# Patient Record
Sex: MALE | Race: WHITE | Employment: OTHER | URBAN - METROPOLITAN AREA
[De-identification: names, ages, dates, MRNs, and addresses within clinical notes are randomized per-mention and may not be internally consistent; named-entity substitution may affect disease eponyms.]

---

## 2017-03-03 ENCOUNTER — OFFICE VISIT (OUTPATIENT)
Dept: INTERNAL MEDICINE CLINIC | Age: 47
End: 2017-03-03

## 2017-03-03 VITALS
BODY MASS INDEX: 24.99 KG/M2 | HEIGHT: 65 IN | HEART RATE: 56 BPM | RESPIRATION RATE: 18 BRPM | WEIGHT: 150 LBS | OXYGEN SATURATION: 98 % | DIASTOLIC BLOOD PRESSURE: 70 MMHG | SYSTOLIC BLOOD PRESSURE: 120 MMHG

## 2017-03-03 DIAGNOSIS — E55.9 VITAMIN D DEFICIENCY: ICD-10-CM

## 2017-03-03 DIAGNOSIS — K21.9 GASTROESOPHAGEAL REFLUX DISEASE WITHOUT ESOPHAGITIS: ICD-10-CM

## 2017-03-03 DIAGNOSIS — J44.9 CHRONIC OBSTRUCTIVE PULMONARY DISEASE, UNSPECIFIED COPD TYPE (HCC): ICD-10-CM

## 2017-03-03 DIAGNOSIS — I10 ESSENTIAL HYPERTENSION: ICD-10-CM

## 2017-03-03 DIAGNOSIS — R73.03 PREDIABETES: ICD-10-CM

## 2017-03-03 DIAGNOSIS — E53.8 FOLATE DEFICIENCY: ICD-10-CM

## 2017-03-03 DIAGNOSIS — E78.5 HYPERLIPIDEMIA LDL GOAL <70: Primary | ICD-10-CM

## 2017-03-03 DIAGNOSIS — M62.838 MUSCLE SPASM: ICD-10-CM

## 2017-03-03 LAB
A/G RATIO: 1.5 (ref 1.1–2.2)
ALBUMIN SERPL-MCNC: 4.3 G/DL (ref 3.4–5)
ALP BLD-CCNC: 92 U/L (ref 40–129)
ALT SERPL-CCNC: 13 U/L (ref 10–40)
ANION GAP SERPL CALCULATED.3IONS-SCNC: 17 MMOL/L (ref 3–16)
AST SERPL-CCNC: 19 U/L (ref 15–37)
BILIRUB SERPL-MCNC: 0.3 MG/DL (ref 0–1)
BUN BLDV-MCNC: 17 MG/DL (ref 7–20)
CALCIUM SERPL-MCNC: 9.3 MG/DL (ref 8.3–10.6)
CHLORIDE BLD-SCNC: 99 MMOL/L (ref 99–110)
CHOLESTEROL, TOTAL: 123 MG/DL (ref 0–199)
CO2: 29 MMOL/L (ref 21–32)
CREAT SERPL-MCNC: 0.9 MG/DL (ref 0.9–1.3)
GFR AFRICAN AMERICAN: >60
GFR NON-AFRICAN AMERICAN: >60
GLOBULIN: 2.8 G/DL
GLUCOSE BLD-MCNC: 88 MG/DL (ref 70–99)
HDLC SERPL-MCNC: 56 MG/DL (ref 40–60)
LDL CHOLESTEROL CALCULATED: 43 MG/DL
POTASSIUM SERPL-SCNC: 4.3 MMOL/L (ref 3.5–5.1)
SODIUM BLD-SCNC: 145 MMOL/L (ref 136–145)
TOTAL PROTEIN: 7.1 G/DL (ref 6.4–8.2)
TRIGL SERPL-MCNC: 120 MG/DL (ref 0–150)
TSH SERPL DL<=0.05 MIU/L-ACNC: 0.95 UIU/ML (ref 0.27–4.2)
VLDLC SERPL CALC-MCNC: 24 MG/DL

## 2017-03-03 PROCEDURE — 36415 COLL VENOUS BLD VENIPUNCTURE: CPT | Performed by: FAMILY MEDICINE

## 2017-03-03 PROCEDURE — 99214 OFFICE O/P EST MOD 30 MIN: CPT | Performed by: FAMILY MEDICINE

## 2017-03-03 RX ORDER — HYDROCHLOROTHIAZIDE 25 MG/1
TABLET ORAL
Qty: 30 TABLET | Refills: 11 | Status: SHIPPED | OUTPATIENT
Start: 2017-03-03 | End: 2018-03-15 | Stop reason: SDUPTHER

## 2017-03-03 RX ORDER — ATORVASTATIN CALCIUM 20 MG/1
20 TABLET, FILM COATED ORAL DAILY
Qty: 30 TABLET | Refills: 11 | Status: SHIPPED | OUTPATIENT
Start: 2017-03-03 | End: 2018-03-15 | Stop reason: SDUPTHER

## 2017-03-03 RX ORDER — MULTIVIT-MIN/IRON/FOLIC ACID/K 18-600-40
1 CAPSULE ORAL DAILY
Qty: 30 CAPSULE | Refills: 11 | Status: SHIPPED | OUTPATIENT
Start: 2017-03-03 | End: 2018-03-15 | Stop reason: SDUPTHER

## 2017-03-03 RX ORDER — FOLIC ACID 1 MG/1
1 TABLET ORAL DAILY
Qty: 30 TABLET | Refills: 11 | Status: SHIPPED | OUTPATIENT
Start: 2017-03-03 | End: 2018-04-03 | Stop reason: ALTCHOICE

## 2017-03-03 RX ORDER — ALBUTEROL SULFATE 90 UG/1
2 AEROSOL, METERED RESPIRATORY (INHALATION) EVERY 4 HOURS PRN
Qty: 1 INHALER | Refills: 11 | Status: SHIPPED | OUTPATIENT
Start: 2017-03-03 | End: 2018-03-15 | Stop reason: SDUPTHER

## 2017-03-03 RX ORDER — PANTOPRAZOLE SODIUM 40 MG/1
TABLET, DELAYED RELEASE ORAL
Qty: 30 TABLET | Refills: 11 | Status: SHIPPED | OUTPATIENT
Start: 2017-03-03 | End: 2018-03-15 | Stop reason: SDUPTHER

## 2017-03-04 ENCOUNTER — TELEPHONE (OUTPATIENT)
Dept: INTERNAL MEDICINE CLINIC | Age: 47
End: 2017-03-04

## 2017-03-04 LAB
ESTIMATED AVERAGE GLUCOSE: 125.5 MG/DL
HBA1C MFR BLD: 6 %

## 2017-03-04 ASSESSMENT — ENCOUNTER SYMPTOMS
WHEEZING: 0
SHORTNESS OF BREATH: 0
DIARRHEA: 0
ABDOMINAL PAIN: 0
VOMITING: 0
COUGH: 0

## 2018-03-15 ENCOUNTER — OFFICE VISIT (OUTPATIENT)
Dept: FAMILY MEDICINE CLINIC | Age: 48
End: 2018-03-15

## 2018-03-15 VITALS
SYSTOLIC BLOOD PRESSURE: 128 MMHG | WEIGHT: 145.4 LBS | HEART RATE: 78 BPM | HEIGHT: 64 IN | DIASTOLIC BLOOD PRESSURE: 76 MMHG | OXYGEN SATURATION: 94 % | BODY MASS INDEX: 24.82 KG/M2

## 2018-03-15 DIAGNOSIS — J44.9 CHRONIC OBSTRUCTIVE PULMONARY DISEASE, UNSPECIFIED COPD TYPE (HCC): ICD-10-CM

## 2018-03-15 DIAGNOSIS — Z12.11 SCREEN FOR COLON CANCER: ICD-10-CM

## 2018-03-15 DIAGNOSIS — L81.8 EXTENSIVE TATTOOS: ICD-10-CM

## 2018-03-15 DIAGNOSIS — R73.03 PREDIABETES: ICD-10-CM

## 2018-03-15 DIAGNOSIS — Z11.4 SCREENING FOR HIV WITHOUT PRESENCE OF RISK FACTORS: ICD-10-CM

## 2018-03-15 DIAGNOSIS — Z00.00 ANNUAL PHYSICAL EXAM: Primary | ICD-10-CM

## 2018-03-15 DIAGNOSIS — E78.5 HYPERLIPIDEMIA LDL GOAL <70: ICD-10-CM

## 2018-03-15 DIAGNOSIS — E55.9 VITAMIN D DEFICIENCY: ICD-10-CM

## 2018-03-15 DIAGNOSIS — Z12.5 SCREENING PSA (PROSTATE SPECIFIC ANTIGEN): ICD-10-CM

## 2018-03-15 DIAGNOSIS — I10 ESSENTIAL HYPERTENSION: ICD-10-CM

## 2018-03-15 DIAGNOSIS — E78.2 MIXED HYPERLIPIDEMIA: ICD-10-CM

## 2018-03-15 DIAGNOSIS — K21.9 GASTROESOPHAGEAL REFLUX DISEASE WITHOUT ESOPHAGITIS: ICD-10-CM

## 2018-03-15 LAB
A/G RATIO: 2.1 (ref 1.1–2.2)
ALBUMIN SERPL-MCNC: 4.5 G/DL (ref 3.4–5)
ALP BLD-CCNC: 93 U/L (ref 40–129)
ALT SERPL-CCNC: 12 U/L (ref 10–40)
ANION GAP SERPL CALCULATED.3IONS-SCNC: 14 MMOL/L (ref 3–16)
AST SERPL-CCNC: 16 U/L (ref 15–37)
BILIRUB SERPL-MCNC: <0.2 MG/DL (ref 0–1)
BUN BLDV-MCNC: 15 MG/DL (ref 7–20)
CALCIUM SERPL-MCNC: 9 MG/DL (ref 8.3–10.6)
CHLORIDE BLD-SCNC: 98 MMOL/L (ref 99–110)
CHOLESTEROL, TOTAL: 116 MG/DL (ref 0–199)
CO2: 28 MMOL/L (ref 21–32)
CREAT SERPL-MCNC: 0.9 MG/DL (ref 0.9–1.3)
GFR AFRICAN AMERICAN: >60
GFR NON-AFRICAN AMERICAN: >60
GLOBULIN: 2.1 G/DL
GLUCOSE BLD-MCNC: 100 MG/DL (ref 70–99)
HDLC SERPL-MCNC: 62 MG/DL (ref 40–60)
HEPATITIS C ANTIBODY INTERPRETATION: NORMAL
LDL CHOLESTEROL CALCULATED: 43 MG/DL
POTASSIUM SERPL-SCNC: 4 MMOL/L (ref 3.5–5.1)
PROSTATE SPECIFIC ANTIGEN: 0.24 NG/ML (ref 0–4)
SODIUM BLD-SCNC: 140 MMOL/L (ref 136–145)
TOTAL PROTEIN: 6.6 G/DL (ref 6.4–8.2)
TRIGL SERPL-MCNC: 53 MG/DL (ref 0–150)
VITAMIN D 25-HYDROXY: 34.8 NG/ML
VLDLC SERPL CALC-MCNC: 11 MG/DL

## 2018-03-15 PROCEDURE — 36415 COLL VENOUS BLD VENIPUNCTURE: CPT | Performed by: FAMILY MEDICINE

## 2018-03-15 PROCEDURE — 99396 PREV VISIT EST AGE 40-64: CPT | Performed by: FAMILY MEDICINE

## 2018-03-15 RX ORDER — ATORVASTATIN CALCIUM 20 MG/1
20 TABLET, FILM COATED ORAL DAILY
Qty: 90 TABLET | Refills: 0 | Status: SHIPPED | OUTPATIENT
Start: 2018-03-15 | End: 2018-06-10 | Stop reason: SDUPTHER

## 2018-03-15 RX ORDER — HYDROCHLOROTHIAZIDE 25 MG/1
TABLET ORAL
Qty: 90 TABLET | Refills: 11 | Status: SHIPPED | OUTPATIENT
Start: 2018-03-15 | End: 2019-04-10 | Stop reason: SDUPTHER

## 2018-03-15 RX ORDER — MULTIVIT-MIN/IRON/FOLIC ACID/K 18-600-40
1 CAPSULE ORAL DAILY
Qty: 90 CAPSULE | Refills: 0 | Status: SHIPPED | OUTPATIENT
Start: 2018-03-15 | End: 2018-06-11 | Stop reason: ALTCHOICE

## 2018-03-15 RX ORDER — ALBUTEROL SULFATE 90 UG/1
2 AEROSOL, METERED RESPIRATORY (INHALATION) EVERY 4 HOURS PRN
Qty: 1 INHALER | Refills: 2 | Status: SHIPPED | OUTPATIENT
Start: 2018-03-15 | End: 2020-03-03

## 2018-03-15 RX ORDER — PANTOPRAZOLE SODIUM 40 MG/1
TABLET, DELAYED RELEASE ORAL
Qty: 90 TABLET | Refills: 0 | Status: SHIPPED | OUTPATIENT
Start: 2018-03-15 | End: 2018-06-10 | Stop reason: SDUPTHER

## 2018-03-15 ASSESSMENT — ENCOUNTER SYMPTOMS
EYE DISCHARGE: 0
CONSTIPATION: 0
SINUS PRESSURE: 0
WHEEZING: 0
ABDOMINAL PAIN: 0
DIARRHEA: 0
EYE PAIN: 0
COUGH: 0
SHORTNESS OF BREATH: 1
RHINORRHEA: 0
COLOR CHANGE: 0
VOMITING: 0
BLOOD IN STOOL: 0
CHEST TIGHTNESS: 0

## 2018-03-15 NOTE — PROGRESS NOTES
capsule by mouth daily 30 capsule 11     No current facility-administered medications for this visit. Allergies: No known allergies  Past Medical History:   Diagnosis Date    Atherosclerosis 11/15: [place]    COPD (chronic obstructive pulmonary disease) ([address]     Folate deficiency 01/2013    GERD (gastroesophageal reflux disease)     Hyperlipidemia 2/14    Hypertriglyceridemia, Good HDL    Hypertension 12/12: age 37    Needle phobia     Prediabetes 11/2016    Vitamin D deficiency 1/13     Past Surgical History:   Procedure Laterality Date    ANKLE FRACTURE SURGERY Left 1998    BRONCHOSCOPY  12-   [address] [place] [address] DENTAL SURGERY  2/13     Family History   Problem Relation Age of Onset    COPD Mother 39    COPD Father 39     Social History   Substance Use Topics    Smoking status: Current Every Day Smoker     Packs/day: 1.00     Years: 30.00     Types: Cigarettes     Start date: 1984    Smokeless tobacco: Never Used    Alcohol use 0.0 oz/week      Comment: occasional     Vitals:    03/15/18 1110   BP: 128/76   Pulse: 78   SpO2: 94%   Weight: 145 lb 6.4 oz (66 kg)   Height: 5' 4\" (1.626 m)     Body mass index is 24.96 kg/m². Wt Readings from Last 3 Encounters:   03/15/18 145 lb 6.4 oz (66 kg)   01/18/18 145 lb (65.8 kg)   03/03/17 150 lb (68 kg)     BP Readings from Last 3 Encounters:   03/15/18 128/76   01/18/18 133/65   03/03/17 120/70        Review of Systems   Constitutional: Negative for fatigue, fever and unexpected weight change. HENT: Negative for congestion, ear discharge, ear pain, hearing loss, rhinorrhea and sinus pressure. Eyes: Negative for pain, discharge and visual disturbance. Respiratory: Positive for shortness of breath. Negative for cough, chest tightness and wheezing. Cardiovascular: Negative for chest pain, palpitations and leg swelling. Gastrointestinal: Negative for abdominal pain, blood in stool, constipation, diarrhea and vomiting.    Genitourinary: Negative for difficulty urinating, dysuria and hematuria. Musculoskeletal: Negative for arthralgias and gait problem. Skin: Negative for color change and rash. Neurological: Negative for dizziness, seizures, syncope and headaches. Hematological: Negative for adenopathy. Does not bruise/bleed easily. Psychiatric/Behavioral: Negative for dysphoric mood and sleep disturbance. The patient is not nervous/anxious. Objective:   Physical Exam   Constitutional: He is oriented to person, place, and time. He appears well-developed and well-nourished. No distress. HENT:   Head: Normocephalic. Right Ear: External ear normal.   Left Ear: External ear normal.   Nose: Nose normal.   Mouth/Throat: Oropharynx is clear and moist. No oropharyngeal exudate. Eyes: Conjunctivae and EOM are normal. Pupils are equal, round, and reactive to light. No scleral icterus. Neck: Neck supple. No thyromegaly present. Cardiovascular: Normal rate, regular rhythm, normal heart sounds and intact distal pulses. No murmur heard. Pulmonary/Chest: Effort normal and breath sounds normal. He has no wheezes. Abdominal: Soft. Bowel sounds are normal. He exhibits no distension. There is no tenderness. There is no rebound and no guarding. Musculoskeletal: Normal range of motion. He exhibits no edema, tenderness or deformity. Lymphadenopathy:     He has no cervical adenopathy. Neurological: He is alert and oriented to person, place, and time. No cranial nerve deficit. Coordination normal.   Skin: Skin is warm and dry. Multiple tattoos   Psychiatric: He has a normal mood and affect.  His behavior is normal. Judgment and thought content normal.       Assessment:      cpe  htn  hld  Prediabetes  COPD  GERD        Plan:      Orders Placed This Encounter   Procedures    LIPID PANEL     Order Specific Question:   Is Patient Fasting?/# of Hours     Answer:   12    COMPREHENSIVE METABOLIC PANEL    VITAMIN D 25 HYDROXY    HEMOGLOBIN A1C    PSA Screening    HIV-1 AND HIV-2 ANTIBODIES    HEPATITIS C ANTIBODY    Garlin Leventhal- Teri Belling, MD     Referral Priority:   Routine     Referral Type:   Consult for Advice and Opinion     Referral Reason:   Specialty Services Required     Referred to Provider:   Angel Real MD     Requested Specialty:   Pulmonology     Number of Visits Requested:   1   Diony Nguyen MD     Referral Priority:   Routine     Referral Type:   Consult for Advice and Opinion     Referral Reason:   Specialty Services Required     Referred to Provider:   Claudette Griffes, MD     Requested Specialty:   Gastroenterology     Number of Visits Requested:   1     Patient Counseling:  --Nutrition: Stressed importance of moderation in sodium/caffeine intake, saturated fat and cholesterol, caloric balance, sufficient intake of fresh fruits, vegetables, fiber, calcium, iron, and 1 mg of folate supplement per day (for females capable of pregnancy). --Exercise: Stressed the importance of regular exercise. --Dental health: Discussed importance of regular tooth brushing, flossing, and dental visits. --Immunizations reviewed. Daily sunscreen recommended. Yearly FSE discussed  --Discussed benefits of screening colonoscopy.

## 2018-03-16 ENCOUNTER — TELEPHONE (OUTPATIENT)
Dept: FAMILY MEDICINE CLINIC | Age: 48
End: 2018-03-16

## 2018-03-16 LAB
ESTIMATED AVERAGE GLUCOSE: 111.2 MG/DL
HBA1C MFR BLD: 5.5 %
HIV AG/AB: NORMAL
HIV ANTIGEN: NORMAL
HIV-1 ANTIBODY: NORMAL
HIV-2 AB: NORMAL

## 2018-03-16 NOTE — TELEPHONE ENCOUNTER
Patient's wife states that if Dr. Nydia Henao wants him to continue taking the magnesium and folic acid they would like a script sent to pharmacy, if she is ok with him stopping these just let them know. They were advised Dr. Nydia Henao is gone for the day and may not respond until Monday and they are ok with this.

## 2018-03-16 NOTE — TELEPHONE ENCOUNTER
The patient called about the prescriptions Dr. Johnny Gonzalez sent over to the pharmacy. He was able to get all of his scripts however he noticed that the magnesium and folic acid he takes were not sent in. He would like to know if Dr. Johnny Gonzalez would prescribe those or does she not want him taking those.

## 2018-03-19 NOTE — TELEPHONE ENCOUNTER
Patient's wife was advised he does not need to continue taking these supplements per Dr. Gerardo Rao so no scripts will be sent.

## 2018-03-28 ENCOUNTER — INITIAL CONSULT (OUTPATIENT)
Dept: GASTROENTEROLOGY | Age: 48
End: 2018-03-28

## 2018-03-28 ENCOUNTER — OFFICE VISIT (OUTPATIENT)
Dept: PULMONOLOGY | Age: 48
End: 2018-03-28

## 2018-03-28 VITALS
HEIGHT: 64 IN | DIASTOLIC BLOOD PRESSURE: 72 MMHG | BODY MASS INDEX: 24.92 KG/M2 | SYSTOLIC BLOOD PRESSURE: 109 MMHG | RESPIRATION RATE: 16 BRPM | OXYGEN SATURATION: 93 % | TEMPERATURE: 97.6 F | HEART RATE: 78 BPM | WEIGHT: 146 LBS

## 2018-03-28 VITALS
DIASTOLIC BLOOD PRESSURE: 68 MMHG | BODY MASS INDEX: 24.92 KG/M2 | WEIGHT: 146 LBS | SYSTOLIC BLOOD PRESSURE: 110 MMHG | HEIGHT: 64 IN

## 2018-03-28 DIAGNOSIS — R13.19 ESOPHAGEAL DYSPHAGIA: Primary | ICD-10-CM

## 2018-03-28 DIAGNOSIS — R12 HEARTBURN: ICD-10-CM

## 2018-03-28 DIAGNOSIS — J44.9 CHRONIC OBSTRUCTIVE PULMONARY DISEASE, UNSPECIFIED COPD TYPE (HCC): Primary | ICD-10-CM

## 2018-03-28 DIAGNOSIS — Z12.11 SCREENING FOR COLON CANCER: ICD-10-CM

## 2018-03-28 PROCEDURE — 3017F COLORECTAL CA SCREEN DOC REV: CPT | Performed by: INTERNAL MEDICINE

## 2018-03-28 PROCEDURE — G8926 SPIRO NO PERF OR DOC: HCPCS | Performed by: INTERNAL MEDICINE

## 2018-03-28 PROCEDURE — 3023F SPIROM DOC REV: CPT | Performed by: INTERNAL MEDICINE

## 2018-03-28 PROCEDURE — 99214 OFFICE O/P EST MOD 30 MIN: CPT | Performed by: INTERNAL MEDICINE

## 2018-03-28 PROCEDURE — G8419 CALC BMI OUT NRM PARAM NOF/U: HCPCS | Performed by: INTERNAL MEDICINE

## 2018-03-28 PROCEDURE — 4004F PT TOBACCO SCREEN RCVD TLK: CPT | Performed by: INTERNAL MEDICINE

## 2018-03-28 PROCEDURE — G8427 DOCREV CUR MEDS BY ELIG CLIN: HCPCS | Performed by: INTERNAL MEDICINE

## 2018-03-28 PROCEDURE — G8484 FLU IMMUNIZE NO ADMIN: HCPCS | Performed by: INTERNAL MEDICINE

## 2018-03-28 PROCEDURE — 99204 OFFICE O/P NEW MOD 45 MIN: CPT | Performed by: INTERNAL MEDICINE

## 2018-03-28 RX ORDER — VARENICLINE TARTRATE 25 MG
KIT ORAL
Qty: 1 EACH | Refills: 0 | Status: SHIPPED | OUTPATIENT
Start: 2018-03-28 | End: 2018-05-04

## 2018-03-28 RX ORDER — VARENICLINE TARTRATE 1 MG/1
1 TABLET, FILM COATED ORAL 2 TIMES DAILY
Qty: 60 TABLET | Refills: 3 | Status: SHIPPED | OUTPATIENT
Start: 2018-04-24 | End: 2018-05-04

## 2018-03-28 NOTE — LETTER
Dabigatran (Pradaxa), Apixaban (Eliquis), Edoxaban (Savaysa)you should have received instructions regarding if and when to discontinue the medication. If you have not, or do not clearly understand the instructions, please call the office for clarification ([phone]). 5. Drink plenty of fluid. 1 day prior to your procedure:  1. Do not eat any SOLID FOOD, beginning with breakfast drink clear liquids only, which includes: Coffee/tea (without milk or creamer) Gatorade/PowerAde (no red or purple), JeIl-O (no red or purple), All Soda (even dark cola), Sorbet/Popsicles (no red or purple) Water If you take Diabetic medications (insulin/oral medication)-reduce the amount by one half on the morning of prep. You may resume the meds once you begin eating again. You must drink 8oz of liquids every hour to avoid dehydration. 2. If you take Diabetic medications (insulin/oral medication) - reduce the amount by one half on morning of prep. You may resume the meds once you begin eating again. 3. Bowel Cleansing Prep  * 7:45 pm Take all four dulcolax tablets. * 8:00 pm (one day prior)-Fill cup with 5oz of cold water then add one packet of prep (stir it for 2-3 min.) & drink it. MAKE SURE THE SOLUTION IS DISSOLVED PRIOR TO DRINKING IT. You must drink 5 more 8oz glasses of clear liquids within the next 5 hrs. MORNING OF PROCEDURE  1. __8:00 AM___(5 hrs prior to the procedure) Mix second packet with 5oz of cold water and drink it. You must drink 3 more 8oz glasses of clear liquids within the next 2hrs. 2. Take your Blood pressure, Heart and Seizure medication the morning of the procedure with sips of water. 3. Bring inhalers with you. 4. Do not take your Diabetic medication the morning of procedure. 5. If you are not having an EGD, you may continue drinking liquids until 2hrs prior to procedure.

## 2018-03-28 NOTE — PROGRESS NOTES
Chief Complaint: shortness of breath     Referring Provider: [facility] History:   Severe dyspnea on exertion, walks less than 50 feet. Tries ventolin with small improvement. Presenting HPI: 51 yo WM with 60 pack year tobacco, now with 2 year moderate progressive dyspnea on exertion associated with productive cough; symptoms are not 100% predictable, some intermittent nature, but daily. Symptoms are improved with albuterol       Physical Exam:  Blood pressure 109/72, pulse 78, temperature 97.6 °F (36.4 °C), temperature source Oral, resp. rate 16, height 5' 4\" (1.626 m), weight 146 lb (66.2 kg), SpO2 93 %.'  Constitutional:  No acute distress. HENT:  Oropharynx is clear and moist. No thyromegaly. Eyes:  Conjunctivae are normal. Pupils equal, round, and reactive to light. No scleral icterus. Neck: . No tracheal deviation present. No obvious thyroid mass. Cardiovascular: Normal rate, regular rhythm, normal heart sounds. No right ventricular heave. No lower extremity edema. Pulmonary/Chest: Few wheezes. No rales. Chest wall is not dull to percussion. No accessory muscle usage or stridor. Abdominal: Soft. Bowel sounds present. No distension or hernia. No tenderness. Musculoskeletal: No cyanosis. No clubbing. No obvious joint deformity. Lymphadenopathy: No cervical or supraclavicular adenopathy. Skin: Skin is warm and dry. No rash or nodules on the exposed extremities. Psychiatric: Normal mood and affect. Behavior is normal.  No anxiety. Neurologic: Alert, awake and oriented. PERRL. Speech fluent    Data:   CT CHEST 11-18-15  There is some minimal increased opacity in the right mainstem bronchus and   bronchus intermedius likely retained secretions. The central airways are   otherwise patent. Subpleural opacity is noted posteriorly at the right lung base likely   representative of atelectasis. There is a calcified granuloma noted posteriorly in the left lower lobe. Lungs are otherwise grossly clear. Mediastinal structures demonstrate a normal appearance of the heart and great   vessels. No suspicious hilar or mediastinal adenopathy noted. The esophagus appears to taper normally to the GE junction. GE junction is unremarkable. Stomach and the small bowel are unremarkable in appearance. Mesenteric and   retroperitoneal structures including the abdominal aorta are grossly   unremarkable. Atherosclerotic disease is noted. Liver, gallbladder, spleen, kidneys, and pancreas are unremarkable in   appearance. No acute abnormality of the visualized osseous structures. Impression   IMPRESSION:   No acute abnormality of the chest or abdomen noted. PFT 1/12/16 FEV1 1/05 L 32%  ++ airtrapping  DLCO 14.73 50%    Bronchoscopy 2015 cytology no malignant cells. Assessment:  Severe COPD  Severe dyspnea on exertion likely secondary to above  Family h/o COPD  Ongoing tobacco abuse, 60 pack year tobacco history, now down to 1/2 ppd. Plan:   · Anoro today  · Albuterol PRN, consider nebulizer in future  · Alpha 1 antitrypsin level, COPD  · CBC diff, drug monitoring, COPD  · Handicap placard today, dx COPD  · Chantix today  · See me in about 2 months    I discussed with this patient today the risks and benefits of various tobacco cessation strategies, including, but not limited to \"cold turkey,\" nicotine replacement, Chantix and wellbutrin. We specifically discussed the risks of Chantix and Wellbutrin, with focus on side effects to include nausea, intense dreams, seizures and spent considerable time focusing on the warning regarding depression. The patient specifically denies suicidal ideation/homicidal ideation and was counseled to stop the product and immediately seek medical assistance for any worsening depression/mood disturbance, agitation, hostility or changes in behavior or thinking. Specific risk of completed suicide was discussed.   The patient is encouraged to read enclosed literature for any prescribed medications.

## 2018-04-03 ENCOUNTER — HOSPITAL ENCOUNTER (OUTPATIENT)
Dept: OTHER | Age: 48
Discharge: HOME | End: 2018-04-03
Attending: INTERNAL MEDICINE | Admitting: INTERNAL MEDICINE

## 2018-04-03 VITALS
WEIGHT: 146 LBS | HEART RATE: 65 BPM | HEIGHT: 64 IN | SYSTOLIC BLOOD PRESSURE: 118 MMHG | DIASTOLIC BLOOD PRESSURE: 52 MMHG | OXYGEN SATURATION: 94 % | BODY MASS INDEX: 24.92 KG/M2 | TEMPERATURE: 97.2 F | RESPIRATION RATE: 16 BRPM

## 2018-04-03 PROCEDURE — 43239 EGD BIOPSY SINGLE/MULTIPLE: CPT | Performed by: INTERNAL MEDICINE

## 2018-04-03 PROCEDURE — 43450 DILATE ESOPHAGUS 1/MULT PASS: CPT | Performed by: INTERNAL MEDICINE

## 2018-04-03 PROCEDURE — 45378 DIAGNOSTIC COLONOSCOPY: CPT | Performed by: INTERNAL MEDICINE

## 2018-04-03 PROCEDURE — 99152 MOD SED SAME PHYS/QHP 5/>YRS: CPT | Performed by: INTERNAL MEDICINE

## 2018-04-03 RX ORDER — SODIUM CHLORIDE, SODIUM LACTATE, POTASSIUM CHLORIDE, CALCIUM CHLORIDE 600; 310; 30; 20 MG/100ML; MG/100ML; MG/100ML; MG/100ML
INJECTION, SOLUTION INTRAVENOUS CONTINUOUS
Status: DISCONTINUED | OUTPATIENT
Start: 2018-04-03 | End: 2018-04-04 | Stop reason: HOSPADM

## 2018-04-03 RX ADMIN — SODIUM CHLORIDE, SODIUM LACTATE, POTASSIUM CHLORIDE, CALCIUM CHLORIDE: 600; 310; 30; 20 INJECTION, SOLUTION INTRAVENOUS at 11:55

## 2018-04-27 ENCOUNTER — OFFICE VISIT (OUTPATIENT)
Dept: ORTHOPEDIC SURGERY | Age: 48
End: 2018-04-27

## 2018-04-27 VITALS
SYSTOLIC BLOOD PRESSURE: 132 MMHG | WEIGHT: 145.94 LBS | BODY MASS INDEX: 24.92 KG/M2 | DIASTOLIC BLOOD PRESSURE: 74 MMHG | HEIGHT: 64 IN | HEART RATE: 68 BPM

## 2018-04-27 DIAGNOSIS — M54.12 CERVICAL RADICULOPATHY: Primary | ICD-10-CM

## 2018-04-27 PROCEDURE — 99203 OFFICE O/P NEW LOW 30 MIN: CPT | Performed by: ORTHOPAEDIC SURGERY

## 2018-04-27 PROCEDURE — G8419 CALC BMI OUT NRM PARAM NOF/U: HCPCS | Performed by: ORTHOPAEDIC SURGERY

## 2018-04-27 PROCEDURE — 3017F COLORECTAL CA SCREEN DOC REV: CPT | Performed by: ORTHOPAEDIC SURGERY

## 2018-04-27 PROCEDURE — 4004F PT TOBACCO SCREEN RCVD TLK: CPT | Performed by: ORTHOPAEDIC SURGERY

## 2018-04-27 PROCEDURE — G8427 DOCREV CUR MEDS BY ELIG CLIN: HCPCS | Performed by: ORTHOPAEDIC SURGERY

## 2018-04-27 RX ORDER — CYCLOBENZAPRINE HCL 10 MG
10 TABLET ORAL 3 TIMES DAILY PRN
Qty: 30 TABLET | Refills: 0 | Status: SHIPPED | OUTPATIENT
Start: 2018-04-27 | End: 2018-05-07

## 2018-05-04 ENCOUNTER — HOSPITAL ENCOUNTER (OUTPATIENT)
Dept: OTHER | Age: 48
Discharge: HOME | End: 2018-05-05
Attending: FAMILY MEDICINE | Admitting: FAMILY MEDICINE

## 2018-05-04 ENCOUNTER — HOSPITAL ENCOUNTER (OUTPATIENT)
Dept: GENERAL RADIOLOGY | Age: 48
Discharge: HOME | End: 2018-05-04

## 2018-05-04 ENCOUNTER — OFFICE VISIT (OUTPATIENT)
Dept: FAMILY MEDICINE CLINIC | Age: 48
End: 2018-05-04

## 2018-05-04 VITALS
SYSTOLIC BLOOD PRESSURE: 120 MMHG | WEIGHT: 149.4 LBS | HEART RATE: 82 BPM | DIASTOLIC BLOOD PRESSURE: 64 MMHG | HEIGHT: 64 IN | BODY MASS INDEX: 25.51 KG/M2 | OXYGEN SATURATION: 96 %

## 2018-05-04 DIAGNOSIS — M54.12 ACUTE CERVICAL RADICULOPATHY: Primary | ICD-10-CM

## 2018-05-04 DIAGNOSIS — M54.12 ACUTE CERVICAL RADICULOPATHY: ICD-10-CM

## 2018-05-04 PROCEDURE — 3017F COLORECTAL CA SCREEN DOC REV: CPT | Performed by: FAMILY MEDICINE

## 2018-05-04 PROCEDURE — 1036F TOBACCO NON-USER: CPT | Performed by: FAMILY MEDICINE

## 2018-05-04 PROCEDURE — G8419 CALC BMI OUT NRM PARAM NOF/U: HCPCS | Performed by: FAMILY MEDICINE

## 2018-05-04 PROCEDURE — 99213 OFFICE O/P EST LOW 20 MIN: CPT | Performed by: FAMILY MEDICINE

## 2018-05-04 PROCEDURE — G8427 DOCREV CUR MEDS BY ELIG CLIN: HCPCS | Performed by: FAMILY MEDICINE

## 2018-05-04 RX ORDER — UMECLIDINIUM BROMIDE AND VILANTEROL TRIFENATATE 62.5; 25 UG/1; UG/1
1 POWDER RESPIRATORY (INHALATION) EVERY MORNING
COMMUNITY
Start: 2018-04-23 | End: 2018-07-12

## 2018-05-04 ASSESSMENT — ENCOUNTER SYMPTOMS
CONSTIPATION: 0
WHEEZING: 0
DIARRHEA: 0
VOMITING: 0
EYE PAIN: 0
RHINORRHEA: 0
ABDOMINAL PAIN: 0
SINUS PRESSURE: 0
CHEST TIGHTNESS: 0
EYE DISCHARGE: 0
COUGH: 0
SHORTNESS OF BREATH: 0
BLOOD IN STOOL: 0

## 2018-05-07 ENCOUNTER — TELEPHONE (OUTPATIENT)
Dept: FAMILY MEDICINE CLINIC | Age: 48
End: 2018-05-07

## 2018-05-07 DIAGNOSIS — M54.12 CERVICAL RADICULOPATHY: Primary | ICD-10-CM

## 2018-05-07 RX ORDER — HYDROCODONE BITARTRATE AND ACETAMINOPHEN 5; 325 MG/1; MG/1
1 TABLET ORAL EVERY 6 HOURS PRN
Qty: 15 TABLET | Refills: 0 | Status: SHIPPED | OUTPATIENT
Start: 2018-05-07 | End: 2018-05-11

## 2018-05-07 NOTE — TELEPHONE ENCOUNTER
Patient says he is in a lot of pain and flexeril is not helping. When asked if he is doing the exercises which Nayeli recommended he stated no because he is in too much pain to even consider exercises. Advised we can not move the MRI up any sooner than this Wednesday and we do not right for pain medications but can see if there is anything else Dr. Jemima Mccrary can recommend.

## 2018-05-07 NOTE — TELEPHONE ENCOUNTER
Patient's wife called again. I advised nurse spoke to patient already, and a message was sent to Dr. Otilia Kellogg to see if something could be done in the interim. She understood.

## 2018-05-07 NOTE — TELEPHONE ENCOUNTER
Patient's wife called. Patient is waiting to have an MRI for the spine which is scheduled this Wednesday. Patient now has numbing, stiffness his in right arm along with severe pain. No sleep, cannot take the pain any longer. None of the pain medication is helping with the pain. Would like to know if you could expedite the MRI, help with the pain. Call patient directly [phone].

## 2018-05-07 NOTE — TELEPHONE ENCOUNTER
Patient was advised, he has not called the ortho yet, I did advise he needs to call and follow up with them to let them know there has been no improvement but pain has worsened. He was advised can not give any more than this and it is a one time script, reviewed precautions while driving due to drowsiness can not drive while taking. He voiced his understanding.

## 2018-05-07 NOTE — TELEPHONE ENCOUNTER
I have a script for hydrocodone -15 pills only. Can not prescribe more than that. Will need to  script  This can make drowsy no driving with it  Did he call the ortho doctor he saw originally?

## 2018-05-09 ENCOUNTER — HOSPITAL ENCOUNTER (OUTPATIENT)
Dept: MRI IMAGING | Age: 48
Discharge: HOME | End: 2018-05-09
Attending: FAMILY MEDICINE | Admitting: FAMILY MEDICINE

## 2018-05-09 DIAGNOSIS — M54.12 ACUTE CERVICAL RADICULOPATHY: ICD-10-CM

## 2018-05-14 ENCOUNTER — OFFICE VISIT (OUTPATIENT)
Dept: ORTHOPEDIC SURGERY | Age: 48
End: 2018-05-14

## 2018-05-14 VITALS
HEIGHT: 64 IN | DIASTOLIC BLOOD PRESSURE: 64 MMHG | BODY MASS INDEX: 25.52 KG/M2 | SYSTOLIC BLOOD PRESSURE: 109 MMHG | HEART RATE: 80 BPM | WEIGHT: 149.47 LBS

## 2018-05-14 DIAGNOSIS — M54.12 CERVICAL RADICULOPATHY: Primary | ICD-10-CM

## 2018-05-14 PROCEDURE — G8427 DOCREV CUR MEDS BY ELIG CLIN: HCPCS | Performed by: PHYSICAL MEDICINE & REHABILITATION

## 2018-05-14 PROCEDURE — 3017F COLORECTAL CA SCREEN DOC REV: CPT | Performed by: PHYSICAL MEDICINE & REHABILITATION

## 2018-05-14 PROCEDURE — 99244 OFF/OP CNSLTJ NEW/EST MOD 40: CPT | Performed by: PHYSICAL MEDICINE & REHABILITATION

## 2018-05-14 PROCEDURE — G8419 CALC BMI OUT NRM PARAM NOF/U: HCPCS | Performed by: PHYSICAL MEDICINE & REHABILITATION

## 2018-05-14 RX ORDER — GABAPENTIN 300 MG/1
CAPSULE ORAL
Qty: 90 CAPSULE | Refills: 1 | Status: SHIPPED | OUTPATIENT
Start: 2018-05-14 | End: 2018-06-12 | Stop reason: SDUPTHER

## 2018-05-18 ENCOUNTER — TELEPHONE (OUTPATIENT)
Dept: ORTHOPEDIC SURGERY | Age: 48
End: 2018-05-18

## 2018-05-23 ENCOUNTER — HOSPITAL ENCOUNTER (OUTPATIENT)
Dept: OTHER | Age: 48
Discharge: HOME | End: 2018-05-23
Attending: INTERNAL MEDICINE | Admitting: INTERNAL MEDICINE

## 2018-05-23 ENCOUNTER — OFFICE VISIT (OUTPATIENT)
Dept: PULMONOLOGY | Age: 48
End: 2018-05-23

## 2018-05-23 VITALS
OXYGEN SATURATION: 96 % | HEIGHT: 64 IN | HEART RATE: 71 BPM | RESPIRATION RATE: 18 BRPM | BODY MASS INDEX: 26.05 KG/M2 | SYSTOLIC BLOOD PRESSURE: 108 MMHG | DIASTOLIC BLOOD PRESSURE: 70 MMHG | WEIGHT: 152.6 LBS | TEMPERATURE: 97.6 F

## 2018-05-23 DIAGNOSIS — J44.9 COPD, SEVERE (HCC): Primary | ICD-10-CM

## 2018-05-23 DIAGNOSIS — Z72.0 TOBACCO ABUSE: ICD-10-CM

## 2018-05-23 DIAGNOSIS — R06.00 DYSPNEA, UNSPECIFIED TYPE: ICD-10-CM

## 2018-05-23 PROCEDURE — 4004F PT TOBACCO SCREEN RCVD TLK: CPT | Performed by: INTERNAL MEDICINE

## 2018-05-23 PROCEDURE — 3017F COLORECTAL CA SCREEN DOC REV: CPT | Performed by: INTERNAL MEDICINE

## 2018-05-23 PROCEDURE — G8926 SPIRO NO PERF OR DOC: HCPCS | Performed by: INTERNAL MEDICINE

## 2018-05-23 PROCEDURE — G8419 CALC BMI OUT NRM PARAM NOF/U: HCPCS | Performed by: INTERNAL MEDICINE

## 2018-05-23 PROCEDURE — 99214 OFFICE O/P EST MOD 30 MIN: CPT | Performed by: INTERNAL MEDICINE

## 2018-05-23 PROCEDURE — 3023F SPIROM DOC REV: CPT | Performed by: INTERNAL MEDICINE

## 2018-05-23 PROCEDURE — G8427 DOCREV CUR MEDS BY ELIG CLIN: HCPCS | Performed by: INTERNAL MEDICINE

## 2018-05-24 LAB
BASOPHILS ABSOLUTE: 0.1 K/UL (ref 0–0.2)
BASOPHILS RELATIVE PERCENT: 0.8 %
EOSINOPHILS ABSOLUTE: 0.3 K/UL (ref 0–0.6)
EOSINOPHILS RELATIVE PERCENT: 4.7 %
HCT VFR BLD CALC: 43.6 % (ref 40.5–52.5)
HEMOGLOBIN: 14.7 G/DL (ref 13.5–17.5)
LYMPHOCYTES ABSOLUTE: 1.7 K/UL (ref 1–5.1)
LYMPHOCYTES RELATIVE PERCENT: 23.6 %
MCH RBC QN AUTO: 32.3 PG (ref 26–34)
MCHC RBC AUTO-ENTMCNC: 33.7 G/DL (ref 31–36)
MCV RBC AUTO: 96.1 FL (ref 80–100)
MONOCYTES ABSOLUTE: 0.7 K/UL (ref 0–1.3)
MONOCYTES RELATIVE PERCENT: 9.7 %
NEUTROPHILS ABSOLUTE: 4.4 K/UL (ref 1.7–7.7)
NEUTROPHILS RELATIVE PERCENT: 61.2 %
PDW BLD-RTO: 13.6 % (ref 12.4–15.4)
PLATELET # BLD: 262 K/UL (ref 135–450)
PMV BLD AUTO: 9.1 FL (ref 5–10.5)
RBC # BLD: 4.54 M/UL (ref 4.2–5.9)
WBC # BLD: 7.1 K/UL (ref 4–11)

## 2018-06-07 ENCOUNTER — PAT TELEPHONE (OUTPATIENT)
Dept: PREADMISSION TESTING | Age: 48
End: 2018-06-07

## 2018-06-07 VITALS — BODY MASS INDEX: 25.95 KG/M2 | WEIGHT: 152 LBS | HEIGHT: 64 IN

## 2018-06-07 RX ORDER — SODIUM CHLORIDE, SODIUM LACTATE, POTASSIUM CHLORIDE, CALCIUM CHLORIDE 600; 310; 30; 20 MG/100ML; MG/100ML; MG/100ML; MG/100ML
INJECTION, SOLUTION INTRAVENOUS CONTINUOUS
Status: CANCELLED | OUTPATIENT
Start: 2018-06-11

## 2018-06-10 DIAGNOSIS — E78.5 HYPERLIPIDEMIA LDL GOAL <70: ICD-10-CM

## 2018-06-10 DIAGNOSIS — K21.9 GASTROESOPHAGEAL REFLUX DISEASE WITHOUT ESOPHAGITIS: ICD-10-CM

## 2018-06-11 ENCOUNTER — HOSPITAL ENCOUNTER (OUTPATIENT)
Dept: PAIN MANAGEMENT | Age: 48
Discharge: HOME | End: 2018-06-11
Attending: PHYSICAL MEDICINE & REHABILITATION | Admitting: PHYSICAL MEDICINE & REHABILITATION

## 2018-06-11 VITALS
OXYGEN SATURATION: 99 % | HEIGHT: 64 IN | TEMPERATURE: 97.6 F | BODY MASS INDEX: 25.95 KG/M2 | HEART RATE: 67 BPM | DIASTOLIC BLOOD PRESSURE: 79 MMHG | SYSTOLIC BLOOD PRESSURE: 132 MMHG | WEIGHT: 152 LBS | RESPIRATION RATE: 16 BRPM

## 2018-06-11 RX ORDER — PANTOPRAZOLE SODIUM 40 MG/1
TABLET, DELAYED RELEASE ORAL
Qty: 30 TABLET | Refills: 5 | Status: SHIPPED | OUTPATIENT
Start: 2018-06-11 | End: 2018-12-10 | Stop reason: SDUPTHER

## 2018-06-11 RX ORDER — SODIUM CHLORIDE, SODIUM LACTATE, POTASSIUM CHLORIDE, CALCIUM CHLORIDE 600; 310; 30; 20 MG/100ML; MG/100ML; MG/100ML; MG/100ML
INJECTION, SOLUTION INTRAVENOUS
Status: COMPLETED
Start: 2018-06-11 | End: 2018-06-11

## 2018-06-11 RX ORDER — LIDOCAINE HYDROCHLORIDE 10 MG/ML
INJECTION, SOLUTION EPIDURAL; INFILTRATION; INTRACAUDAL; PERINEURAL
Status: COMPLETED
Start: 2018-06-11 | End: 2018-06-11

## 2018-06-11 RX ORDER — SODIUM CHLORIDE, SODIUM LACTATE, POTASSIUM CHLORIDE, CALCIUM CHLORIDE 600; 310; 30; 20 MG/100ML; MG/100ML; MG/100ML; MG/100ML
INJECTION, SOLUTION INTRAVENOUS CONTINUOUS
Status: DISCONTINUED | OUTPATIENT
Start: 2018-06-11 | End: 2018-06-12 | Stop reason: HOSPADM

## 2018-06-11 RX ORDER — ATORVASTATIN CALCIUM 20 MG/1
TABLET, FILM COATED ORAL
Qty: 30 TABLET | Refills: 5 | Status: SHIPPED | OUTPATIENT
Start: 2018-06-11 | End: 2018-12-10 | Stop reason: SDUPTHER

## 2018-06-11 RX ADMIN — SODIUM CHLORIDE, SODIUM LACTATE, POTASSIUM CHLORIDE, CALCIUM CHLORIDE: 600; 310; 30; 20 INJECTION, SOLUTION INTRAVENOUS at 10:43

## 2018-06-11 RX ADMIN — LIDOCAINE HYDROCHLORIDE 0.1 ML: 10 INJECTION, SOLUTION EPIDURAL; INFILTRATION; INTRACAUDAL; PERINEURAL at 10:43

## 2018-06-12 DIAGNOSIS — M54.12 CERVICAL RADICULOPATHY: ICD-10-CM

## 2018-06-12 RX ORDER — GABAPENTIN 300 MG/1
CAPSULE ORAL
Qty: 90 CAPSULE | Refills: 0 | Status: SHIPPED | OUTPATIENT
Start: 2018-06-12 | End: 2019-01-03

## 2018-06-25 ENCOUNTER — OFFICE VISIT (OUTPATIENT)
Dept: ORTHOPEDIC SURGERY | Age: 48
End: 2018-06-25

## 2018-06-25 VITALS
DIASTOLIC BLOOD PRESSURE: 71 MMHG | SYSTOLIC BLOOD PRESSURE: 136 MMHG | BODY MASS INDEX: 24.99 KG/M2 | HEART RATE: 71 BPM | HEIGHT: 65 IN | WEIGHT: 150 LBS

## 2018-06-25 DIAGNOSIS — M50.30 DDD (DEGENERATIVE DISC DISEASE), CERVICAL: ICD-10-CM

## 2018-06-25 DIAGNOSIS — R20.0 NUMBNESS OF RIGHT HAND: Primary | ICD-10-CM

## 2018-06-25 DIAGNOSIS — M54.12 CERVICAL RADICULITIS: ICD-10-CM

## 2018-06-25 PROCEDURE — G8427 DOCREV CUR MEDS BY ELIG CLIN: HCPCS | Performed by: PHYSICIAN ASSISTANT

## 2018-06-25 PROCEDURE — 3017F COLORECTAL CA SCREEN DOC REV: CPT | Performed by: PHYSICIAN ASSISTANT

## 2018-06-25 PROCEDURE — G8419 CALC BMI OUT NRM PARAM NOF/U: HCPCS | Performed by: PHYSICIAN ASSISTANT

## 2018-06-25 PROCEDURE — 4004F PT TOBACCO SCREEN RCVD TLK: CPT | Performed by: PHYSICIAN ASSISTANT

## 2018-06-25 PROCEDURE — 99214 OFFICE O/P EST MOD 30 MIN: CPT | Performed by: PHYSICIAN ASSISTANT

## 2018-06-29 ENCOUNTER — TELEPHONE (OUTPATIENT)
Dept: ORTHOPEDIC SURGERY | Age: 48
End: 2018-06-29

## 2018-06-29 ENCOUNTER — OFFICE VISIT (OUTPATIENT)
Dept: ORTHOPEDIC SURGERY | Age: 48
End: 2018-06-29

## 2018-06-29 DIAGNOSIS — M54.12 CERVICAL RADICULOPATHY: Primary | ICD-10-CM

## 2018-06-29 DIAGNOSIS — M79.601 PAIN OF RIGHT UPPER EXTREMITY: ICD-10-CM

## 2018-06-29 PROCEDURE — 95908 NRV CNDJ TST 3-4 STUDIES: CPT | Performed by: PHYSICAL MEDICINE & REHABILITATION

## 2018-06-29 PROCEDURE — 95886 MUSC TEST DONE W/N TEST COMP: CPT | Performed by: PHYSICAL MEDICINE & REHABILITATION

## 2018-07-12 ENCOUNTER — TELEPHONE (OUTPATIENT)
Dept: PULMONOLOGY | Age: 48
End: 2018-07-12

## 2018-07-12 NOTE — TELEPHONE ENCOUNTER
Spoke to [facility] pharmacist states this is correct. Attempted to reach patient times two no answer. Order pending please review and sign.

## 2018-07-12 NOTE — TELEPHONE ENCOUNTER
Toby Hoyt, will you call pharmacy and double check on this. Rare to be okay for incruse and not anoro and pt has been on anoro to date. If correct, change to Stiolto 2 puffs daily. Offer to have him come in for teaching if pharmacist does not provide.

## 2018-07-19 NOTE — TELEPHONE ENCOUNTER
I have attempted without success to contact this patient by phone to will try again later unable to leave message.

## 2018-10-31 ENCOUNTER — OFFICE VISIT (OUTPATIENT)
Dept: ORTHOPEDIC SURGERY | Age: 48
End: 2018-10-31
Payer: MEDICAID

## 2018-10-31 VITALS
DIASTOLIC BLOOD PRESSURE: 94 MMHG | BODY MASS INDEX: 24.98 KG/M2 | HEIGHT: 65 IN | SYSTOLIC BLOOD PRESSURE: 133 MMHG | WEIGHT: 149.91 LBS | HEART RATE: 78 BPM

## 2018-10-31 DIAGNOSIS — M50.30 DDD (DEGENERATIVE DISC DISEASE), CERVICAL: ICD-10-CM

## 2018-10-31 DIAGNOSIS — M54.12 CERVICAL RADICULITIS: Primary | ICD-10-CM

## 2018-10-31 PROCEDURE — G8419 CALC BMI OUT NRM PARAM NOF/U: HCPCS | Performed by: PHYSICIAN ASSISTANT

## 2018-10-31 PROCEDURE — 3017F COLORECTAL CA SCREEN DOC REV: CPT | Performed by: PHYSICIAN ASSISTANT

## 2018-10-31 PROCEDURE — G8484 FLU IMMUNIZE NO ADMIN: HCPCS | Performed by: PHYSICIAN ASSISTANT

## 2018-10-31 PROCEDURE — G8427 DOCREV CUR MEDS BY ELIG CLIN: HCPCS | Performed by: PHYSICIAN ASSISTANT

## 2018-10-31 PROCEDURE — 4004F PT TOBACCO SCREEN RCVD TLK: CPT | Performed by: PHYSICIAN ASSISTANT

## 2018-10-31 PROCEDURE — 99214 OFFICE O/P EST MOD 30 MIN: CPT | Performed by: PHYSICIAN ASSISTANT

## 2018-10-31 RX ORDER — GABAPENTIN 300 MG/1
CAPSULE ORAL
Qty: 90 CAPSULE | Refills: 0 | Status: SHIPPED | OUTPATIENT
Start: 2018-10-31 | End: 2018-11-26 | Stop reason: SDUPTHER

## 2018-10-31 NOTE — PROGRESS NOTES
(gastroesophageal reflux disease)     Hyperlipidemia 2/14    Hypertriglyceridemia, Good HDL    Hypertension 12/12: age 37    Prediabetes 11/2016    Vitamin D deficiency 1/13      Past Surgical History:     Past Surgical History:   Procedure Laterality Date    ANKLE FRACTURE SURGERY Left 1998    BRONCHOSCOPY  12-   [employer] DENTAL SURGERY  2/13     Current Medications:     Current Outpatient Prescriptions:     gabapentin (NEURONTIN) 300 MG capsule, i po BID & qHS. , Disp: 90 capsule, Rfl: 0    Tiotropium Bromide-Olodaterol (STIOLTO RESPIMAT) 2.5-2.5 MCG/ACT AERS, Inhale 2 puffs into the lungs daily Provide testing, Disp: 1 Inhaler, Rfl: 5    pantoprazole (PROTONIX) 40 MG tablet, TAKE ONE TABLET BY MOUTH DAILY, Disp: 30 tablet, Rfl: 5    atorvastatin (LIPITOR) 20 MG tablet, TAKE ONE TABLET BY MOUTH DAILY, Disp: 30 tablet, Rfl: 5    hydrochlorothiazide (HYDRODIURIL) 25 MG tablet, TAKE ONE TABLET BY MOUTH EVERY DAY, Disp: 90 tablet, Rfl: 11    albuterol sulfate HFA (PROAIR HFA) 108 (90 Base) MCG/ACT inhaler, Inhale 2 puffs into the lungs every 4 hours as needed for Wheezing or Shortness of Breath (or coughs), Disp: 1 Inhaler, Rfl: 2    gabapentin (NEURONTIN) 300 MG capsule, TAKE ONE TO TWO CAPSULES BY MOUTH THREE TIMES A DAY AS NEEDED, Disp: 90 capsule, Rfl: 0  Allergies:  No known allergies  Social History:    reports that he has been smoking Cigarettes. He started smoking about 34 years ago. He has a 30.00 pack-year smoking history. He has never used smokeless tobacco. He reports that he drinks about 0.6 oz of alcohol per week . He reports that he uses drugs, including Marijuana.   Family History:   Family History   Problem Relation Age of Onset    COPD Mother 39    COPD Father 39       REVIEW OF SYSTEMS: Full ROS noted & scanned   CONSTITUTIONAL: Denies unexplained weight loss, fevers, chills or fatigue  NEUROLOGICAL: Denies unsteady gait or progressive weakness      PHYSICAL EXAM:    Vitals: Blood

## 2018-10-31 NOTE — LETTER
[address] [place] [address] [address] and Sports Medicine    Please Schedule the following with: Dr. Jerri Stewart    Date:  10/31/18     Patient: Tomasz Bower     YOB: 1970    Patient Home Phone: [phone] (home)    Diagnosis: Right C6 7 foraminal disc M50.20, mild stenosis centrally C5 6 C6 7 M48.02    []LT     [x]RT     []AMINTA     []Midline    Levels: C7-T1 #2    [x]Cervical SOHA 28104, 11705  []L-MBB 50835, 80595  []SI Joint 33458   []C-FACET 39640, 81691, 49616  []L-FACET 06589, 20424  []Interlaminar SOHA 44775     []HIP 56831    []C-MBB 58285, 62780, 53288  []Transforaminal SOHA 31991  []Neurotomy 17513, 92442, 83440    Attending Physician: Brandon Burger Schedule for: At: [facility]    First Insurance:  [facility]                                              Pre-cert #:  Second Insurance:                 Pre-cert #:    Comments: IV sedation    6/11/18 Right C7/T1 DAVIDA #1--w/good improvement     [] Blood Thinner:                 []Diabetic           []Antibiotic:               []Glaucoma:    [] Current Open Wounds, Lacerations or Sores     Allergies: Allergies   Allergen Reactions    No Known Allergies        Past Medical History:   Diagnosis Date    [facility] ([address]ronic obstructive pulmonary disease) [address] [address]     Folate deficiency 01/2013    GERD (gastroesophageal reflux disease)     Hyperlipidemia 2/14    Hypertriglyceridemia, Good HDL    Hypertension 12/12: age 37    Prediabetes 11/2016    Vitamin D deficiency 1/13          Current Outpatient Prescriptions:     gabapentin (NEURONTIN) 300 MG capsule, i po BID & qHS. , Disp: 90 capsule, Rfl: 0    Tiotropium Bromide-Olodaterol (STIOLTO RESPIMAT) 2.5-2.5 MCG/ACT AERS, Inhale 2 puffs into the lungs daily Provide testing, Disp: 1 Inhaler, Rfl: 5    pantoprazole (PROTONIX) 40 MG tablet, TAKE ONE TABLET BY MOUTH DAILY, Disp: 30 tablet, Rfl: 5    atorvastatin (LIPITOR) 20 MG tablet, TAKE ONE TABLET BY MOUTH DAILY,

## 2018-11-06 ENCOUNTER — TELEPHONE (OUTPATIENT)
Dept: ORTHOPEDIC SURGERY | Age: 48
End: 2018-11-06

## 2018-11-12 ENCOUNTER — TELEPHONE (OUTPATIENT)
Dept: ORTHOPEDIC SURGERY | Age: 48
End: 2018-11-12

## 2018-11-14 ENCOUNTER — OFFICE VISIT (OUTPATIENT)
Dept: ORTHOPEDIC SURGERY | Age: 48
End: 2018-11-14
Payer: MEDICAID

## 2018-11-14 VITALS
DIASTOLIC BLOOD PRESSURE: 77 MMHG | BODY MASS INDEX: 24.98 KG/M2 | WEIGHT: 149.91 LBS | HEIGHT: 65 IN | SYSTOLIC BLOOD PRESSURE: 120 MMHG | HEART RATE: 98 BPM

## 2018-11-14 DIAGNOSIS — M54.12 CERVICAL RADICULITIS: Primary | ICD-10-CM

## 2018-11-14 DIAGNOSIS — M54.12 CERVICAL RADICULOPATHY: ICD-10-CM

## 2018-11-14 DIAGNOSIS — M50.30 DDD (DEGENERATIVE DISC DISEASE), CERVICAL: ICD-10-CM

## 2018-11-14 PROCEDURE — G8427 DOCREV CUR MEDS BY ELIG CLIN: HCPCS | Performed by: PHYSICAL MEDICINE & REHABILITATION

## 2018-11-14 PROCEDURE — 99214 OFFICE O/P EST MOD 30 MIN: CPT | Performed by: PHYSICAL MEDICINE & REHABILITATION

## 2018-11-14 PROCEDURE — 4004F PT TOBACCO SCREEN RCVD TLK: CPT | Performed by: PHYSICAL MEDICINE & REHABILITATION

## 2018-11-14 PROCEDURE — 3017F COLORECTAL CA SCREEN DOC REV: CPT | Performed by: PHYSICAL MEDICINE & REHABILITATION

## 2018-11-14 PROCEDURE — G8419 CALC BMI OUT NRM PARAM NOF/U: HCPCS | Performed by: PHYSICAL MEDICINE & REHABILITATION

## 2018-11-14 PROCEDURE — G8484 FLU IMMUNIZE NO ADMIN: HCPCS | Performed by: PHYSICAL MEDICINE & REHABILITATION

## 2018-11-14 RX ORDER — HYDROCODONE BITARTRATE AND ACETAMINOPHEN 5; 325 MG/1; MG/1
1 TABLET ORAL 3 TIMES DAILY PRN
Qty: 21 TABLET | Refills: 0 | Status: SHIPPED | OUTPATIENT
Start: 2018-11-14 | End: 2018-11-21

## 2018-11-14 NOTE — PROGRESS NOTES
Good HDL    Hypertension 12/12: age 37    Prediabetes 11/2016    Vitamin D deficiency 1/13      Past Surgical History:     Past Surgical History:   Procedure Laterality Date    ANKLE FRACTURE SURGERY Left 1998    BRONCHOSCOPY  12-   [facility] DENTAL SURGERY  2/13     Current Medications:     Current Outpatient Prescriptions:     gabapentin (NEURONTIN) 300 MG capsule, i po BID & qHS. , Disp: 90 capsule, Rfl: 0    Tiotropium Bromide-Olodaterol (STIOLTO RESPIMAT) 2.5-2.5 MCG/ACT AERS, Inhale 2 puffs into the lungs daily Provide testing, Disp: 1 Inhaler, Rfl: 5    gabapentin (NEURONTIN) 300 MG capsule, TAKE ONE TO TWO CAPSULES BY MOUTH THREE TIMES A DAY AS NEEDED, Disp: 90 capsule, Rfl: 0    pantoprazole (PROTONIX) 40 MG tablet, TAKE ONE TABLET BY MOUTH DAILY, Disp: 30 tablet, Rfl: 5    atorvastatin (LIPITOR) 20 MG tablet, TAKE ONE TABLET BY MOUTH DAILY, Disp: 30 tablet, Rfl: 5    hydrochlorothiazide (HYDRODIURIL) 25 MG tablet, TAKE ONE TABLET BY MOUTH EVERY DAY, Disp: 90 tablet, Rfl: 11    albuterol sulfate HFA (PROAIR HFA) 108 (90 Base) MCG/ACT inhaler, Inhale 2 puffs into the lungs every 4 hours as needed for Wheezing or Shortness of Breath (or coughs), Disp: 1 Inhaler, Rfl: 2  Allergies:  No known allergies  Social History:    reports that he has been smoking Cigarettes. He started smoking about 34 years ago. He has a 30.00 pack-year smoking history. He has never used smokeless tobacco. He reports that he drinks about 0.6 oz of alcohol per week . He reports that he uses drugs, including Marijuana.   Family History:   Family History   Problem Relation Age of Onset    COPD Mother 39    COPD Father 39       REVIEW OF SYSTEMS: Full ROS noted & scanned   CONSTITUTIONAL: Denies unexplained weight loss, fevers, chills or fatigue  NEUROLOGICAL: Denies unsteady gait or progressive weakness      PHYSICAL EXAM:    Vitals: Blood pressure 120/77, pulse 98, height 5' 4.8\" (1.646 m), weight 149 lb 14.6 oz (68 kg).    GENERAL EXAM:  · General Apparence: Patient is adequately groomed with no evidence of malnutrition. · Orientation: The patient is oriented to time, place and person. · Mood & Affect:The patient's mood and affect are appropriate   · Lymphatic: The lymphatic examination bilaterally reveals all areas to be without enlargement or induration  · Sensation: Sensation is intact without deficit  · Coordination/Balance: Good coordination     CERVICAL EXAMINATION:  · Inspection: His head is listed to the contralateral left side    · Palpation: No evidence of tenderness at the midline, and trapezius. Paraspinal tenderness is present. There is no step-off or paraspinal spasm. · Range of Motion: Intact flexion mild to moderate loss extension  · Strength: 5/5 bilateral upper extremities except right triceps 5 minus/5  · Special Tests:    ·   Spurling's positive on the right, L'Hermitte's & Campos's negative bilaterally. ·   Carpal tunnel Tinel's negative    · Skin:There are no rashes, ulcerations or lesions in right & left upper extremities. · Reflexes: Bilaterally triceps trace left absent right, biceps and brachioradialis are trace  Clonus absent bilaterally at the feet. · Additional Examinations:       · RIGHT UPPER EXTREMITY:  Inspection/examination of the right upper extremity does not show any tenderness, deformity or injury. Range of motion is full. There is no gross instability. There are no rashes, ulcerations or lesions. Strength and tone are normal.  · LEFT UPPER EXTREMITY: Inspection/examination of the left upper extremity does not show any tenderness, deformity or injury. Range of motion is full. There is no gross instability. There are no rashes, ulcerations or lesions.  Strength and tone are normal.      Diagnostic Testing:    Cervical MRI May 2018 is reviewed showing C6 7 disc extrusion with severe right foraminal stenosis correlating with his clinical symptoms, mild central narrowing C5 6 C6

## 2018-11-19 ENCOUNTER — HOSPITAL ENCOUNTER (OUTPATIENT)
Age: 48
Setting detail: OUTPATIENT SURGERY
Discharge: HOME | End: 2018-11-19
Attending: PHYSICAL MEDICINE & REHABILITATION | Admitting: PHYSICAL MEDICINE & REHABILITATION
Payer: MEDICAID

## 2018-11-19 VITALS
HEIGHT: 64 IN | HEART RATE: 80 BPM | RESPIRATION RATE: 17 BRPM | TEMPERATURE: 98.6 F | BODY MASS INDEX: 24.75 KG/M2 | WEIGHT: 145 LBS | SYSTOLIC BLOOD PRESSURE: 127 MMHG | OXYGEN SATURATION: 96 % | DIASTOLIC BLOOD PRESSURE: 79 MMHG

## 2018-11-19 PROCEDURE — 6360000004 HC RX CONTRAST MEDICATION: Performed by: PHYSICAL MEDICINE & REHABILITATION

## 2018-11-19 PROCEDURE — 2500000003 HC RX 250 WO HCPCS: Performed by: PHYSICAL MEDICINE & REHABILITATION

## 2018-11-19 PROCEDURE — 7100000011 HC PHASE II RECOVERY - ADDTL 15 MIN: Performed by: PHYSICAL MEDICINE & REHABILITATION

## 2018-11-19 PROCEDURE — 99152 MOD SED SAME PHYS/QHP 5/>YRS: CPT | Performed by: PHYSICAL MEDICINE & REHABILITATION

## 2018-11-19 PROCEDURE — 3600000002 HC SURGERY LEVEL 2 BASE: Performed by: PHYSICAL MEDICINE & REHABILITATION

## 2018-11-19 PROCEDURE — 7100000010 HC PHASE II RECOVERY - FIRST 15 MIN: Performed by: PHYSICAL MEDICINE & REHABILITATION

## 2018-11-19 PROCEDURE — 6360000002 HC RX W HCPCS: Performed by: PHYSICAL MEDICINE & REHABILITATION

## 2018-11-19 PROCEDURE — 2580000003 HC RX 258: Performed by: PHYSICAL MEDICINE & REHABILITATION

## 2018-11-19 PROCEDURE — 3600000012 HC SURGERY LEVEL 2 ADDTL 15MIN: Performed by: PHYSICAL MEDICINE & REHABILITATION

## 2018-11-19 PROCEDURE — 2709999900 HC NON-CHARGEABLE SUPPLY: Performed by: PHYSICAL MEDICINE & REHABILITATION

## 2018-11-19 RX ORDER — SODIUM CHLORIDE, SODIUM LACTATE, POTASSIUM CHLORIDE, CALCIUM CHLORIDE 600; 310; 30; 20 MG/100ML; MG/100ML; MG/100ML; MG/100ML
INJECTION, SOLUTION INTRAVENOUS ONCE
Status: COMPLETED | OUTPATIENT
Start: 2018-11-19 | End: 2018-11-19

## 2018-11-19 RX ORDER — MIDAZOLAM HYDROCHLORIDE 5 MG/ML
INJECTION INTRAMUSCULAR; INTRAVENOUS PRN
Status: DISCONTINUED | OUTPATIENT
Start: 2018-11-19 | End: 2018-11-19 | Stop reason: HOSPADM

## 2018-11-19 RX ORDER — LIDOCAINE HYDROCHLORIDE 10 MG/ML
INJECTION, SOLUTION EPIDURAL; INFILTRATION; INTRACAUDAL; PERINEURAL PRN
Status: DISCONTINUED | OUTPATIENT
Start: 2018-11-19 | End: 2018-11-19 | Stop reason: HOSPADM

## 2018-11-19 RX ORDER — DEXAMETHASONE SODIUM PHOSPHATE 10 MG/ML
INJECTION, SOLUTION INTRAMUSCULAR; INTRAVENOUS PRN
Status: DISCONTINUED | OUTPATIENT
Start: 2018-11-19 | End: 2018-11-19 | Stop reason: HOSPADM

## 2018-11-19 RX ORDER — 0.9 % SODIUM CHLORIDE 0.9 %
VIAL (ML) INJECTION PRN
Status: DISCONTINUED | OUTPATIENT
Start: 2018-11-19 | End: 2018-11-19 | Stop reason: HOSPADM

## 2018-11-19 RX ORDER — IBUPROFEN 600 MG/1
600 TABLET ORAL EVERY 6 HOURS PRN
COMMUNITY
End: 2019-01-03

## 2018-11-19 RX ADMIN — LIDOCAINE HYDROCHLORIDE 0.1 ML: 10 INJECTION, SOLUTION EPIDURAL; INFILTRATION; INTRACAUDAL; PERINEURAL at 09:52

## 2018-11-19 RX ADMIN — SODIUM CHLORIDE, POTASSIUM CHLORIDE, SODIUM LACTATE AND CALCIUM CHLORIDE: 600; 310; 30; 20 INJECTION, SOLUTION INTRAVENOUS at 09:52

## 2018-11-19 ASSESSMENT — ACTIVITIES OF DAILY LIVING (ADL): EFFECT OF PAIN ON DAILY ACTIVITIES: STANDING AND MOVING

## 2018-11-19 ASSESSMENT — PAIN - FUNCTIONAL ASSESSMENT: PAIN_FUNCTIONAL_ASSESSMENT: 0-10

## 2018-11-19 ASSESSMENT — PAIN SCALES - GENERAL: PAINLEVEL_OUTOF10: 0

## 2018-11-19 ASSESSMENT — PAIN DESCRIPTION - DESCRIPTORS: DESCRIPTORS: NUMBNESS;ACHING

## 2018-11-19 NOTE — H&P
HISTORY AND PHYSICAL/PRE-SEDATION ASSESSMENT    Patient:  Glenna Soliman   :  1970  Medical Record No.:  [id number]   Date:  2018  Physician:  Nasima Tucker M.D. Facility: [facility]     Nursing History and Physical reviewed and agreed upon. Additional findings:    Allergies:  No known allergies    Home Medications:    Prior to Admission medications    Medication Sig Start Date End Date Taking? Authorizing Provider   HYDROcodone-acetaminophen (NORCO) 5-325 MG per tablet Take 1 tablet by mouth 3 times daily as needed for Pain for up to 7 days. Zach Greenwood Date: 18  Nasima Tucker MD   gabapentin (NEURONTIN) 300 MG capsule i po BID & qHS. 10/31/18 11/30/18  Azalea Perez PA-C   Tiotropium Bromide-Olodaterol (STIOLTO RESPIMAT) 2.5-2.5 MCG/ACT AERS Inhale 2 puffs into the lungs daily Provide testing 18   Kaylee Parr MD   gabapentin (NEURONTIN) 300 MG capsule TAKE ONE TO TWO CAPSULES BY MOUTH THREE TIMES A DAY AS NEEDED 18  Nasima Tucker MD   pantoprazole (PROTONIX) 40 MG tablet TAKE ONE TABLET BY MOUTH DAILY 18   Sarina Gomez DO   atorvastatin (LIPITOR) 20 MG tablet TAKE ONE TABLET BY MOUTH DAILY 18   Sarina Gomez DO   hydrochlorothiazide (HYDRODIURIL) 25 MG tablet TAKE ONE TABLET BY MOUTH EVERY DAY 3/15/18   Sarina Gomez DO   albuterol sulfate HFA (PROAIR HFA) 108 (90 Base) MCG/ACT inhaler Inhale 2 puffs into the lungs every 4 hours as needed for Wheezing or Shortness of Breath (or coughs) 3/15/18   Sarina Palomares DO       Vitals: Stable     PHYSICAL EXAM:  HENT: Airway patent and reviewed  Cardiovascular: Normal rate, regular rhythm, normal heart sounds. Pulmonary/Chest: No wheezes. No rhonchi. No rales. Abdominal: Soft. Bowel sounds are normal. No distension. MALLAMPATI:           []   I. Complete visualization of the soft palate           [x]   II.

## 2018-11-26 DIAGNOSIS — M54.12 CERVICAL RADICULITIS: ICD-10-CM

## 2018-11-26 DIAGNOSIS — M50.30 DDD (DEGENERATIVE DISC DISEASE), CERVICAL: ICD-10-CM

## 2018-11-26 RX ORDER — GABAPENTIN 300 MG/1
CAPSULE ORAL
Qty: 90 CAPSULE | Refills: 0 | Status: SHIPPED | OUTPATIENT
Start: 2018-11-26 | End: 2019-01-03

## 2018-12-06 ENCOUNTER — OFFICE VISIT (OUTPATIENT)
Dept: ORTHOPEDIC SURGERY | Age: 48
End: 2018-12-06
Payer: MEDICAID

## 2018-12-06 VITALS
SYSTOLIC BLOOD PRESSURE: 104 MMHG | HEIGHT: 64 IN | HEART RATE: 96 BPM | DIASTOLIC BLOOD PRESSURE: 73 MMHG | BODY MASS INDEX: 24.77 KG/M2 | WEIGHT: 145.06 LBS

## 2018-12-06 DIAGNOSIS — M50.10 HERNIATION OF CERVICAL INTERVERTEBRAL DISC WITH RADICULOPATHY: Primary | ICD-10-CM

## 2018-12-06 PROCEDURE — G8427 DOCREV CUR MEDS BY ELIG CLIN: HCPCS | Performed by: PHYSICIAN ASSISTANT

## 2018-12-06 PROCEDURE — G8484 FLU IMMUNIZE NO ADMIN: HCPCS | Performed by: PHYSICIAN ASSISTANT

## 2018-12-06 PROCEDURE — 99213 OFFICE O/P EST LOW 20 MIN: CPT | Performed by: PHYSICIAN ASSISTANT

## 2018-12-06 PROCEDURE — 3017F COLORECTAL CA SCREEN DOC REV: CPT | Performed by: PHYSICIAN ASSISTANT

## 2018-12-06 PROCEDURE — G8419 CALC BMI OUT NRM PARAM NOF/U: HCPCS | Performed by: PHYSICIAN ASSISTANT

## 2018-12-06 PROCEDURE — 4004F PT TOBACCO SCREEN RCVD TLK: CPT | Performed by: PHYSICIAN ASSISTANT

## 2018-12-07 ENCOUNTER — TELEPHONE (OUTPATIENT)
Dept: PULMONOLOGY | Age: 48
End: 2018-12-07

## 2018-12-10 DIAGNOSIS — K21.9 GASTROESOPHAGEAL REFLUX DISEASE WITHOUT ESOPHAGITIS: ICD-10-CM

## 2018-12-10 DIAGNOSIS — E78.5 HYPERLIPIDEMIA LDL GOAL <70: ICD-10-CM

## 2018-12-10 RX ORDER — PANTOPRAZOLE SODIUM 40 MG/1
TABLET, DELAYED RELEASE ORAL
Qty: 30 TABLET | Refills: 4 | Status: SHIPPED | OUTPATIENT
Start: 2018-12-10 | End: 2019-04-22 | Stop reason: SDUPTHER

## 2018-12-10 RX ORDER — ATORVASTATIN CALCIUM 20 MG/1
TABLET, FILM COATED ORAL
Qty: 30 TABLET | Refills: 4 | Status: SHIPPED | OUTPATIENT
Start: 2018-12-10 | End: 2019-04-22 | Stop reason: SDUPTHER

## 2018-12-11 ENCOUNTER — OFFICE VISIT (OUTPATIENT)
Dept: PULMONOLOGY | Age: 48
End: 2018-12-11
Payer: MEDICAID

## 2018-12-11 VITALS
WEIGHT: 155 LBS | OXYGEN SATURATION: 92 % | TEMPERATURE: 98 F | RESPIRATION RATE: 16 BRPM | HEART RATE: 89 BPM | BODY MASS INDEX: 27.46 KG/M2 | SYSTOLIC BLOOD PRESSURE: 130 MMHG | HEIGHT: 63 IN | DIASTOLIC BLOOD PRESSURE: 85 MMHG

## 2018-12-11 DIAGNOSIS — J43.9 PULMONARY EMPHYSEMA, UNSPECIFIED EMPHYSEMA TYPE (HCC): ICD-10-CM

## 2018-12-11 DIAGNOSIS — J44.9 COPD, SEVERE (HCC): ICD-10-CM

## 2018-12-11 DIAGNOSIS — Z01.818 PRE-OPERATIVE CLEARANCE: Primary | ICD-10-CM

## 2018-12-11 PROCEDURE — G8419 CALC BMI OUT NRM PARAM NOF/U: HCPCS | Performed by: INTERNAL MEDICINE

## 2018-12-11 PROCEDURE — G8484 FLU IMMUNIZE NO ADMIN: HCPCS | Performed by: INTERNAL MEDICINE

## 2018-12-11 PROCEDURE — 99213 OFFICE O/P EST LOW 20 MIN: CPT | Performed by: INTERNAL MEDICINE

## 2018-12-11 PROCEDURE — 3017F COLORECTAL CA SCREEN DOC REV: CPT | Performed by: INTERNAL MEDICINE

## 2018-12-11 PROCEDURE — G8926 SPIRO NO PERF OR DOC: HCPCS | Performed by: INTERNAL MEDICINE

## 2018-12-11 PROCEDURE — 1036F TOBACCO NON-USER: CPT | Performed by: INTERNAL MEDICINE

## 2018-12-11 PROCEDURE — 3023F SPIROM DOC REV: CPT | Performed by: INTERNAL MEDICINE

## 2018-12-11 PROCEDURE — G8427 DOCREV CUR MEDS BY ELIG CLIN: HCPCS | Performed by: INTERNAL MEDICINE

## 2018-12-11 RX ORDER — VARENICLINE TARTRATE 1 MG/1
1 TABLET, FILM COATED ORAL 2 TIMES DAILY
Qty: 60 TABLET | Refills: 2 | Status: SHIPPED | OUTPATIENT
Start: 2018-12-11 | End: 2018-12-18 | Stop reason: CLARIF

## 2018-12-11 NOTE — PROGRESS NOTES
unremarkable. Atherosclerotic disease is noted. Liver, gallbladder, spleen, kidneys, and pancreas are unremarkable in   appearance. No acute abnormality of the visualized osseous structures. Impression   IMPRESSION:   No acute abnormality of the chest or abdomen noted. PFT 1/12/16 FEV1 1/05 L 32%  ++ airtrapping  DLCO 14.73 50%    Bronchoscopy 2015 cytology no malignant cells. Assessment:  Severe COPD/Emphysema  Need for back surgery    Not addressed today  Family h/o COPD  60 pack year tobacco history, quit in September    Plan:   · Anoro - continue, using with benefit  · Albuterol PRN  · Alpha 1 antitrypsin level - give order to patient today, dx emphysema   · On Chantix through March  · Patient may proceed to the operating room from pulmonary perspective without further testing. Because of underlying disease, he is at increased risk of anthony-operative complications, including atelectasis, pneumonia and, when general anesthesia is required, failure to liberate from mechanical ventilation. However, this should not preclude him from having the recommended operation. It is my recommendation that his inhaled bronchodilators (Anoro scheduled) be continued in the perioperative period and that short acting bronchodilators be used on a prn basis as well. All of this was discussed with the patient today in the office.       Eugene Mcqueen with [facility]

## 2018-12-12 ENCOUNTER — TELEPHONE (OUTPATIENT)
Dept: PULMONOLOGY | Age: 48
End: 2018-12-12

## 2018-12-12 NOTE — TELEPHONE ENCOUNTER
PA required for Chantix. PA submitted through CoverMyMeds. Awaiting determination     LOV: 12/11/18      Assessment:  · Severe COPD/Emphysema  · Need for back surgery     Not addressed today  · Family h/o COPD  · 60 pack year tobacco history, quit in September     Plan:   · Anoro - continue, using with benefit  · Albuterol PRN  · Alpha 1 antitrypsin level - give order to patient today, dx emphysema   · On Chantix through March  · Patient may proceed to the operating room from pulmonary perspective without further testing. Because of underlying disease, he is at increased risk of anthony-operative complications, including atelectasis, pneumonia and, when general anesthesia is required, failure to liberate from mechanical ventilation. However, this should not preclude him from having the recommended operation. It is my recommendation that his inhaled bronchodilators (Anoro scheduled) be continued in the perioperative period and that short acting bronchodilators be used on a prn basis as well.   All of this was discussed with the patient today in the office.       Demond Mackenzie with [facility]

## 2019-01-02 ENCOUNTER — HOSPITAL ENCOUNTER (OUTPATIENT)
Age: 49
Discharge: HOME | End: 2019-01-02
Payer: MEDICAID

## 2019-01-02 ENCOUNTER — OFFICE VISIT (OUTPATIENT)
Dept: FAMILY MEDICINE CLINIC | Age: 49
End: 2019-01-02
Payer: MEDICAID

## 2019-01-02 VITALS
DIASTOLIC BLOOD PRESSURE: 80 MMHG | SYSTOLIC BLOOD PRESSURE: 124 MMHG | HEART RATE: 95 BPM | HEIGHT: 63 IN | BODY MASS INDEX: 27.5 KG/M2 | WEIGHT: 155.2 LBS | OXYGEN SATURATION: 95 % | RESPIRATION RATE: 18 BRPM

## 2019-01-02 DIAGNOSIS — I10 ESSENTIAL HYPERTENSION: ICD-10-CM

## 2019-01-02 DIAGNOSIS — J44.9 CHRONIC OBSTRUCTIVE PULMONARY DISEASE, UNSPECIFIED COPD TYPE (HCC): ICD-10-CM

## 2019-01-02 DIAGNOSIS — J43.9 PULMONARY EMPHYSEMA, UNSPECIFIED EMPHYSEMA TYPE (HCC): ICD-10-CM

## 2019-01-02 DIAGNOSIS — M50.30 DDD (DEGENERATIVE DISC DISEASE), CERVICAL: ICD-10-CM

## 2019-01-02 DIAGNOSIS — Z01.818 PREOP EXAMINATION: Primary | ICD-10-CM

## 2019-01-02 LAB
ANION GAP SERPL CALCULATED.3IONS-SCNC: 14 MMOL/L (ref 3–16)
BUN BLDV-MCNC: 16 MG/DL (ref 7–20)
CALCIUM SERPL-MCNC: 10.1 MG/DL (ref 8.3–10.6)
CHLORIDE BLD-SCNC: 98 MMOL/L (ref 99–110)
CO2: 30 MMOL/L (ref 21–32)
CREAT SERPL-MCNC: 1.1 MG/DL (ref 0.9–1.3)
GFR AFRICAN AMERICAN: >60
GFR NON-AFRICAN AMERICAN: >60
GLUCOSE BLD-MCNC: 92 MG/DL (ref 70–99)
POTASSIUM SERPL-SCNC: 4.4 MMOL/L (ref 3.5–5.1)
SODIUM BLD-SCNC: 142 MMOL/L (ref 136–145)

## 2019-01-02 PROCEDURE — 3023F SPIROM DOC REV: CPT | Performed by: FAMILY MEDICINE

## 2019-01-02 PROCEDURE — 36415 COLL VENOUS BLD VENIPUNCTURE: CPT

## 2019-01-02 PROCEDURE — 82103 ALPHA-1-ANTITRYPSIN TOTAL: CPT

## 2019-01-02 PROCEDURE — G8926 SPIRO NO PERF OR DOC: HCPCS | Performed by: FAMILY MEDICINE

## 2019-01-02 PROCEDURE — 3017F COLORECTAL CA SCREEN DOC REV: CPT | Performed by: FAMILY MEDICINE

## 2019-01-02 PROCEDURE — 80048 BASIC METABOLIC PNL TOTAL CA: CPT

## 2019-01-02 PROCEDURE — 93000 ELECTROCARDIOGRAM COMPLETE: CPT | Performed by: FAMILY MEDICINE

## 2019-01-02 PROCEDURE — G8484 FLU IMMUNIZE NO ADMIN: HCPCS | Performed by: FAMILY MEDICINE

## 2019-01-02 PROCEDURE — 99242 OFF/OP CONSLTJ NEW/EST SF 20: CPT | Performed by: FAMILY MEDICINE

## 2019-01-02 PROCEDURE — G8427 DOCREV CUR MEDS BY ELIG CLIN: HCPCS | Performed by: FAMILY MEDICINE

## 2019-01-02 PROCEDURE — G8419 CALC BMI OUT NRM PARAM NOF/U: HCPCS | Performed by: FAMILY MEDICINE

## 2019-01-02 ASSESSMENT — PATIENT HEALTH QUESTIONNAIRE - PHQ9
SUM OF ALL RESPONSES TO PHQ QUESTIONS 1-9: 0
SUM OF ALL RESPONSES TO PHQ9 QUESTIONS 1 & 2: 0
SUM OF ALL RESPONSES TO PHQ QUESTIONS 1-9: 0
1. LITTLE INTEREST OR PLEASURE IN DOING THINGS: 0
2. FEELING DOWN, DEPRESSED OR HOPELESS: 0

## 2019-01-02 ASSESSMENT — ENCOUNTER SYMPTOMS
SINUS PRESSURE: 0
BLOOD IN STOOL: 0
CHEST TIGHTNESS: 0
CONSTIPATION: 0
SHORTNESS OF BREATH: 0
ABDOMINAL PAIN: 0
EYE PAIN: 0
COUGH: 0
DIARRHEA: 0
COLOR CHANGE: 0
RHINORRHEA: 0
EYE DISCHARGE: 0
WHEEZING: 0
VOMITING: 0
BACK PAIN: 0

## 2019-01-04 ENCOUNTER — TELEPHONE (OUTPATIENT)
Dept: ORTHOPEDIC SURGERY | Age: 49
End: 2019-01-04

## 2019-01-04 LAB — ALPHA-1 ANTITRYPSIN: 161 MG/DL (ref 90–200)

## 2019-01-07 RX ORDER — FOLIC ACID 1 MG/1
1 TABLET ORAL DAILY
Qty: 30 TABLET | Refills: 11 | Status: SHIPPED | OUTPATIENT
Start: 2019-01-07 | End: 2020-01-22

## 2019-01-11 ENCOUNTER — TELEPHONE (OUTPATIENT)
Dept: ORTHOPEDIC SURGERY | Age: 49
End: 2019-01-11

## 2019-01-15 ENCOUNTER — ANESTHESIA EVENT (OUTPATIENT)
Dept: OPERATING ROOM | Age: 49
DRG: 321 | End: 2019-01-15
Payer: MEDICAID

## 2019-01-16 ENCOUNTER — HOSPITAL ENCOUNTER (OUTPATIENT)
Dept: GENERAL RADIOLOGY | Age: 49
Discharge: HOME | DRG: 321 | End: 2019-01-16
Attending: ORTHOPAEDIC SURGERY
Payer: MEDICAID

## 2019-01-16 ENCOUNTER — ANESTHESIA (OUTPATIENT)
Dept: OPERATING ROOM | Age: 49
DRG: 321 | End: 2019-01-16
Payer: MEDICAID

## 2019-01-16 ENCOUNTER — HOSPITAL ENCOUNTER (OUTPATIENT)
Age: 49
Setting detail: OUTPATIENT SURGERY
Discharge: HOME | DRG: 321 | End: 2019-01-16
Attending: ORTHOPAEDIC SURGERY | Admitting: ORTHOPAEDIC SURGERY
Payer: MEDICAID

## 2019-01-16 VITALS
RESPIRATION RATE: 16 BRPM | DIASTOLIC BLOOD PRESSURE: 81 MMHG | SYSTOLIC BLOOD PRESSURE: 136 MMHG | BODY MASS INDEX: 27.14 KG/M2 | HEIGHT: 64 IN | HEART RATE: 72 BPM | WEIGHT: 159 LBS | OXYGEN SATURATION: 96 % | TEMPERATURE: 97.3 F

## 2019-01-16 VITALS — SYSTOLIC BLOOD PRESSURE: 134 MMHG | TEMPERATURE: 96.8 F | OXYGEN SATURATION: 100 % | DIASTOLIC BLOOD PRESSURE: 85 MMHG

## 2019-01-16 DIAGNOSIS — M50.20 CERVICAL DISC HERNIATION: Primary | ICD-10-CM

## 2019-01-16 DIAGNOSIS — R52 PAIN: ICD-10-CM

## 2019-01-16 PROCEDURE — 7100000010 HC PHASE II RECOVERY - FIRST 15 MIN: Performed by: ORTHOPAEDIC SURGERY

## 2019-01-16 PROCEDURE — 2500000003 HC RX 250 WO HCPCS: Performed by: ANESTHESIOLOGY

## 2019-01-16 PROCEDURE — 2780000010 HC IMPLANT OTHER: Performed by: ORTHOPAEDIC SURGERY

## 2019-01-16 PROCEDURE — 6360000002 HC RX W HCPCS: Performed by: NURSE ANESTHETIST, CERTIFIED REGISTERED

## 2019-01-16 PROCEDURE — 6360000002 HC RX W HCPCS: Performed by: ORTHOPAEDIC SURGERY

## 2019-01-16 PROCEDURE — 2580000003 HC RX 258: Performed by: ANESTHESIOLOGY

## 2019-01-16 PROCEDURE — 7100000001 HC PACU RECOVERY - ADDTL 15 MIN: Performed by: ORTHOPAEDIC SURGERY

## 2019-01-16 PROCEDURE — 3700000000 HC ANESTHESIA ATTENDED CARE: Performed by: ORTHOPAEDIC SURGERY

## 2019-01-16 PROCEDURE — 3600000005 HC SURGERY LEVEL 5 BASE: Performed by: ORTHOPAEDIC SURGERY

## 2019-01-16 PROCEDURE — 7100000011 HC PHASE II RECOVERY - ADDTL 15 MIN: Performed by: ORTHOPAEDIC SURGERY

## 2019-01-16 PROCEDURE — 2500000003 HC RX 250 WO HCPCS: Performed by: NURSE ANESTHETIST, CERTIFIED REGISTERED

## 2019-01-16 PROCEDURE — 2580000003 HC RX 258: Performed by: ORTHOPAEDIC SURGERY

## 2019-01-16 PROCEDURE — 2720000010 HC SURG SUPPLY STERILE: Performed by: ORTHOPAEDIC SURGERY

## 2019-01-16 PROCEDURE — 72040 X-RAY EXAM NECK SPINE 2-3 VW: CPT

## 2019-01-16 PROCEDURE — 3600000015 HC SURGERY LEVEL 5 ADDTL 15MIN: Performed by: ORTHOPAEDIC SURGERY

## 2019-01-16 PROCEDURE — 3209999900 FLUORO FOR SURGICAL PROCEDURES

## 2019-01-16 PROCEDURE — 2709999900 HC NON-CHARGEABLE SUPPLY: Performed by: ORTHOPAEDIC SURGERY

## 2019-01-16 PROCEDURE — C1713 ANCHOR/SCREW BN/BN,TIS/BN: HCPCS | Performed by: ORTHOPAEDIC SURGERY

## 2019-01-16 PROCEDURE — 2500000003 HC RX 250 WO HCPCS: Performed by: ORTHOPAEDIC SURGERY

## 2019-01-16 PROCEDURE — 7100000000 HC PACU RECOVERY - FIRST 15 MIN: Performed by: ORTHOPAEDIC SURGERY

## 2019-01-16 PROCEDURE — 0RB30ZZ EXCISION OF CERVICAL VERTEBRAL DISC, OPEN APPROACH: ICD-10-PCS | Performed by: ORTHOPAEDIC SURGERY

## 2019-01-16 PROCEDURE — 0RG10A0 FUSION OF CERVICAL VERTEBRAL JOINT WITH INTERBODY FUSION DEVICE, ANTERIOR APPROACH, ANTERIOR COLUMN, OPEN APPROACH: ICD-10-PCS | Performed by: ORTHOPAEDIC SURGERY

## 2019-01-16 PROCEDURE — 3700000001 HC ADD 15 MINUTES (ANESTHESIA): Performed by: ORTHOPAEDIC SURGERY

## 2019-01-16 DEVICE — PLATE 3001021 ZEVO 21MM 1 LVL
Type: IMPLANTABLE DEVICE | Site: SPINE CERVICAL | Status: FUNCTIONAL
Brand: ZEVO™ ANTERIOR CERVICAL PLATE SYSTEM

## 2019-01-16 DEVICE — GRAFT HUM TISS W14XH7MM D11MM CANC CORT LORD SPNL SPCR BLK: Type: IMPLANTABLE DEVICE | Site: SPINE CERVICAL | Status: FUNCTIONAL

## 2019-01-16 RX ORDER — OXYCODONE HYDROCHLORIDE AND ACETAMINOPHEN 5; 325 MG/1; MG/1
2 TABLET ORAL PRN
Status: DISCONTINUED | OUTPATIENT
Start: 2019-01-16 | End: 2019-01-16 | Stop reason: HOSPADM

## 2019-01-16 RX ORDER — HYDRALAZINE HYDROCHLORIDE 20 MG/ML
5 INJECTION INTRAMUSCULAR; INTRAVENOUS
Status: DISCONTINUED | OUTPATIENT
Start: 2019-01-16 | End: 2019-01-16 | Stop reason: HOSPADM

## 2019-01-16 RX ORDER — SODIUM CHLORIDE 0.9 % (FLUSH) 0.9 %
10 SYRINGE (ML) INJECTION EVERY 12 HOURS SCHEDULED
Status: CANCELLED | OUTPATIENT
Start: 2019-01-16

## 2019-01-16 RX ORDER — SODIUM CHLORIDE 9 MG/ML
INJECTION, SOLUTION INTRAVENOUS CONTINUOUS
Status: CANCELLED | OUTPATIENT
Start: 2019-01-16

## 2019-01-16 RX ORDER — MORPHINE SULFATE 4 MG/ML
1 INJECTION, SOLUTION INTRAMUSCULAR; INTRAVENOUS EVERY 5 MIN PRN
Status: DISCONTINUED | OUTPATIENT
Start: 2019-01-16 | End: 2019-01-16 | Stop reason: HOSPADM

## 2019-01-16 RX ORDER — MORPHINE SULFATE 4 MG/ML
2 INJECTION, SOLUTION INTRAMUSCULAR; INTRAVENOUS EVERY 5 MIN PRN
Status: DISCONTINUED | OUTPATIENT
Start: 2019-01-16 | End: 2019-01-16 | Stop reason: HOSPADM

## 2019-01-16 RX ORDER — ONDANSETRON 2 MG/ML
4 INJECTION INTRAMUSCULAR; INTRAVENOUS
Status: DISCONTINUED | OUTPATIENT
Start: 2019-01-16 | End: 2019-01-16 | Stop reason: HOSPADM

## 2019-01-16 RX ORDER — ACETAMINOPHEN 10 MG/ML
1000 INJECTION, SOLUTION INTRAVENOUS
Status: COMPLETED | OUTPATIENT
Start: 2019-01-16 | End: 2019-01-16

## 2019-01-16 RX ORDER — OXYCODONE HYDROCHLORIDE AND ACETAMINOPHEN 5; 325 MG/1; MG/1
1 TABLET ORAL PRN
Status: DISCONTINUED | OUTPATIENT
Start: 2019-01-16 | End: 2019-01-16 | Stop reason: HOSPADM

## 2019-01-16 RX ORDER — OXYCODONE HYDROCHLORIDE AND ACETAMINOPHEN 5; 325 MG/1; MG/1
2 TABLET ORAL EVERY 4 HOURS PRN
Status: CANCELLED | OUTPATIENT
Start: 2019-01-16

## 2019-01-16 RX ORDER — DOCUSATE SODIUM 100 MG/1
100 CAPSULE, LIQUID FILLED ORAL 2 TIMES DAILY
Status: CANCELLED | OUTPATIENT
Start: 2019-01-16

## 2019-01-16 RX ORDER — BUPIVACAINE HYDROCHLORIDE AND EPINEPHRINE 2.5; 5 MG/ML; UG/ML
INJECTION, SOLUTION EPIDURAL; INFILTRATION; INTRACAUDAL; PERINEURAL PRN
Status: DISCONTINUED | OUTPATIENT
Start: 2019-01-16 | End: 2019-01-16 | Stop reason: HOSPADM

## 2019-01-16 RX ORDER — CYCLOBENZAPRINE HCL 10 MG
10 TABLET ORAL 3 TIMES DAILY PRN
Status: CANCELLED | OUTPATIENT
Start: 2019-01-16

## 2019-01-16 RX ORDER — OXYCODONE HYDROCHLORIDE AND ACETAMINOPHEN 5; 325 MG/1; MG/1
1 TABLET ORAL EVERY 4 HOURS PRN
Status: CANCELLED | OUTPATIENT
Start: 2019-01-16

## 2019-01-16 RX ORDER — CYCLOBENZAPRINE HCL 10 MG
10 TABLET ORAL 3 TIMES DAILY PRN
Qty: 40 TABLET | Refills: 1 | Status: SHIPPED | OUTPATIENT
Start: 2019-01-16 | End: 2019-01-26

## 2019-01-16 RX ORDER — OXYCODONE HYDROCHLORIDE AND ACETAMINOPHEN 5; 325 MG/1; MG/1
2 TABLET ORAL EVERY 4 HOURS PRN
Qty: 50 TABLET | Refills: 0 | Status: SHIPPED | OUTPATIENT
Start: 2019-01-16 | End: 2019-02-10

## 2019-01-16 RX ORDER — DEXAMETHASONE SODIUM PHOSPHATE 10 MG/ML
INJECTION INTRAMUSCULAR; INTRAVENOUS PRN
Status: DISCONTINUED | OUTPATIENT
Start: 2019-01-16 | End: 2019-01-16 | Stop reason: SDUPTHER

## 2019-01-16 RX ORDER — ROCURONIUM BROMIDE 10 MG/ML
INJECTION, SOLUTION INTRAVENOUS PRN
Status: DISCONTINUED | OUTPATIENT
Start: 2019-01-16 | End: 2019-01-16 | Stop reason: SDUPTHER

## 2019-01-16 RX ORDER — FOLIC ACID 1 MG/1
1 TABLET ORAL DAILY
Status: CANCELLED | OUTPATIENT
Start: 2019-01-16

## 2019-01-16 RX ORDER — PROPOFOL 10 MG/ML
INJECTION, EMULSION INTRAVENOUS CONTINUOUS PRN
Status: DISCONTINUED | OUTPATIENT
Start: 2019-01-16 | End: 2019-01-16 | Stop reason: SDUPTHER

## 2019-01-16 RX ORDER — ONDANSETRON 2 MG/ML
INJECTION INTRAMUSCULAR; INTRAVENOUS PRN
Status: DISCONTINUED | OUTPATIENT
Start: 2019-01-16 | End: 2019-01-16 | Stop reason: SDUPTHER

## 2019-01-16 RX ORDER — PANTOPRAZOLE SODIUM 40 MG/1
1 TABLET, DELAYED RELEASE ORAL DAILY
Status: CANCELLED | OUTPATIENT
Start: 2019-01-16

## 2019-01-16 RX ORDER — MIDAZOLAM HYDROCHLORIDE 1 MG/ML
INJECTION INTRAMUSCULAR; INTRAVENOUS PRN
Status: DISCONTINUED | OUTPATIENT
Start: 2019-01-16 | End: 2019-01-16 | Stop reason: SDUPTHER

## 2019-01-16 RX ORDER — PROPOFOL 10 MG/ML
INJECTION, EMULSION INTRAVENOUS PRN
Status: DISCONTINUED | OUTPATIENT
Start: 2019-01-16 | End: 2019-01-16 | Stop reason: SDUPTHER

## 2019-01-16 RX ORDER — DOCUSATE SODIUM 100 MG/1
100 CAPSULE, LIQUID FILLED ORAL 2 TIMES DAILY PRN
Qty: 30 CAPSULE | Refills: 1 | Status: SHIPPED | OUTPATIENT
Start: 2019-01-16 | End: 2019-04-16

## 2019-01-16 RX ORDER — FENTANYL CITRATE 50 UG/ML
INJECTION, SOLUTION INTRAMUSCULAR; INTRAVENOUS PRN
Status: DISCONTINUED | OUTPATIENT
Start: 2019-01-16 | End: 2019-01-16 | Stop reason: SDUPTHER

## 2019-01-16 RX ORDER — ATORVASTATIN CALCIUM 20 MG/1
1 TABLET, FILM COATED ORAL DAILY
Status: CANCELLED | OUTPATIENT
Start: 2019-01-16

## 2019-01-16 RX ORDER — HYDROCHLOROTHIAZIDE 25 MG/1
1 TABLET ORAL DAILY
Status: CANCELLED | OUTPATIENT
Start: 2019-01-16

## 2019-01-16 RX ORDER — ONDANSETRON 2 MG/ML
4 INJECTION INTRAMUSCULAR; INTRAVENOUS EVERY 6 HOURS PRN
Status: CANCELLED | OUTPATIENT
Start: 2019-01-16

## 2019-01-16 RX ORDER — ALBUTEROL SULFATE 90 UG/1
2 AEROSOL, METERED RESPIRATORY (INHALATION) EVERY 4 HOURS PRN
Status: CANCELLED | OUTPATIENT
Start: 2019-01-16

## 2019-01-16 RX ORDER — SODIUM CHLORIDE, SODIUM LACTATE, POTASSIUM CHLORIDE, CALCIUM CHLORIDE 600; 310; 30; 20 MG/100ML; MG/100ML; MG/100ML; MG/100ML
INJECTION, SOLUTION INTRAVENOUS CONTINUOUS
Status: DISCONTINUED | OUTPATIENT
Start: 2019-01-16 | End: 2019-01-16 | Stop reason: HOSPADM

## 2019-01-16 RX ORDER — MEPERIDINE HYDROCHLORIDE 50 MG/ML
12.5 INJECTION INTRAMUSCULAR; INTRAVENOUS; SUBCUTANEOUS EVERY 5 MIN PRN
Status: DISCONTINUED | OUTPATIENT
Start: 2019-01-16 | End: 2019-01-16 | Stop reason: HOSPADM

## 2019-01-16 RX ORDER — SODIUM CHLORIDE 0.9 % (FLUSH) 0.9 %
10 SYRINGE (ML) INJECTION PRN
Status: CANCELLED | OUTPATIENT
Start: 2019-01-16

## 2019-01-16 RX ORDER — LABETALOL HYDROCHLORIDE 5 MG/ML
5 INJECTION, SOLUTION INTRAVENOUS EVERY 10 MIN PRN
Status: DISCONTINUED | OUTPATIENT
Start: 2019-01-16 | End: 2019-01-16 | Stop reason: HOSPADM

## 2019-01-16 RX ORDER — MORPHINE SULFATE 4 MG/ML
2 INJECTION, SOLUTION INTRAMUSCULAR; INTRAVENOUS
Status: CANCELLED | OUTPATIENT
Start: 2019-01-16

## 2019-01-16 RX ORDER — CEPHALEXIN 500 MG/1
500 CAPSULE ORAL EVERY 6 HOURS
Qty: 2 CAPSULE | Refills: 0 | Status: SHIPPED | OUTPATIENT
Start: 2019-01-16 | End: 2019-04-16

## 2019-01-16 RX ADMIN — ACETAMINOPHEN 1000 MG: 10 INJECTION, SOLUTION INTRAVENOUS at 12:44

## 2019-01-16 RX ADMIN — SUGAMMADEX 200 MG: 100 INJECTION, SOLUTION INTRAVENOUS at 14:43

## 2019-01-16 RX ADMIN — FENTANYL CITRATE 100 MCG: 50 INJECTION INTRAMUSCULAR; INTRAVENOUS at 13:26

## 2019-01-16 RX ADMIN — PROPOFOL 200 MG: 10 INJECTION, EMULSION INTRAVENOUS at 13:26

## 2019-01-16 RX ADMIN — Medication 2 G: at 13:36

## 2019-01-16 RX ADMIN — FENTANYL CITRATE 50 MCG: 50 INJECTION INTRAMUSCULAR; INTRAVENOUS at 14:26

## 2019-01-16 RX ADMIN — PROPOFOL 70 MCG/KG/MIN: 10 INJECTION, EMULSION INTRAVENOUS at 13:47

## 2019-01-16 RX ADMIN — LIDOCAINE HYDROCHLORIDE 0.1 ML: 10 INJECTION, SOLUTION EPIDURAL; INFILTRATION; INTRACAUDAL; PERINEURAL at 10:35

## 2019-01-16 RX ADMIN — DEXAMETHASONE SODIUM PHOSPHATE 8 MG: 10 INJECTION INTRAMUSCULAR; INTRAVENOUS at 13:26

## 2019-01-16 RX ADMIN — ONDANSETRON 4 MG: 2 INJECTION INTRAMUSCULAR; INTRAVENOUS at 13:26

## 2019-01-16 RX ADMIN — ROCURONIUM BROMIDE 40 MG: 10 SOLUTION INTRAVENOUS at 13:26

## 2019-01-16 RX ADMIN — SODIUM CHLORIDE, POTASSIUM CHLORIDE, SODIUM LACTATE AND CALCIUM CHLORIDE: 600; 310; 30; 20 INJECTION, SOLUTION INTRAVENOUS at 10:35

## 2019-01-16 RX ADMIN — SODIUM CHLORIDE, POTASSIUM CHLORIDE, SODIUM LACTATE AND CALCIUM CHLORIDE: 600; 310; 30; 20 INJECTION, SOLUTION INTRAVENOUS at 14:48

## 2019-01-16 RX ADMIN — MIDAZOLAM HYDROCHLORIDE 2 MG: 2 INJECTION, SOLUTION INTRAMUSCULAR; INTRAVENOUS at 13:26

## 2019-01-16 ASSESSMENT — PULMONARY FUNCTION TESTS
PIF_VALUE: 2
PIF_VALUE: 21
PIF_VALUE: 24
PIF_VALUE: 19
PIF_VALUE: 24
PIF_VALUE: 24
PIF_VALUE: 25
PIF_VALUE: 25
PIF_VALUE: 23
PIF_VALUE: 25
PIF_VALUE: 24
PIF_VALUE: 25
PIF_VALUE: 19
PIF_VALUE: 19
PIF_VALUE: 24
PIF_VALUE: 25
PIF_VALUE: 20
PIF_VALUE: 23
PIF_VALUE: 6
PIF_VALUE: 23
PIF_VALUE: 24
PIF_VALUE: 25
PIF_VALUE: 24
PIF_VALUE: 23
PIF_VALUE: 9
PIF_VALUE: 24
PIF_VALUE: 20
PIF_VALUE: 23
PIF_VALUE: 24
PIF_VALUE: 20
PIF_VALUE: 24
PIF_VALUE: 19
PIF_VALUE: 21
PIF_VALUE: 24
PIF_VALUE: 21
PIF_VALUE: 25
PIF_VALUE: 21
PIF_VALUE: 21
PIF_VALUE: 12
PIF_VALUE: 1
PIF_VALUE: 24
PIF_VALUE: 19
PIF_VALUE: 24
PIF_VALUE: 31
PIF_VALUE: 22
PIF_VALUE: 21
PIF_VALUE: 21
PIF_VALUE: 23
PIF_VALUE: 23
PIF_VALUE: 24
PIF_VALUE: 25
PIF_VALUE: 17
PIF_VALUE: 19
PIF_VALUE: 24
PIF_VALUE: 24
PIF_VALUE: 23
PIF_VALUE: 20
PIF_VALUE: 21
PIF_VALUE: 25
PIF_VALUE: 24
PIF_VALUE: 20
PIF_VALUE: 22
PIF_VALUE: 23
PIF_VALUE: 20
PIF_VALUE: 24
PIF_VALUE: 1
PIF_VALUE: 24
PIF_VALUE: 21
PIF_VALUE: 24
PIF_VALUE: 24
PIF_VALUE: 19
PIF_VALUE: 20
PIF_VALUE: 23
PIF_VALUE: 25
PIF_VALUE: 24
PIF_VALUE: 19
PIF_VALUE: 24
PIF_VALUE: 0
PIF_VALUE: 19
PIF_VALUE: 0
PIF_VALUE: 19
PIF_VALUE: 21
PIF_VALUE: 2
PIF_VALUE: 23
PIF_VALUE: 24

## 2019-01-16 ASSESSMENT — PAIN SCALES - GENERAL
PAINLEVEL_OUTOF10: 0
PAINLEVEL_OUTOF10: 0

## 2019-01-16 ASSESSMENT — PAIN - FUNCTIONAL ASSESSMENT: PAIN_FUNCTIONAL_ASSESSMENT: 0-10

## 2019-01-16 ASSESSMENT — PAIN DESCRIPTION - DESCRIPTORS: DESCRIPTORS: ACHING;TINGLING

## 2019-01-18 ENCOUNTER — TELEPHONE (OUTPATIENT)
Dept: ORTHOPEDIC SURGERY | Age: 49
End: 2019-01-18

## 2019-01-29 ENCOUNTER — OFFICE VISIT (OUTPATIENT)
Dept: ORTHOPEDIC SURGERY | Age: 49
End: 2019-01-29

## 2019-01-29 VITALS
WEIGHT: 158.95 LBS | BODY MASS INDEX: 27.14 KG/M2 | HEART RATE: 76 BPM | HEIGHT: 64 IN | DIASTOLIC BLOOD PRESSURE: 80 MMHG | SYSTOLIC BLOOD PRESSURE: 120 MMHG

## 2019-01-29 DIAGNOSIS — Z98.1 S/P CERVICAL SPINAL FUSION: Primary | ICD-10-CM

## 2019-01-29 PROCEDURE — 99024 POSTOP FOLLOW-UP VISIT: CPT | Performed by: PHYSICIAN ASSISTANT

## 2019-02-01 PROBLEM — Z01.818 PREOP EXAMINATION: Status: RESOLVED | Noted: 2019-01-02 | Resolved: 2019-02-01

## 2019-02-15 ENCOUNTER — TELEPHONE (OUTPATIENT)
Dept: ORTHOPEDIC SURGERY | Age: 49
End: 2019-02-15

## 2019-04-10 DIAGNOSIS — I10 ESSENTIAL HYPERTENSION: ICD-10-CM

## 2019-04-10 RX ORDER — HYDROCHLOROTHIAZIDE 25 MG/1
TABLET ORAL
Qty: 30 TABLET | Refills: 10 | Status: SHIPPED | OUTPATIENT
Start: 2019-04-10 | End: 2019-07-11 | Stop reason: ALTCHOICE

## 2019-04-10 NOTE — TELEPHONE ENCOUNTER
Memory Shon is requesting refill(s) HCTZ  Last OV 1/2/19 (pertaining to medication)  LR 3/15/18 (per medication requested)  Next office visit scheduled or attempted No   If no, reason:  Pt is not scheduled

## 2019-04-16 ENCOUNTER — HOSPITAL ENCOUNTER (OUTPATIENT)
Dept: PULMONOLOGY | Age: 49
Discharge: HOME | End: 2019-04-16
Payer: MEDICAID

## 2019-04-16 ENCOUNTER — OFFICE VISIT (OUTPATIENT)
Dept: PULMONOLOGY | Age: 49
End: 2019-04-16
Payer: MEDICAID

## 2019-04-16 VITALS
SYSTOLIC BLOOD PRESSURE: 114 MMHG | HEART RATE: 77 BPM | OXYGEN SATURATION: 93 % | TEMPERATURE: 97.5 F | HEIGHT: 64 IN | DIASTOLIC BLOOD PRESSURE: 74 MMHG | BODY MASS INDEX: 27.35 KG/M2 | RESPIRATION RATE: 20 BRPM | WEIGHT: 160.2 LBS

## 2019-04-16 DIAGNOSIS — J44.9 CHRONIC OBSTRUCTIVE PULMONARY DISEASE, UNSPECIFIED COPD TYPE (HCC): ICD-10-CM

## 2019-04-16 DIAGNOSIS — J44.9 CHRONIC OBSTRUCTIVE PULMONARY DISEASE, UNSPECIFIED COPD TYPE (HCC): Primary | ICD-10-CM

## 2019-04-16 LAB
DLCO %PRED: 42 %
DLCO PRED: NORMAL ML/MIN/MMHG
DLCO/VA %PRED: NORMAL %
DLCO/VA PRED: NORMAL ML/MIN/MMHG
DLCO/VA: NORMAL ML/MIN/MMHG
DLCO: NORMAL ML/MIN/MMHG
EXPIRATORY TIME-POST: NORMAL SEC
EXPIRATORY TIME: NORMAL SEC
FEF 25-75% %CHNG: NORMAL
FEF 25-75% %PRED-POST: NORMAL %
FEF 25-75% %PRED-PRE: NORMAL L/SEC
FEF 25-75% PRED: NORMAL L/SEC
FEF 25-75%-POST: NORMAL L/SEC
FEF 25-75%-PRE: NORMAL L/SEC
FEV1 %PRED-POST: 36 %
FEV1 %PRED-PRE: 35 %
FEV1 PRED: NORMAL L
FEV1-POST: NORMAL L
FEV1-PRE: NORMAL L
FEV1/FVC %PRED-POST: NORMAL %
FEV1/FVC %PRED-PRE: NORMAL %
FEV1/FVC PRED: NORMAL %
FEV1/FVC-POST: 60 %
FEV1/FVC-PRE: 55 %
FVC %PRED-POST: NORMAL L
FVC %PRED-PRE: NORMAL %
FVC PRED: NORMAL L
FVC-POST: NORMAL L
FVC-PRE: NORMAL L
GAW %PRED: NORMAL %
GAW PRED: NORMAL L/S/CMH2O
GAW: NORMAL L/S/CMH2O
IC %PRED: NORMAL %
IC PRED: NORMAL L
IC: NORMAL L
MEP: NORMAL
MIP: NORMAL
MVV %PRED-PRE: NORMAL %
MVV PRED: NORMAL L/MIN
MVV-PRE: NORMAL L/MIN
PEF %PRED-POST: NORMAL %
PEF %PRED-PRE: NORMAL L/SEC
PEF PRED: NORMAL L/SEC
PEF%CHNG: NORMAL
PEF-POST: NORMAL L/SEC
PEF-PRE: NORMAL L/SEC
RAW %PRED: NORMAL %
RAW PRED: NORMAL CMH2O/L/S
RAW: NORMAL CMH2O/L/S
RV %PRED: NORMAL %
RV PRED: NORMAL L
RV: NORMAL L
SVC %PRED: NORMAL %
SVC PRED: NORMAL L
SVC: NORMAL L
TLC %PRED: 102 %
TLC PRED: NORMAL L
TLC: NORMAL L
VA %PRED: NORMAL %
VA PRED: NORMAL L
VA: NORMAL L
VTG %PRED: NORMAL %
VTG PRED: NORMAL L
VTG: NORMAL L

## 2019-04-16 PROCEDURE — 6360000002 HC RX W HCPCS: Performed by: INTERNAL MEDICINE

## 2019-04-16 PROCEDURE — 94729 DIFFUSING CAPACITY: CPT

## 2019-04-16 PROCEDURE — 1036F TOBACCO NON-USER: CPT | Performed by: INTERNAL MEDICINE

## 2019-04-16 PROCEDURE — 99213 OFFICE O/P EST LOW 20 MIN: CPT | Performed by: INTERNAL MEDICINE

## 2019-04-16 PROCEDURE — 94726 PLETHYSMOGRAPHY LUNG VOLUMES: CPT

## 2019-04-16 PROCEDURE — 94640 AIRWAY INHALATION TREATMENT: CPT

## 2019-04-16 PROCEDURE — 3023F SPIROM DOC REV: CPT | Performed by: INTERNAL MEDICINE

## 2019-04-16 PROCEDURE — G8419 CALC BMI OUT NRM PARAM NOF/U: HCPCS | Performed by: INTERNAL MEDICINE

## 2019-04-16 PROCEDURE — 94060 EVALUATION OF WHEEZING: CPT

## 2019-04-16 PROCEDURE — 3017F COLORECTAL CA SCREEN DOC REV: CPT | Performed by: INTERNAL MEDICINE

## 2019-04-16 PROCEDURE — G8427 DOCREV CUR MEDS BY ELIG CLIN: HCPCS | Performed by: INTERNAL MEDICINE

## 2019-04-16 PROCEDURE — 94618 PULMONARY STRESS TESTING: CPT

## 2019-04-16 PROCEDURE — G8926 SPIRO NO PERF OR DOC: HCPCS | Performed by: INTERNAL MEDICINE

## 2019-04-16 RX ORDER — GLUCOSAMINE HCL 500 MG
2000 TABLET ORAL DAILY
COMMUNITY
End: 2019-07-11

## 2019-04-16 RX ORDER — ALBUTEROL SULFATE 90 UG/1
2 AEROSOL, METERED RESPIRATORY (INHALATION) 4 TIMES DAILY PRN
Qty: 1 INHALER | Refills: 5 | Status: SHIPPED | OUTPATIENT
Start: 2019-04-16 | End: 2020-01-17

## 2019-04-16 RX ORDER — ALBUTEROL SULFATE 2.5 MG/3ML
2.5 SOLUTION RESPIRATORY (INHALATION) ONCE
Status: COMPLETED | OUTPATIENT
Start: 2019-04-16 | End: 2019-04-16

## 2019-04-16 RX ADMIN — ALBUTEROL SULFATE 2.5 MG: 2.5 SOLUTION RESPIRATORY (INHALATION) at 11:18

## 2019-04-16 ASSESSMENT — PULMONARY FUNCTION TESTS
FEV1/FVC_POST: 60
FEV1_PERCENT_PREDICTED_POST: 36
FEV1_PERCENT_PREDICTED_PRE: 35
FEV1/FVC_PRE: 55

## 2019-04-16 NOTE — PROGRESS NOTES
Chief Complaint: shortness of breath     Referring Provider: Andres Bryant History:   Quit tobacco in Jan 2019. Breathing worse with stiolto compared with anoro. Has gained 20 pounds    Works installing dion    Presenting HPI: 49 yo WM with 60 pack year tobacco, now with 2 year moderate progressive dyspnea on exertion associated with productive cough; symptoms are not 100% predictable, some intermittent nature, but daily. Symptoms are improved with albuterol       Physical Exam:  Blood pressure 114/74, pulse 77, temperature 97.5 °F (36.4 °C), temperature source Oral, resp. rate 20, height 5' 4\" (1.626 m), weight 160 lb 3.2 oz (72.7 kg), SpO2 93 %.'  Constitutional:  No acute distress. HENT:  Oropharynx is clear and moist.   Neck: No tracheal deviation present. Cardiovascular: Normal heart sounds. No lower extremity edema. Pulmonary/Chest: No wheezes. No rhonchi. No rales. + decreased breath sounds. No accessory muscle usage or stridor. Musculoskeletal: No cyanosis. No clubbing. Skin: Skin is warm and dry. Psychiatric: Normal mood and affect. Neurologic: speech fluent, alert and oriented, strength symmetric      Data:   CT CHEST 11-18-15  There is some minimal increased opacity in the right mainstem bronchus and   bronchus intermedius likely retained secretions. The central airways are   otherwise patent. Subpleural opacity is noted posteriorly at the right lung base likely   representative of atelectasis. There is a calcified granuloma noted posteriorly in the left lower lobe. Lungs are otherwise grossly clear. Mediastinal structures demonstrate a normal appearance of the heart and great   vessels. No suspicious hilar or mediastinal adenopathy noted. The esophagus appears to taper normally to the GE junction. GE junction is unremarkable. Stomach and the small bowel are unremarkable in appearance.  Mesenteric and   retroperitoneal structures including the abdominal aorta are grossly   unremarkable. Atherosclerotic disease is noted. Liver, gallbladder, spleen, kidneys, and pancreas are unremarkable in   appearance. No acute abnormality of the visualized osseous structures. Impression   IMPRESSION:   No acute abnormality of the chest or abdomen noted. PFT 1/12/16 FEV1 1/05 L 32%  ++ airtrapping  DLCO 14.73 50%  PFT 4/16/19 FEV1 1.1 L 35%    DLCO 12.12 42%    Bronchoscopy 2015 cytology no malignant cells.     ALPHA 1 161  Assessment:  Severe COPD/Emphysema  Need for back surgery    Not addressed today  Family h/o COPD  60 pack year tobacco history, quit 1/1/19    Plan:   · Failed Stiolto, more shortness of breath, change to Trelegy -- start prior authorization -- give co-pay card - if not allowed, will add inhaled corticosteroids to stiolto   · Repeat PFT with 6 MWT   · Weight loss  · Albuterol PRN      Mariely Lin with [facility]

## 2019-04-17 ENCOUNTER — TELEPHONE (OUTPATIENT)
Dept: PULMONOLOGY | Age: 49
End: 2019-04-17

## 2019-04-17 NOTE — TELEPHONE ENCOUNTER
Antolin Mejía MD  [place], [place]   Caller: Unspecified Sadia Avila,  4:51 PM)             Tell pt I did review his PFT. Broderick Tan is very similar to prior PFT from 3 years prior.  Oxygenation looks good.  Plan as discussed in office.

## 2019-04-18 ENCOUNTER — TELEPHONE (OUTPATIENT)
Dept: PULMONOLOGY | Age: 49
End: 2019-04-18

## 2019-04-18 NOTE — TELEPHONE ENCOUNTER
Message closed in error. Received fax from [facility] stating  Was denied. Formulary alternatives are Stiolto Respimat or Arnuity Ellipta. Please advise if any of these would be able to replace the Trelegy Ellipta.

## 2019-04-22 DIAGNOSIS — K21.9 GASTROESOPHAGEAL REFLUX DISEASE WITHOUT ESOPHAGITIS: ICD-10-CM

## 2019-04-22 DIAGNOSIS — E78.5 HYPERLIPIDEMIA LDL GOAL <70: ICD-10-CM

## 2019-04-22 RX ORDER — PANTOPRAZOLE SODIUM 40 MG/1
TABLET, DELAYED RELEASE ORAL
Qty: 90 TABLET | Refills: 1 | Status: SHIPPED | OUTPATIENT
Start: 2019-04-22 | End: 2019-11-18 | Stop reason: SDUPTHER

## 2019-04-22 RX ORDER — ATORVASTATIN CALCIUM 20 MG/1
TABLET, FILM COATED ORAL
Qty: 90 TABLET | Refills: 1 | Status: SHIPPED | OUTPATIENT
Start: 2019-04-22 | End: 2019-12-08 | Stop reason: SDUPTHER

## 2019-04-22 NOTE — TELEPHONE ENCOUNTER
Lucio Ding is requesting refill(s) lipitor  Last OV 1/2/19 (pertaining to medication)  LR 12/10/18 (per medication requested)  Next office visit scheduled or attempted No   If no, reason:  Pt is not scheduled

## 2019-05-02 ENCOUNTER — TELEPHONE (OUTPATIENT)
Dept: PULMONOLOGY | Age: 49
End: 2019-05-02

## 2019-06-05 DIAGNOSIS — E55.9 VITAMIN D DEFICIENCY: ICD-10-CM

## 2019-06-05 NOTE — TELEPHONE ENCOUNTER
LM for the pt to contact the office, pt is over due for a f/u.     Barbie Loveless is requesting refill(s) vitamin d  Last OV 1/2/19 (pertaining to medication)  LR ? (per medication requested)  Next office visit scheduled or attempted No   If no, reason:  LM for the pt to schedule

## 2019-06-10 RX ORDER — ACETAMINOPHEN 160 MG
TABLET,DISINTEGRATING ORAL
Qty: 30 CAPSULE | Refills: 0 | OUTPATIENT
Start: 2019-06-10

## 2019-07-11 ENCOUNTER — OFFICE VISIT (OUTPATIENT)
Dept: FAMILY MEDICINE CLINIC | Age: 49
End: 2019-07-11
Payer: MEDICAID

## 2019-07-11 VITALS
SYSTOLIC BLOOD PRESSURE: 136 MMHG | WEIGHT: 166.8 LBS | DIASTOLIC BLOOD PRESSURE: 86 MMHG | HEART RATE: 70 BPM | HEIGHT: 64 IN | BODY MASS INDEX: 28.48 KG/M2

## 2019-07-11 DIAGNOSIS — R73.03 PREDIABETES: ICD-10-CM

## 2019-07-11 DIAGNOSIS — K21.9 GASTROESOPHAGEAL REFLUX DISEASE WITHOUT ESOPHAGITIS: ICD-10-CM

## 2019-07-11 DIAGNOSIS — I10 ESSENTIAL HYPERTENSION: Primary | ICD-10-CM

## 2019-07-11 DIAGNOSIS — Z12.5 SCREENING PSA (PROSTATE SPECIFIC ANTIGEN): ICD-10-CM

## 2019-07-11 DIAGNOSIS — E78.2 MIXED HYPERLIPIDEMIA: ICD-10-CM

## 2019-07-11 PROCEDURE — 99214 OFFICE O/P EST MOD 30 MIN: CPT | Performed by: FAMILY MEDICINE

## 2019-07-11 PROCEDURE — 3017F COLORECTAL CA SCREEN DOC REV: CPT | Performed by: FAMILY MEDICINE

## 2019-07-11 PROCEDURE — G8419 CALC BMI OUT NRM PARAM NOF/U: HCPCS | Performed by: FAMILY MEDICINE

## 2019-07-11 PROCEDURE — G8427 DOCREV CUR MEDS BY ELIG CLIN: HCPCS | Performed by: FAMILY MEDICINE

## 2019-07-11 PROCEDURE — 1036F TOBACCO NON-USER: CPT | Performed by: FAMILY MEDICINE

## 2019-07-11 RX ORDER — AMLODIPINE BESYLATE 5 MG/1
5 TABLET ORAL DAILY
Qty: 30 TABLET | Refills: 3 | Status: SHIPPED | OUTPATIENT
Start: 2019-07-11 | End: 2019-11-07 | Stop reason: SDUPTHER

## 2019-07-11 ASSESSMENT — ENCOUNTER SYMPTOMS
CONSTIPATION: 0
CHEST TIGHTNESS: 0
BLOOD IN STOOL: 0
DIARRHEA: 0
EYE PAIN: 0
RHINORRHEA: 0
COUGH: 0
SINUS PRESSURE: 0
VOMITING: 0
WHEEZING: 0
SHORTNESS OF BREATH: 0
EYE DISCHARGE: 0
ABDOMINAL PAIN: 0

## 2019-07-16 ENCOUNTER — NURSE ONLY (OUTPATIENT)
Dept: FAMILY MEDICINE CLINIC | Age: 49
End: 2019-07-16
Payer: MEDICAID

## 2019-07-16 DIAGNOSIS — Z12.5 SCREENING PSA (PROSTATE SPECIFIC ANTIGEN): ICD-10-CM

## 2019-07-16 DIAGNOSIS — R73.03 PREDIABETES: ICD-10-CM

## 2019-07-16 DIAGNOSIS — E78.2 MIXED HYPERLIPIDEMIA: ICD-10-CM

## 2019-07-16 LAB
A/G RATIO: 2 (ref 1.1–2.2)
ALBUMIN SERPL-MCNC: 4.8 G/DL (ref 3.4–5)
ALP BLD-CCNC: 90 U/L (ref 40–129)
ALT SERPL-CCNC: 14 U/L (ref 10–40)
ANION GAP SERPL CALCULATED.3IONS-SCNC: 14 MMOL/L (ref 3–16)
AST SERPL-CCNC: 18 U/L (ref 15–37)
BILIRUB SERPL-MCNC: <0.2 MG/DL (ref 0–1)
BUN BLDV-MCNC: 14 MG/DL (ref 7–20)
CALCIUM SERPL-MCNC: 10 MG/DL (ref 8.3–10.6)
CHLORIDE BLD-SCNC: 102 MMOL/L (ref 99–110)
CHOLESTEROL, TOTAL: 173 MG/DL (ref 0–199)
CO2: 27 MMOL/L (ref 21–32)
CREAT SERPL-MCNC: 1 MG/DL (ref 0.9–1.3)
GFR AFRICAN AMERICAN: >60
GFR NON-AFRICAN AMERICAN: >60
GLOBULIN: 2.4 G/DL
GLUCOSE BLD-MCNC: 107 MG/DL (ref 70–99)
HDLC SERPL-MCNC: 75 MG/DL (ref 40–60)
LDL CHOLESTEROL CALCULATED: 81 MG/DL
POTASSIUM SERPL-SCNC: 4.7 MMOL/L (ref 3.5–5.1)
PROSTATE SPECIFIC ANTIGEN: 0.39 NG/ML (ref 0–4)
SODIUM BLD-SCNC: 143 MMOL/L (ref 136–145)
TOTAL PROTEIN: 7.2 G/DL (ref 6.4–8.2)
TRIGL SERPL-MCNC: 87 MG/DL (ref 0–150)
VLDLC SERPL CALC-MCNC: 17 MG/DL

## 2019-07-16 PROCEDURE — 36415 COLL VENOUS BLD VENIPUNCTURE: CPT | Performed by: FAMILY MEDICINE

## 2019-07-17 ENCOUNTER — OFFICE VISIT (OUTPATIENT)
Dept: PULMONOLOGY | Age: 49
End: 2019-07-17
Payer: MEDICAID

## 2019-07-17 VITALS
BODY MASS INDEX: 26.98 KG/M2 | SYSTOLIC BLOOD PRESSURE: 132 MMHG | WEIGHT: 158 LBS | RESPIRATION RATE: 16 BRPM | HEIGHT: 64 IN | TEMPERATURE: 98.2 F | DIASTOLIC BLOOD PRESSURE: 72 MMHG | OXYGEN SATURATION: 95 % | HEART RATE: 84 BPM

## 2019-07-17 DIAGNOSIS — J44.9 COPD, VERY SEVERE (HCC): Primary | ICD-10-CM

## 2019-07-17 LAB
ESTIMATED AVERAGE GLUCOSE: 122.6 MG/DL
HBA1C MFR BLD: 5.9 %

## 2019-07-17 PROCEDURE — 99213 OFFICE O/P EST LOW 20 MIN: CPT | Performed by: INTERNAL MEDICINE

## 2019-07-17 PROCEDURE — 1036F TOBACCO NON-USER: CPT | Performed by: INTERNAL MEDICINE

## 2019-07-17 PROCEDURE — 3017F COLORECTAL CA SCREEN DOC REV: CPT | Performed by: INTERNAL MEDICINE

## 2019-07-17 PROCEDURE — 3023F SPIROM DOC REV: CPT | Performed by: INTERNAL MEDICINE

## 2019-07-17 PROCEDURE — G8926 SPIRO NO PERF OR DOC: HCPCS | Performed by: INTERNAL MEDICINE

## 2019-07-17 PROCEDURE — G8427 DOCREV CUR MEDS BY ELIG CLIN: HCPCS | Performed by: INTERNAL MEDICINE

## 2019-07-17 PROCEDURE — G8419 CALC BMI OUT NRM PARAM NOF/U: HCPCS | Performed by: INTERNAL MEDICINE

## 2019-07-17 NOTE — PROGRESS NOTES
Chief Complaint: shortness of breath     Referring Provider: Andres Bryant History:   Quit tobacco in Jan 2019. Patient experiences significant dyspnea on exertion. He has trouble with heat and humidity. He cannot be out in the heat. For some reason, his Stiolto was discontinued. He does not feel the Arnuity provides any benefit. He is using albuterol more than 4 times daily on average    Presenting HPI: 49 yo WM with 60 pack year tobacco, now with 2 year moderate progressive dyspnea on exertion associated with productive cough; symptoms are not 100% predictable, some intermittent nature, but daily. Symptoms are improved with albuterol       Physical Exam:  Blood pressure 132/72, pulse 84, temperature 98.2 °F (36.8 °C), temperature source Oral, resp. rate 16, height 5' 4\" (1.626 m), weight 158 lb (71.7 kg), SpO2 95 %.'  Constitutional:  No acute distress. HENT:  Oropharynx is clear and moist.   Neck: No tracheal deviation present. Cardiovascular: Normal heart sounds. No lower extremity edema. Pulmonary/Chest: No wheezes. No rhonchi. No rales. + decreased breath sounds. No accessory muscle usage or stridor. Musculoskeletal: No cyanosis. No clubbing. Skin: Skin is warm and dry. Psychiatric: Normal mood and affect. Neurologic: speech fluent, alert and oriented, strength symmetric      Data:   CT CHEST 11-18-15  There is some minimal increased opacity in the right mainstem bronchus and   bronchus intermedius likely retained secretions. The central airways are   otherwise patent. Subpleural opacity is noted posteriorly at the right lung base likely   representative of atelectasis. There is a calcified granuloma noted posteriorly in the left lower lobe. Lungs are otherwise grossly clear. Mediastinal structures demonstrate a normal appearance of the heart and great   vessels. No suspicious hilar or mediastinal adenopathy noted.       The esophagus appears to taper normally to

## 2019-11-08 RX ORDER — AMLODIPINE BESYLATE 5 MG/1
TABLET ORAL
Qty: 30 TABLET | Refills: 2 | Status: SHIPPED | OUTPATIENT
Start: 2019-11-08 | End: 2020-01-13 | Stop reason: SDUPTHER

## 2019-11-18 DIAGNOSIS — K21.9 GASTROESOPHAGEAL REFLUX DISEASE WITHOUT ESOPHAGITIS: ICD-10-CM

## 2019-11-18 RX ORDER — PANTOPRAZOLE SODIUM 40 MG/1
TABLET, DELAYED RELEASE ORAL
Qty: 90 TABLET | Refills: 0 | Status: SHIPPED | OUTPATIENT
Start: 2019-11-18 | End: 2020-01-13 | Stop reason: SDUPTHER

## 2019-12-08 DIAGNOSIS — E78.5 HYPERLIPIDEMIA LDL GOAL <70: ICD-10-CM

## 2019-12-09 RX ORDER — ATORVASTATIN CALCIUM 20 MG/1
TABLET, FILM COATED ORAL
Qty: 30 TABLET | Refills: 2 | Status: SHIPPED | OUTPATIENT
Start: 2019-12-09 | End: 2020-01-13 | Stop reason: SDUPTHER

## 2020-01-13 ENCOUNTER — OFFICE VISIT (OUTPATIENT)
Dept: FAMILY MEDICINE CLINIC | Age: 50
End: 2020-01-13
Payer: MEDICAID

## 2020-01-13 VITALS
HEIGHT: 64 IN | BODY MASS INDEX: 28.61 KG/M2 | WEIGHT: 167.6 LBS | HEART RATE: 80 BPM | DIASTOLIC BLOOD PRESSURE: 60 MMHG | OXYGEN SATURATION: 96 % | SYSTOLIC BLOOD PRESSURE: 116 MMHG

## 2020-01-13 LAB
A/G RATIO: 1.6 (ref 1.1–2.2)
ALBUMIN SERPL-MCNC: 4.5 G/DL (ref 3.4–5)
ALP BLD-CCNC: 97 U/L (ref 40–129)
ALT SERPL-CCNC: 21 U/L (ref 10–40)
ANION GAP SERPL CALCULATED.3IONS-SCNC: 15 MMOL/L (ref 3–16)
AST SERPL-CCNC: 19 U/L (ref 15–37)
BILIRUB SERPL-MCNC: 0.4 MG/DL (ref 0–1)
BUN BLDV-MCNC: 20 MG/DL (ref 7–20)
CALCIUM SERPL-MCNC: 10.2 MG/DL (ref 8.3–10.6)
CHLORIDE BLD-SCNC: 96 MMOL/L (ref 99–110)
CHOLESTEROL, TOTAL: 146 MG/DL (ref 0–199)
CO2: 29 MMOL/L (ref 21–32)
CREAT SERPL-MCNC: 1.1 MG/DL (ref 0.9–1.3)
GFR AFRICAN AMERICAN: >60
GFR NON-AFRICAN AMERICAN: >60
GLOBULIN: 2.9 G/DL
GLUCOSE BLD-MCNC: 102 MG/DL (ref 70–99)
HDLC SERPL-MCNC: 53 MG/DL (ref 40–60)
LDL CHOLESTEROL CALCULATED: 63 MG/DL
POTASSIUM SERPL-SCNC: 4.3 MMOL/L (ref 3.5–5.1)
SODIUM BLD-SCNC: 140 MMOL/L (ref 136–145)
TOTAL PROTEIN: 7.4 G/DL (ref 6.4–8.2)
TRIGL SERPL-MCNC: 150 MG/DL (ref 0–150)
VLDLC SERPL CALC-MCNC: 30 MG/DL

## 2020-01-13 PROCEDURE — G8427 DOCREV CUR MEDS BY ELIG CLIN: HCPCS | Performed by: FAMILY MEDICINE

## 2020-01-13 PROCEDURE — G8419 CALC BMI OUT NRM PARAM NOF/U: HCPCS | Performed by: FAMILY MEDICINE

## 2020-01-13 PROCEDURE — 36415 COLL VENOUS BLD VENIPUNCTURE: CPT | Performed by: FAMILY MEDICINE

## 2020-01-13 PROCEDURE — 3017F COLORECTAL CA SCREEN DOC REV: CPT | Performed by: FAMILY MEDICINE

## 2020-01-13 PROCEDURE — G8484 FLU IMMUNIZE NO ADMIN: HCPCS | Performed by: FAMILY MEDICINE

## 2020-01-13 PROCEDURE — 99213 OFFICE O/P EST LOW 20 MIN: CPT | Performed by: FAMILY MEDICINE

## 2020-01-13 PROCEDURE — 1036F TOBACCO NON-USER: CPT | Performed by: FAMILY MEDICINE

## 2020-01-13 RX ORDER — PANTOPRAZOLE SODIUM 40 MG/1
TABLET, DELAYED RELEASE ORAL
Qty: 90 TABLET | Refills: 1 | Status: SHIPPED | OUTPATIENT
Start: 2020-01-13 | End: 2020-08-17

## 2020-01-13 RX ORDER — AMLODIPINE BESYLATE 5 MG/1
TABLET ORAL
Qty: 90 TABLET | Refills: 1 | Status: SHIPPED | OUTPATIENT
Start: 2020-01-13 | End: 2020-08-17

## 2020-01-13 RX ORDER — ATORVASTATIN CALCIUM 20 MG/1
TABLET, FILM COATED ORAL
Qty: 90 TABLET | Refills: 1 | Status: SHIPPED | OUTPATIENT
Start: 2020-01-13 | End: 2020-09-14

## 2020-01-13 RX ORDER — HYDROCHLOROTHIAZIDE 25 MG/1
1 TABLET ORAL DAILY
COMMUNITY
Start: 2019-11-18 | End: 2020-03-22 | Stop reason: SDUPTHER

## 2020-01-13 ASSESSMENT — PATIENT HEALTH QUESTIONNAIRE - PHQ9
1. LITTLE INTEREST OR PLEASURE IN DOING THINGS: 0
SUM OF ALL RESPONSES TO PHQ9 QUESTIONS 1 & 2: 0
SUM OF ALL RESPONSES TO PHQ QUESTIONS 1-9: 0
2. FEELING DOWN, DEPRESSED OR HOPELESS: 0
SUM OF ALL RESPONSES TO PHQ QUESTIONS 1-9: 0

## 2020-01-13 ASSESSMENT — ENCOUNTER SYMPTOMS
SHORTNESS OF BREATH: 0
CONSTIPATION: 0
VOMITING: 0
RHINORRHEA: 0
DIARRHEA: 0
EYE PAIN: 0
EYE DISCHARGE: 0
WHEEZING: 0
COUGH: 0
SINUS PRESSURE: 0
ABDOMINAL PAIN: 0
BLOOD IN STOOL: 0
CHEST TIGHTNESS: 0

## 2020-01-13 NOTE — PATIENT INSTRUCTIONS

## 2020-01-13 NOTE — PROGRESS NOTES
Subjective:      Patient ID: Arina Falcon is a 44 y.o. male. HPI  Chief Complaint   Patient presents with    Hypertension     Patient is here for a 6 month follow up, he is fasting for blood work       Here for checkup on medication. Has history of hypertension, hyperlipidemia and prediabetes. Has been smoking January for. Is compliant with pulmonology. States no with his blood pressure medicine. Taking PPI. No worsening reflux  Trying to walk regularly.  Breathing stable  Arina Falcon is a 44 y.o. male with the following history as recorded in EpicCare:  Patient Active Problem List    Diagnosis Date Noted    Gastroesophageal reflux disease without esophagitis      Priority: Low    Essential hypertension      Priority: Low    Cervical disc herniation 01/16/2019    DDD (degenerative disc disease), cervical 01/02/2019    Heartburn     Esophageal dysphagia     Mixed hyperlipidemia 03/15/2018    Prediabetes 11/01/2016    COPD [facility]chronic obstructive pulmonary disease) [facility])      Current Outpatient Medications   Medication Sig Dispense Refill    atorvastatin (LIPITOR) 20 MG tablet TAKE ONE TABLET BY MOUTH DAILY 30 tablet 2    pantoprazole (PROTONIX) 40 MG tablet TAKE ONE TABLET BY MOUTH DAILY 90 tablet 0    amLODIPine (NORVASC) 5 MG tablet TAKE ONE TABLET BY MOUTH DAILY 30 tablet 2    Tiotropium Bromide-Olodaterol (STIOLTO RESPIMAT) 2.5-2.5 MCG/ACT AERS Inhale 2 puffs into the lungs daily Provide testing 11 Inhaler 5    fluticasone (ARNUITY ELLIPTA) 100 MCG/ACT AEPB One inhalation daily 30 each 5    albuterol sulfate  (90 Base) MCG/ACT inhaler Inhale 2 puffs into the lungs 4 times daily as needed for Wheezing 1 Inhaler 5    folic acid (FOLVITE) 1 MG tablet Take 1 tablet by mouth daily 30 tablet 11    albuterol sulfate HFA (PROAIR HFA) 108 (90 Base) MCG/ACT inhaler Inhale 2 puffs into the lungs every 4 hours as needed for Wheezing or Shortness of Breath (or coughs) (Patient not

## 2020-01-13 NOTE — PROGRESS NOTES
Sodium (mmol/L)   Date Value   07/16/2019 143    BUN (mg/dL)   Date Value   07/16/2019 14    Glucose (mg/dL)   Date Value   07/16/2019 107 (H)      Potassium (mmol/L)   Date Value   07/16/2019 4.7    CREATININE (mg/dL)   Date Value   07/16/2019 1.0           BP Readings from Last 2 Encounters:   07/17/19 132/72   07/11/19 136/86       Is patient currently taking any antihypertensive medications? Yes   If yes, see med list as above    Is the patient reporting any side effects of antihypertensive medications? No    Is the patient taking any over the counter medications? No   If yes, see med list as above    Is the patient taking a daily aspirin? No    No components found for: CHLPL  Lab Results   Component Value Date    TRIG 87 07/16/2019     Lab Results   Component Value Date    HDL 75 (H) 07/16/2019     Lab Results   Component Value Date    LDLCALC 81 07/16/2019     Lab Results   Component Value Date    LABVLDL 17 07/16/2019       Lab Results   Component Value Date    ALT 14 07/16/2019    AST 18 07/16/2019    ALKPHOS 90 07/16/2019    BILITOT <0.2 07/16/2019           Is patient currently taking any cholesterol medications? Yes   If yes, see med list as above    Is the patient reporting any side effects of cholesterol medications? No    Is the patient taking any over the counter medications? No   If yes, see med list as above    Is the patient taking a daily aspirin?     No

## 2020-01-14 LAB
ESTIMATED AVERAGE GLUCOSE: 114 MG/DL
HBA1C MFR BLD: 5.6 %
VARICELLA-ZOSTER VIRUS AB, IGG: NORMAL

## 2020-01-17 ENCOUNTER — OFFICE VISIT (OUTPATIENT)
Dept: PULMONOLOGY | Age: 50
End: 2020-01-17
Payer: MEDICAID

## 2020-01-17 VITALS
SYSTOLIC BLOOD PRESSURE: 100 MMHG | WEIGHT: 168 LBS | HEART RATE: 80 BPM | RESPIRATION RATE: 18 BRPM | BODY MASS INDEX: 28.68 KG/M2 | HEIGHT: 64 IN | DIASTOLIC BLOOD PRESSURE: 64 MMHG | OXYGEN SATURATION: 95 %

## 2020-01-17 PROCEDURE — G8419 CALC BMI OUT NRM PARAM NOF/U: HCPCS | Performed by: INTERNAL MEDICINE

## 2020-01-17 PROCEDURE — 3017F COLORECTAL CA SCREEN DOC REV: CPT | Performed by: INTERNAL MEDICINE

## 2020-01-17 PROCEDURE — 1036F TOBACCO NON-USER: CPT | Performed by: INTERNAL MEDICINE

## 2020-01-17 PROCEDURE — 99213 OFFICE O/P EST LOW 20 MIN: CPT | Performed by: INTERNAL MEDICINE

## 2020-01-17 PROCEDURE — G8926 SPIRO NO PERF OR DOC: HCPCS | Performed by: INTERNAL MEDICINE

## 2020-01-17 PROCEDURE — G8427 DOCREV CUR MEDS BY ELIG CLIN: HCPCS | Performed by: INTERNAL MEDICINE

## 2020-01-17 PROCEDURE — 3023F SPIROM DOC REV: CPT | Performed by: INTERNAL MEDICINE

## 2020-01-17 PROCEDURE — G8484 FLU IMMUNIZE NO ADMIN: HCPCS | Performed by: INTERNAL MEDICINE

## 2020-01-17 NOTE — PROGRESS NOTES
Chief Complaint: shortness of breath     Referring Provider: Andres Bryant History:   Quit tobacco in Jan 2019. Is using Stiolto q am, albuterol when needed. Dyspnea on exertion is stable, he is active. No hospitalizations or ED visits. Presenting HPI: 49 yo WM with 60 pack year tobacco, now with 2 year moderate progressive dyspnea on exertion associated with productive cough; symptoms are not 100% predictable, some intermittent nature, but daily. Symptoms are improved with albuterol      reports that he quit smoking about 12 months ago. His smoking use included cigarettes. He started smoking about 36 years ago. He has a 70.00 pack-year smoking history. He has never used smokeless tobacco. He reports current alcohol use of about 1.0 standard drinks of alcohol per week. He reports current drug use. Drug: Marijuana. Physical Exam:  Blood pressure 100/64, pulse 80, resp. rate 18, height 5' 4\" (1.626 m), weight 168 lb (76.2 kg), SpO2 95 %.'  Constitutional:  No acute distress. HENT:  Oropharynx is clear and moist. Poor dentition  Neck: No tracheal deviation present. Cardiovascular: Normal heart sounds. No lower extremity edema. Pulmonary/Chest: No wheezes. No rhonchi. No rales. + decreased breath sounds. No accessory muscle usage or stridor. Musculoskeletal: No cyanosis. No clubbing. Skin: Skin is warm and dry. Psychiatric: Normal mood and affect. Neurologic: speech fluent, alert and oriented, strength symmetric      Data:   CT CHEST 11-18-15  There is some minimal increased opacity in the right mainstem bronchus and   bronchus intermedius likely retained secretions. The central airways are   otherwise patent. Subpleural opacity is noted posteriorly at the right lung base likely   representative of atelectasis. There is a calcified granuloma noted posteriorly in the left lower lobe. Lungs are otherwise grossly clear.       Mediastinal structures demonstrate a normal

## 2020-01-22 RX ORDER — FOLIC ACID 1 MG/1
TABLET ORAL
Qty: 60 TABLET | Refills: 10 | Status: SHIPPED | OUTPATIENT
Start: 2020-01-22 | End: 2021-02-16

## 2020-02-17 ENCOUNTER — OFFICE VISIT (OUTPATIENT)
Dept: ORTHOPEDIC SURGERY | Age: 50
End: 2020-02-17
Payer: MEDICAID

## 2020-02-17 VITALS — HEIGHT: 64 IN | BODY MASS INDEX: 28.68 KG/M2 | WEIGHT: 167.99 LBS

## 2020-02-17 PROCEDURE — G8427 DOCREV CUR MEDS BY ELIG CLIN: HCPCS | Performed by: ORTHOPAEDIC SURGERY

## 2020-02-17 PROCEDURE — 99213 OFFICE O/P EST LOW 20 MIN: CPT | Performed by: ORTHOPAEDIC SURGERY

## 2020-02-17 PROCEDURE — G8419 CALC BMI OUT NRM PARAM NOF/U: HCPCS | Performed by: ORTHOPAEDIC SURGERY

## 2020-02-17 PROCEDURE — G8484 FLU IMMUNIZE NO ADMIN: HCPCS | Performed by: ORTHOPAEDIC SURGERY

## 2020-02-17 PROCEDURE — 3017F COLORECTAL CA SCREEN DOC REV: CPT | Performed by: ORTHOPAEDIC SURGERY

## 2020-02-17 PROCEDURE — 1036F TOBACCO NON-USER: CPT | Performed by: ORTHOPAEDIC SURGERY

## 2020-02-18 ENCOUNTER — TELEPHONE (OUTPATIENT)
Dept: ORTHOPEDIC SURGERY | Age: 50
End: 2020-02-18

## 2020-02-19 ENCOUNTER — OFFICE VISIT (OUTPATIENT)
Dept: ORTHOPEDIC SURGERY | Age: 50
End: 2020-02-19
Payer: MEDICAID

## 2020-02-19 PROCEDURE — 95886 MUSC TEST DONE W/N TEST COMP: CPT | Performed by: PHYSICAL MEDICINE & REHABILITATION

## 2020-02-19 PROCEDURE — 95910 NRV CNDJ TEST 7-8 STUDIES: CPT | Performed by: PHYSICAL MEDICINE & REHABILITATION

## 2020-02-28 ENCOUNTER — HOSPITAL ENCOUNTER (OUTPATIENT)
Dept: PHYSICAL THERAPY | Age: 50
Setting detail: THERAPIES SERIES
Discharge: HOME | End: 2020-02-28
Payer: MEDICAID

## 2020-02-28 PROCEDURE — 97110 THERAPEUTIC EXERCISES: CPT

## 2020-02-28 PROCEDURE — 97161 PT EVAL LOW COMPLEX 20 MIN: CPT

## 2020-02-28 PROCEDURE — 97140 MANUAL THERAPY 1/> REGIONS: CPT

## 2020-02-28 NOTE — PROGRESS NOTES
The following patient has been evaluated for physical therapy services. In order for therapy to continue, Medicare requires physician review of the treatment plan. Please review the attached evaluation and/or summary of the patient's plan of care, and verify that you agree by signing below and sending it back to our office. Thank you for this referral.    Physician signature_______________________ Date________________    Fax to:   [facility] [phone]    CERVICAL AND THORACIC SPINE PHYSICAL THERAPY EVALUATION    Evaluation Date:  2/28/2020         Patient Name:  Janet Bennett       YOB: 1970       Medical Diagnosis:  Cervical spondylosis       ICD 10:      Treatment Diagnosis: same. Neck [pain, bilat shoulder pain       Onset Date: Dec 2019     Referral Date:  2/17/20     Referring Physician: Dr. 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 Girlfriend. 1 acre of land. He does the outside work. Health History reviewed with patient:  Yes. COPD, pre diabetic,  HTN, GERD    SUBJECTIVE FINDINGS        History of Present Illness:  2/28/20 states his neck did well after surgery and just started to bother him a few months ago  He has had an x-ray  Then referred to therapy before he can have another MRI. .       surgery 1/16/19: C6-7 fusion. PREOPERATIVE DIAGNOSIS: Herniated cervical disc C6-7 on the right             POSTOPERATIVE DIAGNOSIS:       same     PROCEDURES PERFORMED:  1. Anterior discectomy with excision of HNP C6-7, anterior foraminotomy C6-7  2. Anterior interbody fusion C6-7 with allograft  3. Application of plate and screws L3-5  3.   Microdissection using the operating room microscope.       Pain    Patient describes pain to be intermit cervical pain- lower neck. Aching in his shoulders and upper arms, aching in the R axilla, lateral thoracic area. Has intermit N&T in both hands, the L is the worst.  Increases with sleeping on his R side. States he has a HA most days- it comes on during the day. Patient reports  0/10 pain at rest,   7-8/10 pain with movement. Worsened by - ADLs. Tinkering around in the garage. Has pressure in his neck if he tries to lift things  Improved by - avoidance  Pain increases by coughing, sneezing, and laughing:   NO    Current Functional Limitations: none- has  Been more limited due to his COPD  PLOF:  indep  Pt. Sleep affected? :   Yes. Toss and turn more often. Awake several hours each night. Patient goal for therapy:   Pain relief      OBJECTIVE FINDINGS    Posture  :  Stands stands with upper cerv ext, mild scoliosis with the L shoulder high. Mod FHP      Palpation/Observation  :        Range of Motion (Cervical or Thoracic Spine)     Range Tested AROM PROM MMT/Resisted Tests   Flexion WFL, pulls and pain. neg   Extension WFL  neg   Sidebending Left decr 75%*  neg   Sidebending Right WFL  neg   Rotation Left Decr60%*  Irritating, no pain   Rotation Right decr 30%*  neg     Comments: rot L 4/5  Ext 4/5  Rot R 5/5    Strength        Shoulder  Left Right Scapula Left Right   Flexion /5 /5 Upper Trapezius /5 /5   Abduction  /5 /5 Middle Trapezius /5 /5   Adduction /5 /5 Lower Trapezius /5 /5   Extension /5 /5 Rhomboid /5 /5   Internal Rotation /5 /5 Serratus Anterior   /5 /5   External Rotation /5 /5 Latissimus Dorsi /5 /5       Shoulder AROM   M,ild painful arc at 135-150 elevation bilat. Shoulder  Left Right   Flexion 160 160   Abduction  160 160   Extension 40 40   Internal Rotation decr wfl   External Rotation T2 T2   Shoulders:   + impingement bilat shoulders. Improves a great deal with ER and active elevation.      Dermatomes/Myotomes     [x]   All dermatomes WNL except as marked below  [x]   All mobility      Poor posture            Decreased functional status    Increased pain           Decreased flexibility    Poor alignment    Rehabilitation Potential:  Good for goals listed below. Strengths for achieving goals include: motivated  Limitations for achieving goals include: none expected    Prognosis: [x]    Good []    Fair  []    Poor    GOALS   GCode:    Short Term Goals ( 2   weeks) Long Term Goals ( 6  weeks)   1). Establish HEP 1). (I) HEP   2). decr pain 30% 2). painsclae 0-2/10. No HA   3). improve shoulder AROM 3). cervical AROM: to [facility] with rot left   4). improve sleep posture 4). cervical strength 4+ to 5/5   5). improved awareness of posture 5). shoulder AROM to WNL   6). 6).disability to 20% or less       PLAN OF CARE     To see patient 2  x/week for  6 weeks for the following treatment interventions:     Therapeutic Exercise   Progressive Resistive Exercise   Modalities of Choice (Heat/Cold/US/EStim/Ionto)   Home Exercise Program   Manual Techniques/Mobilization   Postural Reeducation    Thank you for the referral of this patient.       Timed Code Treatment Minutes:  45  minutes     Total Treatment Time: 75   minutes    Beer San PT  MOMT [id number]

## 2020-02-28 NOTE — FLOWSHEET NOTE
[address] [place] [address] [facility], [id number]   Phone [phone]                                                     [x] Daily Treatment Note   [] Progress Note   [] Discharge Note      Date:  2020    Patient Name:  Ady Laurent        :  1970    Restrictions/Precautions:      Treatment Diagnosis: same. Neck [pain, bilat shoulder pain                                      Onset Date:    Dec 2019              Referral Date:  20               Referring Physician: Dr. 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 Girlfriend. 1 acre of land. He does the outside work.     Health History reviewed with patient:  Yes. COPD, pre diabetic,  HTN, GERD        Progress report will be due (10 Rx or 30 days whichever is less):  3/28/20       Visit# / total visits:   1    Pain level: /10     Subjective:  20 states his neck did well after surgery and just started to bother him a few months ago  He has had an x-ray  Then referred to therapy before he can have another MRI. .        surgery 19: C6-7 fusion. PREOPERATIVE DIAGNOSIS: Herniated cervical disc C6-7 on the right             POSTOPERATIVE DIAGNOSIS:       same     PROCEDURES PERFORMED:  1.  Anterior discectomy with excision of HNP C6-7, anterior foraminotomy C6-7  2.  Anterior interbody fusion C6-7 with allograft  3.  Application of plate and screws F3-9  3.  Microdissection using the operating room microscope.        Pain    Patient describes pain to be intermit cervical pain- lower neck. Aching in his shoulders and upper arms, aching in the R axilla, lateral thoracic area.  Has intermit N&T in both hands, the L is the worst.  Increases comments)   [x] Plan of care initiated [] Hold pending MD visit [] Discharge    Plan for Next Session:  above    See Progress Note: [] Yes  [x] No       Next due:         Electronically signed by:  SREEDHAR Handy 23  Note: If patient does not return for scheduled/ recommended follow up visits, this note will serve as a discharge from care along with most recent update on progress.            Outpatient Physical Therapy  Progress Note      Progress Note covers period from:  2/28/20  To       Subjective:           Objective:   Observation:   Test measurements:       Functional Outcome Measure:            Assessment:   Summary:    Patient's response to treatment:      Goals:  · Progress toward previous goals:      Plan:  ·     Electronically Signed by:

## 2020-03-02 ENCOUNTER — HOSPITAL ENCOUNTER (OUTPATIENT)
Dept: PHYSICAL THERAPY | Age: 50
Setting detail: THERAPIES SERIES
Discharge: HOME | End: 2020-03-02
Payer: MEDICAID

## 2020-03-02 PROCEDURE — 97140 MANUAL THERAPY 1/> REGIONS: CPT

## 2020-03-02 NOTE — FLOWSHEET NOTE
[address] [place] [address] and [facility], [address]   Phone [phone]                                                     [x] Daily Treatment Note   [] Progress Note   [] Discharge Note      Date:  3/2/2020    Patient Name:  Alexis Wilson        :  1970    Restrictions/Precautions:      Treatment Diagnosis: same. Neck [pain, bilat shoulder pain                                      Onset Date:    Dec 2019              Referral Date:  20               Referring Physician: Dr. Sherry Soulier                                                    Visits Allowed/Insurance/Certification Information:      Restrictions/Precautions:      Pt Occupation/Job Duties:  Disability. (  Used to install different types of floors)     Social support/Environment:   [address]. Girlfriend. 1 acre of land. He does the outside work.     Health History reviewed with patient:  Yes. COPD, pre diabetic,  HTN, GERD        Progress report will be due (10 Rx or 30 days whichever is less):  3/28/20       Visit# / total visits:   2    Pain level: 0-4/10     Subjective:  3/2/20 reports he has not had a HA since his 1st visit. Pain ranges 0-4/10    Much improved. 20 states his neck did well after surgery and just started to bother him a few months ago  He has had an x-ray  Then referred to therapy before he can have another MRI. .        surgery 19: C6-7 fusion. PREOPERATIVE DIAGNOSIS: Herniated cervical disc C6-7 on the right             POSTOPERATIVE DIAGNOSIS:       same     PROCEDURES PERFORMED:  1.  Anterior discectomy with excision of HNP C6-7, anterior foraminotomy C6-7  2.  Anterior interbody fusion C6-7 with allograft  3.  Application of plate and screws G5-9  3.  Microdissection using the operating room microscope.        Pain    Patient describes pain to be intermit cervical pain- lower neck.   Aching in his shoulders and upper arms, aching in the R axilla, lateral thoracic area. Has intermit N&T in both hands, the L is the worst.  Increases with sleeping on his R side. States he has a HA most days- it comes on during the day. Patient reports  0/10 pain at rest,   7-8/10 pain with movement. Worsened by - ADLs. Tinkering around in the garage. Has pressure in his neck if he tries to lift things  Improved by - avoidance  Pain increases by coughing, sneezing, and laughing:   NO    Current Functional Limitations: none- has  Been more limited due to his COPD  PLOF:  indep  Pt. Sleep affected? :   Yes. Toss and turn more often. Awake several hours each night.       Patient goal for therapy:   Pain relief        Exercises:   Exercise/Equipment Resistance/Repetitions Other comments   20 explained course and role of PT     Education-Discussed shoulder mechanics and nature of impingement. Shoulder presses Hold 5 sec  3x 5    Elbow presses \"                \"    Wall slides for shoulder ROM x5 each  Hold 10-15 sec at least 1x. Positionin head pillows, towel roll if needed 2-3 pillows for top arm. 3/2/20 ER onside w/ Towel roll 3#  2x 10 bilat. Will use red band on his vacation to [place]                                          NV- check ER strength of shoulders, cont manual tech. Therapeutic Exercise:   5 min  Given HO  Group Therapy:      Home Exercise Program:  Given HO    Therapeutic Activity:      Neuromuscular Re-education:      Gait:      Manual Therapy:  3 25 min. Cervical distraction, OA, AA distraction. Wiggle of atlas, AA rot left. 1s tribs bilat. Will add 2nd rib.     Canalith Repositioning Procedure:       Modalities:      Timed Code Treatment Minutes:  27                  2 man       Total Treatment Minutes:   35    Medicare Cap Total YTD:      Treatment/Activity Tolerance:    [x] Patient tolerated treatment well [] Patient limited by fatigue   [] Patient limited by pain [] Patient limited by other

## 2020-03-03 RX ORDER — ALBUTEROL SULFATE 90 UG/1
AEROSOL, METERED RESPIRATORY (INHALATION)
Qty: 1 INHALER | Refills: 5 | Status: SHIPPED | OUTPATIENT
Start: 2020-03-03 | End: 2020-10-21

## 2020-03-04 ENCOUNTER — HOSPITAL ENCOUNTER (OUTPATIENT)
Dept: PHYSICAL THERAPY | Age: 50
Setting detail: THERAPIES SERIES
Discharge: HOME | End: 2020-03-04
Payer: MEDICAID

## 2020-03-04 PROCEDURE — 97140 MANUAL THERAPY 1/> REGIONS: CPT

## 2020-03-04 NOTE — FLOWSHEET NOTE
Increases with sleeping on his R side. States he has a HA most days- it comes on during the day. Patient reports  0/10 pain at rest,   7-8/10 pain with movement. Worsened by - ADLs. Tinkering around in the garage. Has pressure in his neck if he tries to lift things  Improved by - avoidance  Pain increases by coughing, sneezing, and laughing:   NO    Current Functional Limitations: none- has  Been more limited due to his COPD  PLOF:  indep  Pt. Sleep affected? :   Yes. Toss and turn more often. Awake several hours each night.       Patient goal for therapy:   Pain relief     Exercises:   Exercise/Equipment Resistance/Repetitions Other comments   20 explained course and role of PT     Education-Discussed shoulder mechanics and nature of impingement. Shoulder presses Hold 5 sec  3x 5    Elbow presses \"                \"    Wall slides for shoulder ROM x5 each  Hold 10-15 sec at least 1x. Positionin head pillows, towel roll if needed 2-3 pillows for top arm. 3/2/20 ER onside w/ Towel roll 3#  2x 10 bilat. Will use red band on his vacation to [place]                                                                     Therapeutic Exercise:   4 min  Given   ER strength 4-/5 B    Group Therapy:      Home Exercise Program:  Given     Therapeutic Activity:      Neuromuscular Re-education:     Gait:      Manual Therapy:   25 min. Cervical distraction, OA, AA distraction. Wiggle of atlas, AA rot left. 1s tribs bilat. Will add 2nd rib.     Modalities:      Timed Code Treatment Minutes:  34                  2 man       Total Treatment Minutes:   29    Medicare Cap Total YTD:      Treatment/Activity Tolerance:    [x] Patient tolerated treatment well [] Patient limited by fatigue   [] Patient limited by pain [] Patient limited by other medical complications   [] Other:     Prognosis: [x] Good [] Fair  [] Poor    Patient Requires Follow-up:  [x] Yes  [] No    Plan: [x] Continue per plan of care [] Alter current plan (see comments)   [] Plan of care initiated [] Hold pending MD visit [] Discharge    Plan for Next Session:  above    See Progress Note: [] Yes  [x] No       Next due:         Electronically signed by:  Anthony Lake [id number]  Note: If patient does not return for scheduled/ recommended follow up visits, this note will serve as a discharge from care along with most recent update on progress.            Outpatient Physical Therapy  Progress Note      Progress Note covers period from:  2/28/20  To       Subjective:           Objective:   Observation:   Test measurements:       Functional Outcome Measure:            Assessment:   Summary:    Patient's response to treatment:      Goals:  · Progress toward previous goals:      Plan:  ·     Electronically Signed by:

## 2020-03-19 ENCOUNTER — APPOINTMENT (OUTPATIENT)
Dept: PHYSICAL THERAPY | Age: 50
End: 2020-03-19
Payer: MEDICAID

## 2020-03-22 RX ORDER — HYDROCHLOROTHIAZIDE 25 MG/1
TABLET ORAL
Qty: 90 TABLET | Refills: 1 | Status: SHIPPED | OUTPATIENT
Start: 2020-03-22 | End: 2021-06-24 | Stop reason: ALTCHOICE

## 2020-03-24 ENCOUNTER — APPOINTMENT (OUTPATIENT)
Dept: PHYSICAL THERAPY | Age: 50
End: 2020-03-24
Payer: MEDICAID

## 2020-03-27 ENCOUNTER — APPOINTMENT (OUTPATIENT)
Dept: PHYSICAL THERAPY | Age: 50
End: 2020-03-27
Payer: MEDICAID

## 2020-03-31 ENCOUNTER — APPOINTMENT (OUTPATIENT)
Dept: PHYSICAL THERAPY | Age: 50
End: 2020-03-31
Payer: MEDICAID

## 2020-07-21 RX ORDER — TIOTROPIUM BROMIDE AND OLODATEROL 3.124; 2.736 UG/1; UG/1
SPRAY, METERED RESPIRATORY (INHALATION)
Qty: 1 INHALER | Refills: 5 | Status: SHIPPED | OUTPATIENT
Start: 2020-07-21 | End: 2020-10-13

## 2020-08-17 RX ORDER — PANTOPRAZOLE SODIUM 40 MG/1
TABLET, DELAYED RELEASE ORAL
Qty: 90 TABLET | Refills: 1 | Status: SHIPPED | OUTPATIENT
Start: 2020-08-17 | End: 2020-12-17 | Stop reason: SDUPTHER

## 2020-08-17 RX ORDER — AMLODIPINE BESYLATE 5 MG/1
TABLET ORAL
Qty: 90 TABLET | Refills: 1 | Status: SHIPPED | OUTPATIENT
Start: 2020-08-17 | End: 2020-12-17 | Stop reason: SDUPTHER

## 2020-09-14 RX ORDER — ATORVASTATIN CALCIUM 20 MG/1
TABLET, FILM COATED ORAL
Qty: 90 TABLET | Refills: 0 | Status: SHIPPED | OUTPATIENT
Start: 2020-09-14 | End: 2020-12-17 | Stop reason: SDUPTHER

## 2020-09-14 NOTE — TELEPHONE ENCOUNTER
Refill Request ATORVASTATIN 20 MG TABLET     Last Seen: 1/13/2020    Last Written: 1/13/20 90 tablets with 1 refill    Next Appointment:   Future Appointments   Date Time Provider Simone Marcelo   12/23/2020  8:00 AM Vee Tao MD [facility]         Requested Prescriptions     Pending Prescriptions Disp Refills    atorvastatin (LIPITOR) 20 MG tablet [Pharmacy Med Name: ATORVASTATIN 20 MG TABLET] 30 tablet 1     Sig: TAKE ONE TABLET BY MOUTH DAILY

## 2020-09-14 NOTE — TELEPHONE ENCOUNTER
Have pt make follow up appointment with Dr Miguel Lamar. Last see in January and cancelled appointment in June.

## 2020-10-07 ENCOUNTER — OFFICE VISIT (OUTPATIENT)
Dept: FAMILY MEDICINE CLINIC | Age: 50
End: 2020-10-07
Payer: MEDICAID

## 2020-10-07 VITALS
HEART RATE: 77 BPM | OXYGEN SATURATION: 98 % | WEIGHT: 165.6 LBS | HEIGHT: 64 IN | DIASTOLIC BLOOD PRESSURE: 70 MMHG | SYSTOLIC BLOOD PRESSURE: 116 MMHG | TEMPERATURE: 98.3 F | BODY MASS INDEX: 28.27 KG/M2

## 2020-10-07 PROBLEM — M50.20 CERVICAL DISC HERNIATION: Status: RESOLVED | Noted: 2019-01-16 | Resolved: 2020-10-07

## 2020-10-07 PROBLEM — Z00.00 ANNUAL PHYSICAL EXAM: Status: ACTIVE | Noted: 2020-10-07

## 2020-10-07 LAB
A/G RATIO: 1.8 (ref 1.1–2.2)
ALBUMIN SERPL-MCNC: 4.6 G/DL (ref 3.4–5)
ALP BLD-CCNC: 111 U/L (ref 40–129)
ALT SERPL-CCNC: 23 U/L (ref 10–40)
ANION GAP SERPL CALCULATED.3IONS-SCNC: 14 MMOL/L (ref 3–16)
AST SERPL-CCNC: 23 U/L (ref 15–37)
BILIRUB SERPL-MCNC: 0.3 MG/DL (ref 0–1)
BUN BLDV-MCNC: 17 MG/DL (ref 7–20)
CALCIUM SERPL-MCNC: 9.5 MG/DL (ref 8.3–10.6)
CHLORIDE BLD-SCNC: 103 MMOL/L (ref 99–110)
CO2: 24 MMOL/L (ref 21–32)
CREAT SERPL-MCNC: 1 MG/DL (ref 0.9–1.3)
GFR AFRICAN AMERICAN: >60
GFR NON-AFRICAN AMERICAN: >60
GLOBULIN: 2.5 G/DL
GLUCOSE BLD-MCNC: 109 MG/DL (ref 70–99)
POTASSIUM SERPL-SCNC: 4.6 MMOL/L (ref 3.5–5.1)
PROSTATE SPECIFIC ANTIGEN: 0.26 NG/ML (ref 0–4)
SODIUM BLD-SCNC: 141 MMOL/L (ref 136–145)
TOTAL PROTEIN: 7.1 G/DL (ref 6.4–8.2)

## 2020-10-07 PROCEDURE — 99396 PREV VISIT EST AGE 40-64: CPT | Performed by: FAMILY MEDICINE

## 2020-10-07 PROCEDURE — 36415 COLL VENOUS BLD VENIPUNCTURE: CPT | Performed by: FAMILY MEDICINE

## 2020-10-07 PROCEDURE — G8484 FLU IMMUNIZE NO ADMIN: HCPCS | Performed by: FAMILY MEDICINE

## 2020-10-07 ASSESSMENT — ENCOUNTER SYMPTOMS
SHORTNESS OF BREATH: 0
EYE PAIN: 0
VOMITING: 0
EYE REDNESS: 0
SORE THROAT: 0
ABDOMINAL PAIN: 0
DIARRHEA: 0
WHEEZING: 0
COLOR CHANGE: 0
SINUS PRESSURE: 0
COUGH: 0
APNEA: 0
SINUS PAIN: 0
BLOOD IN STOOL: 0

## 2020-10-07 ASSESSMENT — PATIENT HEALTH QUESTIONNAIRE - PHQ9
SUM OF ALL RESPONSES TO PHQ9 QUESTIONS 1 & 2: 0
1. LITTLE INTEREST OR PLEASURE IN DOING THINGS: 0
SUM OF ALL RESPONSES TO PHQ QUESTIONS 1-9: 0
SUM OF ALL RESPONSES TO PHQ QUESTIONS 1-9: 0
2. FEELING DOWN, DEPRESSED OR HOPELESS: 0

## 2020-10-07 NOTE — PROGRESS NOTES
Subjective:      Patient ID: Claudette Sifuentes is a 44 y.o. male. HPI   Chief Complaint   Patient presents with    Annual Exam     Patient is here for a 6 month follow up, he is not fasting for blood work and has declined a flu shot. Shekhar Sapp is a 44year old male presenting to clinic for CPE . FORMER SMOKER   He has no acute complaints today. He has a history of hypertension which is being managed well with lifestyle modifications and medication. He quit smoking over a year ago, plays ball with his kids, eats less, and takes amlodipine. No side effects noted with the amlodipine. He checks his blood pressure once a week and it's usually within a normal range. He also has a history of hypercholesterolemia which is being well controlled with Lipitor. No myalgias or other side effects noted. He sees a pulmonologist once a year to manage his COPD. He states that he hasn't had increased shortness of breath and it's being managed well. Today he refused both the flu vaccine and shingles vaccine.   Claudette Sifuentes is a 44 y.o. male with the following history as recorded in [facility]:  Patient Active Problem List    Diagnosis Date Noted    Gastroesophageal reflux disease without esophagitis      Priority: Low    Essential hypertension      Priority: Low    Cervical disc herniation 01/16/2019    DDD (degenerative disc disease), cervical 01/02/2019    Heartburn     Esophageal dysphagia     Mixed hyperlipidemia 03/15/2018    Prediabetes 11/01/2016    COPD (chronic obstructive pulmonary disease) [facility])      Current Outpatient Medications   Medication Sig Dispense Refill    atorvastatin (LIPITOR) 20 MG tablet TAKE ONE TABLET BY MOUTH DAILY 90 tablet 0    pantoprazole (PROTONIX) 40 MG tablet TAKE ONE TABLET BY MOUTH DAILY 90 tablet 1    amLODIPine (NORVASC) 5 MG tablet TAKE ONE TABLET BY MOUTH DAILY 90 tablet 1    STIOLTO RESPIMAT 2.5-2.5 MCG/ACT AERS INHALE TWO INHALATION(S) BY MOUTH DAILY 1 Inhaler SpO2: 98%   Weight: 165 lb 9.6 oz (75.1 kg)   Height: 5' 4.02\" (1.626 m)     Body mass index is 28.41 kg/m². Wt Readings from Last 3 Encounters:   10/07/20 165 lb 9.6 oz (75.1 kg)   02/17/20 167 lb 15.9 oz (76.2 kg)   01/17/20 168 lb (76.2 kg)     BP Readings from Last 3 Encounters:   10/07/20 116/70   01/17/20 100/64   01/13/20 116/60          Review of Systems   Constitutional: Negative for activity change, chills, fatigue and fever. HENT: Negative for ear pain, hearing loss, sinus pressure, sinus pain, sore throat and tinnitus. Eyes: Negative for pain and redness. Respiratory: Negative for apnea, cough, shortness of breath and wheezing. Cardiovascular: Negative for chest pain and palpitations. Gastrointestinal: Negative for abdominal pain, blood in stool, diarrhea and vomiting. Endocrine: Negative for polydipsia and polyuria. Genitourinary: Negative for difficulty urinating. Musculoskeletal: Negative for arthralgias and joint swelling. Skin: Negative for color change and pallor. Neurological: Negative for dizziness, syncope, weakness and numbness. Hematological: Negative for adenopathy. Does not bruise/bleed easily. Psychiatric/Behavioral: Negative for confusion and hallucinations. The patient is not nervous/anxious. Objective:   Physical Exam  Constitutional:       General: He is not in acute distress. Appearance: Normal appearance. He is normal weight. He is not ill-appearing. HENT:      Head: Normocephalic and atraumatic. Right Ear: Tympanic membrane, ear canal and external ear normal. There is no impacted cerumen. Left Ear: Tympanic membrane, ear canal and external ear normal. There is no impacted cerumen. Nose: Nose normal.      Mouth/Throat:      Mouth: Mucous membranes are moist.      Pharynx: Oropharynx is clear. Eyes:      General: No scleral icterus. Right eye: No discharge. Left eye: No discharge.       Extraocular Movements: Extraocular movements intact. Conjunctiva/sclera: Conjunctivae normal.      Pupils: Pupils are equal, round, and reactive to light. Neck:      Musculoskeletal: Normal range of motion and neck supple. No neck rigidity or muscular tenderness. Cardiovascular:      Rate and Rhythm: Normal rate and regular rhythm. Pulses: Normal pulses. Heart sounds: No murmur. No gallop. Pulmonary:      Effort: Pulmonary effort is normal.      Breath sounds: No stridor. No wheezing, rhonchi or rales. Abdominal:      General: Abdomen is flat. Bowel sounds are normal. There is no distension. Palpations: Abdomen is soft. There is no mass. Tenderness: There is no abdominal tenderness. There is no guarding or rebound. Hernia: No hernia is present. Musculoskeletal: Normal range of motion. General: No swelling, tenderness, deformity or signs of injury. Right lower leg: No edema. Left lower leg: No edema. Lymphadenopathy:      Cervical: No cervical adenopathy. Skin:     General: Skin is warm. Coloration: Skin is not jaundiced or pale. Findings: No bruising or erythema. Neurological:      General: No focal deficit present. Mental Status: He is alert and oriented to person, place, and time. Cranial Nerves: No cranial nerve deficit. Sensory: No sensory deficit. Motor: No weakness. Coordination: Coordination normal.      Gait: Gait normal.   Psychiatric:         Mood and Affect: Mood normal.         Behavior: Behavior normal.         Thought Content:  Thought content normal.         Judgment: Judgment normal.         Assessment:       cpe  Hypertension-Well controlled with lifestyle modification and amlodipine  Hypercholesterolemia-Well controlled  COPD-Stable, well controlled  GERD-Well controlled      Plan:       Orders Placed This Encounter   Procedures    Psa screening    COMPREHENSIVE METABOLIC PANEL    HEMOGLOBIN A1C     PSA  Continue

## 2020-10-07 NOTE — PATIENT INSTRUCTIONS

## 2020-10-08 LAB
ESTIMATED AVERAGE GLUCOSE: 119.8 MG/DL
HBA1C MFR BLD: 5.8 %

## 2020-10-13 ENCOUNTER — TELEPHONE (OUTPATIENT)
Dept: PULMONOLOGY | Age: 50
End: 2020-10-13

## 2020-10-13 NOTE — TELEPHONE ENCOUNTER
PA for [employer]. Insurance prefers Justine Chopra. Please advise. [employer] [place].       Assessment: 1/17/20  · Very Severe COPD/pulmonary emphysema     Not addressed today  · Family h/o COPD  · 60 pack year tobacco history, quit 1/1/19     Plan:   · Stiolto daily  · Albuterol PRN  · 12 month f/u

## 2020-10-21 RX ORDER — ALBUTEROL SULFATE 90 UG/1
AEROSOL, METERED RESPIRATORY (INHALATION)
Qty: 18 G | Refills: 4 | Status: SHIPPED | OUTPATIENT
Start: 2020-10-21 | End: 2021-08-25

## 2020-11-06 PROBLEM — Z00.00 ANNUAL PHYSICAL EXAM: Status: RESOLVED | Noted: 2020-10-07 | Resolved: 2020-11-06

## 2020-12-04 ENCOUNTER — TELEPHONE (OUTPATIENT)
Dept: PULMONOLOGY | Age: 50
End: 2020-12-04

## 2020-12-04 NOTE — TELEPHONE ENCOUNTER
Within this Telehealth Consent, the terms you and yours refer to the person using the Telehealth Service (Service), or in the case of a use of the Service by or on behalf of a minor, you and yours refer to and include (i) the parent or legal guardian who provides consent to the use of the Service by such minor or uses the Service on behalf of such minor, and (ii) the minor for whom consent is being provided or on whose behalf the Service is being utilized. When using Service, you will be consulting with your health care providers via the use of Telehealth.   Telehealth involves the delivery of healthcare services using electronic communications, information technology or other means between a healthcare provider and a patient who are not in the same physical location. Telehealth may be used for diagnosis, treatment, follow-up and/or patient education, and may include, but is not limited to, one or more of the following:    Electronic transmission of medical records, photo images, personal health information or other data between a patient and a healthcare provider    Interactions between a patient and healthcare provider via audio, video and/or data communications    Use of output data from medical devices, sound and video files    Anticipated Benefits   The use of Telehealth by your Provider(s) through the Service may have the following possible benefits:    Making it easier and more efficient for you to access medical care and treatment for the conditions treated by such Provider(s) utilizing the Service    Allowing you to obtain medical care and treatment by Provider(s) at times that are convenient for you    Enabling you to interact with Provider(s) without the necessity of an in-office appointment     Possible Risks   While the use of Telehealth can provide potential benefits for you, there are also potential risks associated with the use of Telehealth.  These risks include, but may not be limited to the following:    Your Provider(s) may not able to provide medical treatment for your particular condition and you may be required to seek alternative healthcare or emergency care services.  The electronic systems or other security protocols or safeguards used in the Service could fail, causing a breach of privacy of your medical or other information.  Given regulatory requirements in certain jurisdictions, your Provider(s) diagnosis and/or treatment options, especially pertaining to certain prescriptions, may be limited. Acceptance   1. You understand that Services will be provided via Telehealth. This process involves the use of HIPAA compliant and secure, real-time audio-visual interfacing with a qualified and appropriately trained provider located at [facility]. 2. You understand that, under no circumstances, will this session be recorded. 3. You understand that the Provider(s) at [facility] and other clinical participants will be party to the information obtained during the Telehealth session in accordance with best medical practices. 4. You understand that the information obtained during the Telehealth session will be used to help determine the most appropriate treatment options. 5. You understand that You have the right to revoke this consent at any point in time. 6. You understand that Telehealth is voluntary, and that continued treatment is not dependent upon consent. 7. You understand that, in the event of non-consent to Telehealth services and/or technical difficulties, you will obtain services as typically provided in the absence of Telehealth technology. 8. You understand that this consent will be kept in Your medical record. 9. No potential benefits from the use of Telehealth or specific results can be guaranteed. Your condition may not be cured or improved and, in some cases, may get worse.    10. There are limitations in the provision of medical care and treatment via Telehealth and the Service and you may not be able to receive diagnosis and/or treatment through the Service for every condition for which you seek diagnosis and/or treatment. 11. There are potential risks to the use of Telehealth, including but not limited to the risks described in this Telehealth Consent. 12. Your Provider(s) have discussed the use of Telehealth and the Service with you, including the benefits and risks of such and you have provided oral consent to your Provider(s) for the use of Telehealth and the Service. 15. You understand that it is your duty to provide your Provider(s) truthful, accurate and complete information, including all relevant information regarding care that you may have received or may be receiving from other healthcare providers outside of the Service. 14. You understand that each of your Provider(s) may determine in his or sole discretion that your condition is not suitable for diagnosis and/or treatment using the Service, and that you may need to seek medical care and treatment a specialist or other healthcare provider, outside of the Service. 15. You understand that you are fully responsible for payment for all services provided by Provider(s) or through use of the Service and that you may not be able to use third-party insurance. 16. You represent that (a) you have read this Telehealth Consent carefully, (b) you understand the risks and benefits of the Service and the use of Telehealth in the medical care and treatment provided to you by Provider(s) using the Service, and (c) you have the legal capacity and authority to provide this consent for yourself and/or the minor for which you are consenting under applicable federal and state laws, including laws relating to the age of [de-identified] and/or parental/guardian consent.    17. You give your informed consent to the use of Telehealth by Provider(s) using the Service under the terms described in the Terms of Service and this Telehealth Consent. The patient was read the following statement and has consented to the visit as of 12/4/20. The patient has been scheduled for their first telehealth visit on 12/23/20 with Dr. Curt Strong.

## 2020-12-17 RX ORDER — AMLODIPINE BESYLATE 5 MG/1
TABLET ORAL
Qty: 90 TABLET | Refills: 0 | Status: SHIPPED | OUTPATIENT
Start: 2020-12-17 | End: 2021-05-13

## 2020-12-17 RX ORDER — PANTOPRAZOLE SODIUM 40 MG/1
TABLET, DELAYED RELEASE ORAL
Qty: 90 TABLET | Refills: 1 | Status: SHIPPED | OUTPATIENT
Start: 2020-12-17 | End: 2021-06-24 | Stop reason: SDUPTHER

## 2020-12-17 RX ORDER — ATORVASTATIN CALCIUM 20 MG/1
TABLET, FILM COATED ORAL
Qty: 90 TABLET | Refills: 1 | Status: SHIPPED | OUTPATIENT
Start: 2020-12-17 | End: 2021-06-17

## 2020-12-17 NOTE — TELEPHONE ENCOUNTER
Francois Bob is requesting refill(s) lipitor, amlodipine  Last OV 10/7/20 (pertaining to medication)  LR 9/14/20 (per medication requested)  Next office visit scheduled or attempted Yes   If no, reason:  4/8/21

## 2020-12-23 ENCOUNTER — VIRTUAL VISIT (OUTPATIENT)
Dept: PULMONOLOGY | Age: 50
End: 2020-12-23
Payer: MEDICAID

## 2020-12-23 PROCEDURE — 99441 PR PHYS/QHP TELEPHONE EVALUATION 5-10 MIN: CPT | Performed by: INTERNAL MEDICINE

## 2020-12-23 RX ORDER — FLUTICASONE FUROATE, UMECLIDINIUM BROMIDE AND VILANTEROL TRIFENATATE 100; 62.5; 25 UG/1; UG/1; UG/1
1 POWDER RESPIRATORY (INHALATION) DAILY
Qty: 1 EACH | Refills: 5 | Status: SHIPPED | OUTPATIENT
Start: 2020-12-23 | End: 2021-08-26

## 2020-12-23 NOTE — PROGRESS NOTES
Chief Complaint: shortness of breath     Referring Provider: Andres Bryant History:   Quit tobacco in Jan 2019. bevespi was substituted for stiolto and is now have to use more albuterol. Dyspnea on exertion is otherwise stable, he is active. No hospitalizations or ED visits. Presenting HPI: 49 yo WM with 60 pack year tobacco, now with 2 year moderate progressive dyspnea on exertion associated with productive cough; symptoms are not 100% predictable, some intermittent nature, but daily. Symptoms are improved with albuterol      reports that he quit smoking about 1 years ago. His smoking use included cigarettes. He started smoking about 37 years ago. He has a 70.00 pack-year smoking history. He has never used smokeless tobacco. He reports current alcohol use of about 1.0 standard drinks of alcohol per week. He reports current drug use. Drug: Marijuana. Physical Exam:  There were no vitals taken for this visit.'  Phone     Data:   CT CHEST 11-18-15  There is some minimal increased opacity in the right mainstem bronchus and   bronchus intermedius likely retained secretions. The central airways are   otherwise patent. Subpleural opacity is noted posteriorly at the right lung base likely   representative of atelectasis. There is a calcified granuloma noted posteriorly in the left lower lobe. Lungs are otherwise grossly clear. Mediastinal structures demonstrate a normal appearance of the heart and great   vessels. No suspicious hilar or mediastinal adenopathy noted. The esophagus appears to taper normally to the GE junction. GE junction is unremarkable. Stomach and the small bowel are unremarkable in appearance. Mesenteric and   retroperitoneal structures including the abdominal aorta are grossly   unremarkable. Atherosclerotic disease is noted. Liver, gallbladder, spleen, kidneys, and pancreas are unremarkable in   appearance.       No acute abnormality of the visualized osseous structures. Impression   IMPRESSION:   No acute abnormality of the chest or abdomen noted. PFT 1/12/16 FEV1 1/05 L 32%  ++ airtrapping  DLCO 14.73 50%  PFT 4/16/19 FEV1 1.1 L 35%    DLCO 12.12 42%    Bronchoscopy 2015 cytology no malignant cells.     ALPHA 1 161    Assessment:  Very Severe COPD/pulmonary emphysema    Not addressed today  Family h/o COPD  60 pack year tobacco history, quit 1/1/19    Plan:   · Trelegy to replace bevespi   · Albuterol PRN  · 12 month f/u

## 2021-02-16 DIAGNOSIS — E53.8 FOLATE DEFICIENCY: ICD-10-CM

## 2021-02-16 DIAGNOSIS — M62.838 MUSCLE SPASM: ICD-10-CM

## 2021-02-16 RX ORDER — FOLIC ACID 1 MG/1
TABLET ORAL
Qty: 60 TABLET | Refills: 9 | Status: SHIPPED | OUTPATIENT
Start: 2021-02-16

## 2021-02-16 NOTE — TELEPHONE ENCOUNTER
Refill Request FOLIC ACID 1 MG TABLET    Last Seen: 10/7/2020    Last Written: 1/22/20 60 tablets with 10 refills    Next Appointment:   Future Appointments   Date Time Provider Simone Marcelo   4/8/2021  8:40 AM DO Radha Flores   12/28/2021  1:00 PM Heena Cordero MD [facility]         Requested Prescriptions     Pending Prescriptions Disp Refills    folic acid (FOLVITE) 1 MG tablet [Pharmacy Med Name: FOLIC ACID 1 MG TABLET] 60 tablet 9     Sig: TAKE ONE TABLET BY MOUTH DAILY

## 2021-05-13 RX ORDER — AMLODIPINE BESYLATE 5 MG/1
TABLET ORAL
Qty: 90 TABLET | Refills: 1 | Status: SHIPPED | OUTPATIENT
Start: 2021-05-13 | End: 2021-12-02 | Stop reason: SDUPTHER

## 2021-05-13 NOTE — TELEPHONE ENCOUNTER
Bella Zapien is requesting refill(s) amlodipine  Last OV 10/7/20 (pertaining to medication)  LR 12/17/20 (per medication requested)  Next office visit scheduled or attempted No   If no, reason:

## 2021-06-17 RX ORDER — ATORVASTATIN CALCIUM 20 MG/1
TABLET, FILM COATED ORAL
Qty: 90 TABLET | Refills: 0 | Status: SHIPPED | OUTPATIENT
Start: 2021-06-17 | End: 2021-09-24

## 2021-06-17 NOTE — TELEPHONE ENCOUNTER
Attempted to contact the pt but was unable to LM due to VM not being set up. Pt is over due for a 6 onth HTN, HLD f/u.     Renetta Rico is requesting refill(s) lipitor  Last OV 10/7/20 (pertaining to medication)  LR 12/17/20 (per medication requested)  Next office visit scheduled or attempted No   If no, reason:

## 2021-06-24 ENCOUNTER — OFFICE VISIT (OUTPATIENT)
Dept: FAMILY MEDICINE CLINIC | Age: 51
End: 2021-06-24
Payer: MEDICARE

## 2021-06-24 VITALS
OXYGEN SATURATION: 98 % | SYSTOLIC BLOOD PRESSURE: 144 MMHG | HEART RATE: 65 BPM | DIASTOLIC BLOOD PRESSURE: 62 MMHG | HEIGHT: 64 IN | WEIGHT: 161.4 LBS | BODY MASS INDEX: 27.55 KG/M2

## 2021-06-24 DIAGNOSIS — K21.9 GASTROESOPHAGEAL REFLUX DISEASE WITHOUT ESOPHAGITIS: ICD-10-CM

## 2021-06-24 DIAGNOSIS — E78.2 MIXED HYPERLIPIDEMIA: ICD-10-CM

## 2021-06-24 DIAGNOSIS — I10 ESSENTIAL HYPERTENSION: Primary | ICD-10-CM

## 2021-06-24 DIAGNOSIS — J44.9 CHRONIC OBSTRUCTIVE PULMONARY DISEASE, UNSPECIFIED COPD TYPE (HCC): ICD-10-CM

## 2021-06-24 DIAGNOSIS — R73.03 PREDIABETES: ICD-10-CM

## 2021-06-24 LAB
A/G RATIO: 1.6 (ref 1.1–2.2)
ALBUMIN SERPL-MCNC: 4.2 G/DL (ref 3.4–5)
ALP BLD-CCNC: 80 U/L (ref 40–129)
ALT SERPL-CCNC: 14 U/L (ref 10–40)
ANION GAP SERPL CALCULATED.3IONS-SCNC: 11 MMOL/L (ref 3–16)
AST SERPL-CCNC: 26 U/L (ref 15–37)
BILIRUB SERPL-MCNC: 0.4 MG/DL (ref 0–1)
BUN BLDV-MCNC: 13 MG/DL (ref 7–20)
CALCIUM SERPL-MCNC: 9.5 MG/DL (ref 8.3–10.6)
CHLORIDE BLD-SCNC: 102 MMOL/L (ref 99–110)
CHOLESTEROL, TOTAL: 137 MG/DL (ref 0–199)
CO2: 27 MMOL/L (ref 21–32)
CREAT SERPL-MCNC: 1.2 MG/DL (ref 0.9–1.3)
GFR AFRICAN AMERICAN: >60
GFR NON-AFRICAN AMERICAN: >60
GLOBULIN: 2.7 G/DL
GLUCOSE BLD-MCNC: 94 MG/DL (ref 70–99)
HDLC SERPL-MCNC: 67 MG/DL (ref 40–60)
LDL CHOLESTEROL CALCULATED: 54 MG/DL
POTASSIUM SERPL-SCNC: 4 MMOL/L (ref 3.5–5.1)
SODIUM BLD-SCNC: 140 MMOL/L (ref 136–145)
TOTAL PROTEIN: 6.9 G/DL (ref 6.4–8.2)
TRIGL SERPL-MCNC: 82 MG/DL (ref 0–150)
VLDLC SERPL CALC-MCNC: 16 MG/DL

## 2021-06-24 PROCEDURE — 36415 COLL VENOUS BLD VENIPUNCTURE: CPT | Performed by: FAMILY MEDICINE

## 2021-06-24 PROCEDURE — 99214 OFFICE O/P EST MOD 30 MIN: CPT | Performed by: FAMILY MEDICINE

## 2021-06-24 RX ORDER — PANTOPRAZOLE SODIUM 40 MG/1
TABLET, DELAYED RELEASE ORAL
Qty: 90 TABLET | Refills: 1 | Status: SHIPPED | OUTPATIENT
Start: 2021-06-24 | End: 2022-02-18

## 2021-06-24 SDOH — ECONOMIC STABILITY: FOOD INSECURITY: WITHIN THE PAST 12 MONTHS, YOU WORRIED THAT YOUR FOOD WOULD RUN OUT BEFORE YOU GOT MONEY TO BUY MORE.: NEVER TRUE

## 2021-06-24 SDOH — ECONOMIC STABILITY: FOOD INSECURITY: WITHIN THE PAST 12 MONTHS, THE FOOD YOU BOUGHT JUST DIDN'T LAST AND YOU DIDN'T HAVE MONEY TO GET MORE.: NEVER TRUE

## 2021-06-24 ASSESSMENT — ENCOUNTER SYMPTOMS
WHEEZING: 0
SHORTNESS OF BREATH: 0
BLOOD IN STOOL: 0
EYE DISCHARGE: 0
EYE PAIN: 0
CHEST TIGHTNESS: 0
DIARRHEA: 0
SINUS PRESSURE: 0
VOMITING: 0
CONSTIPATION: 0
RHINORRHEA: 0
ABDOMINAL PAIN: 0
COUGH: 0

## 2021-06-24 ASSESSMENT — SOCIAL DETERMINANTS OF HEALTH (SDOH): HOW HARD IS IT FOR YOU TO PAY FOR THE VERY BASICS LIKE FOOD, HOUSING, MEDICAL CARE, AND HEATING?: NOT HARD AT ALL

## 2021-06-24 ASSESSMENT — PATIENT HEALTH QUESTIONNAIRE - PHQ9
SUM OF ALL RESPONSES TO PHQ QUESTIONS 1-9: 0
SUM OF ALL RESPONSES TO PHQ QUESTIONS 1-9: 0
SUM OF ALL RESPONSES TO PHQ9 QUESTIONS 1 & 2: 0
SUM OF ALL RESPONSES TO PHQ QUESTIONS 1-9: 0
2. FEELING DOWN, DEPRESSED OR HOPELESS: 0
1. LITTLE INTEREST OR PLEASURE IN DOING THINGS: 0

## 2021-06-24 NOTE — PROGRESS NOTES
ILT=[id number]   Subjective:      Patient ID: Shavonne James is a 46 y.o. male. HPI  Chief Complaint   Patient presents with    Hypertension     Patient is here for a 6 month followup, he is fasting for blood work. Here for checkup on medication. Has history of hypertension, hyperlipidemia and prediabetes. Is compliant with pulmonology. COPD stable. Doing okay on his blood pressure medicine. Denies any cough or swelling or dizziness. Denies any muscle pain or rash with his cholesterol medicine. Has been gaining weight since he stopped smoking. Is not following any particular low-carb diet  Shavonne James is a 46 y.o. male with the following history as recorded in [facility]:  Patient Active Problem List    Diagnosis Date Noted    Gastroesophageal reflux disease without esophagitis     Essential hypertension     DDD (degenerative disc disease), cervical 01/02/2019    Esophageal dysphagia     Mixed hyperlipidemia 03/15/2018    Prediabetes 11/01/2016    COPD [facility]ch[facility] obstructive [facility] disease) ([facility]      Current Outpatient Medications   Medication Sig Dispense Refill    atorvastatin (LIPITOR) 20 MG tablet TAKE ONE TABLET BY MOUTH DAILY 90 tablet 0    amLODIPine (NORVASC) 5 MG tablet TAKE ONE TABLET BY MOUTH DAILY 90 tablet 1    folic acid (FOLVITE) 1 MG tablet TAKE ONE TABLET BY MOUTH DAILY 60 tablet 9    fluticasone-umeclidin-vilant (TRELEGY ELLIPTA) 100-62.5-25 MCG/INH AEPB Inhale 1 puff into the lungs daily 1 each 5    pantoprazole (PROTONIX) 40 MG tablet TAKE ONE TABLET BY MOUTH DAILY 90 tablet 1    albuterol sulfate  (90 Base) MCG/ACT inhaler INHALE TWO PUFFS BY MOUTH FOUR TIMES A DAY AS NEEDED FOR WHEEZING 18 g 4    hydroCHLOROthiazide (HYDRODIURIL) 25 MG tablet TAKE ONE TABLET BY MOUTH DAILY 90 tablet 1     No current facility-administered medications for this visit.      Allergies: No known allergies  Past Medical History:   Diagnosis Date    Cervical disc herniation     [facility]     Folate deficiency 2013    GERD (gastroesophageal reflux disease)     Hyperlipidemia     Hypertriglyceridemia, Good HDL    Hypertension : age 37    Prediabetes 2016    Vitamin D deficiency 2013    resolved     Past Surgical History:   Procedure Laterality Date    ANKLE FRACTURE SURGERY Left     BRONCHOSCOPY  2015    CERVICAL FUSION N/A 2019    ANTERIOR CERVICAL DISCECTOMY AND FUSION WITH ALLOGRAFT, MEDTRONICS AND EVOKES  CPT CODE - 77242 performed by Kamari Huertas [facility] at [facility]      AK NJX DX/THER SBST INTRLMNR CRV/THRC W/IMG GDN Right 2018    RIGHT CERVICAL SEVEN THORACIC EPIDURAL STEROID INJECTION SITE CONFIRMED BY FLUOROSCOPY performed by Franz Hashimoto, MD at [place] [address]     Family History   Problem Relation Age of Onset    COPD Mother 39    COPD Father 39     Social History     Tobacco Use    Smoking status: Former Smoker     Packs/day: 2.00     Years: 35.00     Pack years: 70.00     Types: Cigarettes     Start date:      Quit date: 2019     Years since quittin.4    Smokeless tobacco: Never Used   Substance Use Topics    Alcohol use: Yes     Alcohol/week: 1.0 standard drinks     Types: 1 Cans of beer per week     Comment: occasional         Review of Systems   Constitutional: Negative for fatigue, fever and unexpected weight change. HENT: Negative for congestion, ear discharge, ear pain, hearing loss, rhinorrhea and sinus pressure. Eyes: Negative for pain, discharge and visual disturbance. Respiratory: Negative for cough, chest tightness, shortness of breath and wheezing. Cardiovascular: Negative for chest pain, palpitations and leg swelling. Gastrointestinal: Negative for abdominal pain, blood in stool, constipation, diarrhea and vomiting. Genitourinary: Negative for difficulty urinating, dysuria and hematuria.    Neurological: Negative for dizziness, Melanie Credit LAURO,

## 2021-06-25 LAB
ESTIMATED AVERAGE GLUCOSE: 114 MG/DL
HBA1C MFR BLD: 5.6 %

## 2021-07-27 ENCOUNTER — HOSPITAL ENCOUNTER (INPATIENT)
Age: 51
LOS: 4 days | Discharge: HOME | DRG: 190 | End: 2021-07-31
Attending: EMERGENCY MEDICINE | Admitting: INTERNAL MEDICINE
Payer: MEDICARE

## 2021-07-27 ENCOUNTER — APPOINTMENT (OUTPATIENT)
Dept: GENERAL RADIOLOGY | Age: 51
DRG: 190 | End: 2021-07-27
Payer: MEDICARE

## 2021-07-27 DIAGNOSIS — J96.01 ACUTE RESPIRATORY FAILURE WITH HYPOXIA (HCC): Primary | ICD-10-CM

## 2021-07-27 DIAGNOSIS — J44.1 COPD EXACERBATION (HCC): ICD-10-CM

## 2021-07-27 LAB
A/G RATIO: 1.4 (ref 1.1–2.2)
ALBUMIN SERPL-MCNC: 4.6 G/DL (ref 3.4–5)
ALP BLD-CCNC: 104 U/L (ref 40–129)
ALT SERPL-CCNC: 21 U/L (ref 10–40)
ANION GAP SERPL CALCULATED.3IONS-SCNC: 13 MMOL/L (ref 3–16)
AST SERPL-CCNC: 29 U/L (ref 15–37)
BASE EXCESS VENOUS: -0.5 MMOL/L (ref -3–3)
BASOPHILS ABSOLUTE: 0.1 K/UL (ref 0–0.2)
BASOPHILS RELATIVE PERCENT: 0.9 %
BILIRUB SERPL-MCNC: 0.5 MG/DL (ref 0–1)
BUN BLDV-MCNC: 10 MG/DL (ref 7–20)
CALCIUM SERPL-MCNC: 9.1 MG/DL (ref 8.3–10.6)
CARBOXYHEMOGLOBIN: 1.5 % (ref 0–1.5)
CHLORIDE BLD-SCNC: 96 MMOL/L (ref 99–110)
CO2: 26 MMOL/L (ref 21–32)
CREAT SERPL-MCNC: 1 MG/DL (ref 0.9–1.3)
EKG ATRIAL RATE: 77 BPM
EKG DIAGNOSIS: NORMAL
EKG P AXIS: 79 DEGREES
EKG P-R INTERVAL: 182 MS
EKG Q-T INTERVAL: 366 MS
EKG QRS DURATION: 76 MS
EKG QTC CALCULATION (BAZETT): 414 MS
EKG R AXIS: 72 DEGREES
EKG T AXIS: 76 DEGREES
EKG VENTRICULAR RATE: 77 BPM
EOSINOPHILS ABSOLUTE: 0 K/UL (ref 0–0.6)
EOSINOPHILS RELATIVE PERCENT: 0.3 %
GFR AFRICAN AMERICAN: >60
GFR NON-AFRICAN AMERICAN: >60
GLOBULIN: 3.2 G/DL
GLUCOSE BLD-MCNC: 94 MG/DL (ref 70–99)
HCO3 VENOUS: 25.8 MMOL/L (ref 23–29)
HCT VFR BLD CALC: 48.7 % (ref 40.5–52.5)
HEMOGLOBIN: 16.1 G/DL (ref 13.5–17.5)
LYMPHOCYTES ABSOLUTE: 0.7 K/UL (ref 1–5.1)
LYMPHOCYTES RELATIVE PERCENT: 13.9 %
MAGNESIUM: 1.8 MG/DL (ref 1.8–2.4)
MCH RBC QN AUTO: 32.3 PG (ref 26–34)
MCHC RBC AUTO-ENTMCNC: 33.1 G/DL (ref 31–36)
MCV RBC AUTO: 97.5 FL (ref 80–100)
METHEMOGLOBIN VENOUS: 0.1 %
MONOCYTES ABSOLUTE: 0.6 K/UL (ref 0–1.3)
MONOCYTES RELATIVE PERCENT: 10.9 %
NEUTROPHILS ABSOLUTE: 4 K/UL (ref 1.7–7.7)
NEUTROPHILS RELATIVE PERCENT: 74 %
O2 CONTENT, VEN: 20 VOL %
O2 SAT, VEN: 89 %
O2 THERAPY: ABNORMAL
PCO2, VEN: 48.2 MMHG (ref 40–50)
PDW BLD-RTO: 13.9 % (ref 12.4–15.4)
PH VENOUS: 7.35 (ref 7.35–7.45)
PLATELET # BLD: 192 K/UL (ref 135–450)
PMV BLD AUTO: 7.8 FL (ref 5–10.5)
PO2, VEN: 58.3 MMHG (ref 25–40)
POTASSIUM REFLEX MAGNESIUM: 3.5 MMOL/L (ref 3.5–5.1)
RBC # BLD: 5 M/UL (ref 4.2–5.9)
SARS-COV-2, NAAT: NOT DETECTED
SODIUM BLD-SCNC: 135 MMOL/L (ref 136–145)
TCO2 CALC VENOUS: 27 MMOL/L
TOTAL PROTEIN: 7.8 G/DL (ref 6.4–8.2)
TROPONIN: <0.01 NG/ML
TROPONIN: <0.01 NG/ML
WBC # BLD: 5.4 K/UL (ref 4–11)

## 2021-07-27 PROCEDURE — 99284 EMERGENCY DEPT VISIT MOD MDM: CPT

## 2021-07-27 PROCEDURE — 99221 1ST HOSP IP/OBS SF/LOW 40: CPT | Performed by: NURSE PRACTITIONER

## 2021-07-27 PROCEDURE — 6370000000 HC RX 637 (ALT 250 FOR IP): Performed by: NURSE PRACTITIONER

## 2021-07-27 PROCEDURE — 85025 COMPLETE CBC W/AUTO DIFF WBC: CPT

## 2021-07-27 PROCEDURE — 80053 COMPREHEN METABOLIC PANEL: CPT

## 2021-07-27 PROCEDURE — 93010 ELECTROCARDIOGRAM REPORT: CPT | Performed by: INTERNAL MEDICINE

## 2021-07-27 PROCEDURE — 2580000003 HC RX 258: Performed by: NURSE PRACTITIONER

## 2021-07-27 PROCEDURE — 94761 N-INVAS EAR/PLS OXIMETRY MLT: CPT

## 2021-07-27 PROCEDURE — 6370000000 HC RX 637 (ALT 250 FOR IP): Performed by: INTERNAL MEDICINE

## 2021-07-27 PROCEDURE — 93005 ELECTROCARDIOGRAM TRACING: CPT | Performed by: EMERGENCY MEDICINE

## 2021-07-27 PROCEDURE — 36415 COLL VENOUS BLD VENIPUNCTURE: CPT

## 2021-07-27 PROCEDURE — 1200000000 HC SEMI PRIVATE

## 2021-07-27 PROCEDURE — 6360000002 HC RX W HCPCS: Performed by: NURSE PRACTITIONER

## 2021-07-27 PROCEDURE — 2500000003 HC RX 250 WO HCPCS: Performed by: EMERGENCY MEDICINE

## 2021-07-27 PROCEDURE — 2580000003 HC RX 258: Performed by: EMERGENCY MEDICINE

## 2021-07-27 PROCEDURE — 6360000002 HC RX W HCPCS: Performed by: EMERGENCY MEDICINE

## 2021-07-27 PROCEDURE — 87635 SARS-COV-2 COVID-19 AMP PRB: CPT

## 2021-07-27 PROCEDURE — 6370000000 HC RX 637 (ALT 250 FOR IP): Performed by: EMERGENCY MEDICINE

## 2021-07-27 PROCEDURE — 84484 ASSAY OF TROPONIN QUANT: CPT

## 2021-07-27 PROCEDURE — 83735 ASSAY OF MAGNESIUM: CPT

## 2021-07-27 PROCEDURE — 71045 X-RAY EXAM CHEST 1 VIEW: CPT

## 2021-07-27 PROCEDURE — 94640 AIRWAY INHALATION TREATMENT: CPT

## 2021-07-27 PROCEDURE — 82803 BLOOD GASES ANY COMBINATION: CPT

## 2021-07-27 PROCEDURE — 96374 THER/PROPH/DIAG INJ IV PUSH: CPT

## 2021-07-27 PROCEDURE — 2700000000 HC OXYGEN THERAPY PER DAY

## 2021-07-27 PROCEDURE — 6370000000 HC RX 637 (ALT 250 FOR IP)

## 2021-07-27 PROCEDURE — 99223 1ST HOSP IP/OBS HIGH 75: CPT | Performed by: INTERNAL MEDICINE

## 2021-07-27 RX ORDER — POLYETHYLENE GLYCOL 3350 17 G/17G
17 POWDER, FOR SOLUTION ORAL DAILY PRN
Status: DISCONTINUED | OUTPATIENT
Start: 2021-07-27 | End: 2021-07-31 | Stop reason: HOSPADM

## 2021-07-27 RX ORDER — SODIUM CHLORIDE 0.9 % (FLUSH) 0.9 %
5-40 SYRINGE (ML) INJECTION EVERY 12 HOURS SCHEDULED
Status: DISCONTINUED | OUTPATIENT
Start: 2021-07-27 | End: 2021-07-31 | Stop reason: HOSPADM

## 2021-07-27 RX ORDER — METHYLPREDNISOLONE SODIUM SUCCINATE 40 MG/ML
40 INJECTION, POWDER, LYOPHILIZED, FOR SOLUTION INTRAMUSCULAR; INTRAVENOUS EVERY 12 HOURS
Status: DISCONTINUED | OUTPATIENT
Start: 2021-07-27 | End: 2021-07-28

## 2021-07-27 RX ORDER — BUDESONIDE AND FORMOTEROL FUMARATE DIHYDRATE 160; 4.5 UG/1; UG/1
2 AEROSOL RESPIRATORY (INHALATION) 2 TIMES DAILY
Status: DISCONTINUED | OUTPATIENT
Start: 2021-07-27 | End: 2021-07-31 | Stop reason: HOSPADM

## 2021-07-27 RX ORDER — DOXYCYCLINE HYCLATE 100 MG
100 TABLET ORAL 2 TIMES DAILY
Status: DISCONTINUED | OUTPATIENT
Start: 2021-07-27 | End: 2021-07-31 | Stop reason: HOSPADM

## 2021-07-27 RX ORDER — FOLIC ACID 1 MG/1
1 TABLET ORAL DAILY
Status: DISCONTINUED | OUTPATIENT
Start: 2021-07-27 | End: 2021-07-31 | Stop reason: HOSPADM

## 2021-07-27 RX ORDER — SODIUM CHLORIDE 0.9 % (FLUSH) 0.9 %
5-40 SYRINGE (ML) INJECTION PRN
Status: DISCONTINUED | OUTPATIENT
Start: 2021-07-27 | End: 2021-07-31 | Stop reason: HOSPADM

## 2021-07-27 RX ORDER — ACETAMINOPHEN 325 MG/1
650 TABLET ORAL EVERY 6 HOURS PRN
Status: DISCONTINUED | OUTPATIENT
Start: 2021-07-27 | End: 2021-07-31 | Stop reason: HOSPADM

## 2021-07-27 RX ORDER — SODIUM CHLORIDE 9 MG/ML
25 INJECTION, SOLUTION INTRAVENOUS PRN
Status: DISCONTINUED | OUTPATIENT
Start: 2021-07-27 | End: 2021-07-31 | Stop reason: HOSPADM

## 2021-07-27 RX ORDER — ONDANSETRON 4 MG/1
4 TABLET, ORALLY DISINTEGRATING ORAL EVERY 8 HOURS PRN
Status: DISCONTINUED | OUTPATIENT
Start: 2021-07-27 | End: 2021-07-31 | Stop reason: HOSPADM

## 2021-07-27 RX ORDER — ONDANSETRON 2 MG/ML
4 INJECTION INTRAMUSCULAR; INTRAVENOUS EVERY 6 HOURS PRN
Status: DISCONTINUED | OUTPATIENT
Start: 2021-07-27 | End: 2021-07-31 | Stop reason: HOSPADM

## 2021-07-27 RX ORDER — IPRATROPIUM BROMIDE AND ALBUTEROL SULFATE 2.5; .5 MG/3ML; MG/3ML
1 SOLUTION RESPIRATORY (INHALATION) EVERY 4 HOURS
Status: DISCONTINUED | OUTPATIENT
Start: 2021-07-27 | End: 2021-07-28

## 2021-07-27 RX ORDER — ATORVASTATIN CALCIUM 10 MG/1
20 TABLET, FILM COATED ORAL DAILY
Status: DISCONTINUED | OUTPATIENT
Start: 2021-07-27 | End: 2021-07-31 | Stop reason: HOSPADM

## 2021-07-27 RX ORDER — IPRATROPIUM BROMIDE AND ALBUTEROL SULFATE 2.5; .5 MG/3ML; MG/3ML
1 SOLUTION RESPIRATORY (INHALATION) ONCE
Status: COMPLETED | OUTPATIENT
Start: 2021-07-27 | End: 2021-07-27

## 2021-07-27 RX ORDER — IPRATROPIUM BROMIDE AND ALBUTEROL SULFATE 2.5; .5 MG/3ML; MG/3ML
1 SOLUTION RESPIRATORY (INHALATION)
Status: DISCONTINUED | OUTPATIENT
Start: 2021-07-27 | End: 2021-07-27

## 2021-07-27 RX ORDER — METHYLPREDNISOLONE SODIUM SUCCINATE 125 MG/2ML
125 INJECTION, POWDER, LYOPHILIZED, FOR SOLUTION INTRAMUSCULAR; INTRAVENOUS DAILY
Status: DISCONTINUED | OUTPATIENT
Start: 2021-07-27 | End: 2021-07-27

## 2021-07-27 RX ORDER — PREDNISONE 20 MG/1
40 TABLET ORAL DAILY
Status: DISCONTINUED | OUTPATIENT
Start: 2021-07-30 | End: 2021-07-28

## 2021-07-27 RX ORDER — PANTOPRAZOLE SODIUM 40 MG/1
40 TABLET, DELAYED RELEASE ORAL DAILY
Status: DISCONTINUED | OUTPATIENT
Start: 2021-07-27 | End: 2021-07-31 | Stop reason: HOSPADM

## 2021-07-27 RX ORDER — IPRATROPIUM BROMIDE AND ALBUTEROL SULFATE 2.5; .5 MG/3ML; MG/3ML
SOLUTION RESPIRATORY (INHALATION)
Status: COMPLETED
Start: 2021-07-27 | End: 2021-07-27

## 2021-07-27 RX ORDER — AMLODIPINE BESYLATE 5 MG/1
5 TABLET ORAL DAILY
Status: DISCONTINUED | OUTPATIENT
Start: 2021-07-27 | End: 2021-07-31 | Stop reason: HOSPADM

## 2021-07-27 RX ORDER — ACETAMINOPHEN 650 MG/1
650 SUPPOSITORY RECTAL EVERY 6 HOURS PRN
Status: DISCONTINUED | OUTPATIENT
Start: 2021-07-27 | End: 2021-07-31 | Stop reason: HOSPADM

## 2021-07-27 RX ADMIN — IPRATROPIUM BROMIDE AND ALBUTEROL SULFATE 1 AMPULE: .5; 3 SOLUTION RESPIRATORY (INHALATION) at 13:11

## 2021-07-27 RX ADMIN — IPRATROPIUM BROMIDE AND ALBUTEROL SULFATE 1 AMPULE: .5; 3 SOLUTION RESPIRATORY (INHALATION) at 23:12

## 2021-07-27 RX ADMIN — Medication 10 ML: at 20:26

## 2021-07-27 RX ADMIN — METHYLPREDNISOLONE SODIUM SUCCINATE 40 MG: 40 INJECTION, POWDER, FOR SOLUTION INTRAMUSCULAR; INTRAVENOUS at 20:26

## 2021-07-27 RX ADMIN — Medication 2 PUFF: at 18:50

## 2021-07-27 RX ADMIN — DOXYCYCLINE 100 MG: 100 INJECTION, POWDER, LYOPHILIZED, FOR SOLUTION INTRAVENOUS at 17:01

## 2021-07-27 RX ADMIN — ENOXAPARIN SODIUM 40 MG: 40 INJECTION SUBCUTANEOUS at 18:31

## 2021-07-27 RX ADMIN — IPRATROPIUM BROMIDE AND ALBUTEROL SULFATE 1 AMPULE: 2.5; .5 SOLUTION RESPIRATORY (INHALATION) at 13:11

## 2021-07-27 RX ADMIN — METHYLPREDNISOLONE SODIUM SUCCINATE 125 MG: 125 INJECTION, POWDER, FOR SOLUTION INTRAMUSCULAR; INTRAVENOUS at 13:48

## 2021-07-27 RX ADMIN — IPRATROPIUM BROMIDE AND ALBUTEROL SULFATE 1 AMPULE: .5; 3 SOLUTION RESPIRATORY (INHALATION) at 18:50

## 2021-07-27 ASSESSMENT — PAIN SCALES - GENERAL: PAINLEVEL_OUTOF10: 0

## 2021-07-27 NOTE — ED NOTES
Morris Glover returned the call to Dr. Caro Loredo @ [address]  07/27/21 8739

## 2021-07-27 NOTE — ED NOTES
Tejinder sent to Dr. Telly Lynch at [address] [facility]  07/27/21 [id number]

## 2021-07-27 NOTE — PROGRESS NOTES
Consult has been called to Dr. Kaelyn Stallings on 7/27/21. Spoke with seferino.  5:59 PM    Jose F Bonilla  7/27/2021

## 2021-07-27 NOTE — CONSULTS
Reason for referral and CC: SOB, COPD    HISTORY OF PRESENT ILLNESS: 47 yo male with a h/o COPD presented with shortness of breath. The patient has been trying his inhalers at home but they did not relieve the shortness of breath. Probably worsened heat. Positive wheezing. Over 3 days he has had cough with thick white sputum. No fever. No chest pain. He was found to be hypoxic in the ED and placed on O2. Follows with Dr. Cathe Aschoff for COPD. Past Medical History:   Diagnosis Date    Cervical disc herniation     COPD (chronic obstructive pulmonary disease) ([address]     Folate deficiency 01/2013    GERD (gastroesophageal reflux disease)     Hyperlipidemia 2/14    Hypertriglyceridemia, Good HDL    Hypertension 12/12: age 37    Prediabetes 11/2016    Vitamin D deficiency 01/2013    resolved     Past Surgical History:   Procedure Laterality Date    ANKLE FRACTURE SURGERY Left 1998    BRONCHOSCOPY  12-    CERVICAL FUSION N/A 1/16/2019    ANTERIOR CERVICAL DISCECTOMY AND FUSION WITH ALLOGRAFT, MEDTRONICS AND EVOKES  CPT CODE - 85184 performed [facility] Blaise [facility]arni [facility]  2/13    SD NJX DX/THER SBST INTRLMNR CRV/THRC W/IMG GDN Right 11/19/2018    RIGHT CERVICAL SEVEN THORACIC EPIDURAL STEROID INJECTION SITE CONFIRMED BY FLUOROSCOPY performed by Miracle Barrios MD at [address] History  family history includes COPD (age of onset: 39) in his father and mother. Social History:  reports that he quit smoking about 2 years ago. His smoking use included cigarettes. He started smoking about 37 years ago. He has a 70.00 pack-year smoking history. He has never used smokeless tobacco.   reports current alcohol use of about 1.0 standard drinks of alcohol per week. ALLERGIES:  Patient is allergic to no known allergies.   Continuous Infusions:   sodium chloride       Scheduled Meds:   amLODIPine  5 mg Oral Daily    atorvastatin  20 mg Oral Daily    folic acid  1 mg Oral Daily    pantoprazole  40 mg Oral Daily    sodium chloride flush  5-40 mL Intravenous 2 times per day    enoxaparin  40 mg Subcutaneous Daily    ipratropium-albuterol  1 ampule Inhalation Q4H WA    methylPREDNISolone  40 mg Intravenous Q12H    Followed by   Mary Leiva ON 7/30/2021] predniSONE  40 mg Oral Daily    doxycycline hyclate  100 mg Oral BID    budesonide-formoterol  2 puff Inhalation BID    [START ON 7/28/2021] tiotropium  2 puff Inhalation Daily     PRN Meds:  sodium chloride flush, sodium chloride, ondansetron **OR** ondansetron, polyethylene glycol, acetaminophen **OR** acetaminophen    REVIEW OF SYSTEMS:  Constitutional: Negative for fever  HENT: Negative for sore throat  Eyes: Negative for redness   Respiratory: + for dyspnea, cough  Cardiovascular: Negative for chest pain  Gastrointestinal: Negative for vomiting, diarrhea   Genitourinary: Negative for hematuria   Musculoskeletal: Negative for arthralgias   Skin: Negative for rash  Neurological: Negative for syncope  Hematological: Negative for adenopathy  Psychiatric/Behavorial: Negative for anxiety    PHYSICAL EXAM: BP (!) 156/87   Pulse 93   Temp 98.4 °F (36.9 °C) (Oral)   Resp 20   Ht 5' 4\" (1.626 m)   Wt 165 lb (74.8 kg)   SpO2 94%   BMI 28.32 kg/m²  on 2 L  Constitutional:  No acute distress. Eyes: PERRL. Conjunctivae anicteric. ENT: Normal nose. Normal tongue. Neck:  Trachea is midline. No thyroid tenderness. Respiratory: No accessory muscle usage. decreased breath sounds. + wheezes. No rales. No Rhonchi. Cardiovascular: Normal S1S2. No digit clubbing. No digit cyanosis. No LE edema. Gastrointestinal: No mass palpated. No tenderness palpated. No umbilical hernia. Lymphatic: No cervical or supraclavicular adenopathy. Skin: No rash on the exposed extremities. No Nodules or induration on exposed extremities. Psychiatric: No anxiety or Agitation. Alert and Oriented to person, place and time.     CBC:   Recent Labs 07/27/21  1307   WBC 5.4   HGB 16.1   HCT 48.7   MCV 97.5        BMP:   Recent Labs     07/27/21  1307   *   K 3.5   CL 96*   CO2 26   BUN 10   CREATININE 1.0        Recent Labs     07/27/21  1307   AST 29   ALT 21   BILITOT 0.5   ALKPHOS 104     No results for input(s): PROTIME, INR in the last 72 hours. No results for input(s): NITRITE, COLORU, PHUR, LABCAST, WBCUA, RBCUA, MUCUS, TRICHOMONAS, YEAST, BACTERIA, CLARITYU, SPECGRAV, LEUKOCYTESUR, UROBILINOGEN, BILIRUBINUR, BLOODU, GLUCOSEU, AMORPHOUS in the last 72 hours. Invalid input(s): KETONESU  No results for input(s): PHART, PHQ5KIM, PO2ART in the last 72 hours. Chest imaging was reviewed by me and showed clear lungs    ASSESSMENT:  ·  Acute hypoxic respiratory failure  · COPD AE    PLAN:  Supplemental oxygen to maintain SaO2 >92%; wean as tolerated  Intensive inhaled bronchodilator therapy. IV solumedrol 40 mg IV Q12 hrs. Plan to switch to oral prednisone taper when improved. Doxycycline   Hold Spiriva  Sputum GS&C.   Thank you Chiara Wilkinson, * for this consult

## 2021-07-27 NOTE — ED PROVIDER NOTES
[address] MEDICAL-SURGICAL      CHIEF COMPLAINT  Shortness of Breath (two days; chest hurting; hx COPD)       HISTORY OF PRESENT ILLNESS  Lisbet Stevens is a 46 y.o. male with a past medical history of COPD who presents to the ED complaining of shortness of breath. Patient states this started 2 to 3 days ago. He states he had a subjective fever last night. He describes dyspnea on exertion as well as wheezing. He does not wear home oxygen. He also describes left-sided chest pain, nonradiating. Denies history of cardiac disease. He describes a cough with sputum production. He denies abdominal pain or lower extremity edema. Denies history of VTE, recent hospitalizations, recent surgeries, recent travel, leg edema, hemoptysis, estrogen supplementation, or malignancy. No other complaints, modifying factors or associated symptoms. I have reviewed the following from the nursing documentation.     Past Medical History:   Diagnosis Date    Cervical disc herniation     COPD (chronic obstructive pulmonary disease) ([facility] [address]     Folate deficiency 01/2013    GERD (gastroesophageal reflux disease)     Hyperlipidemia 2/14    Hypertriglyceridemia, Good HDL    Hypertension 12/12: age 37    Prediabetes 11/2016    Vitamin D deficiency 01/2013    resolved     Past Surgical History:   Procedure Laterality Date    ANKLE FRACTURE SURGERY Left 1998    BRONCHOSCOPY  12-    CERVICAL FUSION N/A 1/16/2019    ANTERIOR CERVICAL DISCECTOMY AND FUSION WITH ALLOGRAFT, MEDTRONICS AND EVOKES  CPT CODE - 46471 performed by Shaheen Emery MD at [facility]  2/13    ID NJX DX/THER SBST INTRLMNR CRV/THRC W/IMG GDN Right 11/19/2018    RIGHT CERVICAL SEVEN THORACIC EPIDURAL STEROID INJECTION SITE CONFIRMED BY FLUOROSCOPY performed by Ivory Medeiros MD at [place] [address]     Family History   Problem Relation Age of Onset    COPD Mother 39    COPD Father 39     Social History     Socioeconomic History  Marital status: Single     Spouse name: Jase Presley    Number of children: 1    Years of education: 10th grade    Highest education level: Not on file   Occupational History    Occupation: Remodeling Construction    Tobacco Use    Smoking status: Former Smoker     Packs/day: 2.00     Years: 35.00     Pack years: 70.00     Types: Cigarettes     Start date:      Quit date: 2019     Years since quittin.5    Smokeless tobacco: Never Used   Vaping Use    Vaping Use: Never used   Substance and Sexual Activity    Alcohol use: Yes     Alcohol/week: 1.0 standard drinks     Types: 1 Cans of beer per week     Comment: occasional    Drug use: Yes     Types: Marijuana     Comment: 1 joint per week per patient    Sexual activity: Yes     Partners: Female   Other Topics Concern    Not on file   Social History Narrative    \"wife\" (longterm  Jase Presley)     Social Determinants of Health     Financial Resource Strain: Low Risk     Difficulty of Paying Living Expenses: Not hard at all   Food Insecurity: No Food Insecurity    Worried About Running Out of Food in the Last Year: Never true    [address] in the Last Year: Never true   Transportation Needs:     Lack of Transportation (Medical):      Lack of Transportation (Non-Medical):    Physical Activity:     Days of Exercise per Week:     Minutes of Exercise per Session:    Stress:     Feeling of Stress :    Social Connections:     Frequency of Communication with Friends and Family:     Frequency of Social Gatherings with Friends and Family:     Attends Synagogue Services:     Active Member of Clubs or Organizations:     Attends Club or Organization Meetings:     Marital Status:    Intimate Partner Violence:     Fear of Current or Ex-Partner:     Emotionally Abused:     Physically Abused:     Sexually Abused:      Current Facility-Administered Medications   Medication Dose Route Frequency Provider Last Rate Last Admin    potassium chloride (KLOR-CON M) extended release tablet 40 mEq  40 mEq Oral Once Adolfo Mistry MD        benzonatate (TESSALON) capsule 200 mg  200 mg Oral TID PRN Adolfo Mistry MD        amLODIPine (NORVASC) tablet 5 mg  5 mg Oral Daily Lendon Cumber, APRN - CNP   5 mg at 07/28/21 0839    atorvastatin (LIPITOR) tablet 20 mg  20 mg Oral Daily Lendon Cumber, APRN - CNP   20 mg at 49/15/77 1975    folic acid (FOLVITE) tablet 1 mg  1 mg Oral Daily Lendon Cumber, APRN - CNP   1 mg at 07/28/21 0839    pantoprazole (PROTONIX) tablet 40 mg  40 mg Oral Daily Lendon Cumber, APRN - CNP   40 mg at 07/28/21 0839    sodium chloride flush 0.9 % injection 5-40 mL  5-40 mL Intravenous 2 times per day Lendon Cumber, APRN - CNP   10 mL at 07/28/21 1059    sodium chloride flush 0.9 % injection 5-40 mL  5-40 mL Intravenous PRN Lendon Cumber, APRN - CNP        0.9 % sodium chloride infusion  25 mL Intravenous PRN Lendon Artber, APRN - CNP        ondansetron (ZOFRAN-ODT) disintegrating tablet 4 mg  4 mg Oral Q8H PRN Lendon Artber, APRN - CNP        Or    ondansetron (ZOFRAN) injection 4 mg  4 mg Intravenous Q6H PRN Lendon Cumber, APRN - CNP        polyethylene glycol (GLYCOLAX) packet 17 g  17 g Oral Daily PRN Lendon Artber, APRN - CNP        enoxaparin (LOVENOX) injection 40 mg  40 mg Subcutaneous Daily Lendon Cumber, APRN - CNP   40 mg at 07/28/21 0842    acetaminophen (TYLENOL) tablet 650 mg  650 mg Oral Q6H PRN Lendon Artber, APRN - CNP        Or    acetaminophen (TYLENOL) suppository 650 mg  650 mg Rectal Q6H PRN Ronelon Cumber, APRN - CNP        methylPREDNISolone sodium (SOLU-MEDROL) injection 40 mg  40 mg Intravenous Q12H Ronelon Cumber, APRN - CNP   40 mg at 07/28/21 1058    Followed by   Camille Redhead ON 7/30/2021] predniSONE (DELTASONE) tablet 40 mg  40 mg Oral Daily Lendon Cumber, APRN - KASSY        doxycycline hyclate (VIBRA-TABS) tablet 100 mg  100 mg Oral BID Colin Fong ANAI Roy - CNP   100 mg at 07/28/21 0839    budesonide-formoterol (SYMBICORT) 160-4.5 MCG/ACT inhaler 2 puff  2 puff Inhalation BID Chacha Espinoza MD   2 puff at 07/28/21 0728    COVID-19 Ad26 Vaccine (J&J, BHARAT) injection 0.5 mL  1 Dose Intramuscular Prior to discharge Chacha Espinoza MD        ipratropium-albuterol (DUONEB) nebulizer solution 1 ampule  1 ampule Inhalation Q4H eDnnise Sewell MD   1 ampule at 07/28/21 1108     Allergies   Allergen Reactions    No Known Allergies        REVIEW OF SYSTEMS  10 systems reviewed, pertinent positives per HPI otherwise noted to be negative. PHYSICAL EXAM  /76   Pulse 102   Temp 97.8 °F (36.6 °C) (Oral)   Resp 17   Ht 5' 4\" (1.626 m)   Wt 165 lb (74.8 kg)   SpO2 93%   BMI 28.32 kg/m²    Physical exam:  General appearance: awake and cooperative. He is distressed and tachypneic. Non toxic appearing. Skin: Warm and dry. No rashes or lesions. HENT: Normocephalic. Atraumatic. Mucus membranes are dry  Neck: supple  Eyes: DAVIDA. EOM intact. Heart: RRR. No murmurs. Lungs: Tachypneic with 3 word conversational dyspnea. Breath sounds diminished diffusely with expiratory wheezes most notably on right, diminished breath sounds on left hemithorax. No accessory muscle use. no rales, or rhonchi. Fair air exchange  Abdomen: No tenderness. Soft. Non distended. No peritoneal signs. Musculoskeletal: No extremity edema. Compartments soft. No deformity. No tenderness in the extremities. All extremities neurovascularly intact. Radial, Dp, and PT pulses +2/4 bilaterally  Neurological: Alert and oriented. No focal deficits. No aphasia or dysarthria. Psychiatric: Normal mood and affect. LABS  I have reviewed all labs for this visit.    Results for orders placed or performed during the hospital encounter of 07/27/21   COVID-19, Rapid   Result Value Ref Range    SARS-CoV-2, NAAT Not Detected Not Detected   CBC Auto Differential   Result Value Ref Range    WBC 5.4 4.0 - 11.0 K/uL    RBC 5.00 4.20 - 5.90 M/uL    Hemoglobin 16.1 13.5 - 17.5 g/dL    Hematocrit 48.7 40.5 - 52.5 %    MCV 97.5 80.0 - 100.0 fL    MCH 32.3 26.0 - 34.0 pg    MCHC 33.1 31.0 - 36.0 g/dL    RDW 13.9 12.4 - 15.4 %    Platelets 397 844 - 490 K/uL    MPV 7.8 5.0 - 10.5 fL    Neutrophils % 74.0 %    Lymphocytes % 13.9 %    Monocytes % 10.9 %    Eosinophils % 0.3 %    Basophils % 0.9 %    Neutrophils Absolute 4.0 1.7 - 7.7 K/uL    Lymphocytes Absolute 0.7 (L) 1.0 - 5.1 K/uL    Monocytes Absolute 0.6 0.0 - 1.3 K/uL    Eosinophils Absolute 0.0 0.0 - 0.6 K/uL    Basophils Absolute 0.1 0.0 - 0.2 K/uL   Comprehensive Metabolic Panel w/ Reflex to MG   Result Value Ref Range    Sodium 135 (L) 136 - 145 mmol/L    Potassium reflex Magnesium 3.5 3.5 - 5.1 mmol/L    Chloride 96 (L) 99 - 110 mmol/L    CO2 26 21 - 32 mmol/L    Anion Gap 13 3 - 16    Glucose 94 70 - 99 mg/dL    BUN 10 7 - 20 mg/dL    CREATININE 1.0 0.9 - 1.3 mg/dL    GFR Non-African American >60 >60    GFR African American >60 >60    Calcium 9.1 8.3 - 10.6 mg/dL    Total Protein 7.8 6.4 - 8.2 g/dL    Albumin 4.6 3.4 - 5.0 g/dL    Albumin/Globulin Ratio 1.4 1.1 - 2.2    Total Bilirubin 0.5 0.0 - 1.0 mg/dL    Alkaline Phosphatase 104 40 - 129 U/L    ALT 21 10 - 40 U/L    AST 29 15 - 37 U/L    Globulin 3.2 g/dL   Troponin   Result Value Ref Range    Troponin <0.01 <0.01 ng/mL   Blood Gas, Venous   Result Value Ref Range    pH, Khang 7.347 (L) 7.350 - 7.450    pCO2, Khang 48.2 40.0 - 50.0 mmHg    pO2, Khang 58.3 (H) 25 - 40 mmHg    HCO3, Venous 25.8 23.0 - 29.0 mmol/L    Base Excess, Khang -0.5 -3.0 - 3.0 mmol/L    O2 Sat, Khang 89 Not Established %    Carboxyhemoglobin 1.5 0.0 - 1.5 %    MetHgb, Khang 0.1 <1.5 %    TC02 (Calc), Khang 27 Not Established mmol/L    O2 Content, Khang 20 Not Established VOL %    O2 Therapy Unknown    Magnesium   Result Value Ref Range    Magnesium 1.80 1.80 - 2.40 mg/dL   Troponin   Result Value Ref Range    Troponin <0.01 <0.01 ng/mL   CBC   Result Value Ref Range    WBC 5.6 4.0 - 11.0 K/uL    RBC 4.40 4.20 - 5.90 M/uL    Hemoglobin 14.6 13.5 - 17.5 g/dL    Hematocrit 42.8 40.5 - 52.5 %    MCV 97.3 80.0 - 100.0 fL    MCH 33.3 26.0 - 34.0 pg    MCHC 34.2 31.0 - 36.0 g/dL    RDW 13.8 12.4 - 15.4 %    Platelets 815 567 - 289 K/uL    MPV 8.1 5.0 - 10.5 fL   Basic Metabolic Panel w/ Reflex to MG   Result Value Ref Range    Sodium 134 (L) 136 - 145 mmol/L    Potassium reflex Magnesium 3.0 (L) 3.5 - 5.1 mmol/L    Chloride 98 (L) 99 - 110 mmol/L    CO2 27 21 - 32 mmol/L    Anion Gap 9 3 - 16    Glucose 168 (H) 70 - 99 mg/dL    BUN 11 7 - 20 mg/dL    CREATININE 0.9 0.9 - 1.3 mg/dL    GFR Non-African American >60 >60    GFR African American >60 >60    Calcium 9.0 8.3 - 10.6 mg/dL   Magnesium   Result Value Ref Range    Magnesium 1.80 1.80 - 2.40 mg/dL   EKG 12 Lead   Result Value Ref Range    Ventricular Rate 77 BPM    Atrial Rate 77 BPM    P-R Interval 182 ms    QRS Duration 76 ms    Q-T Interval 366 ms    QTc Calculation (Bazett) 414 ms    P Axis 79 degrees    R Axis 72 degrees    T Axis 76 degrees    Diagnosis       Normal sinus rhythm with sinus arrhythmiaNonspecific ST abnormalityConfirmed by GABRIEL BYRNES MD ([id number]) on 7/27/2021 2:52:20 PM       ECG  The Ekg interpreted by me shows  normal sinus rhythm with a rate of 77  Axis is   Normal  QTc is  within an acceptable range  Intervals and Durations are unremarkable. ST Segments: normal  No significant change from prior EKG dated 1/2/19      RADIOLOGY  XR CHEST PORTABLE    Result Date: 7/27/2021  EXAMINATION: ONE XRAY VIEW OF THE CHEST 7/27/2021 1:02 pm COMPARISON: 08/15/2016 HISTORY: ORDERING SYSTEM PROVIDED HISTORY: short of breath TECHNOLOGIST PROVIDED HISTORY: Reason for exam:->short of breath Reason for Exam: Shortness of breath, ? Covid Acuity: Acute Type of Exam: Initial FINDINGS: Heart size is normal.  The lungs appear clear. No adenopathy or pleural effusion.   No pneumothorax. Normal appearing chest.  No acute abnormality identified. ED COURSE/MDM  Patient seen and evaluated. Old records reviewed. Labs and imaging reviewed and results discussed with patient. This is a 61-year-old male presenting with shortness of breath and chest pain. He is initially hypoxic to 88%, as well as tachypneic. He appeared distressed initially when I evaluated him. He had wheezing on exam, was given 2 duo nebs and Solu-Medrol. He was feeling significantly improved and was no longer tachypneic on reevaluation, however was persistently hypoxic. He is not hypercapnic. He is describing chest pain I suspect this is more related to COPD his EKG is nonischemic and troponin is negative. Rapid Covid negative. We attempted to wean him to room air however he was persistently hypoxic and admitted for further treatment. I spoke with hospitalist JAMAL who accepts. The total Critical Care time is 35 minutes for respiratory failure which excludes separately billable procedures.       During the patient's ED course, the patient was given:  Medications   amLODIPine (NORVASC) tablet 5 mg (5 mg Oral Given 7/28/21 0839)   atorvastatin (LIPITOR) tablet 20 mg (20 mg Oral Given 3/39/60 4133)   folic acid (FOLVITE) tablet 1 mg (1 mg Oral Given 7/28/21 0839)   pantoprazole (PROTONIX) tablet 40 mg (40 mg Oral Given 7/28/21 0839)   sodium chloride flush 0.9 % injection 5-40 mL (10 mLs Intravenous Given 7/28/21 1059)   sodium chloride flush 0.9 % injection 5-40 mL (has no administration in time range)   0.9 % sodium chloride infusion (has no administration in time range)   ondansetron (ZOFRAN-ODT) disintegrating tablet 4 mg (has no administration in time range)     Or   ondansetron (ZOFRAN) injection 4 mg (has no administration in time range)   polyethylene glycol (GLYCOLAX) packet 17 g (has no administration in time range)   enoxaparin (LOVENOX) injection 40 mg (40 mg Subcutaneous Given 7/28/21 0842) acetaminophen (TYLENOL) tablet 650 mg (has no administration in time range)     Or   acetaminophen (TYLENOL) suppository 650 mg (has no administration in time range)   methylPREDNISolone sodium (SOLU-MEDROL) injection 40 mg (40 mg Intravenous Given 7/28/21 1058)     Followed by   predniSONE (DELTASONE) tablet 40 mg (has no administration in time range)   doxycycline hyclate (VIBRA-TABS) tablet 100 mg (100 mg Oral Given 7/28/21 0839)   budesonide-formoterol (SYMBICORT) 160-4.5 MCG/ACT inhaler 2 puff (2 puffs Inhalation Given 7/28/21 0728)   COVID-19 Ad26 Vaccine (J&J, BetUknow) injection 0.5 mL (has no administration in time range)   ipratropium-albuterol (DUONEB) nebulizer solution 1 ampule (1 ampule Inhalation Given 7/28/21 1108)   potassium chloride (KLOR-CON M) extended release tablet 40 mEq (has no administration in time range)   benzonatate (TESSALON) capsule 200 mg (has no administration in time range)   ipratropium-albuterol (DUONEB) nebulizer solution 1 ampule (1 ampule Inhalation Given 7/27/21 1311)   ipratropium-albuterol (DUONEB) nebulizer solution 1 ampule (1 ampule Inhalation Given 7/27/21 1311)   potassium chloride (KLOR-CON M) extended release tablet 40 mEq (40 mEq Oral Given 7/28/21 1128)        CLINICAL IMPRESSION  1. Acute respiratory failure with hypoxia ([address]    2. COPD exacerbation ([facility])        Blood pressure 128/76, pulse 102, temperature 97.8 °F (36.6 °C), temperature source Oral, resp. rate 17, height 5' 4\" (1.626 m), weight 165 lb (74.8 kg), SpO2 93 %. Patient was given scripts for the following medications. I counseled patient how to take these medications. Current Discharge Medication List          Follow-up with:  No follow-up provider specified. DISCLAIMER: This chart was created using Dragon dictation software.   Efforts were made by me to ensure accuracy, however some errors may be present due to limitations of this technology and occasionally words are not transcribed correctly.        Lis, [address] [place] [address]  07/28/21 1846

## 2021-07-27 NOTE — PROGRESS NOTES
Patient admitted to room 210 and oriented to call light, bed control, and TV remote. Supplemental O2 @ 2L/min via NC. Patient reports productive cough with thick white sputum. Patient reports diarrhea 1-2 times per day for past 2 days. C-diff isolation and labs ordered. Patient instructed to notify RN when he has had a BM so that sample can be collected and he verbalizes understanding.

## 2021-07-27 NOTE — ED NOTES
Oxygen sat 86% on RA pt placed on 4 liters oxygen via NC at time of triage given 2 duonebs pt sats 94%. Pt states he feels back to his norm attempt to wean from oxygen not successful. Pt tolerates from 4 liters to 2 liters without difficulty. Unable to tolerate RA sats at rest 88-89%.   Duane Connor, GANGA Connor RN  07/27/21 0309

## 2021-07-27 NOTE — PROGRESS NOTES
RESPIRATORY THERAPY ASSESSMENT    Name:  Doug Newsome Record Number:  [id number]  Age: 46 y.o. Gender: male  : 1970  Today's Date:  2021  Room:  [address] [place] [address]    Assessment     Is the patient being admitted for a COPD or Asthma exacerbation? Yes   (If yes the patient will be seen every 4 hours for the first 24 hours and then reassessed)    Patient Admission Diagnosis      Allergies  Allergies   Allergen Reactions    No Known Allergies        Minimum Predicted Vital Capacity:               Actual Vital Capacity:                    Pulmonary History: COPD  Home Oxygen Therapy:   Room air  Home Respiratory Therapy: Albuterol prn, Trelegy Daily   Current Respiratory Therapy:   Duoneb Q4WA, Symbicort BID, Spiriva Daily  Treatment Type: HHN  Medications: Albuterol/Ipratropium    Respiratory Severity Index(RSI)   Patients with orders for inhalation medications, oxygen, or any therapeutic treatment modality will be placed on Respiratory Protocol. They will be assessed with the first treatment and at least every 72 hours thereafter. The following severity scale will be used to determine frequency of treatment intervention. Smoking History: Mild Exacerbation = 3    Social History  Social History     Tobacco Use    Smoking status: Former Smoker     Packs/day: 2.00     Years: 35.00     Pack years: 70.00     Types: Cigarettes     Start date:      Quit date: 2019     Years since quittin.5    Smokeless tobacco: Never Used   Vaping Use    Vaping Use: Never used   Substance Use Topics    Alcohol use:  Yes     Alcohol/week: 1.0 standard drinks     Types: 1 Cans of beer per week     Comment: occasional    Drug use: Yes     Types: Marijuana     Comment: 1 joint per week per patient       Recent Surgical History: None = 0  Past Surgical History  Past Surgical History:   Procedure Laterality Date    ANKLE FRACTURE SURGERY Left     BRONCHOSCOPY  2015    CERVICAL FUSION N/A 1/16/2019    ANTERIOR CERVICAL DISCECTOMY AND FUSION WITH ALLOGRAFT, MEDTRONICS AND EVOKES  CPT CODE - 65541 performed by Andie Nicholson MD at [facility]  2/13    NJ Deandre [address] DX/THER SBST INTRLMNR CRV/THRC W/IMG GDN Right 11/19/2018    RIGHT CERVICAL SEVEN THORACIC EPIDURAL STEROID INJECTION SITE CONFIRMED BY FLUOROSCOPY performed by Tyronne Snellen, MD at [place] [address]       Level of Consciousness: Alert, Oriented, and Cooperative = 0    Level of Activity: Walking with assistance = 1    Respiratory Pattern: Dyspnea with exertion;Irregular pattern;or RR less than 6 = 2    Breath Sounds: Diminshed bilaterally and/or crackles = 2    Sputum   ,  ,    Cough: Strong, spontaneous, non-productive = 0    Vital Signs   BP (!) 156/87   Pulse 93   Temp 98.4 °F (36.9 °C) (Oral)   Resp 20   Ht 5' 4\" (1.626 m)   Wt 165 lb (74.8 kg)   SpO2 92%   BMI 28.32 kg/m²   SPO2 (COPD values may differ): 88-89% on room air or greater than 92% on FiO2 28- 35% = 2    Peak Flow (asthma only): not applicable = 0    RSI: 5-97 = TID (three times daily) and Q4hr PRN for dyspnea        Plan       Goals: medication delivery     Patient/caregiver was educated on the proper method of use for Respiratory Care Devices:  Yes      Level of patient/caregiver understanding able to:   ? Verbalize understanding   ? Demonstrate understanding       ? Teach back        ? Needs reinforcement       ? No available caregiver               ? Other:     Response to education:  Good     Is patient being placed on Home Treatment Regimen? No     Does the patient have everything they need prior to discharge? NA     Comments:  Chart reviewed, patient assessed    Plan of Care:  Duoneb Q4 X 24 hours per protocol, Symbicort BID    Electronically signed by Deidra Corrigan RCP on 7/27/2021 at 6:53 PM    Respiratory Protocol Guidelines     1.  Assessment and treatment by Respiratory Therapy will be initiated for medication and therapeutic interventions upon initiation of aerosolized medication. 2. Physician will be contacted for respiratory rate (RR) greater than 35 breaths per minute. Therapy will be held for heart rate (HR) greater than 140 beats per minute, pending direction from physician. 3. Bronchodilators will be administered via Metered Dose Inhaler (MDI) with spacer when the following criteria are met:  a. Alert and cooperative     b. HR < 140 bpm  c. RR < 30 bpm                d. Can demonstrate a 2-3 second inspiratory hold  4. Bronchodilators will be administered via Hand Held Nebulizer [facility] to patients when ANY of the following criteria are met  a. Incognizant or uncooperative          b. Patients treated with HHN at Home        c. Unable to demonstrate proper use of MDI with spacer     d. RR > 30 bpm   5. Bronchodilators will be delivered via Metered Dose Inhaler (MDI), HHN, Aerogen to intubated patients on mechanical ventilation. 6. Inhalation medication orders will be delivered and/or substituted as outlined below. Aerosolized Medications Ordering and Administration Guidelines:    1. All Medications will be ordered by a physician, and their frequency and/or modality will be adjusted as defined by the patients Respiratory Severity Index (RSI) score. 2. If the patient does not have documented COPD, consider discontinuing anticholinergics when RSI is less than 9.  3. If the bronchospasm worsens (increased RSI), then the bronchodilator frequency can be increased to a maximum of every 4 hours. If greater than every 4 hours is required, the physician will be contacted. 4. If the bronchospasm improves, the frequency of the bronchodilator can be decreased, based on the patient's RSI, but not less than home treatment regimen frequency. 5. Bronchodilator(s) will be discontinued if patient has a RSI less than 9 and has received no scheduled or as needed treatment for 72  Hrs. Patients Ordered on a Mucolytic Agent:    1.  Must always be administered with a bronchodilator. 2. Discontinue if patient experiences worsened bronchospasm, or secretions have lessened to the point that the patient is able to clear them with a cough. Anti-inflammatory and Combination Medications:    1. If the patient lacks prior history of lung disease, is not using inhaled anti-inflammatory medication at home, and lacks wheezing by examination or by history for at least 24 hours, contact physician for possible discontinuation.

## 2021-07-27 NOTE — H&P
Hospital Medicine History & Physical      PCP: Jc RESTREPO DO    Date of Admission: 7/27/2021    Date of Service: Pt seen/examined on 7/27/2021     Chief Complaint:    Chief Complaint   Patient presents with    Shortness of Breath     two days; chest hurting; hx COPD       History Of Present Illness: The patient is a 46 y.o. male with hypertension, hyperlipidemia, folate deficiency, GERD, and COPD ho presents to [facility] with c/o shortness of breath. He states ongoing for a few days and then he felt worse. He reports he is used to feeling short of breath, but it is worse than his normal.  He has a productive cough. Denies fevers, chills, chest pain, abdominal pain, nausea, vomiting. He f/w Dr. Saundra Walton. He does not normally wear oxygen. His BP is elevated. He is tachypneic and requiring 2 L O2. Labs stable. CXR negative. Admitted to [facility]-s[facility] for COPD exacerbation. Pulmonology consulted.       Past Medical History:        Diagnosis Date    Cervical disc herniation     COPD (chronic obstructive pulmonary disease) ([facility]     Folate deficiency 01/2013    GERD (gastroesophageal reflux disease)     Hyperlipidemia 2/14    Hypertriglyceridemia, Good HDL    Hypertension 12/12: age 37    Prediabetes 11/2016    Vitamin D deficiency 01/2013    resolved       Past Surgical History:        Procedure Laterality Date    ANKLE FRACTURE SURGERY Left 1998    BRONCHOSCOPY  12-    CERVICAL FUSION N/A 1/16/2019    ANTERIOR CERVICAL DISCECTOMY AND FUSION WITH ALLOGRAFT, MEDTRONICS AND EVOKES  CPT CODE - 94225 performed by Annalisa Manley MD at [facility]  2/13    NM [address] DX/THER SBST INTRLMNR CRV/THRC W/IMG GDN Right 11/19/2018    RIGHT CERVICAL SEVEN THORACIC EPIDURAL STEROID INJECTION SITE CONFIRMED BY FLUOROSCOPY performed by Caitlin Harrington MD at [place] [address]       Medications Prior to Admission:    Prior to Admission medications    Medication Sig Start Date End Date Taking? Authorizing Provider   pantoprazole (PROTONIX) 40 MG tablet TAKE ONE TABLET BY MOUTH DAILY 6/24/21  Yes Sarina Gomez,    atorvastatin (LIPITOR) 20 MG tablet TAKE ONE TABLET BY MOUTH DAILY 6/17/21  Yes Sarina Gomez, DO   amLODIPine (NORVASC) 5 MG tablet TAKE ONE TABLET BY MOUTH DAILY 5/13/21  Yes Sarina Gomez,    folic acid (FOLVITE) 1 MG tablet TAKE ONE TABLET BY MOUTH DAILY 2/16/21  Yes Sarina Capps,    fluticasone-umeclidin-vilant (TRELEGY ELLIPTA) 100-62.5-25 MCG/INH AEPB Inhale 1 puff into the lungs daily 12/23/20 9/18/23 Yes Amy Broussard MD   albuterol sulfate  (90 Base) MCG/ACT inhaler INHALE TWO PUFFS BY MOUTH FOUR TIMES A DAY AS NEEDED FOR WHEEZING 10/21/20  Yes Amy Broussard MD       Allergies:  No known allergies    Social History:     TOBACCO:   reports that he quit smoking about 2 years ago. His smoking use included cigarettes. He started smoking about 37 years ago. He has a 70.00 pack-year smoking history. He has never used smokeless tobacco.  ETOH:   reports current alcohol use of about 1.0 standard drinks of alcohol per week. Family History:   Positive as follows:        Problem Relation Age of Onset    COPD Mother 39    COPD Father 39       REVIEW OF SYSTEMS:       Constitutional: Negative for fever   Respiratory: + dyspnea, cough   Cardiovascular: Negative for chest pain   Gastrointestinal: Negative for vomiting, diarrhea   Genitourinary: Negative for hematuria   Musculoskeletal: Negative for arthralgias   Skin: Negative for rash   Neurological: Negative for syncope   Hematological: Negative for adenopathy   Psychiatric/Behavorial: Negative for anxiety    PHYSICAL EXAM:    BP (!) 144/82   Pulse 97   Temp 98.4 °F (36.9 °C)   Resp 21   Ht 5' 4\" (1.626 m)   Wt 165 lb (74.8 kg)   SpO2 99%   BMI 28.32 kg/m²     Gen: No distress. Alert. Eyes: PERRL. No sclera icterus. No conjunctival injection. ENT: No discharge. Pharynx clear.    Neck:

## 2021-07-27 NOTE — PROGRESS NOTES
Patient admitted to room 210 ER. Patient oriented to room, call light, bed rails, phone, lights and bathroom. Patient instructed about the schedule of the day including: vital sign frequency, lab draws, possible tests, frequency of MD and staff rounds, daily weights, I &O's and prescribed diet. Bed alarm deferred patient low fall risk. Bed locked, in lowest position, side rails up 2/4, call light within reach. Recliner Assessment:     Patient is able to demonstrate the ability to move from a reclining position to an upright position within the recliner. 4 Eyes Skin Assessment     The patient is being assess for   Admission    I agree that 2 RN's have performed a thorough Head to Toe Skin Assessment on the patient. ALL assessment sites listed below have been assessed. Areas assessed for pressure by both nurses:   [x]   Head, Face, and Ears   [x]   Shoulders, Back, and Chest, Abdomen  [x]   Arms, Elbows, and Hands   [x]   Coccyx, Sacrum, and Ischium  [x]   Legs, Feet, and Heels     Skin intact with no open area or rashes. Skin Assessed Under all Medical Devices by both nurses:  O2 device tubing              All Mepilex Borders were peeled back and area peeked at by both nurses:  No: n/a  Please list where Mepilex Borders are located:  n/a             **SHARE this note so that the co-signing nurse is able to place an eSignature**    Co-signer eSignature: Electronically signed by Val Donohue RN on 7/27/21 at 10:02 PM EDT    Does the Patient have Skin Breakdown related to pressure?   No            Eric Prevention initiated:  NA   Wound Care Orders initiated:  NA      [facility] nurse consulted for Pressure Injury (Stage 3,4, Unstageable, DTI, NWPT, Complex wounds)and New or Established Ostomies:  NA      Primary Nurse eSignature: Electronically signed by Stormy Dancer, RN on 7/27/21 at 6:36 PM EDT

## 2021-07-28 LAB
ANION GAP SERPL CALCULATED.3IONS-SCNC: 9 MMOL/L (ref 3–16)
BUN BLDV-MCNC: 11 MG/DL (ref 7–20)
CALCIUM SERPL-MCNC: 9 MG/DL (ref 8.3–10.6)
CHLORIDE BLD-SCNC: 98 MMOL/L (ref 99–110)
CO2: 27 MMOL/L (ref 21–32)
CREAT SERPL-MCNC: 0.9 MG/DL (ref 0.9–1.3)
GFR AFRICAN AMERICAN: >60
GFR NON-AFRICAN AMERICAN: >60
GLUCOSE BLD-MCNC: 168 MG/DL (ref 70–99)
HCT VFR BLD CALC: 42.8 % (ref 40.5–52.5)
HEMOGLOBIN: 14.6 G/DL (ref 13.5–17.5)
MAGNESIUM: 1.8 MG/DL (ref 1.8–2.4)
MCH RBC QN AUTO: 33.3 PG (ref 26–34)
MCHC RBC AUTO-ENTMCNC: 34.2 G/DL (ref 31–36)
MCV RBC AUTO: 97.3 FL (ref 80–100)
PDW BLD-RTO: 13.8 % (ref 12.4–15.4)
PLATELET # BLD: 188 K/UL (ref 135–450)
PMV BLD AUTO: 8.1 FL (ref 5–10.5)
POTASSIUM REFLEX MAGNESIUM: 3 MMOL/L (ref 3.5–5.1)
RBC # BLD: 4.4 M/UL (ref 4.2–5.9)
SODIUM BLD-SCNC: 134 MMOL/L (ref 136–145)
WBC # BLD: 5.6 K/UL (ref 4–11)

## 2021-07-28 PROCEDURE — 99232 SBSQ HOSP IP/OBS MODERATE 35: CPT | Performed by: INTERNAL MEDICINE

## 2021-07-28 PROCEDURE — 94761 N-INVAS EAR/PLS OXIMETRY MLT: CPT

## 2021-07-28 PROCEDURE — 6370000000 HC RX 637 (ALT 250 FOR IP): Performed by: INTERNAL MEDICINE

## 2021-07-28 PROCEDURE — 2700000000 HC OXYGEN THERAPY PER DAY

## 2021-07-28 PROCEDURE — 6370000000 HC RX 637 (ALT 250 FOR IP): Performed by: NURSE PRACTITIONER

## 2021-07-28 PROCEDURE — 94640 AIRWAY INHALATION TREATMENT: CPT

## 2021-07-28 PROCEDURE — 2580000003 HC RX 258: Performed by: NURSE PRACTITIONER

## 2021-07-28 PROCEDURE — 85027 COMPLETE CBC AUTOMATED: CPT

## 2021-07-28 PROCEDURE — 1200000000 HC SEMI PRIVATE

## 2021-07-28 PROCEDURE — 36415 COLL VENOUS BLD VENIPUNCTURE: CPT

## 2021-07-28 PROCEDURE — 6360000002 HC RX W HCPCS: Performed by: NURSE PRACTITIONER

## 2021-07-28 PROCEDURE — 80048 BASIC METABOLIC PNL TOTAL CA: CPT

## 2021-07-28 PROCEDURE — 83735 ASSAY OF MAGNESIUM: CPT

## 2021-07-28 PROCEDURE — 99233 SBSQ HOSP IP/OBS HIGH 50: CPT | Performed by: INTERNAL MEDICINE

## 2021-07-28 RX ORDER — IPRATROPIUM BROMIDE AND ALBUTEROL SULFATE 2.5; .5 MG/3ML; MG/3ML
1 SOLUTION RESPIRATORY (INHALATION)
Status: DISCONTINUED | OUTPATIENT
Start: 2021-07-28 | End: 2021-07-31 | Stop reason: HOSPADM

## 2021-07-28 RX ORDER — POTASSIUM CHLORIDE 20 MEQ/1
40 TABLET, EXTENDED RELEASE ORAL ONCE
Status: COMPLETED | OUTPATIENT
Start: 2021-07-28 | End: 2021-07-28

## 2021-07-28 RX ORDER — PREDNISONE 20 MG/1
40 TABLET ORAL DAILY
Status: DISCONTINUED | OUTPATIENT
Start: 2021-07-29 | End: 2021-07-31 | Stop reason: HOSPADM

## 2021-07-28 RX ORDER — IPRATROPIUM BROMIDE AND ALBUTEROL SULFATE 2.5; .5 MG/3ML; MG/3ML
1 SOLUTION RESPIRATORY (INHALATION) EVERY 4 HOURS PRN
Status: DISCONTINUED | OUTPATIENT
Start: 2021-07-28 | End: 2021-07-31 | Stop reason: HOSPADM

## 2021-07-28 RX ORDER — BENZONATATE 100 MG/1
200 CAPSULE ORAL 3 TIMES DAILY PRN
Status: DISCONTINUED | OUTPATIENT
Start: 2021-07-28 | End: 2021-07-31 | Stop reason: HOSPADM

## 2021-07-28 RX ADMIN — ENOXAPARIN SODIUM 40 MG: 40 INJECTION SUBCUTANEOUS at 08:42

## 2021-07-28 RX ADMIN — IPRATROPIUM BROMIDE AND ALBUTEROL SULFATE 1 AMPULE: .5; 3 SOLUTION RESPIRATORY (INHALATION) at 22:47

## 2021-07-28 RX ADMIN — IPRATROPIUM BROMIDE AND ALBUTEROL SULFATE 1 AMPULE: .5; 3 SOLUTION RESPIRATORY (INHALATION) at 07:27

## 2021-07-28 RX ADMIN — POTASSIUM CHLORIDE 40 MEQ: 1500 TABLET, EXTENDED RELEASE ORAL at 16:35

## 2021-07-28 RX ADMIN — DOXYCYCLINE HYCLATE 100 MG: 100 TABLET, COATED ORAL at 08:39

## 2021-07-28 RX ADMIN — Medication 2 PUFF: at 18:39

## 2021-07-28 RX ADMIN — AMLODIPINE BESYLATE 5 MG: 5 TABLET ORAL at 08:39

## 2021-07-28 RX ADMIN — PANTOPRAZOLE SODIUM 40 MG: 40 TABLET, DELAYED RELEASE ORAL at 08:39

## 2021-07-28 RX ADMIN — Medication 10 ML: at 10:59

## 2021-07-28 RX ADMIN — IPRATROPIUM BROMIDE AND ALBUTEROL SULFATE 1 AMPULE: .5; 3 SOLUTION RESPIRATORY (INHALATION) at 15:08

## 2021-07-28 RX ADMIN — IPRATROPIUM BROMIDE AND ALBUTEROL SULFATE 1 AMPULE: .5; 3 SOLUTION RESPIRATORY (INHALATION) at 18:39

## 2021-07-28 RX ADMIN — FOLIC ACID 1 MG: 1 TABLET ORAL at 08:39

## 2021-07-28 RX ADMIN — ATORVASTATIN CALCIUM 20 MG: 10 TABLET, FILM COATED ORAL at 08:39

## 2021-07-28 RX ADMIN — IPRATROPIUM BROMIDE AND ALBUTEROL SULFATE 1 AMPULE: .5; 3 SOLUTION RESPIRATORY (INHALATION) at 11:08

## 2021-07-28 RX ADMIN — POTASSIUM CHLORIDE 40 MEQ: 1500 TABLET, EXTENDED RELEASE ORAL at 11:28

## 2021-07-28 RX ADMIN — IPRATROPIUM BROMIDE AND ALBUTEROL SULFATE 1 AMPULE: .5; 3 SOLUTION RESPIRATORY (INHALATION) at 02:53

## 2021-07-28 RX ADMIN — METHYLPREDNISOLONE SODIUM SUCCINATE 40 MG: 40 INJECTION, POWDER, FOR SOLUTION INTRAMUSCULAR; INTRAVENOUS at 10:58

## 2021-07-28 RX ADMIN — Medication 2 PUFF: at 07:28

## 2021-07-28 RX ADMIN — DOXYCYCLINE HYCLATE 100 MG: 100 TABLET, COATED ORAL at 19:32

## 2021-07-28 RX ADMIN — Medication 10 ML: at 19:32

## 2021-07-28 ASSESSMENT — PAIN SCALES - GENERAL: PAINLEVEL_OUTOF10: 0

## 2021-07-28 NOTE — FLOWSHEET NOTE
07/27/21 2025   Vital Signs   Temp 98.2 °F (36.8 °C)   Temp Source Oral   Pulse 72   Resp 16   BP (!) 144/77   BP Location Right upper arm   Level of Consciousness Alert (0)   MEWS Score 1   Patient Currently in Pain No   Oxygen Therapy   SpO2 93 %   O2 Device Nasal cannula   O2 Flow Rate (L/min) 2 L/min   Evening med given to pt. Pt denies chest pain or SOB at rest. Dry hacky cough noted.  Vesna Mathews RN

## 2021-07-28 NOTE — PROGRESS NOTES
Morning assessment completed patient lying in bed visiting with girlfriend offers no c/o at this time

## 2021-07-28 NOTE — ACP (ADVANCE CARE PLANNING)
Advance Care Planning   Healthcare Decision Maker:    Primary Decision Maker: Wong Boo - Domestic Partner - [phone]    Click here to complete Healthcare Decision Makers including selection of the Healthcare Decision Maker Relationship (ie \"Primary\"). Pt aware that without POA papers in place, it would fall to his next of kin which would be adult children, parents or siblings as they are not legally .  He stated agreement and denied needing assistance with POA papers

## 2021-07-28 NOTE — PROGRESS NOTES
Progress Note    Admit Date:  7/27/2021    Subjective:  Mr. Justa Street is feeling better. He gets more dyspneic when he coughs. He is usually not on oxygen. Presently is on 3 L. Pulse ox is 96%. No fever. Objective:   /76   Pulse 102   Temp 97.8 °F (36.6 °C) (Oral)   Resp 17   Ht 5' 4\" (1.626 m)   Wt 165 lb (74.8 kg)   SpO2 93%   BMI 28.32 kg/m²          Intake/Output Summary (Last 24 hours) at 7/28/2021 1145  Last data filed at 7/28/2021 0827  Gross per 24 hour   Intake 1182.37 ml   Output --   Net 1182.37 ml       Physical Exam:    General appearance: alert, appears stated age and cooperative  Head: Normocephalic, without obvious abnormality, atraumatic  Eyes: conjunctivae/corneas clear. PERRL, EOM's intact. Neck: no adenopathy, no carotid bruit, no JVD, supple, symmetrical, trachea midline and thyroid not enlarged, symmetric, no tenderness/mass/nodules  Lungs: Minimal accessory muscle use. Diminished breath sounds the bases. Bilateral wheezes.   No rhonchi or crackles  Heart: Increased rate and regular rhythm, S1, S2 normal, no murmur, click, rub or gallop  Abdomen: soft, non-tender; bowel sounds normal; no masses,  no organomegaly  Extremities: extremities normal, atraumatic, no cyanosis or edema  Pulses: 2+ and symmetric  Skin: Skin color, texture, turgor normal. No rashes or lesions  Neurologic: Grossly normal    Scheduled Meds:   potassium chloride  40 mEq Oral Once    amLODIPine  5 mg Oral Daily    atorvastatin  20 mg Oral Daily    folic acid  1 mg Oral Daily    pantoprazole  40 mg Oral Daily    sodium chloride flush  5-40 mL Intravenous 2 times per day    enoxaparin  40 mg Subcutaneous Daily    methylPREDNISolone  40 mg Intravenous Q12H    Followed by   Cristina Cazares ON 7/30/2021] predniSONE  40 mg Oral Daily    doxycycline hyclate  100 mg Oral BID    budesonide-formoterol  2 puff Inhalation BID    covid-19 vaccine  1 Dose Intramuscular Prior to discharge    ipratropium-albuterol 1 ampule Inhalation Q4H       Continuous Infusions:   sodium chloride         PRN Meds:  sodium chloride flush, sodium chloride, ondansetron **OR** ondansetron, polyethylene glycol, acetaminophen **OR** acetaminophen      Data:  CBC:   Recent Labs     07/27/21  1307 07/28/21  0507   WBC 5.4 5.6   HGB 16.1 14.6   HCT 48.7 42.8   MCV 97.5 97.3    188     BMP:   Recent Labs     07/27/21  1307 07/28/21  0507   * 134*   K 3.5 3.0*   CL 96* 98*   CO2 26 27   BUN 10 11   CREATININE 1.0 0.9     LIVER PROFILE:   Recent Labs     07/27/21  1307   AST 29   ALT 21   BILITOT 0.5   ALKPHOS 104     PT/INR: No results for input(s): PROTIME, INR in the last 72 hours. Cultures:   COVID 19 neg      XR CHEST PORTABLE   Final Result   Normal appearing chest.  No acute abnormality identified. Assessment:  Principal Problem:    COPD exacerbation (HCC)  Active Problems:    Hypertension, essential    Hyperlipidemia, mixed    Acute respiratory failure with hypoxia (HCC)  Resolved Problems:    * No resolved hospital problems. *      Plan:    Acute hypoxic respiratory failure   COPD exacerbation  - admit to med-surg. Pulmonology consult  - on 2 L O2. Does not wear O2 at home. - F/w Dr. Tabitha Ashton  - IV Solu-medrol  - Doxycycline D#2  - Scheduled Duonebs  - continue Trelegy.    - add Tessalon     Hypertension   - BP elevated  - continue Amlodipine.     Hyperlipidemia  - on Atorvastatin.      GERD  - on PPI.      Folate deficiency   - Folic acid supplement     DVT Prophylaxis: Lovenox  Diet: No diet orders on file  Code Status: Prior    Faith Felton MD, MD 7/28/2021 11:45 AM

## 2021-07-28 NOTE — PROGRESS NOTES
Afternoon assessment completed patient lying in bed visiting with girlfriend offers no c/o at this time

## 2021-07-28 NOTE — PROGRESS NOTES
Pulmonary Progress Note  CC: SOB, COPD    Subjective:  Still SOB but improved some      EXAM: /76   Pulse 76   Temp 98.4 °F (36.9 °C) (Oral)   Resp 16   Ht 5' 4\" (1.626 m)   Wt 165 lb (74.8 kg)   SpO2 90%   BMI 28.32 kg/m²  on 2 L  Constitutional:  No acute distress. Eyes: PERRL. Conjunctivae anicteric. ENT: Normal nose. Normal tongue. Neck:  Trachea is midline. No thyroid tenderness. Respiratory: No accessory muscle usage. decreased breath sounds. + wheezes. No rales. No Rhonchi. Cardiovascular: Normal S1S2. No digit clubbing. No digit cyanosis. No LE edema. Psychiatric: No anxiety or Agitation. Alert and Oriented to person, place and time.     Scheduled Meds:   potassium chloride  40 mEq Oral Once    amLODIPine  5 mg Oral Daily    atorvastatin  20 mg Oral Daily    folic acid  1 mg Oral Daily    pantoprazole  40 mg Oral Daily    sodium chloride flush  5-40 mL Intravenous 2 times per day    enoxaparin  40 mg Subcutaneous Daily    methylPREDNISolone  40 mg Intravenous Q12H    Followed by   Isabel Menon ON 7/30/2021] predniSONE  40 mg Oral Daily    doxycycline hyclate  100 mg Oral BID    budesonide-formoterol  2 puff Inhalation BID    covid-19 vaccine  1 Dose Intramuscular Prior to discharge    ipratropium-albuterol  1 ampule Inhalation Q4H     Continuous Infusions:   sodium chloride       PRN Meds:  benzonatate, sodium chloride flush, sodium chloride, ondansetron **OR** ondansetron, polyethylene glycol, acetaminophen **OR** acetaminophen    Labs:  CBC:   Recent Labs     07/27/21  1307 07/28/21  0507   WBC 5.4 5.6   HGB 16.1 14.6   HCT 48.7 42.8   MCV 97.5 97.3    188     BMP:   Recent Labs     07/27/21  1307 07/28/21  0507   * 134*   K 3.5 3.0*   CL 96* 98*   CO2 26 27   BUN 10 11   CREATININE 1.0 0.9     Chest imaging was reviewed by me and showed clear lungs     ASSESSMENT:  ·  Acute hypoxic respiratory failure  · COPD AE     PLAN:  · Supplemental oxygen to maintain SaO2 >92%; wean as tolerated  · Intensive inhaled bronchodilator therapy. · Change to Prednisone taper. · Doxycycline   · Sputum GS&C.

## 2021-07-28 NOTE — CARE COORDINATION
Case Management Assessment  Initial Evaluation      Patient Name: Janey Medina  YOB: 1970  Diagnosis: COPD exacerbation ([facility] [J44.1]  Date / Time: 7/27/2021 12:49 PM    Admission status/Date: 07/27/2021 Inpatient   Chart Reviewed: Yes      Patient Interviewed: Yes   Family Interviewed:  No      Hospitalization in the last 30 days:  No      Health Care Decision Maker :   Primary Decision Maker: Jeremiah Garcia - Domestic Partner - [phone]    (CM - must 1st enter selection under Navigator - emergency contact- Health Care Decision Maker Relationship and pick relationship)   Who do you trust or have selected to make healthcare decisions for you      Met with: pt  Interview conducted  (bedside/phone): phone call     Current PCP: Katherin Kingsley DO    Financial  Medicare and Medicaid  Precert required for SNF : N          3 night stay required - N-WAIVED    ADLS  Support Systems/Care Needs: Spouse/Significant Other  Transportation: self    Meal Preparation: self    Housing  Living Arrangements: Pt lives at home with Yue Guerrier  Steps: \"a few\" to the step  Intent for return to present living arrangements: Yes  Identified Issues: [id number] [address] [facility] with Home Health Care : No Agency:(Services)  Type of Home Care Services: None  Passport/Waiver : No  :                      Phone Number:    Passport/Waiver Services: n/a          Durable Medical Equiptment   DME Provider: n/a  Equipment:   Walker___Cane___RTS___ BSC___Shower Chair___Hospital Bed___W/C____Other________  02 at ____Liter(s)---wears(frequency)_______ HHN ___ CPAP___ BiPap___   N/A_x___      Home O2 Use :  No    If No for home O2---if presently on O2 during hospitalization:  Yes  if yes CM to follow for potential DC O2 need  Informed of need for care provider to bring portable home O2 tank on day of discharge for nursing to connect prior to leaving:   Not Indicated  Verbalized agreement/Understanding:   Not Indicated    Community Service Affiliation  Dialysis:  No    · Agency:  · Location:  · Dialysis Schedule:  · Phone:   · Fax: Other Community Services: n/a    DISCHARGE PLAN: Explained Case Management role/services. Chart review completed. Called and spoke with pt via call to bedside phone due to isolation precautions. Pt stated that he is independent at home and plans on returning home. He stated that he has no DME, o2, HHC or community services. He denied needs or questions for CM. CM will follow as pt is on o2 and doesn't have this at bedside.  Please notify CM if needs or concerns arise

## 2021-07-29 LAB
ANION GAP SERPL CALCULATED.3IONS-SCNC: 12 MMOL/L (ref 3–16)
BASOPHILS ABSOLUTE: 0 K/UL (ref 0–0.2)
BASOPHILS RELATIVE PERCENT: 0.2 %
BUN BLDV-MCNC: 12 MG/DL (ref 7–20)
CALCIUM SERPL-MCNC: 8.8 MG/DL (ref 8.3–10.6)
CHLORIDE BLD-SCNC: 102 MMOL/L (ref 99–110)
CO2: 25 MMOL/L (ref 21–32)
CREAT SERPL-MCNC: 1 MG/DL (ref 0.9–1.3)
EOSINOPHILS ABSOLUTE: 0 K/UL (ref 0–0.6)
EOSINOPHILS RELATIVE PERCENT: 0 %
GFR AFRICAN AMERICAN: >60
GFR NON-AFRICAN AMERICAN: >60
GLUCOSE BLD-MCNC: 102 MG/DL (ref 70–99)
HCT VFR BLD CALC: 43 % (ref 40.5–52.5)
HEMOGLOBIN: 14.5 G/DL (ref 13.5–17.5)
LYMPHOCYTES ABSOLUTE: 0.8 K/UL (ref 1–5.1)
LYMPHOCYTES RELATIVE PERCENT: 7.5 %
MCH RBC QN AUTO: 33 PG (ref 26–34)
MCHC RBC AUTO-ENTMCNC: 33.7 G/DL (ref 31–36)
MCV RBC AUTO: 97.9 FL (ref 80–100)
MONOCYTES ABSOLUTE: 0.7 K/UL (ref 0–1.3)
MONOCYTES RELATIVE PERCENT: 6.8 %
NEUTROPHILS ABSOLUTE: 9.2 K/UL (ref 1.7–7.7)
NEUTROPHILS RELATIVE PERCENT: 85.5 %
PDW BLD-RTO: 14 % (ref 12.4–15.4)
PLATELET # BLD: 189 K/UL (ref 135–450)
PMV BLD AUTO: 7.9 FL (ref 5–10.5)
POTASSIUM REFLEX MAGNESIUM: 3.6 MMOL/L (ref 3.5–5.1)
RBC # BLD: 4.39 M/UL (ref 4.2–5.9)
SODIUM BLD-SCNC: 139 MMOL/L (ref 136–145)
WBC # BLD: 10.8 K/UL (ref 4–11)

## 2021-07-29 PROCEDURE — 36415 COLL VENOUS BLD VENIPUNCTURE: CPT

## 2021-07-29 PROCEDURE — 6370000000 HC RX 637 (ALT 250 FOR IP): Performed by: NURSE PRACTITIONER

## 2021-07-29 PROCEDURE — 6370000000 HC RX 637 (ALT 250 FOR IP): Performed by: INTERNAL MEDICINE

## 2021-07-29 PROCEDURE — 99233 SBSQ HOSP IP/OBS HIGH 50: CPT | Performed by: INTERNAL MEDICINE

## 2021-07-29 PROCEDURE — 1200000000 HC SEMI PRIVATE

## 2021-07-29 PROCEDURE — 2580000003 HC RX 258: Performed by: NURSE PRACTITIONER

## 2021-07-29 PROCEDURE — 94761 N-INVAS EAR/PLS OXIMETRY MLT: CPT

## 2021-07-29 PROCEDURE — 85025 COMPLETE CBC W/AUTO DIFF WBC: CPT

## 2021-07-29 PROCEDURE — 2700000000 HC OXYGEN THERAPY PER DAY

## 2021-07-29 PROCEDURE — 99232 SBSQ HOSP IP/OBS MODERATE 35: CPT | Performed by: INTERNAL MEDICINE

## 2021-07-29 PROCEDURE — 94640 AIRWAY INHALATION TREATMENT: CPT

## 2021-07-29 PROCEDURE — 80048 BASIC METABOLIC PNL TOTAL CA: CPT

## 2021-07-29 PROCEDURE — 6360000002 HC RX W HCPCS: Performed by: NURSE PRACTITIONER

## 2021-07-29 RX ADMIN — Medication 10 ML: at 09:27

## 2021-07-29 RX ADMIN — DOXYCYCLINE HYCLATE 100 MG: 100 TABLET, COATED ORAL at 19:49

## 2021-07-29 RX ADMIN — Medication 2 PUFF: at 19:43

## 2021-07-29 RX ADMIN — PANTOPRAZOLE SODIUM 40 MG: 40 TABLET, DELAYED RELEASE ORAL at 09:28

## 2021-07-29 RX ADMIN — AMLODIPINE BESYLATE 5 MG: 5 TABLET ORAL at 09:28

## 2021-07-29 RX ADMIN — DOXYCYCLINE HYCLATE 100 MG: 100 TABLET, COATED ORAL at 09:28

## 2021-07-29 RX ADMIN — ENOXAPARIN SODIUM 40 MG: 40 INJECTION SUBCUTANEOUS at 09:27

## 2021-07-29 RX ADMIN — Medication 2 PUFF: at 10:57

## 2021-07-29 RX ADMIN — ATORVASTATIN CALCIUM 20 MG: 10 TABLET, FILM COATED ORAL at 09:28

## 2021-07-29 RX ADMIN — Medication 10 ML: at 19:49

## 2021-07-29 RX ADMIN — IPRATROPIUM BROMIDE AND ALBUTEROL SULFATE 1 AMPULE: .5; 3 SOLUTION RESPIRATORY (INHALATION) at 10:57

## 2021-07-29 RX ADMIN — IPRATROPIUM BROMIDE AND ALBUTEROL SULFATE 1 AMPULE: .5; 3 SOLUTION RESPIRATORY (INHALATION) at 19:42

## 2021-07-29 RX ADMIN — PREDNISONE 40 MG: 20 TABLET ORAL at 09:28

## 2021-07-29 RX ADMIN — IPRATROPIUM BROMIDE AND ALBUTEROL SULFATE 1 AMPULE: .5; 3 SOLUTION RESPIRATORY (INHALATION) at 06:59

## 2021-07-29 RX ADMIN — IPRATROPIUM BROMIDE AND ALBUTEROL SULFATE 1 AMPULE: .5; 3 SOLUTION RESPIRATORY (INHALATION) at 22:32

## 2021-07-29 RX ADMIN — IPRATROPIUM BROMIDE AND ALBUTEROL SULFATE 1 AMPULE: .5; 3 SOLUTION RESPIRATORY (INHALATION) at 16:00

## 2021-07-29 RX ADMIN — FOLIC ACID 1 MG: 1 TABLET ORAL at 09:28

## 2021-07-29 ASSESSMENT — PAIN SCALES - GENERAL: PAINLEVEL_OUTOF10: 0

## 2021-07-29 NOTE — PROGRESS NOTES
Afternoon assessment completed patient lying in bed offers no c/o at this time 02 continues at 2L/NC 02 sat >90%

## 2021-07-29 NOTE — PROGRESS NOTES
Morning assessment completed patient lying in bed watching tv offers no c/o at this time alert and oriented pleasant and cooperative

## 2021-07-29 NOTE — PROGRESS NOTES
Pulmonary Progress Note  CC: SOB, COPD    Subjective:  C/o more SOB and congestion      EXAM: BP (!) 147/82   Pulse 87   Temp 98.7 °F (37.1 °C) (Oral)   Resp 18   Ht 5' 4\" (1.626 m)   Wt 165 lb (74.8 kg)   SpO2 96%   BMI 28.32 kg/m²  on 1 L  Constitutional:  No acute distress. Eyes: PERRL. Conjunctivae anicteric. ENT: Normal nose. Normal tongue. Neck:  Trachea is midline. No thyroid tenderness. Respiratory: No accessory muscle usage. decreased breath sounds. + wheezes. No rales. No Rhonchi. Cardiovascular: Normal S1S2. No digit clubbing. No digit cyanosis. No LE edema. Psychiatric: No anxiety or Agitation. Alert and Oriented to person, place and time.     Scheduled Meds:   predniSONE  40 mg Oral Daily    ipratropium-albuterol  1 ampule Inhalation Q4H While awake    amLODIPine  5 mg Oral Daily    atorvastatin  20 mg Oral Daily    folic acid  1 mg Oral Daily    pantoprazole  40 mg Oral Daily    sodium chloride flush  5-40 mL Intravenous 2 times per day    enoxaparin  40 mg Subcutaneous Daily    doxycycline hyclate  100 mg Oral BID    budesonide-formoterol  2 puff Inhalation BID    covid-19 vaccine  1 Dose Intramuscular Prior to discharge     Continuous Infusions:   sodium chloride       PRN Meds:  benzonatate, ipratropium-albuterol, sodium chloride flush, sodium chloride, ondansetron **OR** ondansetron, polyethylene glycol, acetaminophen **OR** acetaminophen    Labs:  CBC:   Recent Labs     07/27/21  1307 07/28/21  0507 07/29/21  0624   WBC 5.4 5.6 10.8   HGB 16.1 14.6 14.5   HCT 48.7 42.8 43.0   MCV 97.5 97.3 97.9    188 189     BMP:   Recent Labs     07/27/21  1307 07/28/21  0507 07/29/21  0624   * 134* 139   K 3.5 3.0* 3.6   CL 96* 98* 102   CO2 26 27 25   BUN 10 11 12   CREATININE 1.0 0.9 1.0     Chest imaging was reviewed by me and showed clear lungs     ASSESSMENT:  ·  Acute hypoxic respiratory failure  · COPD AE     PLAN:  · Supplemental oxygen to maintain SaO2 >92%; wean as tolerated  · Intensive inhaled bronchodilator therapy. · Change to Prednisone taper.     · Doxycycline

## 2021-07-29 NOTE — FLOWSHEET NOTE
07/29/21 1930   Vital Signs   Temp 98.2 °F (36.8 °C)   Temp Source Oral   Pulse 78   Heart Rate Source Monitor   Resp 18   /79   BP Location Right upper arm   Patient Position High fowlers   Level of Consciousness Alert (0)   MEWS Score 1   Oxygen Therapy   SpO2 96 %   O2 Device Nasal cannula   O2 Flow Rate (L/min) 1 L/min   Pt assessment complete. Pt lying in bed quietly. No s/s of distress noted. RT in room giving breathing treatment. Lung sounds diminished. Pt taken off 02 at this time. 02 sat 95%. Nightly medications given. Denies needs at this time. Call light within reach.

## 2021-07-29 NOTE — PROGRESS NOTES
Progress Note    Admit Date:  7/27/2021    Subjective:  Mr. Bashir Rowley is feeling better. He gets more dyspneic when he coughs. He is usually not on oxygen. Presently is on 3 L. Pulse ox is 96%. No fever. 7/29-had a coughing episode this morning and felt more short of breath. After he coughed up some phlegm is beginning to feel better. Objective:   BP (!) 147/82   Pulse 87   Temp 98.7 °F (37.1 °C) (Oral)   Resp 18   Ht 5' 4\" (1.626 m)   Wt 165 lb (74.8 kg)   SpO2 98%   BMI 28.32 kg/m²        No intake or output data in the 24 hours ending 07/29/21 1436    Physical Exam:    General appearance: alert, appears stated age and cooperative  Head: Normocephalic, without obvious abnormality, atraumatic  Eyes: conjunctivae/corneas clear. PERRL, EOM's intact. Neck: no adenopathy, no carotid bruit, no JVD, supple, symmetrical, trachea midline and thyroid not enlarged, symmetric, no tenderness/mass/nodules  Lungs: Minimal accessory muscle use. Diminished breath sounds the bases. Bilateral wheezes.   No rhonchi or crackles  Heart: Increased rate and regular rhythm, S1, S2 normal, no murmur, click, rub or gallop  Abdomen: soft, non-tender; bowel sounds normal; no masses,  no organomegaly  Extremities: extremities normal, atraumatic, no cyanosis or edema  Pulses: 2+ and symmetric  Skin: Skin color, texture, turgor normal. No rashes or lesions  Neurologic: Grossly normal    Scheduled Meds:   predniSONE  40 mg Oral Daily    ipratropium-albuterol  1 ampule Inhalation Q4H While awake    amLODIPine  5 mg Oral Daily    atorvastatin  20 mg Oral Daily    folic acid  1 mg Oral Daily    pantoprazole  40 mg Oral Daily    sodium chloride flush  5-40 mL Intravenous 2 times per day    enoxaparin  40 mg Subcutaneous Daily    doxycycline hyclate  100 mg Oral BID    budesonide-formoterol  2 puff Inhalation BID    covid-19 vaccine  1 Dose Intramuscular Prior to discharge       Continuous Infusions:   sodium chloride PRN Meds:  benzonatate, ipratropium-albuterol, sodium chloride flush, sodium chloride, ondansetron **OR** ondansetron, polyethylene glycol, acetaminophen **OR** acetaminophen      Data:  CBC:   Recent Labs     07/27/21  1307 07/28/21  0507 07/29/21  0624   WBC 5.4 5.6 10.8   HGB 16.1 14.6 14.5   HCT 48.7 42.8 43.0   MCV 97.5 97.3 97.9    188 189     BMP:   Recent Labs     07/27/21  1307 07/28/21  0507 07/29/21  0624   * 134* 139   K 3.5 3.0* 3.6   CL 96* 98* 102   CO2 26 27 25   BUN 10 11 12   CREATININE 1.0 0.9 1.0     LIVER PROFILE:   Recent Labs     07/27/21  1307   AST 29   ALT 21   BILITOT 0.5   ALKPHOS 104     PT/INR: No results for input(s): PROTIME, INR in the last 72 hours. Cultures:   COVID 19 neg      XR CHEST PORTABLE   Final Result   Normal appearing chest.  No acute abnormality identified. Assessment:  Principal Problem:    COPD exacerbation (HCC)  Active Problems:    Hypertension, essential    Hyperlipidemia, mixed    Acute respiratory failure with hypoxia (HCC)  Resolved Problems:    * No resolved hospital problems. *      Plan:    Acute hypoxic respiratory failure   COPD exacerbation  - admit to med-surg. Pulmonology consult  - on 2 L O2. Does not wear O2 at home. - F/w Dr. Nava Etienne  - IV Solu-medrol  - Doxycycline D#3  - Scheduled Duonebs  - continue Trelegy.    - add Tessalon     Hypertension   - BP stable  - continue Amlodipine.     Hyperlipidemia  - on Atorvastatin.      GERD  - on PPI.      Folate deficiency   - Folic acid supplement     DVT Prophylaxis: Lovenox  Diet: No diet orders on file  Code Status: Prior    Sammi Bates MD, MD 7/29/2021 2:36 PM

## 2021-07-29 NOTE — PROGRESS NOTES
RESPIRATORY THERAPY ASSESSMENT    Name:  Doug Newsome Record Number:  [id number]  Age: 46 y.o. Gender: male  : 1970  Today's Date:  2021  Room:  [address] [place] [address]    Assessment     Is the patient being admitted for a COPD or Asthma exacerbation? Yes   (If yes the patient will be seen every 4 hours for the first 24 hours and then reassessed)    Patient Admission Diagnosis      Allergies  Allergies   Allergen Reactions    No Known Allergies        Minimum Predicted Vital Capacity:               Actual Vital Capacity:                    Pulmonary History: COPD  Home Oxygen Therapy:   Room air  Home Respiratory Therapy: Albuterol prn, Trelegy   Current Respiratory Therapy:   Duoneb Q4, Symbicort BID  Treatment Type: MDI  Medications: Budesonide/Formoterol    Respiratory Severity Index(RSI)   Patients with orders for inhalation medications, oxygen, or any therapeutic treatment modality will be placed on Respiratory Protocol. They will be assessed with the first treatment and at least every 72 hours thereafter. The following severity scale will be used to determine frequency of treatment intervention. Smoking History: Mild Exacerbation = 3    Social History  Social History     Tobacco Use    Smoking status: Former Smoker     Packs/day: 2.00     Years: 35.00     Pack years: 70.00     Types: Cigarettes     Start date:      Quit date: 2019     Years since quittin.5    Smokeless tobacco: Never Used   Vaping Use    Vaping Use: Never used   Substance Use Topics    Alcohol use:  Yes     Alcohol/week: 1.0 standard drinks     Types: 1 Cans of beer per week     Comment: occasional    Drug use: Yes     Types: Marijuana     Comment: 1 joint per week per patient       Recent Surgical History: None = 0  Past Surgical History  Past Surgical History:   Procedure Laterality Date    ANKLE FRACTURE SURGERY Left     BRONCHOSCOPY  2015    CERVICAL FUSION N/A 2019    ANTERIOR CERVICAL DISCECTOMY AND FUSION WITH ALLOGRAFT, MEDTRONICS AND EVOKES  CPT CODE - 38850 performed by Shaheen Emery MD at [facility]  2/13    SD Deandre Sami [address] 82 DX/THER SBST INTRLMNR CRV/THRC W/IMG GDN Right 11/19/2018    RIGHT CERVICAL SEVEN THORACIC EPIDURAL STEROID INJECTION SITE CONFIRMED BY FLUOROSCOPY performed by Ivory Medeiros MD at [facility]       Level of Consciousness: Alert, Oriented, and Cooperative = 0    Level of Activity: Walking with assistance = 1    Respiratory Pattern: Dyspnea with exertion;Irregular pattern;or RR less than 6 = 2    Breath Sounds: Diminshed bilaterally and/or crackles = 2    Sputum   ,  ,    Cough: Strong, spontaneous, non-productive = 0    Vital Signs   BP (!) 154/80   Pulse 100   Temp 97.4 °F (36.3 °C) (Oral)   Resp 20   Ht 5' 4\" (1.626 m)   Wt 165 lb (74.8 kg)   SpO2 95%   BMI 28.32 kg/m²   SPO2 (COPD values may differ): 88-89% on room air or greater than 92% on FiO2 28- 35% = 2    Peak Flow (asthma only): not applicable = 0    RSI: 8-56 = TID (three times daily) and Q4hr PRN for dyspnea        Plan       Goals:  Medication delivery     Patient/caregiver was educated on the proper method of use for Respiratory Care Devices:  Yes      Level of patient/caregiver understanding able to:   ? Verbalize understanding   ? Demonstrate understanding       ? Teach back        ? Needs reinforcement       ? No available caregiver               ? Other:     Response to education:  Good     Is patient being placed on Home Treatment Regimen? No     Does the patient have everything they need prior to discharge? NA     Comments:  Chart reviewed, patient assessed    Plan of Care:  Duoneb Q4WA, Duoneb Q4prn, Symbicort BID    Electronically signed by Erlin Marina RCP on 7/28/2021 at 9:57 PM    Respiratory Protocol Guidelines     1.  Assessment and treatment by Respiratory Therapy will be initiated for medication and therapeutic interventions upon initiation of aerosolized medication. 2. Physician will be contacted for respiratory rate (RR) greater than 35 breaths per minute. Therapy will be held for heart rate (HR) greater than 140 beats per minute, pending direction from physician. 3. Bronchodilators will be administered via Metered Dose Inhaler (MDI) with spacer when the following criteria are met:  a. Alert and cooperative     b. HR < 140 bpm  c. RR < 30 bpm                d. Can demonstrate a 2-3 second inspiratory hold  4. Bronchodilators will be administered via Hand Held Nebulizer [facility]) to patients when ANY of the following criteria are met  a. Incognizant or uncooperative          b. Patients treated with HHN at Home        c. Unable to demonstrate proper use of MDI with spacer     d. RR > 30 bpm   5. Bronchodilators will be delivered via Metered Dose Inhaler (MDI), HHN, Aerogen to intubated patients on mechanical ventilation. 6. Inhalation medication orders will be delivered and/or substituted as outlined below. Aerosolized Medications Ordering and Administration Guidelines:    1. All Medications will be ordered by a physician, and their frequency and/or modality will be adjusted as defined by the patients Respiratory Severity Index (RSI) score. 2. If the patient does not have documented COPD, consider discontinuing anticholinergics when RSI is less than 9.  3. If the bronchospasm worsens (increased RSI), then the bronchodilator frequency can be increased to a maximum of every 4 hours. If greater than every 4 hours is required, the physician will be contacted. 4. If the bronchospasm improves, the frequency of the bronchodilator can be decreased, based on the patient's RSI, but not less than home treatment regimen frequency. 5. Bronchodilator(s) will be discontinued if patient has a RSI less than 9 and has received no scheduled or as needed treatment for 72  Hrs. Patients Ordered on a Mucolytic Agent:    1.  Must always be administered with a bronchodilator. 2. Discontinue if patient experiences worsened bronchospasm, or secretions have lessened to the point that the patient is able to clear them with a cough. Anti-inflammatory and Combination Medications:    1. If the patient lacks prior history of lung disease, is not using inhaled anti-inflammatory medication at home, and lacks wheezing by examination or by history for at least 24 hours, contact physician for possible discontinuation.

## 2021-07-30 LAB
BASOPHILS ABSOLUTE: 0.1 K/UL (ref 0–0.2)
BASOPHILS RELATIVE PERCENT: 0.9 %
EOSINOPHILS ABSOLUTE: 0 K/UL (ref 0–0.6)
EOSINOPHILS RELATIVE PERCENT: 0.1 %
HCT VFR BLD CALC: 43.2 % (ref 40.5–52.5)
HEMOGLOBIN: 14.4 G/DL (ref 13.5–17.5)
LYMPHOCYTES ABSOLUTE: 1.6 K/UL (ref 1–5.1)
LYMPHOCYTES RELATIVE PERCENT: 24.4 %
MCH RBC QN AUTO: 33 PG (ref 26–34)
MCHC RBC AUTO-ENTMCNC: 33.3 G/DL (ref 31–36)
MCV RBC AUTO: 99 FL (ref 80–100)
MONOCYTES ABSOLUTE: 0.8 K/UL (ref 0–1.3)
MONOCYTES RELATIVE PERCENT: 12.3 %
NEUTROPHILS ABSOLUTE: 4 K/UL (ref 1.7–7.7)
NEUTROPHILS RELATIVE PERCENT: 62.3 %
PDW BLD-RTO: 13.8 % (ref 12.4–15.4)
PLATELET # BLD: 180 K/UL (ref 135–450)
PMV BLD AUTO: 7.9 FL (ref 5–10.5)
RBC # BLD: 4.37 M/UL (ref 4.2–5.9)
WBC # BLD: 6.4 K/UL (ref 4–11)

## 2021-07-30 PROCEDURE — 99233 SBSQ HOSP IP/OBS HIGH 50: CPT | Performed by: INTERNAL MEDICINE

## 2021-07-30 PROCEDURE — 6370000000 HC RX 637 (ALT 250 FOR IP): Performed by: NURSE PRACTITIONER

## 2021-07-30 PROCEDURE — 2580000003 HC RX 258: Performed by: NURSE PRACTITIONER

## 2021-07-30 PROCEDURE — 6370000000 HC RX 637 (ALT 250 FOR IP): Performed by: INTERNAL MEDICINE

## 2021-07-30 PROCEDURE — 85025 COMPLETE CBC W/AUTO DIFF WBC: CPT

## 2021-07-30 PROCEDURE — 94669 MECHANICAL CHEST WALL OSCILL: CPT

## 2021-07-30 PROCEDURE — 94640 AIRWAY INHALATION TREATMENT: CPT

## 2021-07-30 PROCEDURE — 1200000000 HC SEMI PRIVATE

## 2021-07-30 PROCEDURE — 6360000002 HC RX W HCPCS: Performed by: NURSE PRACTITIONER

## 2021-07-30 PROCEDURE — 36415 COLL VENOUS BLD VENIPUNCTURE: CPT

## 2021-07-30 PROCEDURE — 94761 N-INVAS EAR/PLS OXIMETRY MLT: CPT

## 2021-07-30 PROCEDURE — 87070 CULTURE OTHR SPECIMN AEROBIC: CPT

## 2021-07-30 PROCEDURE — 87205 SMEAR GRAM STAIN: CPT

## 2021-07-30 PROCEDURE — 2700000000 HC OXYGEN THERAPY PER DAY

## 2021-07-30 PROCEDURE — 99232 SBSQ HOSP IP/OBS MODERATE 35: CPT | Performed by: INTERNAL MEDICINE

## 2021-07-30 RX ORDER — GUAIFENESIN 600 MG/1
600 TABLET, EXTENDED RELEASE ORAL 2 TIMES DAILY
Status: DISCONTINUED | OUTPATIENT
Start: 2021-07-30 | End: 2021-07-31 | Stop reason: HOSPADM

## 2021-07-30 RX ADMIN — PANTOPRAZOLE SODIUM 40 MG: 40 TABLET, DELAYED RELEASE ORAL at 09:38

## 2021-07-30 RX ADMIN — IPRATROPIUM BROMIDE AND ALBUTEROL SULFATE 1 AMPULE: .5; 3 SOLUTION RESPIRATORY (INHALATION) at 10:42

## 2021-07-30 RX ADMIN — IPRATROPIUM BROMIDE AND ALBUTEROL SULFATE 1 AMPULE: .5; 3 SOLUTION RESPIRATORY (INHALATION) at 14:52

## 2021-07-30 RX ADMIN — Medication 2 PUFF: at 07:12

## 2021-07-30 RX ADMIN — IPRATROPIUM BROMIDE AND ALBUTEROL SULFATE 1 AMPULE: .5; 3 SOLUTION RESPIRATORY (INHALATION) at 22:54

## 2021-07-30 RX ADMIN — ENOXAPARIN SODIUM 40 MG: 40 INJECTION SUBCUTANEOUS at 09:38

## 2021-07-30 RX ADMIN — DOXYCYCLINE HYCLATE 100 MG: 100 TABLET, COATED ORAL at 21:47

## 2021-07-30 RX ADMIN — IPRATROPIUM BROMIDE AND ALBUTEROL SULFATE 1 AMPULE: .5; 3 SOLUTION RESPIRATORY (INHALATION) at 19:08

## 2021-07-30 RX ADMIN — AMLODIPINE BESYLATE 5 MG: 5 TABLET ORAL at 09:38

## 2021-07-30 RX ADMIN — Medication 10 ML: at 09:39

## 2021-07-30 RX ADMIN — ATORVASTATIN CALCIUM 20 MG: 10 TABLET, FILM COATED ORAL at 09:38

## 2021-07-30 RX ADMIN — IPRATROPIUM BROMIDE AND ALBUTEROL SULFATE 1 AMPULE: .5; 3 SOLUTION RESPIRATORY (INHALATION) at 07:12

## 2021-07-30 RX ADMIN — PREDNISONE 40 MG: 20 TABLET ORAL at 09:38

## 2021-07-30 RX ADMIN — DOXYCYCLINE HYCLATE 100 MG: 100 TABLET, COATED ORAL at 09:38

## 2021-07-30 RX ADMIN — FOLIC ACID 1 MG: 1 TABLET ORAL at 09:38

## 2021-07-30 RX ADMIN — Medication 2 PUFF: at 19:08

## 2021-07-30 RX ADMIN — Medication 10 ML: at 21:48

## 2021-07-30 RX ADMIN — GUAIFENESIN 600 MG: 600 TABLET, EXTENDED RELEASE ORAL at 14:37

## 2021-07-30 RX ADMIN — GUAIFENESIN 600 MG: 600 TABLET, EXTENDED RELEASE ORAL at 21:47

## 2021-07-30 NOTE — FLOWSHEET NOTE
07/30/21 0930   Vital Signs   Temp 97.5 °F (36.4 °C)   Temp Source Oral   Pulse 86   Heart Rate Source Monitor   Resp 16   BP (!) 144/83   BP Location Right upper arm   Patient Position Semi fowlers   Oxygen Therapy   SpO2 (!) 88 %   O2 Device None (Room air)   AM assessment complete. Vital signs stable. Am medications given as ordered. Applied o2 back to 1L, pt unable to recover on RA after 2 min, recovered with 1L o2 to 90%. No other needs identified at this time. Will continue to monitor.

## 2021-07-30 NOTE — PROGRESS NOTES
Pt is lying in bed with their eyes closed. Respirations are easy and even. Call light within reach bed in lowest position with the wheels locked. Will continue to monitor.  Bernardo Encinas RN

## 2021-07-30 NOTE — PROGRESS NOTES
Progress Note    Admit Date:  7/27/2021    Subjective:  Mr. Steffen Cottrell is feeling better. He gets more dyspneic when he coughs. He is usually not on oxygen. Presently is on 3 L. Pulse ox is 96%. No fever. 7/29-had a coughing episode this morning and felt more short of breath. After he coughed up some phlegm is beginning to feel better. 7/30-shortness of breath with coughing. Back on oxygen. Objective:   BP (!) 144/83   Pulse 86   Temp 97.5 °F (36.4 °C) (Oral)   Resp 16   Ht 5' 4\" (1.626 m)   Wt 165 lb (74.8 kg)   SpO2 90%   BMI 28.32 kg/m²        No intake or output data in the 24 hours ending 07/30/21 1223    Physical Exam:    General appearance: alert, appears stated age and cooperative  Head: Normocephalic, without obvious abnormality, atraumatic  Eyes: conjunctivae/corneas clear. PERRL, EOM's intact. Neck: no adenopathy, no carotid bruit, no JVD, supple, symmetrical, trachea midline and thyroid not enlarged, symmetric, no tenderness/mass/nodules  Lungs: Minimal accessory muscle use. Diminished breath sounds the bases. Bilateral wheezes.   No rhonchi or crackles  Heart: Increased rate and regular rhythm, S1, S2 normal, no murmur, click, rub or gallop  Abdomen: soft, non-tender; bowel sounds normal; no masses,  no organomegaly  Extremities: extremities normal, atraumatic, no cyanosis or edema  Pulses: 2+ and symmetric  Skin: Skin color, texture, turgor normal. No rashes or lesions  Neurologic: Grossly normal    Scheduled Meds:   predniSONE  40 mg Oral Daily    ipratropium-albuterol  1 ampule Inhalation Q4H While awake    amLODIPine  5 mg Oral Daily    atorvastatin  20 mg Oral Daily    folic acid  1 mg Oral Daily    pantoprazole  40 mg Oral Daily    sodium chloride flush  5-40 mL Intravenous 2 times per day    enoxaparin  40 mg Subcutaneous Daily    doxycycline hyclate  100 mg Oral BID    budesonide-formoterol  2 puff Inhalation BID    covid-19 vaccine  1 Dose Intramuscular Prior to discharge       Continuous Infusions:   sodium chloride         PRN Meds:  benzonatate, ipratropium-albuterol, sodium chloride flush, sodium chloride, ondansetron **OR** ondansetron, polyethylene glycol, acetaminophen **OR** acetaminophen      Data:  CBC:   Recent Labs     07/28/21  0507 07/29/21  0624 07/30/21  0523   WBC 5.6 10.8 6.4   HGB 14.6 14.5 14.4   HCT 42.8 43.0 43.2   MCV 97.3 97.9 99.0    189 180     BMP:   Recent Labs     07/27/21  1307 07/28/21  0507 07/29/21  0624   * 134* 139   K 3.5 3.0* 3.6   CL 96* 98* 102   CO2 26 27 25   BUN 10 11 12   CREATININE 1.0 0.9 1.0     LIVER PROFILE:   Recent Labs     07/27/21  1307   AST 29   ALT 21   BILITOT 0.5   ALKPHOS 104     PT/INR: No results for input(s): PROTIME, INR in the last 72 hours. Cultures:   COVID 19 neg      XR CHEST PORTABLE   Final Result   Normal appearing chest.  No acute abnormality identified. Assessment:  Principal Problem:    COPD exacerbation (HCC)  Active Problems:    Hypertension, essential    Hyperlipidemia, mixed    Acute respiratory failure with hypoxia (HCC)  Resolved Problems:    * No resolved hospital problems. *      Plan:    Acute hypoxic respiratory failure   COPD exacerbation  - admit to med-surg. Pulmonology consult  - on 2 L O2. Does not wear O2 at home. - F/w Dr. Franc Dickey  - IV Solu-medrol  - Doxycycline D#4  - Scheduled Duonebs  - continue Trelegy.    - add Tessalon     Hypertension   - BP stable  - continue Amlodipine.     Hyperlipidemia  - on Atorvastatin.      GERD  - on PPI.      Folate deficiency   - Folic acid supplement    Discharge planning for a.m.     DVT Prophylaxis: Lovenox  Diet:  Regular  Code Status: Prior    Berry Maurer MD, MD 7/30/2021 12:23 PM

## 2021-07-30 NOTE — CARE COORDINATION
INTERDISCIPLINARY PLAN OF CARE CONFERENCE    Date/Time: 7/30/2021 11:51 AM  Completed by: Endy Lorenzana RN, Case Management      Patient Name:  Gaby Cerda  YOB: 1970  Admitting Diagnosis: COPD exacerbation ([facility] [address] [J44.1]     Admit Date/Time:  7/27/2021 12:49 PM    Chart reviewed. Interdisciplinary team contacted or reviewed plan related to patient progress and discharge plans. Disciplines included Case Management, Nursing, and Dietitian. Current Status: IP 07/27/2021  PT/OT recommendation for discharge plan of care: NA    Expected D/C Disposition:  Home  Confirmed plan with patient  Discharge Plan Comments: Chart reviewed. Met with pt at bedside and explained the role of the CM. IPTA. Plans to return home. No DC barriers. +CM following for possible home O2 needs.        Home O2 in place on admit: No  Pt informed of need to bring portable home O2 tank on day of discharge for nursing to connect prior to leaving:  Not Indicated  Verbalized agreement/Understanding:  Not Indicated

## 2021-07-30 NOTE — PROGRESS NOTES
Pulmonary Progress Note  CC: SOB, COPD    Subjective:  Still c/o SOB and congestion      EXAM: BP (!) 144/83   Pulse 86   Temp 97.5 °F (36.4 °C) (Oral)   Resp 16   Ht 5' 4\" (1.626 m)   Wt 165 lb (74.8 kg)   SpO2 90%   BMI 28.32 kg/m²  on 2 L  Constitutional:  No acute distress. Eyes: PERRL. Conjunctivae anicteric. ENT: Normal nose. Normal tongue. Neck:  Trachea is midline. No thyroid tenderness. Respiratory: No accessory muscle usage. decreased breath sounds. + wheezes. No rales. No Rhonchi. Cardiovascular: Normal S1S2. No digit clubbing. No digit cyanosis. No LE edema. Psychiatric: No anxiety or Agitation. Alert and Oriented to person, place and time.     Scheduled Meds:   predniSONE  40 mg Oral Daily    ipratropium-albuterol  1 ampule Inhalation Q4H While awake    amLODIPine  5 mg Oral Daily    atorvastatin  20 mg Oral Daily    folic acid  1 mg Oral Daily    pantoprazole  40 mg Oral Daily    sodium chloride flush  5-40 mL Intravenous 2 times per day    enoxaparin  40 mg Subcutaneous Daily    doxycycline hyclate  100 mg Oral BID    budesonide-formoterol  2 puff Inhalation BID    covid-19 vaccine  1 Dose Intramuscular Prior to discharge     Continuous Infusions:   sodium chloride       PRN Meds:  benzonatate, ipratropium-albuterol, sodium chloride flush, sodium chloride, ondansetron **OR** ondansetron, polyethylene glycol, acetaminophen **OR** acetaminophen    Labs:  CBC:   Recent Labs     07/28/21  0507 07/29/21  0624 07/30/21  0523   WBC 5.6 10.8 6.4   HGB 14.6 14.5 14.4   HCT 42.8 43.0 43.2   MCV 97.3 97.9 99.0    189 180     BMP:   Recent Labs     07/28/21  0507 07/29/21  0624   * 139   K 3.0* 3.6   CL 98* 102   CO2 27 25   BUN 11 12   CREATININE 0.9 1.0     Chest imaging was reviewed by me and showed clear lungs     ASSESSMENT:  ·  Acute hypoxic respiratory failure  · COPD AE     PLAN:  · Supplemental oxygen to maintain SaO2 >92%; wean as tolerated  · Intensive inhaled bronchodilator therapy. · Prednisone taper.     · Doxycycline   · Mucinex

## 2021-07-31 VITALS
SYSTOLIC BLOOD PRESSURE: 124 MMHG | RESPIRATION RATE: 18 BRPM | DIASTOLIC BLOOD PRESSURE: 78 MMHG | BODY MASS INDEX: 28.17 KG/M2 | TEMPERATURE: 97.7 F | WEIGHT: 165 LBS | HEIGHT: 64 IN | OXYGEN SATURATION: 91 % | HEART RATE: 65 BPM

## 2021-07-31 LAB
BASOPHILS ABSOLUTE: 0.1 K/UL (ref 0–0.2)
BASOPHILS RELATIVE PERCENT: 2.3 %
EOSINOPHILS ABSOLUTE: 0 K/UL (ref 0–0.6)
EOSINOPHILS RELATIVE PERCENT: 0.3 %
HCT VFR BLD CALC: 43.2 % (ref 40.5–52.5)
HEMOGLOBIN: 14.2 G/DL (ref 13.5–17.5)
LYMPHOCYTES ABSOLUTE: 1.8 K/UL (ref 1–5.1)
LYMPHOCYTES RELATIVE PERCENT: 28.1 %
MCH RBC QN AUTO: 32.4 PG (ref 26–34)
MCHC RBC AUTO-ENTMCNC: 32.9 G/DL (ref 31–36)
MCV RBC AUTO: 98.4 FL (ref 80–100)
MONOCYTES ABSOLUTE: 0.7 K/UL (ref 0–1.3)
MONOCYTES RELATIVE PERCENT: 11.6 %
NEUTROPHILS ABSOLUTE: 3.7 K/UL (ref 1.7–7.7)
NEUTROPHILS RELATIVE PERCENT: 57.7 %
PDW BLD-RTO: 13.6 % (ref 12.4–15.4)
PLATELET # BLD: 187 K/UL (ref 135–450)
PMV BLD AUTO: 8 FL (ref 5–10.5)
RBC # BLD: 4.39 M/UL (ref 4.2–5.9)
WBC # BLD: 6.3 K/UL (ref 4–11)

## 2021-07-31 PROCEDURE — 6370000000 HC RX 637 (ALT 250 FOR IP): Performed by: INTERNAL MEDICINE

## 2021-07-31 PROCEDURE — 2700000000 HC OXYGEN THERAPY PER DAY

## 2021-07-31 PROCEDURE — 94761 N-INVAS EAR/PLS OXIMETRY MLT: CPT

## 2021-07-31 PROCEDURE — 2580000003 HC RX 258: Performed by: NURSE PRACTITIONER

## 2021-07-31 PROCEDURE — 6370000000 HC RX 637 (ALT 250 FOR IP): Performed by: NURSE PRACTITIONER

## 2021-07-31 PROCEDURE — 94640 AIRWAY INHALATION TREATMENT: CPT

## 2021-07-31 PROCEDURE — 94669 MECHANICAL CHEST WALL OSCILL: CPT

## 2021-07-31 PROCEDURE — 36415 COLL VENOUS BLD VENIPUNCTURE: CPT

## 2021-07-31 PROCEDURE — 85025 COMPLETE CBC W/AUTO DIFF WBC: CPT

## 2021-07-31 PROCEDURE — 6360000002 HC RX W HCPCS: Performed by: NURSE PRACTITIONER

## 2021-07-31 RX ORDER — GUAIFENESIN 600 MG/1
600 TABLET, EXTENDED RELEASE ORAL 2 TIMES DAILY
Qty: 60 TABLET | Refills: 0 | Status: SHIPPED | OUTPATIENT
Start: 2021-07-31 | End: 2022-02-16

## 2021-07-31 RX ORDER — DOXYCYCLINE HYCLATE 100 MG
100 TABLET ORAL 2 TIMES DAILY
Qty: 20 TABLET | Refills: 0 | Status: SHIPPED | OUTPATIENT
Start: 2021-07-31 | End: 2021-08-10

## 2021-07-31 RX ORDER — PREDNISONE 20 MG/1
TABLET ORAL
Qty: 17 TABLET | Refills: 0 | Status: SHIPPED | OUTPATIENT
Start: 2021-07-31 | End: 2021-08-12

## 2021-07-31 RX ADMIN — AMLODIPINE BESYLATE 5 MG: 5 TABLET ORAL at 08:39

## 2021-07-31 RX ADMIN — FOLIC ACID 1 MG: 1 TABLET ORAL at 08:39

## 2021-07-31 RX ADMIN — IPRATROPIUM BROMIDE AND ALBUTEROL SULFATE 1 AMPULE: .5; 3 SOLUTION RESPIRATORY (INHALATION) at 06:58

## 2021-07-31 RX ADMIN — PANTOPRAZOLE SODIUM 40 MG: 40 TABLET, DELAYED RELEASE ORAL at 08:39

## 2021-07-31 RX ADMIN — Medication 2 PUFF: at 06:58

## 2021-07-31 RX ADMIN — PREDNISONE 40 MG: 20 TABLET ORAL at 08:39

## 2021-07-31 RX ADMIN — DOXYCYCLINE HYCLATE 100 MG: 100 TABLET, COATED ORAL at 08:39

## 2021-07-31 RX ADMIN — GUAIFENESIN 600 MG: 600 TABLET, EXTENDED RELEASE ORAL at 08:39

## 2021-07-31 RX ADMIN — ENOXAPARIN SODIUM 40 MG: 40 INJECTION SUBCUTANEOUS at 08:39

## 2021-07-31 RX ADMIN — ATORVASTATIN CALCIUM 20 MG: 10 TABLET, FILM COATED ORAL at 08:39

## 2021-07-31 RX ADMIN — IPRATROPIUM BROMIDE AND ALBUTEROL SULFATE 1 AMPULE: .5; 3 SOLUTION RESPIRATORY (INHALATION) at 11:26

## 2021-07-31 RX ADMIN — Medication 10 ML: at 08:40

## 2021-07-31 ASSESSMENT — PAIN SCALES - GENERAL: PAINLEVEL_OUTOF10: 0

## 2021-07-31 NOTE — PLAN OF CARE
Problem: Infection:  Goal: Will remain free from infection  Description: Will remain free from infection  7/31/2021 1034 by Pablo Camara RN  Outcome: Ongoing  7/31/2021 0320 by Micheline Caal RN  Outcome: Ongoing     Problem: Safety:  Goal: Free from accidental physical injury  Description: Free from accidental physical injury  7/31/2021 0320 by Micheline Caal RN  Outcome: Ongoing  Goal: Free from intentional harm  Description: Free from intentional harm  7/31/2021 1034 by Pablo Camara RN  Outcome: Ongoing  7/31/2021 0320 by Micheline Caal RN  Outcome: Ongoing     Problem: Daily Care:  Goal: Daily care needs are met  Description: Daily care needs are met  7/31/2021 0320 by Micheline Caal RN  Outcome: Ongoing     Problem: Pain:  Goal: Patient's pain/discomfort is manageable  Description: Patient's pain/discomfort is manageable  7/31/2021 1034 by Pablo Camara RN  Outcome: Ongoing  7/31/2021 0320 by Micheline Caal RN  Outcome: Ongoing     Problem: Skin Integrity:  Goal: Skin integrity will stabilize  Description: Skin integrity will stabilize  7/31/2021 0320 by Micheline Caal RN  Outcome: Ongoing     Problem: Discharge Planning:  Goal: Patients continuum of care needs are met  Description: Patients continuum of care needs are met  7/31/2021 0320 by Micheline Caal RN  Outcome: Ongoing

## 2021-07-31 NOTE — PROGRESS NOTES
Handoff report and transfer of care given at bedside to [facility]. Patient in stable condition, denies needs/concerns at this time. Call light within reach.

## 2021-07-31 NOTE — DISCHARGE SUMMARY
Hospital Medicine Discharge Summary    Patient ID: Gaby Cerda      Patient's PCP: Kavya Clements DO    Admit Date: 7/27/2021     Discharge Date:   7/31/2021    Admitting Physician: Syed Edward MD     Discharge Physician: Emily Kaur MD     Discharge Diagnoses: Active Hospital Problems    Diagnosis     Hypertension, essential [I10]      Priority: Low    COPD exacerbation (HCC) [J44.1]     Acute respiratory failure with hypoxia (HCC) [J96.01]     Hyperlipidemia, mixed [E78.2]        The patient was seen and examined on day of discharge and this discharge summary is in conjunction with any daily progress note from day of discharge. Hospital Course:  17-year-old gentleman admitted with acute hypoxemic respiratory failure due to COPD exacerbation. Patient has been treated with steroid oral antibiotic scheduled DuoNeb's. Pulmonary has been consulted  Patient is feeling better today he will be discharged on weaning down dose of steroid and follow-up as an outpatient. Patient oxygen saturations were 87% on room air but dropped to 85% on ambulation will be discharged on 1 L of oxygen. Reflux disease on PPI        Physical Exam Performed:     BP (!) 146/80   Pulse 66   Temp 97 °F (36.1 °C) (Oral)   Resp 16   Ht 5' 4\" (1.626 m)   Wt 165 lb (74.8 kg)   SpO2 98%   BMI 28.32 kg/m²       General appearance:  No apparent distress, appears stated age and cooperative. HEENT:  Normal cephalic, atraumatic w. Respiratory: Mild wheezes but fair air entry  Cardiovascular:  Regular rate and rhythm with normal S1/S2 without murmurs, rubs or gallops. Abdomen: Soft, non-tender, non-distended with normal bowel sounds. Musculoskeletal:  No clubbing, cyanosis or edema bilaterally. .  Skin: Skin color, texture, turgor normal.  No rashes or lesions. Neurologic:  Neurovascularly intact without any focal sensory/motor deficits.  Cranial nerves: II-XII intact, grossly non-focal.  Psychiatric:  Alert and oriented,     Labs: For convenience and continuity at follow-up the following most recent labs are provided:      CBC:    Lab Results   Component Value Date    WBC 6.3 07/31/2021    HGB 14.2 07/31/2021    HCT 43.2 07/31/2021     07/31/2021       Renal:    Lab Results   Component Value Date     07/29/2021    K 3.6 07/29/2021     07/29/2021    CO2 25 07/29/2021    BUN 12 07/29/2021    CREATININE 1.0 07/29/2021    CALCIUM 8.8 07/29/2021         Significant Diagnostic Studies    Radiology:   XR CHEST PORTABLE   Final Result   Normal appearing chest.  No acute abnormality identified. Consults:     IP CONSULT TO HOSPITALIST  IP CONSULT TO PULMONOLOGY    Disposition: home    Condition at Discharge: Stable    Discharge Instructions/Follow-up:  pcp  pulm    Code Status:  Full Code     Activity: activity as tolerated    Diet: regular diet      Discharge Medications:     Current Discharge Medication List           Details   guaiFENesin (MUCINEX) 600 MG extended release tablet Take 1 tablet by mouth 2 times daily  Qty: 60 tablet, Refills: 0      doxycycline hyclate (VIBRA-TABS) 100 MG tablet Take 1 tablet by mouth 2 times daily for 10 days  Qty: 20 tablet, Refills: 0      COVID-19 Ad26 Vaccine ([employer], [employer]) 0.5 ML SUSP injection Inject 0.5 mLs into the muscle once for 1 dose  Qty: 0.5 mL, Refills: 0      predniSONE (DELTASONE) 20 MG tablet Take 2 tablets by mouth daily for 5 days, THEN 1 tablet daily for 7 days.   Qty: 17 tablet, Refills: 0              Details   pantoprazole (PROTONIX) 40 MG tablet TAKE ONE TABLET BY MOUTH DAILY  Qty: 90 tablet, Refills: 1    Associated Diagnoses: Gastroesophageal reflux disease without esophagitis      atorvastatin (LIPITOR) 20 MG tablet TAKE ONE TABLET BY MOUTH DAILY  Qty: 90 tablet, Refills: 0      amLODIPine (NORVASC) 5 MG tablet TAKE ONE TABLET BY MOUTH DAILY  Qty: 90 tablet, Refills: 1      folic acid (FOLVITE) 1 MG

## 2021-07-31 NOTE — CARE COORDINATION
DISCHARGE ORDER  Date/Time 2021 10:28 AM  Completed by: Reny Villafana RN, Case Management    Patient Name: Mark Thomas      : 1970  Admitting Diagnosis: COPD exacerbation ([address] [J44.1]      Admit order Date and Status: 21 inpt  (verify MD's last order for status of admission)      Noted discharge order. If applicable PT/OT recommendation at Discharge: N/A  DME recommendation by PT/OT:N/A  Confirmed discharge plan  : Yes  with whom patient  If pt confirmed DC plan does family need to be contacted by CM No  Discharge Plan: Order for dc noted. Poke with pt at bedside. Plan is for return home. States is IPTA and declines [address]. [facility] [place] [facility] if needed. Discussed need for home O2 and pt is agreeable and chooses [facility]. Spoke with Dilia Connelly at [facility] care re: dc and home O2 needs. Face sheet faxed. Await return call that has needed orders and documentation. Spoke with Dilia Connelly at [facility] who states has al needed documentation and pt can dc home. Portable tank provided to pt and pt inst to call [facility] on way home. No other dc needs identified. Reviewed chart. Role of discharge planner explained and patient verbalized understanding. Discharge order is noted. Has Home O2 in place on admit:  No  Informed of need to bring portable home O2 tank on day of discharge for nursing to connect prior to leaving:   Not Indicated  Verbalized agreement/Understanding:   Not Indicated  Pt is being d/c'd to home today. Pt's O2 sats are 85% RA with activity, 93% on 1L NC with activity. Discharge timeout done with nsg, CM and pt. All discharge needs and concerns addressed.

## 2021-07-31 NOTE — PROGRESS NOTES
Pt given written and verbal discharge instructions. Pt. Instructed on use of portable oxygen tank and completed a return demonstration. Pt indicated understanding of home medication and care instructions. Prescriptions sent to pt preferred pharmacy. Pt packed own belongings. Pt taken down to family car via wheelchair.

## 2021-07-31 NOTE — FLOWSHEET NOTE
07/31/21 0830   Vital Signs   Temp 97.7 °F (36.5 °C)   Temp Source Oral   Pulse 65   Heart Rate Source Monitor   Resp 16   /78   BP Location Right upper arm   Patient Position Semi fowlers   Level of Consciousness Alert (0)   MEWS Score 1   Patient Currently in Pain No   Pain Assessment   Pain Assessment 0-10   Pain Level 0   Oxygen Therapy   SpO2 93 %   O2 Flow Rate (L/min) 1 L/min   AM assessment completed, see flow sheet. Pt is alert and oriented. Vital signs are WNL. Respirations are even & easy. No complaints voiced. Pt. O2 sats are improving. Still requiring 1l of O2 per NC. Pt denies needs at this time. SR up x 2, and bed in low position. Call light is within reach. Bedside Mobility Assessment Tool (BMAT):     Assessment Level 1- Sit and Shake    1. From a semi-reclined position, ask patient to sit up and rotate to a seated position at the side of the bed. Can use the bedrail. 2. Ask patient to reach out and grab your hand and shake making sure patient reaches across his/her midline. Pass- Patient is able to come to a seated position, maintain core strength. Maintains seated balance while reaching across midline. Move on to Assessment Level 2. Assessment Level 2- Stretch and Point   1. With patient in seated position at the side of the bed, have patient place both feet on the floor (or stool) with knees no higher than hips. 2. Ask patient to stretch one leg and straighten the knee, then bend the ankle/flex and point the toes. If appropriate, repeat with the other leg. Pass- Patient is able to demonstrate appropriate quad strength on intended weight bearing limb(s). Move onto Assessment Level 3. Assessment Level 3- Stand   1. Ask patient to elevate off the bed or chair (seated to standing) using an assistive device (cane, bedrail). 2. Patient should be able to raise buttocks off be and hold for a count of five. May repeat once.    Pass- Patient maintains standing stability for at least 5 seconds, proceed to assessment level 4. Assessment Level 4- Walk   1. Ask patient to march in place at bedside. 2. Then ask patient to advance step and return each foot. Some medical conditions may render a patient from stepping backwards, use your best clinical judgement. Pass- Patient demonstrates balance while shifting weight and ability to step, takes independent steps, does not use assistive device patient is MOBILITY LEVEL 4. Mobility Level- 4    Patient is able to demonstrate the ability to move from a reclining position to an upright position within the recliner.

## 2021-08-01 LAB
CULTURE, RESPIRATORY: NORMAL
GRAM STAIN RESULT: NORMAL

## 2021-08-03 ENCOUNTER — TELEPHONE (OUTPATIENT)
Dept: PULMONOLOGY | Age: 51
End: 2021-08-03

## 2021-08-03 DIAGNOSIS — J44.9 CHRONIC OBSTRUCTIVE PULMONARY DISEASE, UNSPECIFIED COPD TYPE (HCC): Primary | ICD-10-CM

## 2021-08-03 NOTE — TELEPHONE ENCOUNTER
Pt requesting an appt for hospital f/u. Pt was recently admitted at [facility] and pt requesting appt. Pt was also d/c home with oxygen from hospital.  Pt caregiver Ahsan Garcia will be out of town until 8/15/21, so requesting an appt after 8/16/21 if possible. Hospital consult Dr. Ruben Taylor 7/30/21:    ASSESSMENT:  ·  Acute hypoxic respiratory failure  · COPD AE     PLAN:  · Supplemental oxygen to maintain SaO2 >92%; wean as tolerated  · Intensive inhaled bronchodilator therapy.   · Prednisone taper.    · Doxycycline   · Mucinex    None

## 2021-08-06 ENCOUNTER — TELEPHONE (OUTPATIENT)
Dept: PULMONOLOGY | Age: 51
End: 2021-08-06

## 2021-08-06 DIAGNOSIS — J44.9 CHRONIC OBSTRUCTIVE PULMONARY DISEASE, UNSPECIFIED COPD TYPE (HCC): Primary | ICD-10-CM

## 2021-08-06 RX ORDER — IPRATROPIUM BROMIDE AND ALBUTEROL SULFATE 2.5; .5 MG/3ML; MG/3ML
1 SOLUTION RESPIRATORY (INHALATION) EVERY 4 HOURS PRN
Qty: 360 ML | Refills: 5 | Status: SHIPPED | OUTPATIENT
Start: 2021-08-06 | End: 2021-08-10 | Stop reason: SDUPTHER

## 2021-08-10 RX ORDER — IPRATROPIUM BROMIDE AND ALBUTEROL SULFATE 2.5; .5 MG/3ML; MG/3ML
1 SOLUTION RESPIRATORY (INHALATION) EVERY 4 HOURS PRN
Qty: 360 ML | Refills: 5 | Status: SHIPPED | OUTPATIENT
Start: 2021-08-10 | End: 2021-11-15 | Stop reason: SDUPTHER

## 2021-08-10 NOTE — TELEPHONE ENCOUNTER
[facility] needing new script with DX code. Order Pended. Pharmacy notified to send to Medicare B.    ? Your request has been denied  Denied. IPRATROPIUM-ALBUTEROL Solution is used in a nebulizer. A nebulizer is a piece of durable medical equipment (DME). Drugs used with DME in the home are covered under Medicare Part B. Our records show that you do not live in a long term care (LTC) facility. We cannot pay for drugs under Medicare Part D if they are covered under Medicare Part A or B. We did not decide whether IPRATROPIUM-ALBUTEROL Solution is medically necessary. We made our decision only on the fact that we cannot pay for the drug under Medicare Part D. For more information, talk to your prescriber or call [phone].

## 2021-08-12 ENCOUNTER — OFFICE VISIT (OUTPATIENT)
Dept: PULMONOLOGY | Age: 51
End: 2021-08-12
Payer: MEDICARE

## 2021-08-12 VITALS
HEART RATE: 85 BPM | SYSTOLIC BLOOD PRESSURE: 130 MMHG | DIASTOLIC BLOOD PRESSURE: 66 MMHG | RESPIRATION RATE: 16 BRPM | BODY MASS INDEX: 25.92 KG/M2 | OXYGEN SATURATION: 94 % | WEIGHT: 151 LBS

## 2021-08-12 DIAGNOSIS — J44.9 CHRONIC OBSTRUCTIVE PULMONARY DISEASE, UNSPECIFIED COPD TYPE (HCC): Primary | ICD-10-CM

## 2021-08-12 PROCEDURE — 1036F TOBACCO NON-USER: CPT | Performed by: INTERNAL MEDICINE

## 2021-08-12 PROCEDURE — 99213 OFFICE O/P EST LOW 20 MIN: CPT | Performed by: INTERNAL MEDICINE

## 2021-08-12 PROCEDURE — G8427 DOCREV CUR MEDS BY ELIG CLIN: HCPCS | Performed by: INTERNAL MEDICINE

## 2021-08-12 PROCEDURE — 3023F SPIROM DOC REV: CPT | Performed by: INTERNAL MEDICINE

## 2021-08-12 PROCEDURE — G8419 CALC BMI OUT NRM PARAM NOF/U: HCPCS | Performed by: INTERNAL MEDICINE

## 2021-08-12 PROCEDURE — 3017F COLORECTAL CA SCREEN DOC REV: CPT | Performed by: INTERNAL MEDICINE

## 2021-08-12 PROCEDURE — 1111F DSCHRG MED/CURRENT MED MERGE: CPT | Performed by: INTERNAL MEDICINE

## 2021-08-12 PROCEDURE — G8926 SPIRO NO PERF OR DOC: HCPCS | Performed by: INTERNAL MEDICINE

## 2021-08-12 NOTE — PROGRESS NOTES
Chief Complaint: shortness of breath     Referring Provider: Andres Bryant History:   Treated for COPD exacerbation in July 2021, now doing better, prednisone almost off. Planning to get COVID vaccine      Presenting HPI: 49 yo WM with 60 pack year tobacco, now with 2 year moderate progressive dyspnea on exertion associated with productive cough; symptoms are not 100% predictable, some intermittent nature, but daily. Symptoms are improved with albuterol      reports that he quit smoking about 2 years ago. His smoking use included cigarettes. He started smoking about 37 years ago. He has a 70.00 pack-year smoking history. He has never used smokeless tobacco. He reports current alcohol use of about 1.0 standard drinks of alcohol per week. He reports current drug use. Drug: Marijuana. Physical Exam:  Blood pressure 130/66, pulse 85, resp. rate 16, weight 151 lb (68.5 kg), SpO2 94 %. 'RA  Constitutional:  No acute distress. HENT:  Oropharynx is clear and moist.   Neck: No tracheal deviation present. Cardiovascular: Normal heart sounds. No lower extremity edema. Pulmonary/Chest: + wheezes. No rhonchi. No rales. No decreased breath sounds. No accessory muscle usage or stridor. Musculoskeletal: No cyanosis. No clubbing. Skin: Skin is warm and dry. Psychiatric: Normal mood and affect. Neurologic: speech fluent, alert and oriented, strength symmetric         Data:   CT CHEST 11-18-15  There is some minimal increased opacity in the right mainstem bronchus and   bronchus intermedius likely retained secretions. The central airways are   otherwise patent. Subpleural opacity is noted posteriorly at the right lung base likely   representative of atelectasis. There is a calcified granuloma noted posteriorly in the left lower lobe. Lungs are otherwise grossly clear. Mediastinal structures demonstrate a normal appearance of the heart and great   vessels.  No suspicious hilar or mediastinal adenopathy noted. The esophagus appears to taper normally to the GE junction. GE junction is unremarkable. Stomach and the small bowel are unremarkable in appearance. Mesenteric and   retroperitoneal structures including the abdominal aorta are grossly   unremarkable. Atherosclerotic disease is noted. Liver, gallbladder, spleen, kidneys, and pancreas are unremarkable in   appearance. No acute abnormality of the visualized osseous structures. Impression   IMPRESSION:   No acute abnormality of the chest or abdomen noted. CXR 7/27/21 no airspace disease     PFT 1/12/16 FEV1 1/05 L 32%  ++ airtrapping  DLCO 14.73 50%  PFT 4/16/19 FEV1 1.1 L 35%    DLCO 12.12 42%    Bronchoscopy 2015 cytology no malignant cells.     ALPHA 1 161    Assessment:  Very Severe COPD/pulmonary emphysema    Not addressed today  Family h/o COPD  61 pack year tobacco history, quit 1/1/19    Plan:   · Trelegy to replace bevespi   · Albuterol PRN  · I recommend LDCT for LCS once covered by Medicare  · COVID vaccine, today if possible at local pharmacy of choice   · 6 month f/u

## 2021-08-25 DIAGNOSIS — J44.9 CHRONIC OBSTRUCTIVE PULMONARY DISEASE, UNSPECIFIED COPD TYPE (HCC): ICD-10-CM

## 2021-08-25 RX ORDER — ALBUTEROL SULFATE 90 UG/1
AEROSOL, METERED RESPIRATORY (INHALATION)
Qty: 18 G | Refills: 5 | Status: SHIPPED | OUTPATIENT
Start: 2021-08-25 | End: 2022-05-24 | Stop reason: SDUPTHER

## 2021-08-26 RX ORDER — FLUTICASONE FUROATE, UMECLIDINIUM BROMIDE AND VILANTEROL TRIFENATATE 100; 62.5; 25 UG/1; UG/1; UG/1
POWDER RESPIRATORY (INHALATION)
Qty: 60 EACH | Refills: 5 | Status: SHIPPED | OUTPATIENT
Start: 2021-08-26 | End: 2022-06-07

## 2021-09-24 RX ORDER — ATORVASTATIN CALCIUM 20 MG/1
TABLET, FILM COATED ORAL
Qty: 90 TABLET | Refills: 0 | Status: SHIPPED | OUTPATIENT
Start: 2021-09-24 | End: 2021-12-29

## 2021-11-15 ENCOUNTER — PATIENT MESSAGE (OUTPATIENT)
Dept: PULMONOLOGY | Age: 51
End: 2021-11-15

## 2021-11-15 DIAGNOSIS — J44.9 CHRONIC OBSTRUCTIVE PULMONARY DISEASE, UNSPECIFIED COPD TYPE (HCC): ICD-10-CM

## 2021-11-15 NOTE — TELEPHONE ENCOUNTER
From: Bobbi Torrez  To: Dr. Malgorzata Handley: 11/15/2021 12:21 PM EST  Subject: Prescription Question    Recently I was given a prescription for medicine to go in a nebulizer machine by the attending physician when I was in the hospital at [facility] in August. insurance refused to cover it so I did not go and pick the machine up. I'm currently in a flare-up coughing a lot having issues with breathing I have access to a nebulizer machine is it possible to get a prescription filled that is covered under my insurance which is Medicare part A and B.

## 2021-11-16 RX ORDER — IPRATROPIUM BROMIDE AND ALBUTEROL SULFATE 2.5; .5 MG/3ML; MG/3ML
1 SOLUTION RESPIRATORY (INHALATION) EVERY 4 HOURS PRN
Qty: 360 ML | Refills: 5 | Status: SHIPPED | OUTPATIENT
Start: 2021-11-16

## 2021-11-29 ENCOUNTER — PATIENT MESSAGE (OUTPATIENT)
Dept: FAMILY MEDICINE CLINIC | Age: 51
End: 2021-11-29

## 2021-11-29 ENCOUNTER — VIRTUAL VISIT (OUTPATIENT)
Dept: FAMILY MEDICINE CLINIC | Age: 51
End: 2021-11-29
Payer: MEDICARE

## 2021-11-29 DIAGNOSIS — Z00.00 ROUTINE GENERAL MEDICAL EXAMINATION AT A HEALTH CARE FACILITY: Primary | ICD-10-CM

## 2021-11-29 PROCEDURE — 3017F COLORECTAL CA SCREEN DOC REV: CPT | Performed by: FAMILY MEDICINE

## 2021-11-29 PROCEDURE — G8484 FLU IMMUNIZE NO ADMIN: HCPCS | Performed by: FAMILY MEDICINE

## 2021-11-29 PROCEDURE — G0402 INITIAL PREVENTIVE EXAM: HCPCS | Performed by: FAMILY MEDICINE

## 2021-11-29 ASSESSMENT — PATIENT HEALTH QUESTIONNAIRE - PHQ9
SUM OF ALL RESPONSES TO PHQ QUESTIONS 1-9: 0
SUM OF ALL RESPONSES TO PHQ QUESTIONS 1-9: 0
1. LITTLE INTEREST OR PLEASURE IN DOING THINGS: 0
SUM OF ALL RESPONSES TO PHQ QUESTIONS 1-9: 0
SUM OF ALL RESPONSES TO PHQ9 QUESTIONS 1 & 2: 0
2. FEELING DOWN, DEPRESSED OR HOPELESS: 0

## 2021-11-29 ASSESSMENT — LIFESTYLE VARIABLES
AUDIT TOTAL SCORE: 10
HOW OFTEN DO YOU HAVE SIX OR MORE DRINKS ON ONE OCCASION: 3
HOW MANY STANDARD DRINKS CONTAINING ALCOHOL DO YOU HAVE ON A TYPICAL DAY: 2
HOW OFTEN DO YOU HAVE A DRINK CONTAINING ALCOHOL: 3
HOW OFTEN DURING THE LAST YEAR HAVE YOU FAILED TO DO WHAT WAS NORMALLY EXPECTED FROM YOU BECAUSE OF DRINKING: 0
HOW OFTEN DURING THE LAST YEAR HAVE YOU HAD A FEELING OF GUILT OR REMORSE AFTER DRINKING: 0
HAVE YOU OR SOMEONE ELSE BEEN INJURED AS A RESULT OF YOUR DRINKING: 0
HOW OFTEN DURING THE LAST YEAR HAVE YOU BEEN UNABLE TO REMEMBER WHAT HAPPENED THE NIGHT BEFORE BECAUSE YOU HAD BEEN DRINKING: 0
HOW OFTEN DURING THE LAST YEAR HAVE YOU FOUND THAT YOU WERE NOT ABLE TO STOP DRINKING ONCE YOU HAD STARTED: 0
HOW OFTEN DURING THE LAST YEAR HAVE YOU NEEDED AN ALCOHOLIC DRINK FIRST THING IN THE MORNING TO GET YOURSELF GOING AFTER A NIGHT OF HEAVY DRINKING: 0
HAS A RELATIVE, FRIEND, DOCTOR, OR ANOTHER HEALTH PROFESSIONAL EXPRESSED CONCERN ABOUT YOUR DRINKING OR SUGGESTED YOU CUT DOWN: 2
AUDIT-C TOTAL SCORE: 8

## 2021-11-29 NOTE — PROGRESS NOTES
Medicare Annual Wellness Visit  Are Name: Re Stephenson Date: 2021   MRN: <[id number]> Sex: Male   Age: 46 y.o. Ethnicity: Non- / Non    : 1970 Race: White (non-)      Alexandra Barnhart is here for Medicare AWV (Patient is completing a Medicare Annual Wellness Visit by phone.)    Screenings for behavioral, psychosocial and functional/safety risks, and cognitive dysfunction are all negative except as indicated below. These results, as well as other patient data from the [address] form, are documented in Flowsheets linked to this Encounter. Allergies   Allergen Reactions    No Known Allergies        Prior to Visit Medications    Medication Sig Taking?  Authorizing Provider   ipratropium-albuterol (DUONEB) 0.5-2.5 (3) MG/3ML SOLN nebulizer solution Inhale 3 mLs into the lungs every 4 hours as needed for Shortness of Breath Yes Dorita Orozco PA-C   atorvastatin (LIPITOR) 20 MG tablet TAKE ONE TABLET BY MOUTH DAILY Yes Sarina Gomez DO   TRELEGY ELLIPTA 100-62.5-25 MCG/INH AEPB INHALE ONE PUFF BY MOUTH DAILY Yes Sommer Dahl MD   albuterol sulfate  (90 Base) MCG/ACT inhaler INHALE TWO PUFFS BY MOUTH FOUR TIMES A DAY AS NEEDED FOR WHEEZING Yes Sommer Dahl MD   pantoprazole (PROTONIX) 40 MG tablet TAKE ONE TABLET BY MOUTH DAILY Yes Sarina Gomez DO   amLODIPine (NORVASC) 5 MG tablet TAKE ONE TABLET BY MOUTH DAILY Yes Sarina Gomez DO   folic acid (FOLVITE) 1 MG tablet TAKE ONE TABLET BY MOUTH DAILY Yes Sarina Gomez DO   guaiFENesin (MUCINEX) 600 MG extended release tablet Take 1 tablet by mouth 2 times daily  Patient not taking: Reported on 2021  Damien Ferreira MD       Past Medical History:   Diagnosis Date    Cervical disc herniation     COPD (chronic obstructive pulmonary disease) ([facility] [address]     Folate deficiency 2013    GERD (gastroesophageal reflux disease)     Hyperlipidemia  Hypertriglyceridemia, Good HDL    Hypertension 12/12: age 37    Prediabetes 11/2016    Vitamin D deficiency 01/2013    resolved       Past Surgical History:   Procedure Laterality Date    ANKLE FRACTURE SURGERY Left 1998    BRONCHOSCOPY  12-    CERVICAL FUSION N/A 1/16/2019    ANTERIOR CERVICAL DISCECTOMY AND FUSION WITH ALLOGRAFT, MEDTRONICS AND EVOKES  CPT CODE - 55537 performed [facility]  2/13    UT NJX DX/THER SBST INTRLMNR CRV/THRC W/IMG GDN Right 11/19/2018    RIGHT CERVICAL SEVEN THORACIC EPIDURAL STEROID INJECTION SITE CONFIRMED BY FLUOROSCOPY performed by Rosa Maria Kimball MD at [place] [address]       Family History   Problem Relation Age of Onset    COPD Mother 39    COPD Father 39       CareTeam (Including outside providers/suppliers regularly involved in providing care):   Patient Care Team:  Jj Barillas DO as PCP - [facility]  Sarina Greenfield DO as PCP - [facility] Empaneled Provider  Noemy Stubbs MD as Consulting Physician (Pulmonology)  ANAI Gilliland - CNP as Consulting Physician (Nurse Practitioner)  Maki Bhakta MD as Consulting Physician (Orthopedic Surgery)  Noemy Stubbs MD as Consulting Physician (Pulmonology)    Wt Readings from Last 3 Encounters:   08/12/21 151 lb (68.5 kg)   07/27/21 165 lb (74.8 kg)   06/24/21 161 lb 6.4 oz (73.2 kg)      Patient-Reported Vitals 12/23/2020   Patient-Reported Weight 165 lb   Patient-Reported Height 5 4      There is no height or weight on file to calculate BMI. Based upon direct observation of the patient, evaluation of cognition reveals recent and remote memory intact. Patient's complete Health Risk Assessment and screening values have been reviewed and are found in Flowsheets. The following problems were reviewed today and where indicated follow up appointments were made and/or referrals ordered.     Positive Risk Factor Screenings with Interventions:         Substance History:  Social History     Tobacco History     Smoking Status  Former Smoker Smoking Start Date  1/1/1984 Quit date  1/1/2019 Smoking Frequency  2 packs/day for 35 years (79 pk yrs)    Smoking Tobacco Type  Cigarettes    Smokeless Tobacco Use  Never Used          Alcohol History     Alcohol Use Status  Yes Drinks/Week  1 Cans of beer, 0 Standard drinks or equivalent per week Amount  1.0 standard drink of alcohol/wk Comment  occasional          Drug Use     Drug Use Status  Yes Types  Marijuana (Weed) Comment  1 joint per week per patient          Sexual Activity     Sexually Active  Yes Partners  Female               Alcohol Screening: Audit-C Score: 8  Total Score: 10    A score of 8 or more is associated with harmful or hazardous drinking. A score of 13 or more in women, and 15 or more in men, is likely to indicate alcohol dependence. Substance Abuse Interventions:  · Alcohol misuse/dependence:  patient is not ready to change his/her alcohol consumption behavior at this time    General Health and ACP:  General  In general, how would you say your health is?: Fair  In the past 7 days, have you experienced any of the following?  New or Increased Pain, New or Increased Fatigue, Loneliness, Social Isolation, Stress or Anger?: (!) New or Increased Fatigue  Do you get the social and emotional support that you need?: Yes  Do you have a Living Will?: (!) No  Advance Directives     Power of  Living Will ACP-Advance Directive ACP-Power of     Not on File Not on File Not on File Not on File      General Health Risk Interventions:  · Fatigue: patient advised to follow-up in this office for further evaluation and treatment within 1 month(s)  · No Living Will: Patient referred to [place] Habits/Nutrition:  Health Habits/Nutrition  Do you exercise for at least 20 minutes 2-3 times per week?: (!) No  Have you lost any weight without trying in the past 3 months?: No  Do you eat only one meal per day?: No  Have you seen the dentist within the past year?: N/A - wear dentures     Health Habits/Nutrition Interventions:  · Inadequate physical activity:  patient is not ready to increase his/her physical activity level at this time    Hearing/Vision:  No exam data present  Hearing/Vision  Do you or your family notice any trouble with your hearing that hasn't been managed with hearing aids?: No  Do you have difficulty driving, watching TV, or doing any of your daily activities because of your eyesight?: No  Have you had an eye exam within the past year?: (!) No  Hearing/Vision Interventions:  · Vision concerns:  patient encouraged to make appointment with his/her eye specialist    Safety:  Safety  Do you have working smoke detectors?: Yes  Have all throw rugs been removed or fastened?: Yes  Do you have non-slip mats or surfaces in all bathtubs/showers?: Yes  Do all of your stairways have a railing or banister?: Yes  Are your doorways, halls and stairs free of clutter?: Yes  Do you always fasten your seatbelt when you are in a car?: (!) No  Safety Interventions:  · Home safety tips provided     Personalized Preventive Plan   Current Health Maintenance Status  Immunization History   Administered Date(s) Administered    Influenza Nasal 11/10/2015    Influenza Virus Vaccine 02/13/2014, 11/07/2014    Pneumococcal Polysaccharide ([id number]) 08/10/2015    Tdap (Boostrix, Adacel) 02/13/2014        Health Maintenance   Topic Date Due    COVID-19 Vaccine (1) Never done    Shingles Vaccine (1 of 2) Never done    Low dose CT lung screening  Never done   [employer] Visit (AWV)  Never done    Flu vaccine (1) 09/01/2021    A1C test (Diabetic or Prediabetic)  06/24/2022    Lipid screen  06/24/2022    Potassium monitoring  07/29/2022    Creatinine monitoring  07/29/2022    DTaP/Tdap/Td vaccine (2 - Td or Tdap) 02/13/2024    Colon cancer screen colonoscopy  04/03/2028    Pneumococcal 0-64 years Vaccine (2 of 2 - PPSV23) 01/01/2033    Hepatitis C screen  Completed    HIV screen  Completed    Hepatitis A vaccine  Aged Out    Hepatitis B vaccine  Aged Out    Hib vaccine  Aged Out    Meningococcal (ACWY) vaccine  Aged Out     Recommendations for [employer] Due: see orders and patient instructions/AVS.  . Recommended screening schedule for the next 5-10 years is provided to the patient in written form: see Patient Instructions/AVS.    Analy Doshi was seen today for medicare awv. Diagnoses and all orders for this visit:    Routine general medical examination at a health care facility               Cielo Cortes is a 46 y.o. male being evaluated by a Virtual Visit (phone) encounter to address concerns as mentioned above. A caregiver was present when appropriate. Due to this being a TeleHealth encounter (During YKQQG-36 public health emergency), evaluation of the following organ systems was limited: Vitals/Constitutional/EENT/Resp/CV/GI//MS/Neuro/Skin/Heme-Lymph-Imm. Pursuant to the emergency declaration under the [address] [place] [address] and the [place] [employer] and [employer] Act, this Virtual Visit was conducted with patient's (and/or legal guardian's) consent, to reduce the patient's risk of exposure to COVID-19 and provide necessary medical care. The patient (and/or legal guardian) has also been advised to contact this office for worsening conditions or problems, and seek emergency medical treatment and/or call 911 if deemed necessary. Patient identification was verified at the start of the visit: Yes    Services were provided through phone to substitute for in-person clinic visit. Patient and provider were located at their individual homes. --AMANDA RESTREPO DO on 11/29/2021 at 11:26 AM    An electronic signature was used to authenticate this note.

## 2021-11-29 NOTE — PATIENT INSTRUCTIONS
Personalized Preventive Plan for Carie Schaefer - 11/29/2021  Medicare offers a range of preventive health benefits. Some of the tests and screenings are paid in full while other may be subject to a deductible, co-insurance, and/or copay. Some of these benefits include a comprehensive review of your medical history including lifestyle, illnesses that may run in your family, and various assessments and screenings as appropriate. After reviewing your medical record and screening and assessments performed today your provider may have ordered immunizations, labs, imaging, and/or referrals for you. A list of these orders (if applicable) as well as your Preventive Care list are included within your After Visit Summary for your review. Other Preventive Recommendations:    · A preventive eye exam performed by an eye specialist is recommended every 1-2 years to screen for glaucoma; cataracts, macular degeneration, and other eye disorders. · A preventive dental visit is recommended every 6 months. · Try to get at least 150 minutes of exercise per week or 10,000 steps per day on a pedometer . · Order or download the FREE \"Exercise & Physical Activity: Your Everyday Guide\" from [employer]. Call 3-354.215.4757 or search [employer] online. · You need 6246-8555 mg of calcium and 1760-6227 IU of vitamin D per day. It is possible to meet your calcium requirement with diet alone, but a vitamin D supplement is usually necessary to meet this goal.  · When exposed to the sun, use a sunscreen that protects against both UVA and UVB radiation with an SPF of 30 or greater. Reapply every 2 to 3 hours or after sweating, drying off with a towel, or swimming. · Always wear a seat belt when traveling in a car. Always wear a helmet when riding a bicycle or motorcycle.

## 2021-12-02 RX ORDER — AMLODIPINE BESYLATE 5 MG/1
TABLET ORAL
Qty: 90 TABLET | Refills: 1 | Status: SHIPPED | OUTPATIENT
Start: 2021-12-02 | End: 2022-06-10

## 2021-12-02 NOTE — TELEPHONE ENCOUNTER
From: Bebeto Valdez  To: Dr. Carlos Means: 11/29/2021 12:39 PM EST  Subject: Refill    During my Medicare screening with Dr Moise Nyhan I forgot to ask about a prescription refill that [facility] said that they had sent over for a request to have it done and they were told that the patient had to be seen or contact the office. I am waiting for an insurance change come January 1st before I come in for any appointments. Tried to schedule an appointment for in January but the calendar does not go out that far on my chart. Do I need to come in before then to get this prescription filled.

## 2021-12-13 ENCOUNTER — TELEPHONE (OUTPATIENT)
Dept: PULMONOLOGY | Age: 51
End: 2021-12-13

## 2021-12-13 NOTE — TELEPHONE ENCOUNTER
Trelegy needed a re-new PA. PA sumbitted through covermymeds.  awaiting determination     LOV 8/12/21    Assessment:  · Very Severe COPD/pulmonary emphysema     Not addressed today  · Family h/o COPD  · 60 pack year tobacco history, quit 1/1/19     Plan:   · Trelegy to replace bevespi   · Albuterol PRN  · I recommend LDCT for LCS once covered by Medicare  · COVID vaccine, today if possible at local pharmacy of choice   · 6 month f/u

## 2021-12-29 RX ORDER — ATORVASTATIN CALCIUM 20 MG/1
TABLET, FILM COATED ORAL
Qty: 90 TABLET | Refills: 1 | Status: SHIPPED | OUTPATIENT
Start: 2021-12-29 | End: 2022-06-30

## 2022-01-04 ENCOUNTER — TELEPHONE (OUTPATIENT)
Dept: PULMONOLOGY | Age: 52
End: 2022-01-04

## 2022-01-04 NOTE — TELEPHONE ENCOUNTER
This is a great question and you are thinking correctly. Unfortunately, the antibody infusions do now show the same efficacy against Omicron and the risk of the infusion is likely greater than any benefit at this point. Dr. Zahra Stoner no longer recommends the infusion since the Omicron variant has taken over. We encourage symptomatic treatment and to monitor your oxygen levels at home.

## 2022-01-04 NOTE — TELEPHONE ENCOUNTER
Xavi Millietoyin called for pt, stating that yesterday pt started feeling short of breath. Pt took a home covid test this morning, which is showing positive. Michelet Iraheta is asking about pt getting covid infusion to lessen symptoms. Please advise. Do you have the following symptoms? Shortness of Breath  yes  Wheezing  yes  Cough  yes  Cough Characteristics:  Productive    sometimes  Sputum Color    green  Hemoptysis   no  Consistency of sputum   thick     Fever:    yes    Temp:101  Chills/Sweats:  yes  What other symptoms are you having?:  Body aches    How long have you had these symptoms? 2 days     [facility]    Have you been vaccinated for covid? No          Review medications and allergies: Allergies? Allergies   Allergen Reactions    No Known Allergies              Currently on Antibiotics? (Drug/Dose/Frequency and how long on?) none        Systemic Steroids? (Drug/Dose/Frequency and how long on?) none      Last sick call taken on n/a. Meds prescribed were n/a, by n/a.     Last OV 8/12/21 with Dr. Dr. Anand Acevedo   (pull in last visit note assessment/plan)   Assessment:  · Very Severe COPD/pulmonary emphysema     Not addressed today  · Family h/o COPD  · 60 pack year tobacco history, quit 1/1/19     Plan:   · Trelegy to replace bevespi   · Albuterol PRN  · I recommend LDCT for LCS once covered by Medicare  · COVID vaccine, today if possible at local pharmacy of choice   · 6 month f/u

## 2022-01-04 NOTE — TELEPHONE ENCOUNTER
Heidy Cornell returned call and was informed of [place], [place] response with verbal understanding. Heidy Cornell asked about pt getting set up with oxygen, as he has had a harder time breathing recently, or moving appt sooner. Barrett De La Torreard that pt would have to be re-qualified for oxygen, but monitoring for dropping levels, which could change quickly and would require pt to seek emergency care. Heidy Cornell verbalized understanding.

## 2022-02-08 ENCOUNTER — CARE COORDINATION (OUTPATIENT)
Dept: CARE COORDINATION | Age: 52
End: 2022-02-08

## 2022-02-08 NOTE — CARE COORDINATION
Ambulatory Care Coordination Note  2/8/2022  CM Risk Score: 2  Charlson 10 Year Mortality Risk Score: 10%     ACC: Suzan Escalante, RN    Summary Note: Spoke with pt. Joao did get over covid and is doing better. Does feel like COPD has worsened over time. Has appt on 2/16 with pulmologist. Does use nebulizer if needed. Doesn't have any oxygen and really isn't interested at this time because he doesn't want to get use to wearing something like that. Will discuss with pulm prn. Joao is taking all his medications and no problems with taking them or affording them at this time. No changes in meds. Reports has a pulse ox but doesn't check it very much. Discussed to monitor sats and especially if sob, take some deep breaths and it should come up. .Alternate activity with rest periods with flare ups and as needed. PLAN  1) fu appt 2/16 (pulm)  2) review sob  3) review O2 sats    COPD Assessment    Does the patient understand envrionmental exposure?: Yes  Is the patient able to verbalize Rescue vs. Long Acting medications?: Yes  Does the patient have a nebulizer?: Yes  Does the patient use a space with inhaled medications?: No     No patient-reported symptoms         Symptoms:     Self Monitoring - SaO2: Yes (Comment: but hasn't checked sats in a while but can monitor that)           Ambulatory Care Coordination Assessment    Care Coordination Protocol  Program Enrollment: Rising Risk  Referral from Primary Care Provider: No  Week 1 - Initial Assessment     Do you have all of your prescriptions and are they filled?: Yes  Barriers to medication adherence: None  Are you able to afford your medications?: Yes  How often do you have trouble taking your medications the way you have been told to take them?: I always take them as prescribed. Do you have Home O2 Therapy?: No      Ability to seek help/take action for Emergent Urgent situations i.e. fire, crime, inclement weather or health crisis. Venus Do Independent  Ability to ambulate to restroom: Independent  Ability handle personal hygeine needs (bathing/dressing/grooming): Independent  Ability to manage Medications: Independent  Ability to prepare Food Preparation: Independent  Ability to maintain home (clean home, laundry): Independent  Ability to drive and/or has transportation: Independent  Ability to do shopping: Independent  Ability to manage finances: Independent  Is patient able to live independently?: Yes     Current Housing: Private Residence        Per the Fall Risk Screening, did the patient have 2 or more falls or 1 fall with injury in the past year?: No     Frequent urination at night?: No  Do you use rails/bars?: No  Do you have a non-slip tub mat?: Yes     Are you experiencing loss of meaning?: No  Are you experiencing loss of hope and peace?: No     Thinking about your patient's physical health needs, are there any symptoms or problems (risk indicators) you are unsure about that require further investigation?: No identified areas of uncertainly or problems already being investigated   Are the patients physical health problems impacting on their mental well-being?: No identified areas of concern   Are there any problems with your patients lifestyle behaviors (alcohol, drugs, diet, exercise) that are impacting on physical or mental well-being?: No identified areas of concern   Do you have any other concerns about your patients mental well-being?  How would you rate their severity and impact on the patient?: No identified areas of concern   How would you rate their home environment in terms of safety and stability (including domestic violence, insecure housing, neighbor harassment)?: Consistently safe, supportive, stable, no identified problems   How do daily activities impact on the patient's well-being? (include current or anticipated unemployment, work, caregiving, access to transportation or other): No identified problems or perceived Diallo Cantor MD   pantoprazole (PROTONIX) 40 MG tablet TAKE ONE TABLET BY MOUTH DAILY 6/24/21   Garcia Gomez DO   folic acid (FOLVITE) 1 MG tablet TAKE ONE TABLET BY MOUTH DAILY 2/16/21   Antwon Morales,        Future Appointments   Date Time Provider Simone Salinasi   2/16/2022 10:00 AM MD YOSI Russell

## 2022-02-15 ENCOUNTER — CARE COORDINATION (OUTPATIENT)
Dept: CARE COORDINATION | Age: 52
End: 2022-02-15

## 2022-02-15 NOTE — CARE COORDINATION
Ambulatory Care Coordination Note  2/15/2022  CM Risk Score: 2  Charlson 10 Year Mortality Risk Score: 10%     ACC: Suzan Escalante RN    Summary Note: Reports sats are running around 95% on room air and maybe a little lower than that. Reports occasional sob on exertion but thinks its because of the cold weather. Has appt tomorrow with pulmonology. No changes in his medications and taking them without any problems. PLAN  1) fu appt ()  2) review sob  3) review sats  4) review diet  5) COPD zone  COPD Assessment    Does the patient understand envrionmental exposure?: Yes  Is the patient able to verbalize Rescue vs. Long Acting medications?: Yes  Does the patient have a nebulizer?: Yes  Does the patient use a space with inhaled medications?: No     No patient-reported symptoms         Symptoms:  None: Yes      Self Monitoring - SaO2: Yes  Baseline SaO2 Readin               Care Coordination Interventions    Program Enrollment: Rising Risk  Referral from Primary Care Provider: No  Suggested Interventions and Community Resources  Disease Association: In Process (Comment: COPD)  Zone Management Tools: In Process         Goals Addressed                    This Visit's Progress      Patient Stated (pt-stated)   Improving      Will monitor sats prn    Barriers: lack of education  Plan for overcoming my barriers: will work with ACM   Confidence: 9/10  Anticipated Goal Completion Date: 22            Prior to Admission medications    Medication Sig Start Date End Date Taking?  Authorizing Provider   atorvastatin (LIPITOR) 20 MG tablet TAKE ONE TABLET BY MOUTH DAILY 21   Sarina Gomez, DO   amLODIPine (NORVASC) 5 MG tablet TAKE ONE TABLET BY MOUTH DAILY 21   Sarina Gomez, DO   ipratropium-albuterol (DUONEB) 0.5-2.5 (3) MG/3ML SOLN nebulizer solution Inhale 3 mLs into the lungs every 4 hours as needed for Shortness of Breath 21   YOSELIN Truong 100-62.5-25 MCG/INH AEPB INHALE ONE PUFF BY MOUTH DAILY 8/26/21   Kim Topete MD   albuterol sulfate  (90 Base) MCG/ACT inhaler INHALE TWO PUFFS BY MOUTH FOUR TIMES A DAY AS NEEDED FOR WHEEZING 8/25/21   MD Louisa Chase [facility]) 600 MG extended release tablet Take 1 tablet by mouth 2 times daily  Patient not taking: Reported on 11/29/2021 7/31/21   Andres Beth MD   pantoprazole (PROTONIX) 40 MG tablet TAKE ONE TABLET BY MOUTH DAILY 6/24/21   Aneudy Koroma DO   folic acid (FOLVITE) 1 MG tablet TAKE ONE TABLET BY MOUTH DAILY 2/16/21   Sarina Gage DO       Future Appointments   Date Time Provider Simone Marcelo   2/16/2022 10:00 AM MD YOSI Chase

## 2022-02-16 ENCOUNTER — OFFICE VISIT (OUTPATIENT)
Dept: PULMONOLOGY | Age: 52
End: 2022-02-16
Payer: MEDICARE

## 2022-02-16 VITALS
DIASTOLIC BLOOD PRESSURE: 70 MMHG | SYSTOLIC BLOOD PRESSURE: 144 MMHG | OXYGEN SATURATION: 95 % | WEIGHT: 155 LBS | BODY MASS INDEX: 26.46 KG/M2 | HEIGHT: 64 IN | HEART RATE: 87 BPM

## 2022-02-16 DIAGNOSIS — J44.9 CHRONIC OBSTRUCTIVE PULMONARY DISEASE, UNSPECIFIED COPD TYPE (HCC): Primary | ICD-10-CM

## 2022-02-16 PROCEDURE — 3017F COLORECTAL CA SCREEN DOC REV: CPT | Performed by: INTERNAL MEDICINE

## 2022-02-16 PROCEDURE — G8427 DOCREV CUR MEDS BY ELIG CLIN: HCPCS | Performed by: INTERNAL MEDICINE

## 2022-02-16 PROCEDURE — 99213 OFFICE O/P EST LOW 20 MIN: CPT | Performed by: INTERNAL MEDICINE

## 2022-02-16 PROCEDURE — 3023F SPIROM DOC REV: CPT | Performed by: INTERNAL MEDICINE

## 2022-02-16 PROCEDURE — G8484 FLU IMMUNIZE NO ADMIN: HCPCS | Performed by: INTERNAL MEDICINE

## 2022-02-16 PROCEDURE — G8419 CALC BMI OUT NRM PARAM NOF/U: HCPCS | Performed by: INTERNAL MEDICINE

## 2022-02-16 PROCEDURE — 1036F TOBACCO NON-USER: CPT | Performed by: INTERNAL MEDICINE

## 2022-02-16 NOTE — PROGRESS NOTES
PULMONARY, CRITICAL CARE AND SLEEP MEDICINE   CC: Shortness of breath   Referring Provider: Andres    Interval History: 2/16/2022  - says he is doing pretty well other than shortness of breath when exposed to very cold air   - COVID-19+ on 1/4/22, antibody infusion not recommended. Interval History: 8/12/2021 Treated for COPD exacerbation in July 2021, now doing better, prednisone almost off. Planning to get COVID vaccine    Presenting HPI: 51 yo WM with 60 pack year tobacco, now with 2 year moderate progressive LOZANO associated with productive cough; sxs are not 100% predictable, some intermittent nature, but daily. Improved with albuterol. reports that he quit smoking about 3 years ago. His smoking use included cigarettes. He started smoking about 38 years ago. He has a 70.00 pack-year smoking history. He has never used smokeless tobacco. He reports current alcohol use of about 1.0 standard drink of alcohol per week. He reports current drug use. Drug: Marijuana Eudelia Cornelius). PHYSICAL EXAM:   Blood pressure (!) 144/70, pulse 87, height 5' 4\" (1.626 m), weight 155 lb (70.3 kg), SpO2 95 %. 'RA  Constitutional:  No acute distress. HENT:  Oropharynx is clear and moist.   Neck: No tracheal deviation present. Cardiovascular: Normal heart sounds. No lower extremity edema. Pulmonary/Chest: + wheezes. No rhonchi. No rales. No decreased breath sounds. No accessory muscle usage or stridor. Musculoskeletal: No cyanosis. No clubbing. Skin: Skin is warm and dry. Psychiatric: Normal mood and affect. Neurologic: speech fluent, alert and oriented, strength symmetric     DATA:   CT CHEST 11-18-15  There is some minimal increased opacity in the right mainstem bronchus and   bronchus intermedius likely retained secretions. The central airways are   otherwise patent. Subpleural opacity is noted posteriorly at the right lung base likely   representative of atelectasis.       There is a calcified granuloma noted posteriorly in the left lower lobe. Lungs are otherwise grossly clear. Mediastinal structures demonstrate a normal appearance of the heart and great   vessels. No suspicious hilar or mediastinal adenopathy noted. Impression   No acute abnormality of the chest or abdomen noted. CXR 7/27/21 no airspace disease     PFT 1/12/16 FEV1 1/05 L 32%  ++ airtrapping  DLCO 14.73 50%  PFT 4/16/19 FEV1 1.1 L 35%    DLCO 12.12 42%    Bronchoscopy 2015 cytology no malignant cells.     ALPHA 1 161    ASSESSMENT:   Very Severe COPD/pulmonary emphysema    Not addressed today  Family h/o COPD  61 pack year tobacco history, quit 1/1/19    PLAN:   · Trelegy daily   · Albuterol PRN  · I recommend LDCT for LCS once covered by Medicare  · 12 month f/u

## 2022-02-18 DIAGNOSIS — K21.9 GASTROESOPHAGEAL REFLUX DISEASE WITHOUT ESOPHAGITIS: ICD-10-CM

## 2022-02-18 RX ORDER — PANTOPRAZOLE SODIUM 40 MG/1
TABLET, DELAYED RELEASE ORAL
Qty: 90 TABLET | Refills: 1 | Status: SHIPPED | OUTPATIENT
Start: 2022-02-18 | End: 2022-08-16

## 2022-02-18 NOTE — TELEPHONE ENCOUNTER
[facility] is requesting refill(s)   Last OV 11/29/2021 (pertaining to medication)  LR 06/24/2021 (per medication requested)  Next office visit scheduled or attempted due 11/29/2022

## 2022-03-01 ENCOUNTER — CARE COORDINATION (OUTPATIENT)
Dept: CARE COORDINATION | Age: 52
End: 2022-03-01

## 2022-03-01 NOTE — CARE COORDINATION
Ambulatory Care Coordination Note  3/1/2022  CM Risk Score: 2  Charlson 10 Year Mortality Risk Score: 10%     ACC: Suzan Escalante RN    Summary Note: Spoke with pt. Reports breathing is doing well. No sob while speaking on phone. Had appt with pulmonologist which went well and doesn't have to go back for a year. No changes in medications. Using inhalers as ordered and is able to afford them ok. Plans to call for an appt with Dr Isauro Thomas in May for his 6 month f/u. PLAN  1) fu appts  2) review sob  3) review goal Pox  4) review diet    COPD Assessment    Does the patient understand envrionmental exposure?: Yes  Is the patient able to verbalize Rescue vs. Long Acting medications?: Yes  Does the patient have a nebulizer?: Yes  Does the patient use a space with inhaled medications?: No     No patient-reported symptoms         Symptoms:  None: Yes                   Care Coordination Interventions    Program Enrollment: Rising Risk  Referral from Primary Care Provider: No  Suggested Interventions and Community Resources  Disease Association: Completed (Comment: COPD)  Zone Management Tools: Completed (Comment: 3/1/22-discussed)         Goals Addressed                    This Visit's Progress      Patient Stated (pt-stated)   Improving      Will monitor sats prn    Barriers: lack of education  Plan for overcoming my barriers: will work with ACM   Confidence: 9/10  Anticipated Goal Completion Date: 5/8/22            Prior to Admission medications    Medication Sig Start Date End Date Taking?  Authorizing Provider   pantoprazole (PROTONIX) 40 MG tablet TAKE ONE TABLET BY MOUTH DAILY 2/18/22   Sarina Master Patricia, DO   atorvastatin (LIPITOR) 20 MG tablet TAKE ONE TABLET BY MOUTH DAILY 12/29/21   Sarina Master Patricia, DO   amLODIPine (NORVASC) 5 MG tablet TAKE ONE TABLET BY MOUTH DAILY 12/2/21   Sarina Master Patricia, DO   ipratropium-albuterol (DUONEB) 0.5-2.5 (3) MG/3ML SOLN nebulizer solution Inhale 3 mLs into the lungs every 4 hours as needed for Shortness of Breath 11/16/21   YOSELIN Truong   TRELEGY ELLIPTA 100-62.5-25 MCG/INH AEPB INHALE ONE PUFF BY MOUTH DAILY 8/26/21   Joanne Avilez MD   albuterol sulfate  (90 Base) MCG/ACT inhaler INHALE TWO PUFFS BY MOUTH FOUR TIMES A DAY AS NEEDED FOR WHEEZING 8/25/21   Joanne Avilez MD   folic acid (FOLVITE) 1 MG tablet TAKE ONE TABLET BY MOUTH DAILY 2/16/21   Markel Moyer, DO       Future Appointments   Date Time Provider Simone Marcelo   2/22/2023  9:30 AM Joanne Avilez MD [facility]

## 2022-03-16 ENCOUNTER — CARE COORDINATION (OUTPATIENT)
Dept: CARE COORDINATION | Age: 52
End: 2022-03-16

## 2022-03-16 NOTE — CARE COORDINATION
Ambulatory Care Coordination Note  3/16/2022  CM Risk Score: 2  Charlson 10 Year Mortality Risk Score: 10%     ACC: Suzan Escalante, RN    Summary Note: Reports is doing ok and stays in when weather is bad. Denies any sob. Sats are doing good. Reports no changes in medications. Doing ok affording them at this time. Reviewed diabetic, low fat, low sodium diets. PLAN  1) fu appt  2) review sob  3) review diet    COPD Assessment    Does the patient understand envrionmental exposure?: Yes  Is the patient able to verbalize Rescue vs. Long Acting medications?: Yes  Does the patient have a nebulizer?: Yes  Does the patient use a space with inhaled medications?: No     No patient-reported symptoms         Symptoms:  None: Yes                   Care Coordination Interventions    Program Enrollment: Rising Risk  Referral from Primary Care Provider: No  Suggested Interventions and Community Resources  Disease Association: Completed (Comment: COPD)  Zone Management Tools: Completed (Comment: 3/1/22-discussed)         Goals Addressed                    This Visit's Progress      Patient Stated (pt-stated)   Improving      Will monitor sats prn    Barriers: lack of education  Plan for overcoming my barriers: will work with ACM   Confidence: 9/10  Anticipated Goal Completion Date: 5/8/22            Prior to Admission medications    Medication Sig Start Date End Date Taking?  Authorizing Provider   pantoprazole (PROTONIX) 40 MG tablet TAKE ONE TABLET BY MOUTH DAILY 2/18/22   Sarina Gomez, DO   atorvastatin (LIPITOR) 20 MG tablet TAKE ONE TABLET BY MOUTH DAILY 12/29/21   Sarina Gomez, DO   amLODIPine (NORVASC) 5 MG tablet TAKE ONE TABLET BY MOUTH DAILY 12/2/21   Sarina Gomez, DO   ipratropium-albuterol (DUONEB) 0.5-2.5 (3) MG/3ML SOLN nebulizer solution Inhale 3 mLs into the lungs every 4 hours as needed for Shortness of Breath 11/16/21   YOSELIN Lozano ELLIPTA 100-62.5-25 MCG/INH AEPB INHALE ONE PUFF BY MOUTH DAILY 8/26/21   Sandy Davis MD   albuterol sulfate  (90 Base) MCG/ACT inhaler INHALE TWO PUFFS BY MOUTH FOUR TIMES A DAY AS NEEDED FOR WHEEZING 8/25/21   Sandy Davis MD   folic acid (FOLVITE) 1 MG tablet TAKE ONE TABLET BY MOUTH DAILY 2/16/21   Tamiko Fry, DO       Future Appointments   Date Time Provider hospitals   2/22/2023  9:30 AM Sandy Davis MD [facility]

## 2022-04-06 ENCOUNTER — CARE COORDINATION (OUTPATIENT)
Dept: CARE COORDINATION | Age: 52
End: 2022-04-06

## 2022-04-06 NOTE — CARE COORDINATION
Ambulatory Care Coordination Note  2022  CM Risk Score: 2  Charlson 10 Year Mortality Risk Score: 10%     ACC: Suzan Escalante, RN    Summary Note: Reports breathing is doing much better. Sats are usually 96-97 % when he checks it. Reviewed diabetic, low fat, low sodium diets. Reports needs an appt with Dr Jeffry Lopez, offered to do a 3 way call but he plans to call later. PLAN  1) fu appt  2) review sob  3) review diet  4) sats  COPD Assessment    Does the patient understand envrionmental exposure?: Yes  Is the patient able to verbalize Rescue vs. Long Acting medications?: Yes  Does the patient have a nebulizer?: Yes  Does the patient use a space with inhaled medications?: No     No patient-reported symptoms         Symptoms:  None: Yes      Self Monitoring - SaO2: Yes  Baseline SaO2 Readin               Care Coordination Interventions    Program Enrollment: Rising Risk  Referral from Primary Care Provider: No  Suggested Interventions and Community Resources  Disease Association: Completed (Comment: COPD)  Zone Management Tools: Completed (Comment: 3/1/22-discussed)         Goals Addressed                    This Visit's Progress      Patient Stated (pt-stated)   Improving      Will monitor sats prn    Barriers: lack of education  Plan for overcoming my barriers: will work with ACM   Confidence: 9/10  Anticipated Goal Completion Date: 22            Prior to Admission medications    Medication Sig Start Date End Date Taking?  Authorizing Provider   pantoprazole (PROTONIX) 40 MG tablet TAKE ONE TABLET BY MOUTH DAILY 22   Sarina Master Patricia, DO   atorvastatin (LIPITOR) 20 MG tablet TAKE ONE TABLET BY MOUTH DAILY 21   Sarina Masterrand Gomez, DO   amLODIPine (NORVASC) 5 MG tablet TAKE ONE TABLET BY MOUTH DAILY 21   Sarina Master Patricia, DO   ipratropium-albuterol (DUONEB) 0.5-2.5 (3) MG/3ML SOLN nebulizer solution Inhale 3 mLs into the lungs every 4 hours as needed for Shortness of Breath 11/16/21   YOSELIN Truong   TRELEGY ELLIPTA 100-62.5-25 MCG/INH AEPB INHALE ONE PUFF BY MOUTH DAILY 8/26/21   Kristy Pond MD   albuterol sulfate  (90 Base) MCG/ACT inhaler INHALE TWO PUFFS BY MOUTH FOUR TIMES A DAY AS NEEDED FOR WHEEZING 8/25/21   Kristy Pond MD   folic acid (FOLVITE) 1 MG tablet TAKE ONE TABLET BY MOUTH DAILY 2/16/21   Mitch Jon, DO       Future Appointments   Date Time Provider Simone Salinasi   2/22/2023  9:30 AM MD YOSI Michelle [facility]

## 2022-04-13 ENCOUNTER — OFFICE VISIT (OUTPATIENT)
Dept: FAMILY MEDICINE CLINIC | Age: 52
End: 2022-04-13
Payer: COMMERCIAL

## 2022-04-13 VITALS
BODY MASS INDEX: 25.95 KG/M2 | DIASTOLIC BLOOD PRESSURE: 74 MMHG | WEIGHT: 152 LBS | OXYGEN SATURATION: 95 % | HEIGHT: 64 IN | RESPIRATION RATE: 16 BRPM | TEMPERATURE: 97.8 F | SYSTOLIC BLOOD PRESSURE: 138 MMHG | HEART RATE: 90 BPM

## 2022-04-13 DIAGNOSIS — A46 ERYSIPELAS: Primary | ICD-10-CM

## 2022-04-13 DIAGNOSIS — I10 ELEVATED BLOOD PRESSURE READING WITH DIAGNOSIS OF HYPERTENSION: ICD-10-CM

## 2022-04-13 PROCEDURE — G8427 DOCREV CUR MEDS BY ELIG CLIN: HCPCS | Performed by: PHYSICIAN ASSISTANT

## 2022-04-13 PROCEDURE — 99214 OFFICE O/P EST MOD 30 MIN: CPT | Performed by: PHYSICIAN ASSISTANT

## 2022-04-13 PROCEDURE — G8419 CALC BMI OUT NRM PARAM NOF/U: HCPCS | Performed by: PHYSICIAN ASSISTANT

## 2022-04-13 PROCEDURE — 3017F COLORECTAL CA SCREEN DOC REV: CPT | Performed by: PHYSICIAN ASSISTANT

## 2022-04-13 PROCEDURE — 1036F TOBACCO NON-USER: CPT | Performed by: PHYSICIAN ASSISTANT

## 2022-04-13 RX ORDER — CEPHALEXIN 500 MG/1
500 CAPSULE ORAL 4 TIMES DAILY
Qty: 28 CAPSULE | Refills: 0 | Status: SHIPPED | OUTPATIENT
Start: 2022-04-13 | End: 2022-04-20

## 2022-04-13 RX ORDER — BACITRACIN ZINC AND POLYMYXIN B SULFATE 500; 1000 [USP'U]/G; [USP'U]/G
OINTMENT TOPICAL
Qty: 1 EACH | Refills: 1 | COMMUNITY
Start: 2022-04-13 | End: 2022-04-20

## 2022-04-13 NOTE — PROGRESS NOTES
[address]    04/13/22     CHIEF COMPLAINT  Chief Complaint   Patient presents with    Skin Problem     Red bumps near both eyes that are painful to the touch, x 1 month    Hypertension     BP has been higher than normal       HISTORY OF PRESENT  ILLNESS  Filemon Espinoza is a 45 y.o.  male   Red bumps around orbits x 1 month. Non pruritic. No pustules. Painful to touch. Just not improving. Never before. no others with rash. No other areas of rash. Elevated BP's at home 094-345'I systolic mostly at night x last 2 days. With headaches. Takes amlodipine in morning. Denies  vison changes. No chest pain, palpitations, cough, dyspnea, or pedal edema. ROS:  Remaining 14 ROS were reviewed and are unremarkable for other constitutional, EENT, cardiac, pulmonary, GI, , neurologic, musculoskeletal, or integumentary complaints. PAST MEDICAL/SURGICAL, SOCIAL, &  FAMILY HISTORY:  Reviewed and updated accordingly. ALLERGIES : No known allergies    MEDICATIONS:  Current Outpatient Medications   Medication Sig Dispense Refill    bacitracin-polymyxin b (POLYSPORIN) 500-07871 UNIT/GM ointment Apply topically daily.  1 each 1    cephALEXin (KEFLEX) 500 MG capsule Take 1 capsule by mouth 4 times daily for 7 days 28 capsule 0    pantoprazole (PROTONIX) 40 MG tablet TAKE ONE TABLET BY MOUTH DAILY 90 tablet 1    atorvastatin (LIPITOR) 20 MG tablet TAKE ONE TABLET BY MOUTH DAILY 90 tablet 1    amLODIPine (NORVASC) 5 MG tablet TAKE ONE TABLET BY MOUTH DAILY 90 tablet 1    ipratropium-albuterol (DUONEB) 0.5-2.5 (3) MG/3ML SOLN nebulizer solution Inhale 3 mLs into the lungs every 4 hours as needed for Shortness of Breath 360 mL 5    TRELEGY ELLIPTA 100-62.5-25 MCG/INH AEPB INHALE ONE PUFF BY MOUTH DAILY 60 each 5    albuterol sulfate  (90 Base) MCG/ACT inhaler INHALE TWO PUFFS BY MOUTH FOUR TIMES A DAY AS NEEDED FOR WHEEZING 18 g 5    folic acid (FOLVITE) 1 MG tablet TAKE ONE TABLET BY MOUTH DAILY 60 tablet 9     No current facility-administered medications for this visit. PHYSICAL EXAM     Vitals:    04/13/22 1426 04/13/22 1443   BP: 120/76 138/74   Site: Left Upper Arm    Position: Sitting    Pulse: 90    Resp: 16    Temp: 97.8 °F (36.6 °C)    TempSrc: Temporal    SpO2: 95%    Weight: 152 lb (68.9 kg)    Height: 5' 4\" (1.626 m)    Body mass index is 26.09 kg/m². APPEARANCE: Well nourished. No distress. HEENT: Head: Normocephalic, atraumatic. EOMI,PERRLA. Conjunctiva pink and moist. Sclera white. Erythemic small patches of scaly red lesions only around orbits. Hearing intact. Nares patent. Oral mucosa moist. Throat clear. NECK: No lymphadenopathy or masses. Thyroid is smooth. Moves neck fully. HEART: Reg rate and rhythm. No murmurs, rubs, or gallops. LUNGS: Clear to auscultation. No wheezes, rales, or rhonchi. ABDOMEN:  Soft,    MUSCULOSKELETAL:  No clubbing, cyanosis or edema. Moves all joints without eliciting pain. NEUROLOGIC: Grossly non focal.   SKIN: Warm, dry, normal turgor. Cap refill <3secs. IMPRESSION/ PLAN  1. Erysipelas   Apply antibiotic ointment plus keflex. Wash face with soap daily. If continues will work up further. May be form of rosacea      2. Elevated blood pressure reading with diagnosis of hypertension   - continue to  Monitor. Take norvasc in evening instead. Avoid /discontinue the excess MtnDew caffeine sodas.        fuprn      Electronically signed by GUANAKITO Calderon on 4/13/2022 at 2:46 PM

## 2022-04-17 NOTE — TELEPHONE ENCOUNTER
[facility] is requesting refill(s) lipitor  Last OV 6/24/21 (pertaining to medication)  LR 6/17/21 (per medication requested)  Next office visit scheduled or attempted No   If no, reason:  Pt is due in December
pt walk in, c/o fluid leaking from his nose. was recently treated with ABT for sinus infection. pt denies head injury. states he was a chronic cocaine user and stopped about 2 months ago.

## 2022-04-19 ENCOUNTER — CARE COORDINATION (OUTPATIENT)
Dept: CARE COORDINATION | Age: 52
End: 2022-04-19

## 2022-04-19 NOTE — CARE COORDINATION
Ambulatory Care Coordination Note  4/19/2022  CM Risk Score: 2  Charlson 10 Year Mortality Risk Score: 10%     ACC: Suzan Escalante RN    Summary Note: Returning call. Reports rash around eyes are about the same. Doesn't think he changed any soaps or detergents but will be more aware. Reports doesn't think the atb and cream is helping much. Will take last dose of atb tomorrow. Reinforced if continues or worsens to call the office. Verbalizes understanding of same. Denies any vision problems, no itching or pain from rash. No sob noted while speaking on phone. PLAN  1) fu appt  2) review rash  3) review sob  4) review sats    COPD Assessment    Does the patient understand envrionmental exposure?: Yes  Is the patient able to verbalize Rescue vs. Long Acting medications?: Yes  Does the patient have a nebulizer?: Yes  Does the patient use a space with inhaled medications?: No     No patient-reported symptoms         Symptoms:                  Care Coordination Interventions    Program Enrollment: Rising Risk  Referral from Primary Care Provider: No  Suggested Interventions and Community Resources  Disease Association: Completed (Comment: COPD)  Zone Management Tools: Completed (Comment: 3/1/22-discussed)         Goals Addressed                    This Visit's Progress      Patient Stated (pt-stated)   Improving      Will monitor sats prn if gets sob    Barriers: lack of education  Plan for overcoming my barriers: will work with ACM   Confidence: 9/10  Anticipated Goal Completion Date: 9/8/22            Prior to Admission medications    Medication Sig Start Date End Date Taking? Authorizing Provider   bacitracin-polymyxin b (POLYSPORIN) [id number] UNIT/GM ointment Apply topically daily.  4/13/22 4/20/22  GUANAKITO Marc   cephALEXin (KEFLEX) 500 MG capsule Take 1 capsule by mouth 4 times daily for 7 days 4/13/22 4/20/22  GUANAKITO [place]   pantoprazole (PROTONIX) 40 MG tablet TAKE ONE TABLET BY MOUTH DAILY 2/18/22   Sarina Gomez DO   atorvastatin (LIPITOR) 20 MG tablet TAKE ONE TABLET BY MOUTH DAILY 12/29/21   Sarina Gomez DO   amLODIPine (NORVASC) 5 MG tablet TAKE ONE TABLET BY MOUTH DAILY 12/2/21   Sarina Gomez DO   ipratropium-albuterol (DUONEB) 0.5-2.5 (3) MG/3ML SOLN nebulizer solution Inhale 3 mLs into the lungs every 4 hours as needed for Shortness of Breath 11/16/21   [facility]   TRELEGY ELLIPTA 100-62.5-25 MCG/INH AEPB INHALE ONE PUFF BY MOUTH DAILY 8/26/21   Yaa Gilliland MD   albuterol sulfate  (90 Base) MCG/ACT inhaler INHALE TWO PUFFS BY MOUTH FOUR TIMES A DAY AS NEEDED FOR WHEEZING 8/25/21   Yaa Gilliland MD   folic acid (FOLVITE) 1 MG tablet TAKE ONE TABLET BY MOUTH DAILY 2/16/21   Sarnia Carranza DO       Future Appointments   Date Time Provider Simone Marcelo   2/22/2023  9:30 AM Yaa Gilliland MD [facility]

## 2022-04-19 NOTE — CARE COORDINATION
Ambulatory Care Coordination Note  4/19/2022  CM Risk Score: 2  Charlson 10 Year Mortality Risk Score: 10%     ACC: Suzan Escalante, RN    Summary Note: Female answered and will give pt the message to call back. Care Coordination Interventions    Program Enrollment: Rising Risk  Referral from Primary Care Provider: No  Suggested Interventions and Community Resources  Disease Association: Completed (Comment: COPD)  Zone Management Tools: Completed (Comment: 3/1/22-discussed)         Goals Addressed    None         Prior to Admission medications    Medication Sig Start Date End Date Taking? Authorizing Provider   bacitracin-polymyxin b (POLYSPORIN) 500-10496 UNIT/GM ointment Apply topically daily.  4/13/22 4/20/22  GUANAKITO Christina   cephALEXin (KEFLEX) 500 MG capsule Take 1 capsule by mouth 4 times daily for 7 days 4/13/22 4/20/22  GUANAKITO Christina   pantoprazole (PROTONIX) 40 MG tablet TAKE ONE TABLET BY MOUTH DAILY 2/18/22   Sarina Gomez, DO   atorvastatin (LIPITOR) 20 MG tablet TAKE ONE TABLET BY MOUTH DAILY 12/29/21   Sarina Gomez, DO   amLODIPine (NORVASC) 5 MG tablet TAKE ONE TABLET BY MOUTH DAILY 12/2/21   Sarina Gomez, DO   ipratropium-albuterol (DUONEB) 0.5-2.5 (3) MG/3ML SOLN nebulizer solution Inhale 3 mLs into the lungs every 4 hours as needed for Shortness of Breath 11/16/21   [facility]   TRELEGY ELLIPTA 100-62.5-25 MCG/INH AEPB INHALE ONE PUFF BY MOUTH DAILY 8/26/21   Nae Ash MD   albuterol sulfate  (90 Base) MCG/ACT inhaler INHALE TWO PUFFS BY MOUTH FOUR TIMES A DAY AS NEEDED FOR WHEEZING 8/25/21   Nae Ash MD   folic acid (FOLVITE) 1 MG tablet TAKE ONE TABLET BY MOUTH DAILY 2/16/21   Sarina Kahn DO       Future Appointments   Date Time Provider Simone Mracelo   2/22/2023  9:30 AM Nae Ash MD [facility]

## 2022-04-26 ENCOUNTER — CARE COORDINATION (OUTPATIENT)
Dept: CARE COORDINATION | Age: 52
End: 2022-04-26

## 2022-05-04 ENCOUNTER — CARE COORDINATION (OUTPATIENT)
Dept: CARE COORDINATION | Age: 52
End: 2022-05-04

## 2022-05-10 ENCOUNTER — CARE COORDINATION (OUTPATIENT)
Dept: CARE COORDINATION | Age: 52
End: 2022-05-10

## 2022-05-10 NOTE — CARE COORDINATION
Ambulatory Care Coordination Note  5/10/2022  CM Risk Score: 2  Charlson 10 Year Mortality Risk Score: 10%     ACC: Suzan Escalante RN    Summary Note: Spoke with [place]. Reports breathing is good and no problems. No changes in meds. Will back later. COPD Assessment    Does the patient understand envrionmental exposure?: Yes  Is the patient able to verbalize Rescue vs. Long Acting medications?: Yes  Does the patient have a nebulizer?: Yes  Does the patient use a space with inhaled medications?: No     No patient-reported symptoms         Symptoms:                  Care Coordination Interventions    Program Enrollment: Rising Risk  Referral from Primary Care Provider: No  Suggested Interventions and Community Resources  Disease Association: Completed (Comment: COPD)  Zone Management Tools: Completed (Comment: 3/1/22-discussed)         Goals Addressed    None         Prior to Admission medications    Medication Sig Start Date End Date Taking?  Authorizing Provider   pantoprazole (PROTONIX) 40 MG tablet TAKE ONE TABLET BY MOUTH DAILY 2/18/22   Sarina Gomez, DO   atorvastatin (LIPITOR) 20 MG tablet TAKE ONE TABLET BY MOUTH DAILY 12/29/21   Sarina Gomez, DO   amLODIPine (NORVASC) 5 MG tablet TAKE ONE TABLET BY MOUTH DAILY 12/2/21   Sarina Master Gomez, DO   ipratropium-albuterol (DUONEB) 0.5-2.5 (3) MG/3ML SOLN nebulizer solution Inhale 3 mLs into the lungs every 4 hours as needed for Shortness of Breath 11/16/21   [facility]   TRELEGY ELLIPTA 100-62.5-25 MCG/INH AEPB INHALE ONE PUFF BY MOUTH DAILY 8/26/21   Santos Perez MD   albuterol sulfate  (90 Base) MCG/ACT inhaler INHALE TWO PUFFS BY MOUTH FOUR TIMES A DAY AS NEEDED FOR WHEEZING 8/25/21   Santos Perez MD   folic acid (FOLVITE) 1 MG tablet TAKE ONE TABLET BY MOUTH DAILY 2/16/21   Lou Service, DO       Future Appointments   Date Time Provider Simone Marcelo   2/22/2023 9:30 AM MD YOSI Fung

## 2022-05-24 DIAGNOSIS — J44.9 CHRONIC OBSTRUCTIVE PULMONARY DISEASE, UNSPECIFIED COPD TYPE (HCC): ICD-10-CM

## 2022-05-24 RX ORDER — ALBUTEROL SULFATE 90 UG/1
AEROSOL, METERED RESPIRATORY (INHALATION)
Qty: 18 G | Refills: 5 | Status: SHIPPED | OUTPATIENT
Start: 2022-05-24 | End: 2022-09-27

## 2022-06-06 ENCOUNTER — CARE COORDINATION (OUTPATIENT)
Dept: CARE COORDINATION | Age: 52
End: 2022-06-06

## 2022-06-06 NOTE — CARE COORDINATION
Ambulatory Care Coordination Note  2022  CM Risk Score: 2  Charlson 10 Year Mortality Risk Score: 10%     ACC: Suzan Escalante RN    Summary Note: Returning call. Reports has been busy moving. Will make sure to give the office his new address. Reports breathing is about the same. No sob noted while talking on phone. .Alternate activity with rest periods with flare ups and as needed. Sats are usually around 92 %. No changes in medicines. PLAN  1) fu appts  2) review sob  3) review sats    COPD Assessment    Does the patient understand envrionmental exposure?: Yes  Is the patient able to verbalize Rescue vs. Long Acting medications?: Yes  Does the patient have a nebulizer?: Yes  Does the patient use a space with inhaled medications?: No     No patient-reported symptoms         Symptoms:  None: Yes      Symptom course: stable  Self Monitoring - SaO2: Yes  Baseline SaO2 Readin  Have you had a recent diagnosis of pneumonia either by PCP or at a hospital?: No           Lab Results     None          Care Coordination Interventions    Program Enrollment: Rising Risk  Referral from Primary Care Provider: No  Suggested Interventions and Community Resources  Disease Association: Completed (Comment: COPD)  Zone Management Tools: Completed (Comment: 3/1/22-discussed)         Goals Addressed                    This Visit's Progress      Patient Stated (pt-stated)   Improving      Will monitor sats prn if gets sob    Barriers: lack of education  Plan for overcoming my barriers: will work with ACM   Confidence: 9/10  Anticipated Goal Completion Date: 22            Prior to Admission medications    Medication Sig Start Date End Date Taking?  Authorizing Provider   albuterol sulfate  (90 Base) MCG/ACT inhaler INHALE TWO PUFFS BY MOUTH FOUR TIMES A DAY AS NEEDED FOR WHEEZING 22   Dorita Orozco PA-C   pantoprazole (PROTONIX) 40 MG tablet TAKE ONE TABLET BY MOUTH DAILY 22   Shannan Katz Patricia,    atorvastatin (LIPITOR) 20 MG tablet TAKE ONE TABLET BY MOUTH DAILY 12/29/21   Sarina Gomez DO   amLODIPine (NORVASC) 5 MG tablet TAKE ONE TABLET BY MOUTH DAILY 12/2/21   Sarina Gomez DO   ipratropium-albuterol (DUONEB) 0.5-2.5 (3) MG/3ML SOLN nebulizer solution Inhale 3 mLs into the lungs every 4 hours as needed for Shortness of Breath 11/16/21   YOSELIN Breen ELLIPTA 100-62.5-25 MCG/INH AEPB INHALE ONE PUFF BY MOUTH DAILY 8/26/21   Mona Silverio MD   folic acid (FOLVITE) 1 MG tablet TAKE ONE TABLET BY MOUTH DAILY 2/16/21   Xiomara Stokes DO       Future Appointments   Date Time Provider Simone Salinasi   2/22/2023  9:30 AM Mona Silverio MD [facility]

## 2022-06-07 RX ORDER — FLUTICASONE FUROATE, UMECLIDINIUM BROMIDE AND VILANTEROL TRIFENATATE 100; 62.5; 25 UG/1; UG/1; UG/1
POWDER RESPIRATORY (INHALATION)
Qty: 60 EACH | Refills: 5 | Status: SHIPPED | OUTPATIENT
Start: 2022-06-07

## 2022-06-10 RX ORDER — AMLODIPINE BESYLATE 5 MG/1
TABLET ORAL
Qty: 90 TABLET | Refills: 1 | Status: SHIPPED
Start: 2022-06-10 | End: 2022-08-25 | Stop reason: SINTOL

## 2022-06-10 NOTE — TELEPHONE ENCOUNTER
Katerine Pyle is requesting refill(s) amlodipine  Last OV 11/29/21 (pertaining to medication)  LR 12/2/21 (per medication requested)  Next office visit scheduled or attempted Yes   If no, reason:  6/16/22

## 2022-06-16 ENCOUNTER — OFFICE VISIT (OUTPATIENT)
Dept: FAMILY MEDICINE CLINIC | Age: 52
End: 2022-06-16
Payer: COMMERCIAL

## 2022-06-16 VITALS
HEIGHT: 64 IN | HEART RATE: 94 BPM | OXYGEN SATURATION: 94 % | WEIGHT: 154.6 LBS | DIASTOLIC BLOOD PRESSURE: 74 MMHG | BODY MASS INDEX: 26.4 KG/M2 | SYSTOLIC BLOOD PRESSURE: 116 MMHG

## 2022-06-16 DIAGNOSIS — E78.2 HYPERLIPIDEMIA, MIXED: ICD-10-CM

## 2022-06-16 DIAGNOSIS — I10 HYPERTENSION, ESSENTIAL: Primary | ICD-10-CM

## 2022-06-16 DIAGNOSIS — Z12.5 SCREENING PSA (PROSTATE SPECIFIC ANTIGEN): ICD-10-CM

## 2022-06-16 PROCEDURE — G8419 CALC BMI OUT NRM PARAM NOF/U: HCPCS | Performed by: FAMILY MEDICINE

## 2022-06-16 PROCEDURE — 3017F COLORECTAL CA SCREEN DOC REV: CPT | Performed by: FAMILY MEDICINE

## 2022-06-16 PROCEDURE — 36415 COLL VENOUS BLD VENIPUNCTURE: CPT | Performed by: FAMILY MEDICINE

## 2022-06-16 PROCEDURE — 1036F TOBACCO NON-USER: CPT | Performed by: FAMILY MEDICINE

## 2022-06-16 PROCEDURE — G8427 DOCREV CUR MEDS BY ELIG CLIN: HCPCS | Performed by: FAMILY MEDICINE

## 2022-06-16 PROCEDURE — 99213 OFFICE O/P EST LOW 20 MIN: CPT | Performed by: FAMILY MEDICINE

## 2022-06-16 ASSESSMENT — PATIENT HEALTH QUESTIONNAIRE - PHQ9
2. FEELING DOWN, DEPRESSED OR HOPELESS: 0
1. LITTLE INTEREST OR PLEASURE IN DOING THINGS: 0
SUM OF ALL RESPONSES TO PHQ9 QUESTIONS 1 & 2: 0
SUM OF ALL RESPONSES TO PHQ QUESTIONS 1-9: 0

## 2022-06-16 ASSESSMENT — ENCOUNTER SYMPTOMS
ABDOMINAL PAIN: 0
SHORTNESS OF BREATH: 0
BLOOD IN STOOL: 0
CONSTIPATION: 0
EYE PAIN: 0
RHINORRHEA: 0
COUGH: 0
WHEEZING: 0
DIARRHEA: 0
CHEST TIGHTNESS: 0
EYE DISCHARGE: 0
SINUS PRESSURE: 0
VOMITING: 0

## 2022-06-16 NOTE — PROGRESS NOTES
Subjective:      Patient ID: Loretta Shaikh is a 45 y.o. male. HPI  Chief Complaint   Patient presents with    Hypertension     Pt is here for HTN follow up and FBW     Follow-up hypertension,  hyperlipidemia. Taking medications daily. Any problems with his medications. No side effects. No swelling from amlodipine no muscle pain or weakness from statin. Sees pulmonology for his COPD  Loretta Shaikh is a 45 y.o. male with the following history as recorded in [facility]:  Patient Active Problem List    Diagnosis Date Noted    Gastroesophageal reflux disease without esophagitis     Hypertension, essential     COPD exacerbation ([facility] [address] 07/27/2021    Acute respiratory failure with hypoxia ([facility] [address]     DDD (degenerative disc disease), cervical 01/02/2019    Esophageal dysphagia     Hyperlipidemia, mixed 03/15/2018    Prediabetes 11/01/2016    [facility]ch[facility]      Current Outpatient Medications   Medication Sig Dispense Refill    amLODIPine (NORVASC) 5 MG tablet TAKE ONE TABLET BY MOUTH DAILY 90 tablet 1    TRELEGY ELLIPTA 100-62.5-25 MCG/INH AEPB INHALE ONE PUFF BY MOUTH DAILY 60 each 5    albuterol sulfate  (90 Base) MCG/ACT inhaler INHALE TWO PUFFS BY MOUTH FOUR TIMES A DAY AS NEEDED FOR WHEEZING 18 g 5    pantoprazole (PROTONIX) 40 MG tablet TAKE ONE TABLET BY MOUTH DAILY 90 tablet 1    atorvastatin (LIPITOR) 20 MG tablet TAKE ONE TABLET BY MOUTH DAILY 90 tablet 1    ipratropium-albuterol (DUONEB) 0.5-2.5 (3) MG/3ML SOLN nebulizer solution Inhale 3 mLs into the lungs every 4 hours as needed for Shortness of Breath 856 mL 5    folic acid (FOLVITE) 1 MG tablet TAKE ONE TABLET BY MOUTH DAILY 60 tablet 9     No current facility-administered medications for this visit.      Allergies: No known allergies  Past Medical History:   Diagnosis Date    Cervical disc herniation     [facility]     Folate deficiency 01/2013  GERD (gastroesophageal reflux disease)     Hyperlipidemia 2/14    Hypertriglyceridemia, Good HDL    Hypertension 12/12: age 37    Prediabetes 11/2016    Vitamin D deficiency 01/2013    resolved     Past Surgical History:   Procedure Laterality Date    ANKLE FRACTURE SURGERY Left 1998    BRONCHOSCOPY  12-    CERVICAL FUSION N/A 1/16/2019    ANTERIOR CERVICAL DISCECTOMY AND FUSION WITH ALLOGRAFT, MEDTRONICS AND EVOKES  CPT CODE - 36532 performed by [facility] at [facility]  2/13    DE NJX DX/THER SBST INTRLMNR CRV/THRC W/IMG GDN Right 11/19/2018    RIGHT CERVICAL SEVEN THORACIC EPIDURAL STEROID INJECTION SITE CONFIRMED BY FLUOROSCOPY performed by Jarred Cabrera MD at [place] [address]     Family History   Problem Relation Age of Onset    COPD Mother 39    COPD Father 39     Social History     Tobacco Use    Smoking status: Former Smoker     Packs/day: 2.00     Years: 35.00     Pack years: 70.00     Types: Cigarettes     Start date: 1984     Quit date: 1/1/2019     Years since quitting: 3.4    Smokeless tobacco: Never Used   Substance Use Topics    Alcohol use: Not Currently     Alcohol/week: 1.0 standard drink     Types: 1 Cans of beer per week     Comment: occasional       Review of Systems   Constitutional: Negative for fatigue, fever and unexpected weight change. HENT: Negative for congestion, ear discharge, ear pain, hearing loss, rhinorrhea and sinus pressure. Eyes: Negative for pain, discharge and visual disturbance. Respiratory: Negative for cough, chest tightness, shortness of breath and wheezing. Cardiovascular: Negative for chest pain, palpitations and leg swelling. Gastrointestinal: Negative for abdominal pain, blood in stool, constipation, diarrhea and vomiting. Genitourinary: Negative for difficulty urinating, dysuria and hematuria. Neurological: Negative for dizziness, seizures, syncope and headaches.    Psychiatric/Behavioral: Negative for dysphoric mood and sleep disturbance. The patient is not nervous/anxious. Objective:   Physical Exam  Constitutional:       General: He is not in acute distress. Appearance: He is well-developed. HENT:      Head: Normocephalic. Right Ear: External ear normal.      Left Ear: External ear normal.      Nose: Nose normal.      Mouth/Throat:      Pharynx: No oropharyngeal exudate. Eyes:      Pupils: Pupils are equal, round, and reactive to light. Neck:      Thyroid: No thyromegaly. Cardiovascular:      Rate and Rhythm: Normal rate and regular rhythm. Heart sounds: Normal heart sounds. No murmur heard. Pulmonary:      Effort: Pulmonary effort is normal.      Breath sounds: Normal breath sounds. No wheezing. Abdominal:      General: Bowel sounds are normal. There is no distension. Palpations: Abdomen is soft. Tenderness: There is no abdominal tenderness. There is no guarding or rebound. Musculoskeletal:         General: Normal range of motion. Cervical back: Neck supple. Lymphadenopathy:      Cervical: No cervical adenopathy. Skin:     General: Skin is warm. Neurological:      Mental Status: He is alert and oriented to person, place, and time. Psychiatric:         Behavior: Behavior normal.         Thought Content:  Thought content normal.         Judgment: Judgment normal.         Assessment:      htn- controlled, continue med  hld- on statin, check lab      Plan:      Orders Placed This Encounter   Procedures    LIPID PANEL     Order Specific Question:   Is Patient Fasting?/# of Hours     Answer:   15    Comprehensive Metabolic Panel    PSA Screening     Refill med          Patrice RESTREPO, DO

## 2022-06-16 NOTE — PATIENT INSTRUCTIONS

## 2022-06-17 ENCOUNTER — TELEPHONE (OUTPATIENT)
Dept: FAMILY MEDICINE CLINIC | Age: 52
End: 2022-06-17

## 2022-06-17 LAB
A/G RATIO: 1.8 (ref 1.1–2.2)
ALBUMIN SERPL-MCNC: 4.7 G/DL (ref 3.4–5)
ALP BLD-CCNC: 101 U/L (ref 40–129)
ALT SERPL-CCNC: 16 U/L (ref 10–40)
ANION GAP SERPL CALCULATED.3IONS-SCNC: 16 MMOL/L (ref 3–16)
AST SERPL-CCNC: 22 U/L (ref 15–37)
BILIRUB SERPL-MCNC: 0.4 MG/DL (ref 0–1)
BUN BLDV-MCNC: 15 MG/DL (ref 7–20)
CALCIUM SERPL-MCNC: 9.9 MG/DL (ref 8.3–10.6)
CHLORIDE BLD-SCNC: 101 MMOL/L (ref 99–110)
CHOLESTEROL, TOTAL: 160 MG/DL (ref 0–199)
CO2: 24 MMOL/L (ref 21–32)
CREAT SERPL-MCNC: 1.1 MG/DL (ref 0.9–1.3)
GFR AFRICAN AMERICAN: >60
GFR NON-AFRICAN AMERICAN: >60
GLUCOSE BLD-MCNC: 89 MG/DL (ref 70–99)
HDLC SERPL-MCNC: 60 MG/DL (ref 40–60)
LDL CHOLESTEROL CALCULATED: 77 MG/DL
POTASSIUM SERPL-SCNC: 4.4 MMOL/L (ref 3.5–5.1)
PROSTATE SPECIFIC ANTIGEN: 0.28 NG/ML (ref 0–4)
SODIUM BLD-SCNC: 141 MMOL/L (ref 136–145)
TOTAL PROTEIN: 7.3 G/DL (ref 6.4–8.2)
TRIGL SERPL-MCNC: 115 MG/DL (ref 0–150)
VLDLC SERPL CALC-MCNC: 23 MG/DL

## 2022-06-30 ENCOUNTER — CARE COORDINATION (OUTPATIENT)
Dept: CARE COORDINATION | Age: 52
End: 2022-06-30

## 2022-06-30 RX ORDER — ATORVASTATIN CALCIUM 20 MG/1
TABLET, FILM COATED ORAL
Qty: 90 TABLET | Refills: 1 | Status: SHIPPED | OUTPATIENT
Start: 2022-06-30

## 2022-06-30 NOTE — TELEPHONE ENCOUNTER
Attempted to contact the pt by phone to make sure he was aware of Dr. Huffman Muss leaving the practice but his phone has restrictions on it and the call could not go through. A aWhere message was sent to the pt to have him call our office so that we can discuss this with him.     Daja Cordon is requesting refill(s) lipitor  Last OV 6/16/22 (pertaining to medication)  LR 12/29/21 (per medication requested)  Next office visit scheduled or attempted No   If no, reason:

## 2022-07-19 ENCOUNTER — CARE COORDINATION (OUTPATIENT)
Dept: CARE COORDINATION | Age: 52
End: 2022-07-19

## 2022-07-19 NOTE — CARE COORDINATION
Attempted to call. No answer. Mailbox is full. Ambulatory Care Coordination Note  7/19/2022    ACC: Henry Escalante RN    Summary Note: Returning call. Reports sats are doing good. Denies any sob. Reviewed diabetic, low fat, low sodium diets. Does good with diet. Pt agreeable to dc from ACM due to goal met. COPD Assessment    Does the patient understand envrionmental exposure?: Yes  Is the patient able to verbalize Rescue vs. Long Acting medications?: Yes  Does the patient have a nebulizer?: Yes  Does the patient use a space with inhaled medications?: No            Symptoms:                Lab Results       None            Care Coordination Interventions    Program Enrollment: Rising Risk  Referral from Primary Care Provider: No  Suggested Interventions and Community Resources  Disease Association: Completed (Comment: COPD)  Zone Management Tools: Completed (Comment: 3/1/22-discussed)          Goals Addressed                      This Visit's Progress      COMPLETED: Patient Stated (pt-stated)         Will monitor sats prn if gets sob    Barriers: lack of education  Plan for overcoming my barriers: will work with AC   Confidence: 9/10  Anticipated Goal Completion Date: 9/8/22              Prior to Admission medications    Medication Sig Start Date End Date Taking?  Authorizing Provider   atorvastatin (LIPITOR) 20 MG tablet TAKE ONE TABLET BY MOUTH DAILY 6/30/22   Sarina Gomez, DO   amLODIPine (NORVASC) 5 MG tablet TAKE ONE TABLET BY MOUTH DAILY 6/10/22   Sarina Gomez DO   TRELEGY ELLIPTA 100-62.5-25 MCG/INH AEPB INHALE ONE PUFF BY MOUTH DAILY 6/7/22   Dorita Orozco PA-C   albuterol sulfate  (90 Base) MCG/ACT inhaler INHALE TWO PUFFS BY MOUTH FOUR TIMES A DAY AS NEEDED FOR WHEEZING 5/24/22   Dorita Orozco PA-C   pantoprazole (PROTONIX) 40 MG tablet TAKE ONE TABLET BY MOUTH DAILY 2/18/22   Sarina Gomez DO   ipratropium-albuterol (DUONEB) 0.5-2.5 (3) MG/3ML SOLN nebulizer solution Inhale 3 mLs into the lungs every 4 hours as needed for Shortness of Breath 11/16/21   [facility] Huseyin Cotter PA-C   folic acid (FOLVITE) 1 MG tablet TAKE ONE TABLET BY MOUTH DAILY 2/16/21   Noe Barroso, DO       Future Appointments   Date Time Provider Simone Marcelo   2/22/2023  9:30 AM Jean Russ MD [facility]

## 2022-08-04 ENCOUNTER — OFFICE VISIT (OUTPATIENT)
Dept: PRIMARY CARE CLINIC | Age: 52
End: 2022-08-04
Payer: COMMERCIAL

## 2022-08-04 VITALS
HEART RATE: 71 BPM | SYSTOLIC BLOOD PRESSURE: 150 MMHG | TEMPERATURE: 97.7 F | DIASTOLIC BLOOD PRESSURE: 76 MMHG | HEIGHT: 64 IN | OXYGEN SATURATION: 98 % | BODY MASS INDEX: 26.63 KG/M2 | WEIGHT: 156 LBS

## 2022-08-04 DIAGNOSIS — K21.9 GASTROESOPHAGEAL REFLUX DISEASE WITHOUT ESOPHAGITIS: ICD-10-CM

## 2022-08-04 DIAGNOSIS — E78.2 HYPERLIPIDEMIA, MIXED: ICD-10-CM

## 2022-08-04 DIAGNOSIS — F12.90 MARIJUANA USE, EPISODIC: ICD-10-CM

## 2022-08-04 DIAGNOSIS — R73.03 PREDIABETES: ICD-10-CM

## 2022-08-04 DIAGNOSIS — M50.30 DDD (DEGENERATIVE DISC DISEASE), CERVICAL: ICD-10-CM

## 2022-08-04 DIAGNOSIS — I10 HYPERTENSION, ESSENTIAL: ICD-10-CM

## 2022-08-04 DIAGNOSIS — J44.9 CHRONIC OBSTRUCTIVE PULMONARY DISEASE, UNSPECIFIED COPD TYPE (HCC): Primary | ICD-10-CM

## 2022-08-04 DIAGNOSIS — Z87.891 PERSONAL HISTORY OF TOBACCO USE: ICD-10-CM

## 2022-08-04 PROCEDURE — 3017F COLORECTAL CA SCREEN DOC REV: CPT | Performed by: STUDENT IN AN ORGANIZED HEALTH CARE EDUCATION/TRAINING PROGRAM

## 2022-08-04 PROCEDURE — G8419 CALC BMI OUT NRM PARAM NOF/U: HCPCS | Performed by: STUDENT IN AN ORGANIZED HEALTH CARE EDUCATION/TRAINING PROGRAM

## 2022-08-04 PROCEDURE — 1036F TOBACCO NON-USER: CPT | Performed by: STUDENT IN AN ORGANIZED HEALTH CARE EDUCATION/TRAINING PROGRAM

## 2022-08-04 PROCEDURE — 99214 OFFICE O/P EST MOD 30 MIN: CPT | Performed by: STUDENT IN AN ORGANIZED HEALTH CARE EDUCATION/TRAINING PROGRAM

## 2022-08-04 PROCEDURE — 3023F SPIROM DOC REV: CPT | Performed by: STUDENT IN AN ORGANIZED HEALTH CARE EDUCATION/TRAINING PROGRAM

## 2022-08-04 PROCEDURE — G8427 DOCREV CUR MEDS BY ELIG CLIN: HCPCS | Performed by: STUDENT IN AN ORGANIZED HEALTH CARE EDUCATION/TRAINING PROGRAM

## 2022-08-04 RX ORDER — LISINOPRIL 5 MG/1
5 TABLET ORAL DAILY
Qty: 90 TABLET | Refills: 1 | Status: SHIPPED
Start: 2022-08-04 | End: 2022-09-27 | Stop reason: SINTOL

## 2022-08-04 RX ORDER — ALBUTEROL SULFATE 90 UG/1
2 AEROSOL, METERED RESPIRATORY (INHALATION) 4 TIMES DAILY PRN
Qty: 18 G | Refills: 5 | Status: SHIPPED | OUTPATIENT
Start: 2022-08-04

## 2022-08-04 RX ORDER — MELOXICAM 15 MG/1
15 TABLET ORAL DAILY PRN
Qty: 30 TABLET | Refills: 5 | Status: SHIPPED
Start: 2022-08-04 | End: 2022-09-27 | Stop reason: SINTOL

## 2022-08-04 SDOH — ECONOMIC STABILITY: FOOD INSECURITY: WITHIN THE PAST 12 MONTHS, YOU WORRIED THAT YOUR FOOD WOULD RUN OUT BEFORE YOU GOT MONEY TO BUY MORE.: NEVER TRUE

## 2022-08-04 SDOH — ECONOMIC STABILITY: FOOD INSECURITY: WITHIN THE PAST 12 MONTHS, THE FOOD YOU BOUGHT JUST DIDN'T LAST AND YOU DIDN'T HAVE MONEY TO GET MORE.: NEVER TRUE

## 2022-08-04 ASSESSMENT — PATIENT HEALTH QUESTIONNAIRE - PHQ9
10. IF YOU CHECKED OFF ANY PROBLEMS, HOW DIFFICULT HAVE THESE PROBLEMS MADE IT FOR YOU TO DO YOUR WORK, TAKE CARE OF THINGS AT HOME, OR GET ALONG WITH OTHER PEOPLE: 0
SUM OF ALL RESPONSES TO PHQ9 QUESTIONS 1 & 2: 0
9. THOUGHTS THAT YOU WOULD BE BETTER OFF DEAD, OR OF HURTING YOURSELF: 0
1. LITTLE INTEREST OR PLEASURE IN DOING THINGS: 0
SUM OF ALL RESPONSES TO PHQ QUESTIONS 1-9: 1
4. FEELING TIRED OR HAVING LITTLE ENERGY: 1
6. FEELING BAD ABOUT YOURSELF - OR THAT YOU ARE A FAILURE OR HAVE LET YOURSELF OR YOUR FAMILY DOWN: 0
7. TROUBLE CONCENTRATING ON THINGS, SUCH AS READING THE NEWSPAPER OR WATCHING TELEVISION: 0
2. FEELING DOWN, DEPRESSED OR HOPELESS: 0
SUM OF ALL RESPONSES TO PHQ QUESTIONS 1-9: 1
5. POOR APPETITE OR OVEREATING: 0
SUM OF ALL RESPONSES TO PHQ QUESTIONS 1-9: 1
SUM OF ALL RESPONSES TO PHQ QUESTIONS 1-9: 1
8. MOVING OR SPEAKING SO SLOWLY THAT OTHER PEOPLE COULD HAVE NOTICED. OR THE OPPOSITE, BEING SO FIGETY OR RESTLESS THAT YOU HAVE BEEN MOVING AROUND A LOT MORE THAN USUAL: 0
3. TROUBLE FALLING OR STAYING ASLEEP: 0

## 2022-08-04 ASSESSMENT — ANXIETY QUESTIONNAIRES
4. TROUBLE RELAXING: 0-NOT AT ALL
7. FEELING AFRAID AS IF SOMETHING AWFUL MIGHT HAPPEN: 0-NOT AT ALL
1. FEELING NERVOUS, ANXIOUS, OR ON EDGE: 0
GAD7 TOTAL SCORE: 0
3. WORRYING TOO MUCH ABOUT DIFFERENT THINGS: 0-NOT AT ALL
6. BECOMING EASILY ANNOYED OR IRRITABLE: 0-NOT AT ALL
5. BEING SO RESTLESS THAT IT IS HARD TO SIT STILL: 0-NOT AT ALL
2. NOT BEING ABLE TO STOP OR CONTROL WORRYING: 0-NOT AT ALL

## 2022-08-04 ASSESSMENT — SOCIAL DETERMINANTS OF HEALTH (SDOH): HOW HARD IS IT FOR YOU TO PAY FOR THE VERY BASICS LIKE FOOD, HOUSING, MEDICAL CARE, AND HEATING?: SOMEWHAT HARD

## 2022-08-04 NOTE — PROGRESS NOTES
[address] [facility]                                             [address] [facility], [facility], [address] [place] [address]        Phone: [phone]                                     Name:  Brigitte Christopher  :    1970      Consultants:   Patient Care Team:  Jemima Nielsen DO as PCP - General (Family Medicine)  Rosa Holley DO as PCP - [facility] Empaneled Provider  Louann Colon MD as Consulting Physician (Pulmonology)  ANAI Conklin CNP as Consulting Physician (Nurse Practitioner)  Praful Feldman MD as Consulting Physician (Orthopedic Surgery)  Louann Colon MD as Consulting Physician (Pulmonology)    Chief Complaint:     Brigitte Christopher is a 45 y.o. male  who presents today for a New Patient care visit with Personalized Prevention Plan Services as noted below. HPI:     Patient is a 61-year-old male with past medical history of hypertension, hyperlipidemia, COPD, GERD, prediabetes, cervical degenerative disc disease. Patient presents in office today to establish care with new PCP. Pt previously seen by Dr. Brianna Thompson. He c/o R arm numbness/tingling, burning sensation, and constant pain. Hypertension  In office BP today was 144/76 and 150/76 on repeat. Pt states he is currently taking Amlodipine 5mg and tolerates it well with leg swelling. GERD  Pt takes Pantoprazole at unspecified dose. Pt states that he continues to be sx occasionally. Pt states that his diet is poor and contains a lot of fatty/greasy foods that exacerbates sx. Hyperlipidemia  Pt currently taking Atorvastatin 20mg and denies side effects like myositis today. Last Lipid panel on 22 was within normal limits. Pt states that his diet is poor and high in fatty/greasy foods. COPD/Hx of Tobacco Use Disorder/Chronic THC Usage  Pt states he takes Albuterol (needs refill), Duoneb, and Trelegy-Ellipta.  Pt states he is a former smoker who smoked about 2 packs per day for about 35 years and stopped 3 years ago. He currently follows Dr. Aly Carver. Prediabetes  Last A1c on 6/24/21 was 5.6%. Pt not currently taking any medication for DM. Cervical degenerative disc disease  Pt states he had C Spine Fusion in 2019 from Dr. Nyasia Galan and C7-T1 SOHA TF injections in 2018 with Dr. Jennifer Carlos. Pt states that he has residual R arm pain, numbness/tingling which he feels is attributable to his DDD. Sx do not run into the fingers, just localized from shoulder down to arm and forearm. He also has headaches which he feels are secondary to c spine DDD pain too.           Patient Active Problem List   Diagnosis    Hypertension, essential    Gastroesophageal reflux disease without esophagitis    COPD (chronic obstructive pulmonary disease) ([facility])    Prediabetes    Hyperlipidemia, mixed    Esophageal dysphagia    DDD (degenerative disc disease), cervical    COPD exacerbation ([facility])    Acute respiratory failure with hypoxia ([facility])    Marijuana use, episodic         Past Medical History:    Past Medical History:   Diagnosis Date    Cervical disc herniation     COPD (chronic obstructive pulmonary disease) ([facility] [address]     Folate deficiency 01/2013    GERD (gastroesophageal reflux disease)     Hyperlipidemia 2/14    Hypertriglyceridemia, Good HDL    Hypertension 12/12: age 37    Prediabetes 11/2016    Vitamin D deficiency 01/2013    resolved       Past Surgical History:  Past Surgical History:   Procedure Laterality Date    ANKLE FRACTURE SURGERY Left 1998    BRONCHOSCOPY  12-    CERVICAL FUSION N/A 1/16/2019    ANTERIOR CERVICAL DISCECTOMY AND FUSION WITH ALLOGRAFT, MEDTRONICS AND EVOKES  CPT CODE - 58752 performed by Ad Sherman MD at [place] [id number]  2/13    [place] [address] DX/THER SBST INTRLMNR CRV/THRC W/IMG GDN Right 11/19/2018    RIGHT CERVICAL SEVEN THORACIC EPIDURAL STEROID INJECTION SITE CONFIRMED BY FLUOROSCOPY performed by Louisa Peña China Nieto MD at [facility].:  Prior to Visit Medications    Medication Sig Taking? Authorizing Provider   meloxicam (MOBIC) 15 MG tablet Take 1 tablet by mouth daily as needed for Pain Yes Alesia Becker DO   lisinopril (PRINIVIL;ZESTRIL) 5 MG tablet Take 1 tablet by mouth in the morning.  Yes Alesia Becker DO   albuterol sulfate HFA (VENTOLIN HFA) 108 (90 Base) MCG/ACT inhaler Inhale 2 puffs into the lungs 4 times daily as needed for Wheezing Yes Alesia Becker DO   atorvastatin (LIPITOR) 20 MG tablet TAKE ONE TABLET BY MOUTH DAILY Yes Sarina Gomez DO   amLODIPine (NORVASC) 5 MG tablet TAKE ONE TABLET BY MOUTH DAILY Yes Sarina Gomez DO   TRELEGY ELLIPTA 100-62.5-25 MCG/INH AEPB INHALE ONE PUFF BY MOUTH DAILY Yes Dorita Orozco PA-C   albuterol sulfate  (90 Base) MCG/ACT inhaler INHALE TWO PUFFS BY MOUTH FOUR TIMES A DAY AS NEEDED FOR WHEEZING Yes Keyur Castro PA-C   pantoprazole (PROTONIX) 40 MG tablet TAKE ONE TABLET BY MOUTH DAILY Yes Sarina Gomez DO   ipratropium-albuterol (DUONEB) 0.5-2.5 (3) MG/3ML SOLN nebulizer solution Inhale 3 mLs into the lungs every 4 hours as needed for Shortness of Breath Yes Keyur Castro PA-C   folic acid (FOLVITE) 1 MG tablet TAKE ONE TABLET BY MOUTH DAILY Yes Sarina Gomez DO       Allergies:    No known allergies    Family History:       Problem Relation Age of Onset    COPD Mother 39    COPD Father 39       Social History:  Social History       Tobacco History       Smoking Status  Former Smoking Start Date  1984 Quit Date  1/1/2019 Smoking Frequency  2.00 packs/day for 35.00 years (70.00 pk-yrs)    Smoking Tobacco Type  Cigarettes from 1984 to 1/1/2019      Smokeless Tobacco Use  Never              Alcohol History       Alcohol Use Status  Not Currently Drinks/Week  1 Cans of beer, 0 Standard drinks or equivalent per week Amount  1.0 standard drink of alcohol/wk Comment  occasional Drug Use       Drug Use Status  Yes Types  Marijuana (Weed) Comment  1 joint per week per patient              Sexual Activity       Sexually Active  Yes Partners  Female                       Health Maintenance Completed:  Health Maintenance   Topic Date Due    COVID-19 Vaccine (1) Never done    Pneumococcal 0-64 years Vaccine (2 - PCV) 08/10/2016    Shingles vaccine (1 of 2) Never done    Low dose CT lung screening  Never done    A1C test (Diabetic or Prediabetic)  06/24/2022    Flu vaccine (1) 09/01/2022    Annual Wellness Visit (AWV)  11/30/2022    Lipids  06/16/2023    Depression Screen  06/16/2023    DTaP/Tdap/Td vaccine (2 - Td or Tdap) 02/13/2024    Colorectal Cancer Screen  04/03/2028    Hepatitis C screen  Completed    HIV screen  Completed    Hepatitis A vaccine  Aged Out    Hepatitis B vaccine  Aged Out    Hib vaccine  Aged Out    Meningococcal (ACWY) vaccine  Aged [employer] History   Administered Date(s) Administered    Influenza Nasal 11/10/2015    Influenza Virus Vaccine 02/13/2014, 11/07/2014    Pneumococcal Polysaccharide ([id number]) 08/10/2015    Tdap (Boostrix, Adacel) 02/13/2014         Review of Systems:  Review of Systems   Constitutional:  Negative for chills and fever. HENT: Negative. Eyes: Negative. Respiratory:  Negative for cough, chest tightness, shortness of breath and wheezing. Cardiovascular:  Negative for chest pain, palpitations and leg swelling. Gastrointestinal:  Positive for diarrhea. Negative for abdominal pain, constipation, nausea and vomiting. Genitourinary: Negative. Musculoskeletal:  Positive for myalgias, neck pain and neck stiffness. Negative for arthralgias, back pain, gait problem and joint swelling. Neurological:  Positive for numbness and headaches. Negative for tremors and seizures. Psychiatric/Behavioral: Negative. The patient is not nervous/anxious and is not hyperactive.        Physical Exam:   Vitals:    08/04/22 3533 08/04/22 0844   BP: (!) 144/76 (!) 150/76   Site: Left Upper Arm    Position: Sitting    Cuff Size: Small Adult    Pulse: 71    Temp: 97.7 °F (36.5 °C)    TempSrc: Temporal    SpO2: 98%    Weight: 156 lb (70.8 kg)    Height: 5' 3.78\" (1.62 m)      Body mass index is 26.96 kg/m². Wt Readings from Last 3 Encounters:   08/04/22 156 lb (70.8 kg)   06/16/22 154 lb 9.6 oz (70.1 kg)   04/13/22 152 lb (68.9 kg)       BP Readings from Last 3 Encounters:   08/04/22 (!) 150/76   06/16/22 116/74   04/13/22 138/74       Physical Exam  Constitutional:       General: He is not in acute distress. Appearance: Normal appearance. He is not ill-appearing or toxic-appearing. HENT:      Head: Normocephalic and atraumatic. Right Ear: Tympanic membrane normal.      Left Ear: Tympanic membrane normal.   Eyes:      Extraocular Movements: Extraocular movements intact. Conjunctiva/sclera: Conjunctivae normal.      Pupils: Pupils are equal, round, and reactive to light. Cardiovascular:      Rate and Rhythm: Normal rate and regular rhythm. Pulses: Normal pulses. Heart sounds: Normal heart sounds. No murmur heard. No friction rub. No gallop. Pulmonary:      Effort: No respiratory distress. Breath sounds: Wheezing present. No rhonchi or rales. Abdominal:      General: There is no distension. Palpations: Abdomen is soft. There is no mass. Tenderness: There is no abdominal tenderness. There is no guarding. Hernia: No hernia is present. Musculoskeletal:         General: No deformity or signs of injury. Normal range of motion. Cervical back: Neck supple. Right lower leg: No edema. Left lower leg: No edema. Skin:     General: Skin is warm and dry. Capillary Refill: Capillary refill takes less than 2 seconds. Neurological:      General: No focal deficit present. Mental Status: He is alert. Mental status is at baseline.    Psychiatric:         Mood and Affect: Mood normal.         Behavior: Behavior normal.            Lab Review:   Office Visit on 06/16/2022   Component Date Value    Cholesterol, Total 06/16/2022 160     Triglycerides 06/16/2022 115     HDL 06/16/2022 60     LDL Calculated 06/16/2022 77     VLDL Cholesterol Calcula* 06/16/2022 23     Sodium 06/16/2022 141     Potassium 06/16/2022 4.4     Chloride 06/16/2022 101     CO2 06/16/2022 24     Anion Gap 06/16/2022 16     Glucose 06/16/2022 89     BUN 06/16/2022 15     Creatinine 06/16/2022 1.1     GFR Non- 06/16/2022 >60     GFR  06/16/2022 >60     Calcium 06/16/2022 9.9     Total Protein 06/16/2022 7.3     Albumin 06/16/2022 4.7     Albumin/Globulin Ratio 06/16/2022 1.8     Total Bilirubin 06/16/2022 0.4     Alkaline Phosphatase 06/16/2022 101     ALT 06/16/2022 16     AST 06/16/2022 22     PSA 06/16/2022 0.28           Assessment/Plan:  George Bach was seen today for new patient. Diagnoses and all orders for this visit:    Chronic obstructive pulmonary disease, unspecified COPD type ([address]    Personal history of tobacco use  -     CT Lung Screening; Future    Hypertension, essential  -     Comprehensive Metabolic Panel; Future    Hyperlipidemia, mixed  -     LIPID PANEL; Future  -     Comprehensive Metabolic Panel; Future    Gastroesophageal reflux disease without esophagitis  -     Magnesium; Future    Prediabetes  -     Hemoglobin A1C; Future  -     MICROALBUMIN / CREATININE URINE RATIO; Future  -     LIPID PANEL; Future  -     Comprehensive Metabolic Panel; Future    DDD (degenerative disc disease), cervical    Marijuana use, episodic    Other orders  -     meloxicam (MOBIC) 15 MG tablet; Take 1 tablet by mouth daily as needed for Pain  -     lisinopril (PRINIVIL;ZESTRIL) 5 MG tablet; Take 1 tablet by mouth in the morning.  -     albuterol sulfate HFA (VENTOLIN HFA) 108 (90 Base) MCG/ACT inhaler;  Inhale 2 puffs into the lungs 4 times daily as needed for Wheezing    Patient is a 68-year-old male with past medical history of hypertension, hyperlipidemia, COPD, GERD, prediabetes, cervical degenerative disc disease. Patient presents in office today to establish care with new PCP. Hypertension  Not controlled, not at goal  Pt currently taking Amlodipine 5mg (continue). Pt was previously on HCTZ by Dr. Amos Andrea. Start Lisinopril 5mg with renal protection for Pre-DM. Order Lipid Panel, CMP. RTC in 1 week to f/u on BP recheck and evaluate side effects, if any from medication. GERD  Well controlled, at goal   Pt takes unspecified regiment of Pantoprazole. Continue regiment daily 1 hr before meals. Advised pt to decrease fatty/greasy food intake. Order Mg levels given chronic PPI usage. RTC in 6 months or sooner if sx worsen. Hyperlipidemia  Well controlled, at goal   Continue current regiment of Atorvastatin 20mg daily. Advised pt on dietary changes. Order CMP and Lipid Panel  RTC in 1 week to review labs and adjust medication if necessary. COPD/Hx of Tobacco Use Disorder/Chronic THC Usage  Well controlled, at goal   Pt no longer smoking tobacco. Has occasional THC usage. Advised pt on risks. Continue Albuterol (refill today), Duoneb, and Trelegy-Ellipta inhalers as directed. Order Low Dose CT based on USPSTF guidelines. RTC in 1 week to f/u on CT results. Continue to f/u with Dr. Shagufta Colunga. Prediabetes  Well controlled, at goal.   Last A1c on 6/24/21 was 5.6%  Advised pt on dietary and lifestyle changes. Order Microalbumin, A1c   RTC in 1 week to f/u on results    Cervical degenerative disc disease  Not controlled, not at goal.   Pt with C spine fusion with residual sx  Start Mobic 15mg for pain control. Consider Gabapentin or Lyrica at next visit if neuropathic pain sx persist.   RTC in 1 week to re-evaluate sx and alter med management if necessary.      Healthcare Maintenance  Ordered Low Dose CT  Pt declined Pneumovax, Shingrix, COVID vaccine      Health Maintenance Due:  Health Maintenance Due   Topic Date Due    COVID-19 Vaccine (1) Never done    Pneumococcal 0-64 years Vaccine (2 - PCV) 08/10/2016    Shingles vaccine (1 of 2) Never done    Low dose CT lung screening  Never done    A1C test (Diabetic or Prediabetic)  06/24/2022        Health care decision maker:  <72years old      Health Maintenance: (USPSTF Recommendations)  (M) Prostate Cancer Screen: (54-79 yo discuss benefits/harm, does not recommend testing PSA in men >73 yo (D): Pt not 52   (M) AAA Screen: (men 73-69 yo who has ever smoked (B), consider in nonsmokers if high risk): Pt not 65   CRC/Colonoscopy Screening: (adults 45-49 (B), 50-75 (A)) Pt up-to-date   Lung Ca Screening: Annual LDCT (+smoker age 49-80, smoked within 15 years, total of 20 pack yr history (B)): Ordered today. DEXA Screen: (women >65 and older, <65 if at risk/postmenopausal (B))  HIV Screen: (16-65 yr old, and all pregnant patients (A)): Completed 2018  Hep C Screen: (18-79 yr old (B)): Completed 2018  [facility] [address] Screen: (all pts with cirrhosis and high risk Hep B (US q6 mo)):  Immunizations:    RTC:  Return in about 1 week (around 8/11/2022). EMR Dragon/transcription disclaimer:  Much of this encounter note is electronic transcription/translation of spoken language to printed texts. The electronic translation of spoken language may be erroneous, or at times, nonsensical words or phrases may be inadvertently transcribed.   Although I have reviewed the note for such errors, some may still exist.

## 2022-08-05 ASSESSMENT — ENCOUNTER SYMPTOMS
CONSTIPATION: 0
DIARRHEA: 1
WHEEZING: 0
CHEST TIGHTNESS: 0
EYES NEGATIVE: 1
BACK PAIN: 0
VOMITING: 0
COUGH: 0
ABDOMINAL PAIN: 0
NAUSEA: 0
SHORTNESS OF BREATH: 0

## 2022-08-06 ENCOUNTER — HOSPITAL ENCOUNTER (OUTPATIENT)
Age: 52
Discharge: HOME | End: 2022-08-06
Payer: COMMERCIAL

## 2022-08-06 DIAGNOSIS — K21.9 GASTROESOPHAGEAL REFLUX DISEASE WITHOUT ESOPHAGITIS: ICD-10-CM

## 2022-08-06 DIAGNOSIS — I10 HYPERTENSION, ESSENTIAL: ICD-10-CM

## 2022-08-06 DIAGNOSIS — R73.03 PREDIABETES: ICD-10-CM

## 2022-08-06 DIAGNOSIS — E78.2 HYPERLIPIDEMIA, MIXED: ICD-10-CM

## 2022-08-06 LAB
CREATININE URINE: 85.1 MG/DL (ref 39–259)
MICROALBUMIN UR-MCNC: <1.2 MG/DL
MICROALBUMIN/CREAT UR-RTO: NORMAL MG/G (ref 0–30)

## 2022-08-06 PROCEDURE — 83735 ASSAY OF MAGNESIUM: CPT

## 2022-08-06 PROCEDURE — 82043 UR ALBUMIN QUANTITATIVE: CPT

## 2022-08-06 PROCEDURE — 82570 ASSAY OF URINE CREATININE: CPT

## 2022-08-06 PROCEDURE — 36415 COLL VENOUS BLD VENIPUNCTURE: CPT

## 2022-08-06 PROCEDURE — 83036 HEMOGLOBIN GLYCOSYLATED A1C: CPT

## 2022-08-06 PROCEDURE — 80053 COMPREHEN METABOLIC PANEL: CPT

## 2022-08-06 PROCEDURE — 80061 LIPID PANEL: CPT

## 2022-08-07 LAB
A/G RATIO: 1.6 (ref 1.1–2.2)
ALBUMIN SERPL-MCNC: 4 G/DL (ref 3.4–5)
ALP BLD-CCNC: 114 U/L (ref 40–129)
ALT SERPL-CCNC: 19 U/L (ref 10–40)
ANION GAP SERPL CALCULATED.3IONS-SCNC: 10 MMOL/L (ref 3–16)
AST SERPL-CCNC: 19 U/L (ref 15–37)
BILIRUB SERPL-MCNC: 0.3 MG/DL (ref 0–1)
BUN BLDV-MCNC: 16 MG/DL (ref 7–20)
CALCIUM SERPL-MCNC: 8.6 MG/DL (ref 8.3–10.6)
CHLORIDE BLD-SCNC: 103 MMOL/L (ref 99–110)
CHOLESTEROL, TOTAL: 129 MG/DL (ref 0–199)
CO2: 28 MMOL/L (ref 21–32)
CREAT SERPL-MCNC: 0.9 MG/DL (ref 0.9–1.3)
ESTIMATED AVERAGE GLUCOSE: 119.8 MG/DL
GFR AFRICAN AMERICAN: >60
GFR NON-AFRICAN AMERICAN: >60
GLUCOSE BLD-MCNC: 84 MG/DL (ref 70–99)
HBA1C MFR BLD: 5.8 %
HDLC SERPL-MCNC: 60 MG/DL (ref 40–60)
LDL CHOLESTEROL CALCULATED: 55 MG/DL
MAGNESIUM: 1.8 MG/DL (ref 1.8–2.4)
POTASSIUM SERPL-SCNC: 4.4 MMOL/L (ref 3.5–5.1)
SODIUM BLD-SCNC: 141 MMOL/L (ref 136–145)
TOTAL PROTEIN: 6.5 G/DL (ref 6.4–8.2)
TRIGL SERPL-MCNC: 68 MG/DL (ref 0–150)
VLDLC SERPL CALC-MCNC: 14 MG/DL

## 2022-08-11 ENCOUNTER — OFFICE VISIT (OUTPATIENT)
Dept: PRIMARY CARE CLINIC | Age: 52
End: 2022-08-11
Payer: COMMERCIAL

## 2022-08-11 VITALS
WEIGHT: 157 LBS | HEIGHT: 63 IN | OXYGEN SATURATION: 95 % | HEART RATE: 76 BPM | SYSTOLIC BLOOD PRESSURE: 140 MMHG | BODY MASS INDEX: 27.82 KG/M2 | DIASTOLIC BLOOD PRESSURE: 62 MMHG | TEMPERATURE: 98.1 F

## 2022-08-11 DIAGNOSIS — M50.30 DDD (DEGENERATIVE DISC DISEASE), CERVICAL: ICD-10-CM

## 2022-08-11 DIAGNOSIS — G56.91 UNSPECIFIED MONONEUROPATHY OF RIGHT UPPER LIMB: ICD-10-CM

## 2022-08-11 DIAGNOSIS — I10 HYPERTENSION, ESSENTIAL: Primary | ICD-10-CM

## 2022-08-11 DIAGNOSIS — R73.03 PREDIABETES: ICD-10-CM

## 2022-08-11 DIAGNOSIS — J44.9 CHRONIC OBSTRUCTIVE PULMONARY DISEASE, UNSPECIFIED COPD TYPE (HCC): ICD-10-CM

## 2022-08-11 DIAGNOSIS — E78.2 HYPERLIPIDEMIA, MIXED: ICD-10-CM

## 2022-08-11 DIAGNOSIS — K21.9 GASTROESOPHAGEAL REFLUX DISEASE WITHOUT ESOPHAGITIS: ICD-10-CM

## 2022-08-11 PROCEDURE — 3017F COLORECTAL CA SCREEN DOC REV: CPT | Performed by: STUDENT IN AN ORGANIZED HEALTH CARE EDUCATION/TRAINING PROGRAM

## 2022-08-11 PROCEDURE — G8427 DOCREV CUR MEDS BY ELIG CLIN: HCPCS | Performed by: STUDENT IN AN ORGANIZED HEALTH CARE EDUCATION/TRAINING PROGRAM

## 2022-08-11 PROCEDURE — 1036F TOBACCO NON-USER: CPT | Performed by: STUDENT IN AN ORGANIZED HEALTH CARE EDUCATION/TRAINING PROGRAM

## 2022-08-11 PROCEDURE — G8419 CALC BMI OUT NRM PARAM NOF/U: HCPCS | Performed by: STUDENT IN AN ORGANIZED HEALTH CARE EDUCATION/TRAINING PROGRAM

## 2022-08-11 PROCEDURE — 99214 OFFICE O/P EST MOD 30 MIN: CPT | Performed by: STUDENT IN AN ORGANIZED HEALTH CARE EDUCATION/TRAINING PROGRAM

## 2022-08-11 PROCEDURE — 3023F SPIROM DOC REV: CPT | Performed by: STUDENT IN AN ORGANIZED HEALTH CARE EDUCATION/TRAINING PROGRAM

## 2022-08-11 ASSESSMENT — PATIENT HEALTH QUESTIONNAIRE - PHQ9
6. FEELING BAD ABOUT YOURSELF - OR THAT YOU ARE A FAILURE OR HAVE LET YOURSELF OR YOUR FAMILY DOWN: 0
9. THOUGHTS THAT YOU WOULD BE BETTER OFF DEAD, OR OF HURTING YOURSELF: 0
SUM OF ALL RESPONSES TO PHQ QUESTIONS 1-9: 0
3. TROUBLE FALLING OR STAYING ASLEEP: 0
SUM OF ALL RESPONSES TO PHQ QUESTIONS 1-9: 0
5. POOR APPETITE OR OVEREATING: 0
1. LITTLE INTEREST OR PLEASURE IN DOING THINGS: 0
2. FEELING DOWN, DEPRESSED OR HOPELESS: 0
4. FEELING TIRED OR HAVING LITTLE ENERGY: 0
7. TROUBLE CONCENTRATING ON THINGS, SUCH AS READING THE NEWSPAPER OR WATCHING TELEVISION: 0
8. MOVING OR SPEAKING SO SLOWLY THAT OTHER PEOPLE COULD HAVE NOTICED. OR THE OPPOSITE, BEING SO FIGETY OR RESTLESS THAT YOU HAVE BEEN MOVING AROUND A LOT MORE THAN USUAL: 0
SUM OF ALL RESPONSES TO PHQ9 QUESTIONS 1 & 2: 0
SUM OF ALL RESPONSES TO PHQ QUESTIONS 1-9: 0
SUM OF ALL RESPONSES TO PHQ QUESTIONS 1-9: 0
10. IF YOU CHECKED OFF ANY PROBLEMS, HOW DIFFICULT HAVE THESE PROBLEMS MADE IT FOR YOU TO DO YOUR WORK, TAKE CARE OF THINGS AT HOME, OR GET ALONG WITH OTHER PEOPLE: 0

## 2022-08-11 ASSESSMENT — ENCOUNTER SYMPTOMS
EYES NEGATIVE: 1
CONSTIPATION: 0
VOMITING: 0
ABDOMINAL PAIN: 0
DIARRHEA: 1
BACK PAIN: 0
COUGH: 0
ALLERGIC/IMMUNOLOGIC NEGATIVE: 1
SHORTNESS OF BREATH: 0
WHEEZING: 0
NAUSEA: 0

## 2022-08-11 ASSESSMENT — ANXIETY QUESTIONNAIRES
5. BEING SO RESTLESS THAT IT IS HARD TO SIT STILL: 0
GAD7 TOTAL SCORE: 0
1. FEELING NERVOUS, ANXIOUS, OR ON EDGE: 0
IF YOU CHECKED OFF ANY PROBLEMS ON THIS QUESTIONNAIRE, HOW DIFFICULT HAVE THESE PROBLEMS MADE IT FOR YOU TO DO YOUR WORK, TAKE CARE OF THINGS AT HOME, OR GET ALONG WITH OTHER PEOPLE: NOT DIFFICULT AT ALL
3. WORRYING TOO MUCH ABOUT DIFFERENT THINGS: 0
6. BECOMING EASILY ANNOYED OR IRRITABLE: 0
7. FEELING AFRAID AS IF SOMETHING AWFUL MIGHT HAPPEN: 0
4. TROUBLE RELAXING: 0
2. NOT BEING ABLE TO STOP OR CONTROL WORRYING: 0

## 2022-08-11 NOTE — PROGRESS NOTES
[address] and [facility]                                             [address] [facility], [address] [place] [address]        Phone: [phone]      Name:  Gema Rodarte  :    1970    Consultants:   Patient Care Team:  Edward Meyer DO as PCP - General (Family Medicine)  Naldo Gann DO as PCP - [facility] Empaneled Provider  Zora Vasquez MD as Consulting Physician (Pulmonology)  ANAI Hammer CNP as Consulting Physician (Nurse Practitioner)  Parish Stauffer MD as Consulting Physician (Orthopedic Surgery)  Zora Vasquez MD as Consulting Physician (Pulmonology)    Chief Complaint:     Gema Rodarte is a 45 y.o. male  who presents today for an established patient care visit with Personalized Prevention Plan Services as noted below. HPI:     Patient is a 57-year-old male with past medical history of COPD, hypertension, hyperlipidemia, GERD, prediabetes, degenerative disc disease, neuropathy. Patient is here for 1 week follow-up on recent lab results and reevaluation of blood pressure. Hypertension  Patient currently taking amlodipine 5 and was recently started on lisinopril 5 mg. Patient denies any side effects like dry cough and swelling of the lips or tongue. In office BP today was 140/62. Patient states that his blood pressure usually runs around the upper limit of normal previous guidelines 140/90. However blood pressure results at the beginning of this year were much lower. Patient states that he currently does home BP readings nightly and keeps a log of them. COPD  Patient continues to take albuterol, DuoNeb, Trelegy-Ellipta. Patient states he is currently being followed by Dr. Brandy Gan and his next visit is 2023. Patient denies productive cough or sputum, shortness of breath, wheezing.   Patient previously had a 35-pack-year history and stopped smoking in 2019. Hyperlipidemia  Patient currently taking atorvastatin 20. Patient denies any myositis type pain. Lipid panel from 8/6/2022 was within normal limits. GERD  Patient continues to take pantoprazole. Magnesium level checked on 8/6/2022 was normal.  Patient was advised on dietary changes at last visit including cutting out red meat. Patient states that his diet has started to improve in the last week including more vegetables. Prediabetes  Last A1c on 8/6/2022 was 5.8%. Patient states that he is A1c usually trends around this number. Patient advised on last visit on dietary changes and has already started to make dose since the last visit. Degenerative disc disease/neuropathy  Patient has history of C-spine degenerative disc disease treated with prior C7-T1 SOHA-TF injections in 2018. Patient had follow-up C-spine fusion in 2019. Patient states that he has had residual right arm numbness and tingling since then. Patient states that he does have flareups but overall symptoms are manageable. Patient states that his last EMG was in 2017 or 2018. At last visit patient was started on Mobic 15 mg but patient states that this is not currently helping his symptoms.           Patient Active Problem List   Diagnosis    Hypertension, essential    Gastroesophageal reflux disease without esophagitis    COPD (chronic obstructive pulmonary disease) (HCC)    Prediabetes    Hyperlipidemia, mixed    Esophageal dysphagia    DDD (degenerative disc disease), cervical    COPD exacerbation ([facility])    Acute respiratory failure with hypoxia ([facility])    Marijuana use, episodic         Past Medical History:    Past Medical History:   Diagnosis Date    Cervical disc herniation     COPD (chronic obstructive pulmonary disease) ([facility] [address]     Folate deficiency 01/2013    GERD (gastroesophageal reflux disease)     Hyperlipidemia 2/14    Hypertriglyceridemia, Good HDL    Hypertension 12/12: age 37    Prediabetes 11/2016 Vitamin D deficiency 01/2013    resolved       Past Surgical History:  Past Surgical History:   Procedure Laterality Date    ANKLE FRACTURE SURGERY Left 1998    BRONCHOSCOPY  12-    CERVICAL FUSION N/A 1/16/2019    ANTERIOR CERVICAL DISCECTOMY AND FUSION WITH ALLOGRAFT, MEDTRONICS AND EVOKES  CPT CODE - 04580 performed by Antonia Pulido MD at [place] [address]  2/13    MA Deandre Sami [address] 82 DX/THER SBST INTRLMNR CRV/THRC W/IMG GDN Right 11/19/2018    RIGHT CERVICAL SEVEN THORACIC EPIDURAL STEROID INJECTION SITE CONFIRMED BY FLUOROSCOPY performed by Patricia Stinson MD at [facility].:  Prior to Visit Medications    Medication Sig Taking? Authorizing Provider   meloxicam (MOBIC) 15 MG tablet Take 1 tablet by mouth daily as needed for Pain Yes Ramonita Rosado, DO   lisinopril (PRINIVIL;ZESTRIL) 5 MG tablet Take 1 tablet by mouth in the morning.  Yes Ramonita Rosado, DO   albuterol sulfate HFA (VENTOLIN HFA) 108 (90 Base) MCG/ACT inhaler Inhale 2 puffs into the lungs 4 times daily as needed for Wheezing Yes Ramonita Rosado, DO   atorvastatin (LIPITOR) 20 MG tablet TAKE ONE TABLET BY MOUTH DAILY Yes Sarina Gomez, DO   amLODIPine (NORVASC) 5 MG tablet TAKE ONE TABLET BY MOUTH DAILY Yes Sarina Gomez DO   TRELEGY ELLIPTA 100-62.5-25 MCG/INH AEPB INHALE ONE PUFF BY MOUTH DAILY Yes Dorita Orozco PA-C   albuterol sulfate  (90 Base) MCG/ACT inhaler INHALE TWO PUFFS BY MOUTH FOUR TIMES A DAY AS NEEDED FOR WHEEZING Yes Dorita Orozco PA-C   pantoprazole (PROTONIX) 40 MG tablet TAKE ONE TABLET BY MOUTH DAILY Yes Sarina Gomez DO   ipratropium-albuterol (DUONEB) 0.5-2.5 (3) MG/3ML SOLN nebulizer solution Inhale 3 mLs into the lungs every 4 hours as needed for Shortness of Breath Yes Dorita Orozco PA-C   folic acid (FOLVITE) 1 MG tablet TAKE ONE TABLET BY MOUTH DAILY Yes Sarina Gomez,        Allergies:    No known allergies    Family History: Problem Relation Age of Onset    COPD Mother 39    COPD Father 39         Health Maintenance Completed:  Health Maintenance   Topic Date Due    Low dose CT lung screening  Never done    Pneumococcal 0-64 years Vaccine (2 - PCV) 08/11/2023 (Originally 8/10/2016)    Shingles vaccine (1 of 2) 08/11/2024 (Originally 1/1/2018)    COVID-19 Vaccine (1) 08/11/2024 (Originally 1970)    Flu vaccine (1) 09/01/2022    Annual Wellness Visit (AWV)  11/30/2022    Depression Screen  08/04/2023    A1C test (Diabetic or Prediabetic)  08/06/2023    Lipids  08/06/2023    DTaP/Tdap/Td vaccine (2 - Td or Tdap) 02/13/2024    Colorectal Cancer Screen  04/03/2028    Hepatitis C screen  Completed    HIV screen  Completed    Hepatitis A vaccine  Aged Out    Hepatitis B vaccine  Aged Out    Hib vaccine  Aged Out    Meningococcal (ACWY) vaccine  Aged [employer] History   Administered Date(s) Administered    Influenza Nasal 11/10/2015    Influenza Virus Vaccine 02/13/2014, 11/07/2014    Pneumococcal Polysaccharide (Foughpfln26) 08/10/2015    Tdap (Boostrix, Adacel) 02/13/2014         Review of Systems:  Review of Systems   Constitutional:  Negative for chills, fatigue and fever. HENT: Negative. Eyes: Negative. Respiratory:  Negative for cough, shortness of breath and wheezing. Cardiovascular:  Negative for chest pain and palpitations. Gastrointestinal:  Positive for diarrhea. Negative for abdominal pain, constipation, nausea and vomiting. Endocrine: Negative. Genitourinary:  Negative for decreased urine volume, difficulty urinating and dysuria. Musculoskeletal:  Negative for arthralgias, back pain and myalgias. Skin: Negative. Allergic/Immunologic: Negative. Neurological:  Positive for numbness and headaches. Negative for dizziness, seizures and weakness. Psychiatric/Behavioral: Negative.         Physical Exam:   Vitals:    08/11/22 1512   BP: (!) 140/62   Site: Left Upper Arm   Position: Sitting   Cuff Size: Medium Adult   Pulse: 76   Temp: 98.1 °F (36.7 °C)   TempSrc: Temporal   SpO2: 95%   Weight: 157 lb (71.2 kg)   Height: 5' 3\" (1.6 m)     Body mass index is 27.81 kg/m². Wt Readings from Last 3 Encounters:   08/11/22 157 lb (71.2 kg)   08/04/22 156 lb (70.8 kg)   06/16/22 154 lb 9.6 oz (70.1 kg)       BP Readings from Last 3 Encounters:   08/11/22 (!) 140/62   08/04/22 (!) 150/76   06/16/22 116/74       Physical Exam  Constitutional:       Appearance: Normal appearance. HENT:      Head: Normocephalic and atraumatic. Eyes:      Extraocular Movements: Extraocular movements intact. Conjunctiva/sclera: Conjunctivae normal.      Pupils: Pupils are equal, round, and reactive to light. Cardiovascular:      Rate and Rhythm: Normal rate and regular rhythm. Pulses: Normal pulses. Heart sounds: No murmur heard. No friction rub. No gallop. Pulmonary:      Effort: Pulmonary effort is normal. No respiratory distress. Breath sounds: Normal breath sounds. No stridor. No wheezing, rhonchi or rales. Abdominal:      General: There is no distension. Palpations: Abdomen is soft. There is no mass. Hernia: No hernia is present. Musculoskeletal:         General: No deformity or signs of injury. Normal range of motion. Cervical back: Neck supple. Right lower leg: No edema. Left lower leg: No edema. Skin:     General: Skin is warm and dry. Capillary Refill: Capillary refill takes less than 2 seconds. Neurological:      General: No focal deficit present. Mental Status: He is alert. Mental status is at baseline. Sensory: Sensory deficit present.    Psychiatric:         Mood and Affect: Mood normal.         Behavior: Behavior normal.            Lab Review:   Hospital Outpatient Visit on 08/06/2022   Component Date Value    Cholesterol, Total 08/06/2022 129     Triglycerides 08/06/2022 68     HDL 08/06/2022 60     LDL Calculated 08/06/2022 55 VLDL Cholesterol Calcula* 08/06/2022 14     Sodium 08/06/2022 141     Potassium 08/06/2022 4.4     Chloride 08/06/2022 103     CO2 08/06/2022 28     Anion Gap 08/06/2022 10     Glucose 08/06/2022 84     BUN 08/06/2022 16     Creatinine 08/06/2022 0.9     GFR Non- 08/06/2022 >60     GFR  08/06/2022 >60     Calcium 08/06/2022 8.6     Total Protein 08/06/2022 6.5     Albumin 08/06/2022 4.0     Albumin/Globulin Ratio 08/06/2022 1.6     Total Bilirubin 08/06/2022 0.3     Alkaline Phosphatase 08/06/2022 114     ALT 08/06/2022 19     AST 08/06/2022 19     Magnesium 08/06/2022 1.80     Microalbumin, Random Uri* 08/06/2022 <1.20     Creatinine, Ur 08/06/2022 85.1     Microalbumin Creatinine * 08/06/2022 see below     Hemoglobin A1C 08/06/2022 5.8     eAG 08/06/2022 119.8    Office Visit on 06/16/2022   Component Date Value    Cholesterol, Total 06/16/2022 160     Triglycerides 06/16/2022 115     HDL 06/16/2022 60     LDL Calculated 06/16/2022 77     VLDL Cholesterol Calcula* 06/16/2022 23     Sodium 06/16/2022 141     Potassium 06/16/2022 4.4     Chloride 06/16/2022 101     CO2 06/16/2022 24     Anion Gap 06/16/2022 16     Glucose 06/16/2022 89     BUN 06/16/2022 15     Creatinine 06/16/2022 1.1     GFR Non- 06/16/2022 >60     GFR  06/16/2022 >60     Calcium 06/16/2022 9.9     Total Protein 06/16/2022 7.3     Albumin 06/16/2022 4.7     Albumin/Globulin Ratio 06/16/2022 1.8     Total Bilirubin 06/16/2022 0.4     Alkaline Phosphatase 06/16/2022 101     ALT 06/16/2022 16     AST 06/16/2022 22     PSA 06/16/2022 0.28           Assessment/Plan:  Maria Alejandra Montiel was seen today for follow-up.     Diagnoses and all orders for this visit:    Hypertension, essential    Chronic obstructive pulmonary disease, unspecified COPD type ([facility] [address]    Hyperlipidemia, mixed    Gastroesophageal reflux disease without esophagitis    Prediabetes    DDD (degenerative disc disease), cervical  - EMG; Future    Unspecified mononeuropathy of right upper limb   -     EMG; Future      Patient is a 73-year-old male with past medical history of COPD, hypertension, hyperlipidemia, GERD, prediabetes, degenerative disc disease, neuropathy. Patient is here for 1 week follow-up on recent lab results and reevaluation of blood pressure. Hypertension  Not controlled not at goal  Patient recently started on lisinopril 5 mg daily in addition to his already dosage of 5 mg of amlodipine daily. Continue this current regiment. Target goal realistically <130/90. Advised patient to continue nightly readings of BP. Return to clinic in 4 weeks to reevaluate. Consider, if BP is unchanged, bumping up to lisinopril 10 mg daily. COPD  Well-controlled, at goal  Patient currently being followed by Dr. Brandy Gan. Patient will follow up with them on February 22, 2023. Will follow the recommendation but patient should continue albuterol, DuoNeb, Trelegy-Ellipta inhalers. Patient can follow-up in 6 months or sooner if there is worsening of symptoms. Hyperlipidemia  Well-controlled, at goal  Recent lipid panel on 8/6/2022 was normal.  Patient currently taking atorvastatin 20 mg daily. Patient should continue this medication regiment  Return to clinic in 6 months for repeat labs. GERD  Well-controlled, at goal  Patient currently taking unspecified dosage of pantoprazole. Advised patient to continue this regiment. Return to clinic in 6 months or sooner if symptomatic. Prediabetes  Well-controlled, close to goal.  A1c at 5.8%. Patient currently managing with diet and exercise. Advised patient on nutrition and dietary changes today. Return to clinic in 6 months for repeat of labs. Degenerative disc disease/neuropathy  Not controlled, not at goal  Patient was previously prescribed Mobic 15 mg without resolution of symptoms. Patient advised that he he can stop medication.   No indication for gabapentin or Lyrica at this time as patient states sx are manageable. Will order EMG today. Patient states that his last one was in 2017 or 2018 before his SOHA-TF injection. Patient stated he may have difficulty paying for this depending on insurance coverage. Would like for patient to follow-up in 1 month if EMG completed before then if not, within the next 3 months. Healthcare maintenance  1. Patient to complete low-dose CT on 8/13/2022. Health Maintenance Due:  Health Maintenance Due   Topic Date Due    Low dose CT lung screening  Never done          Health care decision maker:  <72years old      Health Maintenance: (USPSTF Recommendations)  (M) Prostate Cancer Screen: (54-79 yo discuss benefits/harm, does not recommend testing PSA in men >75 yo (D):   (M) AAA Screen: (men 73-67 yo who has ever smoked (B), consider in nonsmokers if high risk):  CRC/Colonoscopy Screening: (adults 39-53 (B), 50-75 (A))  Lung Ca Screening: Annual LDCT (+smoker age 49-80, smoked within 15 years, total of 20 pack yr history (B)):  DEXA Screen: (women >65 and older, <65 if at risk/postmenopausal (B))  HIV Screen: (16-65 yr old, and all pregnant patients (A)): Hep C Screen: (18-79 yr old (B)):  HCC Screen: (all pts with cirrhosis and high risk Hep B (US q6 mo)):  Immunizations:    RTC:  Return in about 4 weeks (around 9/8/2022). EMR Dragon/transcription disclaimer:  Much of this encounter note is electronic transcription/translation of spoken language to printed texts. The electronic translation of spoken language may be erroneous, or at times, nonsensical words or phrases may be inadvertently transcribed.   Although I have reviewed the note for such errors, some may still exist.

## 2022-08-13 ENCOUNTER — HOSPITAL ENCOUNTER (OUTPATIENT)
Dept: CT IMAGING | Age: 52
Discharge: HOME | End: 2022-08-13
Payer: COMMERCIAL

## 2022-08-13 DIAGNOSIS — Z87.891 PERSONAL HISTORY OF TOBACCO USE: ICD-10-CM

## 2022-08-13 PROCEDURE — 71271 CT THORAX LUNG CANCER SCR C-: CPT

## 2022-08-16 DIAGNOSIS — K21.9 GASTROESOPHAGEAL REFLUX DISEASE WITHOUT ESOPHAGITIS: ICD-10-CM

## 2022-08-16 RX ORDER — PANTOPRAZOLE SODIUM 40 MG/1
TABLET, DELAYED RELEASE ORAL
Qty: 90 TABLET | Refills: 1 | Status: SHIPPED | OUTPATIENT
Start: 2022-08-16

## 2022-08-16 NOTE — TELEPHONE ENCOUNTER
Refill Request       Last Seen: Last Seen Department: 6/16/2022  Last Seen by PCP: 6/16/2022    Last Written: 2/18, #90, 1 refill Dr. Mey Browne    Next Appointment:   Future Appointments   Date Time Provider Simone Marcelo   2/22/2023  9:30 AM Saeed Dutton MD [facility]             Requested Prescriptions     Pending Prescriptions Disp Refills    pantoprazole (PROTONIX) 40 MG tablet [Pharmacy Med Name: PANTOPRAZOLE SOD DR 40 MG TAB] 90 tablet 1     Sig: TAKE ONE TABLET BY MOUTH DAILY

## 2022-08-18 ENCOUNTER — APPOINTMENT (OUTPATIENT)
Dept: GENERAL RADIOLOGY | Age: 52
DRG: 392 | End: 2022-08-18
Payer: COMMERCIAL

## 2022-08-18 ENCOUNTER — APPOINTMENT (OUTPATIENT)
Dept: CT IMAGING | Age: 52
DRG: 392 | End: 2022-08-18
Payer: COMMERCIAL

## 2022-08-18 ENCOUNTER — HOSPITAL ENCOUNTER (INPATIENT)
Age: 52
LOS: 2 days | Discharge: HOME | DRG: 392 | End: 2022-08-20
Attending: EMERGENCY MEDICINE | Admitting: INTERNAL MEDICINE
Payer: COMMERCIAL

## 2022-08-18 DIAGNOSIS — K57.20 DIVERTICULITIS OF COLON WITH PERFORATION: Primary | ICD-10-CM

## 2022-08-18 DIAGNOSIS — N28.1 CYST OF RIGHT KIDNEY: ICD-10-CM

## 2022-08-18 DIAGNOSIS — D72.829 LEUKOCYTOSIS, UNSPECIFIED TYPE: ICD-10-CM

## 2022-08-18 PROBLEM — K57.92 ACUTE DIVERTICULITIS OF INTESTINE: Status: ACTIVE | Noted: 2022-08-18

## 2022-08-18 LAB
A/G RATIO: 1.3 (ref 1.1–2.2)
ALBUMIN SERPL-MCNC: 4.3 G/DL (ref 3.4–5)
ALP BLD-CCNC: 119 U/L (ref 40–129)
ALT SERPL-CCNC: 11 U/L (ref 10–40)
ANION GAP SERPL CALCULATED.3IONS-SCNC: 11 MMOL/L (ref 3–16)
AST SERPL-CCNC: 14 U/L (ref 15–37)
BACTERIA: ABNORMAL /HPF
BASOPHILS ABSOLUTE: 0.1 K/UL (ref 0–0.2)
BASOPHILS RELATIVE PERCENT: 0.6 %
BILIRUB SERPL-MCNC: 0.6 MG/DL (ref 0–1)
BILIRUBIN URINE: NEGATIVE
BLOOD, URINE: ABNORMAL
BUN BLDV-MCNC: 11 MG/DL (ref 7–20)
CALCIUM SERPL-MCNC: 9.6 MG/DL (ref 8.3–10.6)
CHLORIDE BLD-SCNC: 98 MMOL/L (ref 99–110)
CLARITY: ABNORMAL
CO2: 28 MMOL/L (ref 21–32)
COLOR: YELLOW
CREAT SERPL-MCNC: 1 MG/DL (ref 0.9–1.3)
EKG ATRIAL RATE: 68 BPM
EKG DIAGNOSIS: NORMAL
EKG P AXIS: 66 DEGREES
EKG P-R INTERVAL: 200 MS
EKG Q-T INTERVAL: 386 MS
EKG QRS DURATION: 96 MS
EKG QTC CALCULATION (BAZETT): 410 MS
EKG R AXIS: 34 DEGREES
EKG T AXIS: 70 DEGREES
EKG VENTRICULAR RATE: 68 BPM
EOSINOPHILS ABSOLUTE: 0.1 K/UL (ref 0–0.6)
EOSINOPHILS RELATIVE PERCENT: 0.5 %
EPITHELIAL CELLS, UA: ABNORMAL /HPF (ref 0–5)
GFR AFRICAN AMERICAN: >60
GFR NON-AFRICAN AMERICAN: >60
GLUCOSE BLD-MCNC: 106 MG/DL (ref 70–99)
GLUCOSE BLD-MCNC: 120 MG/DL (ref 70–99)
GLUCOSE URINE: NEGATIVE MG/DL
HCT VFR BLD CALC: 42.4 % (ref 40.5–52.5)
HEMOGLOBIN: 14.4 G/DL (ref 13.5–17.5)
INFLUENZA A: NOT DETECTED
INFLUENZA B: NOT DETECTED
KETONES, URINE: NEGATIVE MG/DL
LACTIC ACID: 0.7 MMOL/L (ref 0.4–2)
LEUKOCYTE ESTERASE, URINE: NEGATIVE
LIPASE: 14 U/L (ref 13–60)
LYMPHOCYTES ABSOLUTE: 1.2 K/UL (ref 1–5.1)
LYMPHOCYTES RELATIVE PERCENT: 7.7 %
MCH RBC QN AUTO: 31.8 PG (ref 26–34)
MCHC RBC AUTO-ENTMCNC: 33.9 G/DL (ref 31–36)
MCV RBC AUTO: 93.8 FL (ref 80–100)
MICROSCOPIC EXAMINATION: YES
MONOCYTES ABSOLUTE: 1.2 K/UL (ref 0–1.3)
MONOCYTES RELATIVE PERCENT: 8.1 %
NEUTROPHILS ABSOLUTE: 12.7 K/UL (ref 1.7–7.7)
NEUTROPHILS RELATIVE PERCENT: 83.1 %
NITRITE, URINE: NEGATIVE
PDW BLD-RTO: 13.1 % (ref 12.4–15.4)
PERFORMED ON: ABNORMAL
PH UA: 6.5 (ref 5–8)
PLATELET # BLD: 245 K/UL (ref 135–450)
PMV BLD AUTO: 7.9 FL (ref 5–10.5)
POTASSIUM REFLEX MAGNESIUM: 3.6 MMOL/L (ref 3.5–5.1)
PROTEIN UA: ABNORMAL MG/DL
RBC # BLD: 4.52 M/UL (ref 4.2–5.9)
RBC UA: ABNORMAL /HPF (ref 0–4)
SARS-COV-2 RNA, RT PCR: NOT DETECTED
SODIUM BLD-SCNC: 137 MMOL/L (ref 136–145)
SPECIFIC GRAVITY UA: <=1.005 (ref 1–1.03)
TOTAL PROTEIN: 7.7 G/DL (ref 6.4–8.2)
TROPONIN: <0.01 NG/ML
URINE REFLEX TO CULTURE: ABNORMAL
URINE TYPE: ABNORMAL
UROBILINOGEN, URINE: 0.2 E.U./DL
WBC # BLD: 15.3 K/UL (ref 4–11)
WBC UA: ABNORMAL /HPF (ref 0–5)

## 2022-08-18 PROCEDURE — 2580000003 HC RX 258: Performed by: NURSE PRACTITIONER

## 2022-08-18 PROCEDURE — 2580000003 HC RX 258: Performed by: REGISTERED NURSE

## 2022-08-18 PROCEDURE — 81001 URINALYSIS AUTO W/SCOPE: CPT

## 2022-08-18 PROCEDURE — 6370000000 HC RX 637 (ALT 250 FOR IP): Performed by: NURSE PRACTITIONER

## 2022-08-18 PROCEDURE — G0378 HOSPITAL OBSERVATION PER HR: HCPCS

## 2022-08-18 PROCEDURE — 93010 ELECTROCARDIOGRAM REPORT: CPT | Performed by: INTERNAL MEDICINE

## 2022-08-18 PROCEDURE — 99222 1ST HOSP IP/OBS MODERATE 55: CPT | Performed by: NURSE PRACTITIONER

## 2022-08-18 PROCEDURE — 96375 TX/PRO/DX INJ NEW DRUG ADDON: CPT

## 2022-08-18 PROCEDURE — 84484 ASSAY OF TROPONIN QUANT: CPT

## 2022-08-18 PROCEDURE — 94761 N-INVAS EAR/PLS OXIMETRY MLT: CPT

## 2022-08-18 PROCEDURE — 6360000002 HC RX W HCPCS: Performed by: NURSE PRACTITIONER

## 2022-08-18 PROCEDURE — 85025 COMPLETE CBC W/AUTO DIFF WBC: CPT

## 2022-08-18 PROCEDURE — 87636 SARSCOV2 & INF A&B AMP PRB: CPT

## 2022-08-18 PROCEDURE — 6360000002 HC RX W HCPCS: Performed by: REGISTERED NURSE

## 2022-08-18 PROCEDURE — 2500000003 HC RX 250 WO HCPCS: Performed by: NURSE PRACTITIONER

## 2022-08-18 PROCEDURE — 96361 HYDRATE IV INFUSION ADD-ON: CPT

## 2022-08-18 PROCEDURE — 6370000000 HC RX 637 (ALT 250 FOR IP): Performed by: INTERNAL MEDICINE

## 2022-08-18 PROCEDURE — 80053 COMPREHEN METABOLIC PANEL: CPT

## 2022-08-18 PROCEDURE — 2500000003 HC RX 250 WO HCPCS: Performed by: REGISTERED NURSE

## 2022-08-18 PROCEDURE — 74177 CT ABD & PELVIS W/CONTRAST: CPT

## 2022-08-18 PROCEDURE — 36415 COLL VENOUS BLD VENIPUNCTURE: CPT

## 2022-08-18 PROCEDURE — 6360000004 HC RX CONTRAST MEDICATION: Performed by: REGISTERED NURSE

## 2022-08-18 PROCEDURE — 99285 EMERGENCY DEPT VISIT HI MDM: CPT

## 2022-08-18 PROCEDURE — 96365 THER/PROPH/DIAG IV INF INIT: CPT

## 2022-08-18 PROCEDURE — 83605 ASSAY OF LACTIC ACID: CPT

## 2022-08-18 PROCEDURE — 96367 TX/PROPH/DG ADDL SEQ IV INF: CPT

## 2022-08-18 PROCEDURE — 93005 ELECTROCARDIOGRAM TRACING: CPT | Performed by: REGISTERED NURSE

## 2022-08-18 PROCEDURE — 1200000000 HC SEMI PRIVATE

## 2022-08-18 PROCEDURE — 94640 AIRWAY INHALATION TREATMENT: CPT

## 2022-08-18 PROCEDURE — 99221 1ST HOSP IP/OBS SF/LOW 40: CPT | Performed by: SURGERY

## 2022-08-18 PROCEDURE — 96366 THER/PROPH/DIAG IV INF ADDON: CPT

## 2022-08-18 PROCEDURE — 83690 ASSAY OF LIPASE: CPT

## 2022-08-18 RX ORDER — ATORVASTATIN CALCIUM 10 MG/1
20 TABLET, FILM COATED ORAL DAILY
Status: DISCONTINUED | OUTPATIENT
Start: 2022-08-18 | End: 2022-08-20 | Stop reason: HOSPADM

## 2022-08-18 RX ORDER — FOLIC ACID 1 MG/1
1 TABLET ORAL DAILY
Status: DISCONTINUED | OUTPATIENT
Start: 2022-08-18 | End: 2022-08-20 | Stop reason: HOSPADM

## 2022-08-18 RX ORDER — MORPHINE SULFATE 4 MG/ML
4 INJECTION, SOLUTION INTRAMUSCULAR; INTRAVENOUS
Status: COMPLETED | OUTPATIENT
Start: 2022-08-18 | End: 2022-08-18

## 2022-08-18 RX ORDER — ALBUTEROL SULFATE 90 UG/1
2 AEROSOL, METERED RESPIRATORY (INHALATION) 4 TIMES DAILY PRN
Status: DISCONTINUED | OUTPATIENT
Start: 2022-08-18 | End: 2022-08-20 | Stop reason: HOSPADM

## 2022-08-18 RX ORDER — PANTOPRAZOLE SODIUM 40 MG/1
40 TABLET, DELAYED RELEASE ORAL DAILY
Status: DISCONTINUED | OUTPATIENT
Start: 2022-08-18 | End: 2022-08-20 | Stop reason: HOSPADM

## 2022-08-18 RX ORDER — ONDANSETRON 2 MG/ML
4 INJECTION INTRAMUSCULAR; INTRAVENOUS ONCE
Status: COMPLETED | OUTPATIENT
Start: 2022-08-18 | End: 2022-08-18

## 2022-08-18 RX ORDER — METRONIDAZOLE 500 MG/100ML
500 INJECTION, SOLUTION INTRAVENOUS EVERY 8 HOURS
Status: DISCONTINUED | OUTPATIENT
Start: 2022-08-18 | End: 2022-08-20 | Stop reason: HOSPADM

## 2022-08-18 RX ORDER — ENOXAPARIN SODIUM 100 MG/ML
40 INJECTION SUBCUTANEOUS DAILY
Status: DISCONTINUED | OUTPATIENT
Start: 2022-08-18 | End: 2022-08-20 | Stop reason: HOSPADM

## 2022-08-18 RX ORDER — LISINOPRIL 5 MG/1
5 TABLET ORAL DAILY
Status: DISCONTINUED | OUTPATIENT
Start: 2022-08-18 | End: 2022-08-20 | Stop reason: HOSPADM

## 2022-08-18 RX ORDER — ONDANSETRON 2 MG/ML
4 INJECTION INTRAMUSCULAR; INTRAVENOUS EVERY 6 HOURS PRN
Status: DISCONTINUED | OUTPATIENT
Start: 2022-08-18 | End: 2022-08-20 | Stop reason: HOSPADM

## 2022-08-18 RX ORDER — IPRATROPIUM BROMIDE AND ALBUTEROL SULFATE 2.5; .5 MG/3ML; MG/3ML
1 SOLUTION RESPIRATORY (INHALATION) EVERY 4 HOURS PRN
Status: DISCONTINUED | OUTPATIENT
Start: 2022-08-18 | End: 2022-08-20 | Stop reason: HOSPADM

## 2022-08-18 RX ORDER — BUDESONIDE AND FORMOTEROL FUMARATE DIHYDRATE 160; 4.5 UG/1; UG/1
2 AEROSOL RESPIRATORY (INHALATION) 2 TIMES DAILY
Status: DISCONTINUED | OUTPATIENT
Start: 2022-08-18 | End: 2022-08-18

## 2022-08-18 RX ORDER — POLYETHYLENE GLYCOL 3350 17 G/17G
17 POWDER, FOR SOLUTION ORAL DAILY PRN
Status: DISCONTINUED | OUTPATIENT
Start: 2022-08-18 | End: 2022-08-20 | Stop reason: HOSPADM

## 2022-08-18 RX ORDER — ONDANSETRON 4 MG/1
4 TABLET, ORALLY DISINTEGRATING ORAL EVERY 8 HOURS PRN
Status: DISCONTINUED | OUTPATIENT
Start: 2022-08-18 | End: 2022-08-20 | Stop reason: HOSPADM

## 2022-08-18 RX ORDER — SODIUM CHLORIDE 0.9 % (FLUSH) 0.9 %
5-40 SYRINGE (ML) INJECTION PRN
Status: DISCONTINUED | OUTPATIENT
Start: 2022-08-18 | End: 2022-08-20 | Stop reason: HOSPADM

## 2022-08-18 RX ORDER — AMLODIPINE BESYLATE 5 MG/1
5 TABLET ORAL DAILY
Status: DISCONTINUED | OUTPATIENT
Start: 2022-08-18 | End: 2022-08-20 | Stop reason: HOSPADM

## 2022-08-18 RX ORDER — ACETAMINOPHEN 325 MG/1
650 TABLET ORAL EVERY 6 HOURS PRN
Status: DISCONTINUED | OUTPATIENT
Start: 2022-08-18 | End: 2022-08-20 | Stop reason: HOSPADM

## 2022-08-18 RX ORDER — ACETAMINOPHEN 650 MG/1
650 SUPPOSITORY RECTAL EVERY 6 HOURS PRN
Status: DISCONTINUED | OUTPATIENT
Start: 2022-08-18 | End: 2022-08-20 | Stop reason: HOSPADM

## 2022-08-18 RX ORDER — SODIUM CHLORIDE 0.9 % (FLUSH) 0.9 %
5-40 SYRINGE (ML) INJECTION EVERY 12 HOURS SCHEDULED
Status: DISCONTINUED | OUTPATIENT
Start: 2022-08-18 | End: 2022-08-20 | Stop reason: HOSPADM

## 2022-08-18 RX ORDER — SODIUM CHLORIDE 9 MG/ML
INJECTION, SOLUTION INTRAVENOUS PRN
Status: DISCONTINUED | OUTPATIENT
Start: 2022-08-18 | End: 2022-08-20 | Stop reason: HOSPADM

## 2022-08-18 RX ORDER — 0.9 % SODIUM CHLORIDE 0.9 %
1000 INTRAVENOUS SOLUTION INTRAVENOUS ONCE
Status: COMPLETED | OUTPATIENT
Start: 2022-08-18 | End: 2022-08-18

## 2022-08-18 RX ORDER — BUDESONIDE AND FORMOTEROL FUMARATE DIHYDRATE 160; 4.5 UG/1; UG/1
2 AEROSOL RESPIRATORY (INHALATION) 2 TIMES DAILY
Status: DISCONTINUED | OUTPATIENT
Start: 2022-08-18 | End: 2022-08-20 | Stop reason: HOSPADM

## 2022-08-18 RX ADMIN — Medication 10 ML: at 21:51

## 2022-08-18 RX ADMIN — SODIUM CHLORIDE 1000 ML: 9 INJECTION, SOLUTION INTRAVENOUS at 12:21

## 2022-08-18 RX ADMIN — CEFTRIAXONE SODIUM 1000 MG: 1 INJECTION, POWDER, FOR SOLUTION INTRAMUSCULAR; INTRAVENOUS at 13:57

## 2022-08-18 RX ADMIN — ONDANSETRON HYDROCHLORIDE 4 MG: 2 INJECTION, SOLUTION INTRAMUSCULAR; INTRAVENOUS at 12:21

## 2022-08-18 RX ADMIN — Medication 2 PUFF: at 19:16

## 2022-08-18 RX ADMIN — ACETAMINOPHEN 650 MG: 325 TABLET ORAL at 16:28

## 2022-08-18 RX ADMIN — METRONIDAZOLE 500 MG: 500 INJECTION, SOLUTION INTRAVENOUS at 21:56

## 2022-08-18 RX ADMIN — HYDROMORPHONE HYDROCHLORIDE 1 MG: 1 INJECTION, SOLUTION INTRAMUSCULAR; INTRAVENOUS; SUBCUTANEOUS at 21:45

## 2022-08-18 RX ADMIN — MORPHINE SULFATE 4 MG: 4 INJECTION, SOLUTION INTRAMUSCULAR; INTRAVENOUS at 12:22

## 2022-08-18 RX ADMIN — IOPAMIDOL 75 ML: 755 INJECTION, SOLUTION INTRAVENOUS at 12:52

## 2022-08-18 RX ADMIN — METRONIDAZOLE 500 MG: 500 INJECTION, SOLUTION INTRAVENOUS at 15:01

## 2022-08-18 ASSESSMENT — PAIN SCALES - GENERAL
PAINLEVEL_OUTOF10: 7
PAINLEVEL_OUTOF10: 7
PAINLEVEL_OUTOF10: 6
PAINLEVEL_OUTOF10: 8
PAINLEVEL_OUTOF10: 8
PAINLEVEL_OUTOF10: 0

## 2022-08-18 ASSESSMENT — PAIN DESCRIPTION - ORIENTATION
ORIENTATION: LOWER
ORIENTATION: LOWER
ORIENTATION: RIGHT;LEFT;LOWER

## 2022-08-18 ASSESSMENT — PAIN DESCRIPTION - DESCRIPTORS
DESCRIPTORS: ACHING
DESCRIPTORS: PRESSURE;DISCOMFORT;ACHING

## 2022-08-18 ASSESSMENT — ENCOUNTER SYMPTOMS
EYE PAIN: 0
NAUSEA: 1
CHEST TIGHTNESS: 1
VOMITING: 0
RHINORRHEA: 0
SHORTNESS OF BREATH: 0
CONSTIPATION: 0
DIARRHEA: 0
TROUBLE SWALLOWING: 0
COUGH: 1
SORE THROAT: 0
BACK PAIN: 0
ABDOMINAL PAIN: 1
BLOOD IN STOOL: 0

## 2022-08-18 ASSESSMENT — PAIN - FUNCTIONAL ASSESSMENT
PAIN_FUNCTIONAL_ASSESSMENT: ACTIVITIES ARE NOT PREVENTED
PAIN_FUNCTIONAL_ASSESSMENT: 0-10

## 2022-08-18 ASSESSMENT — PAIN DESCRIPTION - PAIN TYPE: TYPE: ACUTE PAIN

## 2022-08-18 ASSESSMENT — LIFESTYLE VARIABLES
HOW OFTEN DO YOU HAVE A DRINK CONTAINING ALCOHOL: NEVER
HOW OFTEN DO YOU HAVE A DRINK CONTAINING ALCOHOL: NEVER

## 2022-08-18 ASSESSMENT — PAIN DESCRIPTION - LOCATION
LOCATION: ABDOMEN

## 2022-08-18 NOTE — CONSULTS
Department of General Surgery Consult    PATIENT NAME: Bridget Fernandez OF BIRTH: 1970    ADMISSION DATE: 8/18/2022 11:18 AM      TODAY'S DATE: 8/18/2022    Reason for Consult: Diverticulitis    Chief Complaint: Abdominal pain    Requesting Physician: Elvira Garcia    HISTORY OF PRESENT ILLNESS:              The patient is a 45 y.o. male who presents with abdominal pain. This started a few days ago. It has progressively worsened. Is a sharp stabbing pain in his left lower quadrant. Eating makes this worse. He has had nausea and fevers for the past day. He has never had symptoms like this before. The pain is worse with movement. Past Medical History:        Diagnosis Date    Cervical disc herniation     COPD (chronic obstructive pulmonary disease) ([facility] [address]     Folate deficiency 01/2013    GERD (gastroesophageal reflux disease)     Hyperlipidemia 2/14    Hypertriglyceridemia, Good HDL    Hypertension 12/12: age 37    Prediabetes 11/2016    Vitamin D deficiency 01/2013    resolved       Past Surgical History:        Procedure Laterality Date    ANKLE FRACTURE SURGERY Left 1998    BRONCHOSCOPY  12-    CERVICAL FUSION N/A 1/16/2019    ANTERIOR CERVICAL DISCECTOMY AND FUSION WITH ALLOGRAFT, MEDTRONICS AND EVOKES  CPT CODE - 11005 performed by Jim Wilson MD at [place] [address]  2/13    AL NJX DX/THER SBST INTRLMNR CRV/THRC W/IMG GDN Right 11/19/2018    RIGHT CERVICAL SEVEN THORACIC EPIDURAL STEROID INJECTION SITE CONFIRMED BY FLUOROSCOPY performed by Huang Taylor MD at [place] [address]       Current Medications:   Current Facility-Administered Medications: metronidazole (FLAGYL) 500 mg in 0.9% NaCl 100 mL IVPB premix, 500 mg, IntraVENous, Q8H  cefTRIAXone (ROCEPHIN) 1,000 mg in dextrose 5 % 50 mL IVPB mini-bag, 1,000 mg, IntraVENous, Q24H  Prior to Admission medications    Medication Sig Start Date End Date Taking?  Authorizing Provider   pantoprazole (PROTONIX) 40 MG tablet TAKE ONE TABLET BY MOUTH DAILY 8/16/22   Panda Campos, DO   meloxicam FLORY ARZOLAJasen RITCHIE [facility]) 15 MG tablet Take 1 tablet by mouth daily as needed for Pain 8/4/22   Panda Campos, DO   lisinopril (PRINIVIL;ZESTRIL) 5 MG tablet Take 1 tablet by mouth in the morning. 8/4/22   Panda Campos, DO   albuterol sulfate HFA (VENTOLIN HFA) 108 (90 Base) MCG/ACT inhaler Inhale 2 puffs into the lungs 4 times daily as needed for Wheezing 8/4/22   Panda Campos, DO   atorvastatin (LIPITOR) 20 MG tablet TAKE ONE TABLET BY MOUTH DAILY 6/30/22   Sarina Gomez DO   amLODIPine (NORVASC) 5 MG tablet TAKE ONE TABLET BY MOUTH DAILY 6/10/22   Sarina Gomez DO   TRELEGY ELLIPTA 100-62.5-25 MCG/INH AEPB INHALE ONE PUFF BY MOUTH DAILY 6/7/22   Dorita Orozco PA-C   albuterol sulfate  (90 Base) MCG/ACT inhaler INHALE TWO PUFFS BY MOUTH FOUR TIMES A DAY AS NEEDED FOR WHEEZING 5/24/22   YOSELIN Truong   ipratropium-albuterol (DUONEB) 0.5-2.5 (3) MG/3ML SOLN nebulizer solution Inhale 3 mLs into the lungs every 4 hours as needed for Shortness of Breath 11/16/21   Dorita Orozco PA-C   folic acid (FOLVITE) 1 MG tablet TAKE ONE TABLET BY MOUTH DAILY 2/16/21   Tejal Marcianne Mohs, DO        Allergies:  No known allergies    Social History:   TOBACCO:   reports that he quit smoking about 3 years ago. His smoking use included cigarettes. He started smoking about 38 years ago. He has a 70.00 pack-year smoking history. He has never used smokeless tobacco.  ETOH:   reports that he does not currently use alcohol after a past usage of about 1.0 standard drink per week. DRUGS:   reports current drug use. Drug: Marijuana Joleen Mustard).       Family History:        Problem Relation Age of Onset    COPD Mother 39    COPD Father 39       REVIEW OF SYSTEMS:  CONSTITUTIONAL:  positive for  chills  HEENT:  negative  RESPIRATORY:  negative  CARDIOVASCULAR:  negative  GASTROINTESTINAL:  negative except for nausea and abdominal pain  GENITOURINARY:  negative  HEMATOLOGIC/LYMPHATIC:  negative  NEUROLOGICAL:  Negative  * All other ROS reviewed and negative. PHYSICAL EXAM:  VITALS:  BP (!) 157/78   Pulse 77   Temp 98.9 °F (37.2 °C) (Oral)   Resp 16   Ht 5' 3\" (1.6 m)   Wt 157 lb (71.2 kg)   SpO2 97%   BMI 27.81 kg/m²   24HR INTAKE/OUTPUT:    No intake/output data recorded. No intake/output data recorded. CONSTITUTIONAL:  alert, no apparent distress and normal weight  EYES:  PERRL, sclera clear  ENT:  Normocephalic,atraumatic, without obvious abnormality  NECK:  supple, symmetrical, trachea midline  LUNGS: Resp effort easy and unlabored, clear to auscultation  CARDIOVASCULAR:  NO JVD, regular rate and rhythm and no murmur noted  ABDOMEN:  normal bowel sounds, soft, non-distended, tenderness noted in the left lower quadrant, voluntary guarding absent, no masses palpated and hernia absent  MUSCULOSKELETAL: No clubbing or cyanosis, 0+ pitting edema lower extremities  NEUROLOGIC:  Mental Status Exam:  Level of Alertness:   awake  PSYCHIATRIC:   person, place, time  SKIN:  no bruising or bleeding, normal skin color, texture, turgor, and no redness, warmth, or swelling    DATA:    CBC:   Recent Labs     08/18/22  1130   WBC 15.3*   HGB 14.4   HCT 42.4        BMP:    Recent Labs     08/18/22  1130      K 3.6   CL 98*   CO2 28   BUN 11   CREATININE 1.0   GLUCOSE 120*     Hepatic:   Recent Labs     08/18/22  1130   AST 14*   ALT 11   BILITOT 0.6   ALKPHOS 119     Mag:    No results for input(s): MG in the last 72 hours. Phos:   No results for input(s): PHOS in the last 72 hours. INR: No results for input(s): INR in the last 72 hours. Radiology Review: Images personally reviewed by me. CT shows contained perforated sigmoid diverticulitis      IMPRESSION/RECOMMENDATIONS:    Contained perforated sigmoid diverticulitis. Admit for IV fluid hydration, IV antibiotics, bowel rest, and parenteral narcotics as needed. This should improve with nonoperative management.   He will ultimately need interval colectomy    Electronically signed by Toula Leventhal, MD     [address]

## 2022-08-18 NOTE — DISCHARGE INSTRUCTIONS
Needs outpatient colonoscopy  Needs Outpatient CT or MRI renal protocol to evaluate right renal cystic lesion. Please follow-up with Dr Tuyet Hager in the office in 2 weeks to discussed you colon surgery. Call [phone] to make your follow-up appointment. Please call Dr. Danyell Brandon office and or return to the hospital with return of severe/constant abdominal pain, persistent fevers greater than 100 and/or persistent vomiting.

## 2022-08-18 NOTE — H&P
Hospital Medicine History & Physical      PCP: Al Dickson DO    Date of Admission: 8/18/2022    Date of Service: Pt seen/examined on 8/18/2022     Chief Complaint:    Chief Complaint   Patient presents with    Abdominal Pain     Patient states that he has had lower abdominal pain for a few days, last night began with fever and emesis. History Of Present Illness: The patient is a 45 y.o. male with COPD, folate deficiency, GERD, hyperlipidemia, DM who presents to [facility] with c/o abdominal pain. He states onset was 2 days ago. The pain worsened last night. He states the pain was severe. Located to his lower abdomen. It radiates to her back. Associated symptoms are fever (up to 104 last night), nausea, vomiting. He has not had a BM. He reports cough and dyspnea at baseline due to COPD. Vitals stable. Labs with leukocytosis. CT abdomen/pelvis with acute sigmoid diverticulitis, contained perforation, no drainable fluid collection, an underlying neoplasm is not entirely excluded, non-emergent colonoscopy should be considered, 12 mm indeterminate cystic lesion of the right kidney. Admitted to med-surg. General surgery consulted. GI consulted.      Past Medical History:        Diagnosis Date    Cervical disc herniation     COPD (chronic obstructive pulmonary disease) ([facility]     Folate deficiency 01/2013    GERD (gastroesophageal reflux disease)     Hyperlipidemia 2/14    Hypertriglyceridemia, Good HDL    Hypertension 12/12: age 37    Prediabetes 11/2016    Vitamin D deficiency 01/2013    resolved       Past Surgical History:        Procedure Laterality Date    ANKLE FRACTURE SURGERY Left 1998    BRONCHOSCOPY  12-    CERVICAL FUSION N/A 1/16/2019    ANTERIOR CERVICAL DISCECTOMY AND FUSION WITH ALLOGRAFT, MEDTRONICS AND EVOKES  CPT CODE - 91777 performed by Arcelia Matthews MD at [address] [place] [address]  2/13    [facility] Deandre Waldrop 82 DX/THER SBST INTRLMNR CRV/THRC W/IMG GDN Right 11/19/2018 RIGHT CERVICAL SEVEN THORACIC EPIDURAL STEROID INJECTION SITE CONFIRMED BY FLUOROSCOPY performed by George Samano MD at [place] [address]       Medications Prior to Admission:    Prior to Admission medications    Medication Sig Start Date End Date Taking? Authorizing Provider   pantoprazole (PROTONIX) 40 MG tablet TAKE ONE TABLET BY MOUTH DAILY 8/16/22   Rosana Gallardo DO   meloxicam [facility]) 15 MG tablet Take 1 tablet by mouth daily as needed for Pain 8/4/22   Rosana Gallardo DO   lisinopril (PRINIVIL;ZESTRIL) 5 MG tablet Take 1 tablet by mouth in the morning. 8/4/22   Rosana Gallardo DO   albuterol sulfate HFA (VENTOLIN HFA) 108 (90 Base) MCG/ACT inhaler Inhale 2 puffs into the lungs 4 times daily as needed for Wheezing 8/4/22   Rosana Gallardo DO   atorvastatin (LIPITOR) 20 MG tablet TAKE ONE TABLET BY MOUTH DAILY 6/30/22   Sarina Gomez DO   amLODIPine (NORVASC) 5 MG tablet TAKE ONE TABLET BY MOUTH DAILY 6/10/22   Sarina Gomez DO   TRELEGY ELLIPTA 100-62.5-25 MCG/INH AEPB INHALE ONE PUFF BY MOUTH DAILY 6/7/22   Dorita Orozco PA-C   albuterol sulfate  (90 Base) MCG/ACT inhaler INHALE TWO PUFFS BY MOUTH FOUR TIMES A DAY AS NEEDED FOR WHEEZING 5/24/22   Subha Denis PA-C   ipratropium-albuterol (DUONEB) 0.5-2.5 (3) MG/3ML SOLN nebulizer solution Inhale 3 mLs into the lungs every 4 hours as needed for Shortness of Breath 11/16/21   Dorita Orozco PA-C   folic acid (FOLVITE) 1 MG tablet TAKE ONE TABLET BY MOUTH DAILY 2/16/21   Kyree Oconnor DO       Allergies:  No known allergies    Social History:  The patient currently lives at home. TOBACCO:   reports that he quit smoking about 3 years ago. His smoking use included cigarettes. He started smoking about 38 years ago. He has a 70.00 pack-year smoking history.  He has never used smokeless tobacco.  ETOH:   reports that he does not currently use alcohol after a past usage of about 1.0 standard drink per week. Family History:   Positive as follows:        Problem Relation Age of Onset    COPD Mother 39    COPD Father 39       REVIEW OF SYSTEMS:       Constitutional: + fever, chills  Respiratory: + dyspnea, cough (at baseline)  Cardiovascular: Negative for chest pain   Gastrointestinal: Negative for diarrhea +abdominal pain, nausea, vomiting  Genitourinary: Negative for hematuria   Musculoskeletal: Negative for arthralgias   Skin: Negative for rash   Neurological: Negative for syncope   Psychiatric/Behavorial: Negative for anxiety    PHYSICAL EXAM:    BP (!) 144/79   Pulse 67   Temp 98.9 °F (37.2 °C) (Oral)   Resp 17   Ht 5' 3\" (1.6 m)   Wt 157 lb (71.2 kg)   SpO2 94%   BMI 27.81 kg/m²     Gen: No distress. Alert. Eyes: PERRL. No sclera icterus. No conjunctival injection. ENT: No discharge. Pharynx clear. Neck: Trachea midline. Resp: No accessory muscle use. No crackles. No wheezes. No rhonchi. CV: Regular rate. Regular rhythm. No murmur. No rub. No edema. GI: Lower abdomen tender. Non-distended. Normal bowel sounds. No hernia. Skin: Warm and dry. No nodule on exposed extremities. No rash on exposed extremities. M/S: No cyanosis. No joint deformity. No clubbing. Neuro: Awake. Grossly nonfocal    Psych: Oriented x 3. No anxiety or agitation.      CBC:   Recent Labs     08/18/22  1130   WBC 15.3*   HGB 14.4   HCT 42.4   MCV 93.8        BMP:   Recent Labs     08/18/22  1130      K 3.6   CL 98*   CO2 28   BUN 11   CREATININE 1.0     LIVER PROFILE:   Recent Labs     08/18/22  1130   AST 14*   ALT 11   LIPASE 14.0   BILITOT 0.6   ALKPHOS 119     UA:  Recent Labs     08/18/22  1345   COLORU Yellow   PHUR 6.5   WBCUA 0-2   RBCUA 0-2   BACTERIA Rare*   CLARITYU SL CLOUDY*   SPECGRAV <=1.005   LEUKOCYTESUR Negative   UROBILINOGEN 0.2   BILIRUBINUR Negative   BLOODU TRACE-INTACT*   GLUCOSEU Negative       CARDIAC ENZYMES  Recent Labs     08/18/22  1130   TROPONINI <0.01 CULTURES  COVID/flu: not detected    EKG:  I have reviewed the EKG with the following interpretation:   Normal sinus rhythm, Incomplete right bundle branch block    RADIOLOGY  XR CHEST (2 VW)   Final Result   No acute cardiopulmonary disease         CT ABDOMEN PELVIS W IV CONTRAST Additional Contrast? None   Final Result   1. Findings compatible with acute sigmoid colon diverticulitis with small   foci of adjacent extraluminal gas compatible with a contained perforation. No drainable fluid collection. An underlying neoplasm is not entirely   excluded and nonemergent colonoscopy should be considered. 2. 12 mm indeterminate cystic lesion in the upper pole the right kidney   increased in size from 2015. Recommend a nonemergent renal protocol MRI or   CT. Principal Problem:    Acute diverticulitis of intestine  Resolved Problems:    * No resolved hospital problems. *        ASSESSMENT/PLAN:  Acute diverticulitis   -contained perforation  -admitted for further management/care  -GI and General surgery consulted. -needs outpatient colonoscopy. Patient is aware  -NPO, IVF's,   -Rocephin, Flagyl D#1  -Dilaudid prn    Cystic lesion of the right kidney  -recommend renal protocol MRI or CT  -patient is aware. Leukocytosis   -related to above  -continue IVF's, antibiotics  -repeat CBC    Hypertension  -BP stable. Continue Lisinopril, Amlodipine    Hyperlipidemia  -on Atorvastatin    COPD  -stable.  No AE  -continue Symbicort, Spiriva  -Albuterol, Duonebs prn    GERD  -on PPI    Folate deficiency   -cont folic acid    DVT Prophylaxis: Lovenox  Diet: Diet NPO  Code Status: Full Code    Kbbrisa Asmita FNP-C  8/18/2022

## 2022-08-18 NOTE — ED NOTES
5-  Called general surgery for a consult.       Yue Marti  08/18/22 [id number]- Dr. Blanca Reynolds was down in the ER and spoke with Dr. Sarah Edmonds  08/18/22 [id number]

## 2022-08-18 NOTE — ED NOTES
65- Perfect served the Hospitalist for a consult. Acute diverticulitis with perforation.  No drainable abscess collection seen on CT. Vitor Boudreaux  08/18/22 [phone]- Hospitalist called back to speak with Brandee FARRELL.       Vitor Boudreaux  08/18/22 0205

## 2022-08-18 NOTE — CARE COORDINATION
Review of chart for any potential discharge needs. No needs identified for discharge intervention at this time. MD and bedside RN  if needs arise please consult case management for discharge intervention. CM not following at this time.         Amilcar Jones RN

## 2022-08-18 NOTE — PROGRESS NOTES
4 Eyes Skin Assessment     The patient is being assess for   Admission    I agree that 2 RN's have performed a thorough Head to Toe Skin Assessment on the patient. ALL assessment sites listed below have been assessed. Areas assessed for pressure by both nurses:   [x]   Head, Face, and Ears   [x]   Shoulders, Back, and Chest, Abdomen     Arms, Elbows, and Hands   [x][x]   Coccyx, Sacrum, and Ischium  [x]   Legs, Feet, and Heels        Skin Assessed Under all Medical Devices by both nurses:  none               All Mepilex Borders were peeled back and area peeked at by both nurses:  No: N/A  Please list where Mepilex Borders are located:  N/A             **SHARE this note so that the co-signing nurse is able to place an eSignature**    Co-signer eSignature: Electronically signed by Nino Alvarado RN on 8/18/22 at 6:44 PM EDT    Does the Patient have Skin Breakdown related to pressure?   No              Eric Prevention initiated:  No   Wound Care Orders initiated:  No      [facility] nurse consulted for Pressure Injury (Stage 3,4, Unstageable, DTI, NWPT, Complex wounds)and New or Established Ostomies:  No      Primary Nurse eSignature: Electronically signed by Marie Bejarano RN on 8/18/22 at [address] PM EDT

## 2022-08-18 NOTE — PLAN OF CARE
Problem: Discharge Planning  Goal: Discharge to home or other facility with appropriate resources  Outcome: Progressing  Flowsheets  Taken 8/18/2022 1820  Discharge to home or other facility with appropriate resources:   Identify barriers to discharge with patient and caregiver   Identify discharge learning needs (meds, wound care, etc)   Refer to discharge planning if patient needs post-hospital services based on physician order or complex needs related to functional status, cognitive ability or social support system   Arrange for needed discharge resources and transportation as appropriate  Taken 8/18/2022 1758  Discharge to home or other facility with appropriate resources:   Identify barriers to discharge with patient and caregiver   Arrange for needed discharge resources and transportation as appropriate   Identify discharge learning needs (meds, wound care, etc)     Problem: Pain  Goal: Verbalizes/displays adequate comfort level or baseline comfort level  Outcome: Progressing     Problem: Safety - Adult  Goal: Free from fall injury  Outcome: Progressing

## 2022-08-18 NOTE — FLOWSHEET NOTE
08/18/22 1745   Vital Signs   Temp 99.8 °F (37.7 °C)   Temp Source Oral   Heart Rate 67   Heart Rate Source Monitor   Resp 17   /68   BP Location Left upper arm   BP Method Manual   MAP (Calculated) 83.67   Patient Position Semi fowlers   Level of Consciousness Alert (0)   MEWS Score 1   Pain Assessment   Pain Assessment 0-10   Pain Level 7   Pain Location Abdomen   Pain Orientation Right;Left;Lower   Oxygen Therapy   SpO2 93 %   O2 Device None (Room air)      Patient admitted to room 214 from ER. Side rails up x2. Patient has an order for telemetry. Bed is locked and in lowest position. Call light placed in patient reach. Patient explained the routine of the hospital, including but not limited to lab work, vital signs, hourly rounding, etc. Care plans and education updated. /68   Pulse 67   Temp 99.8 °F (37.7 °C) (Oral)   Resp 17   Ht 5' 3\" (1.6 m)   Wt 157 lb (71.2 kg)   SpO2 93%   BMI 27.81 kg/m²     Most recent set of vitals as shown. Bedside Mobility Assessment Tool (BMAT):     Assessment Level 1- Sit and Shake    1. From a semi-reclined position, ask patient to sit up and rotate to a seated position at the side of the bed. Can use the bedrail. 2. Ask patient to reach out and grab your hand and shake making sure patient reaches across his/her midline. Pass- Patient is able to come to a seated position, maintain core strength. Maintains seated balance while reaching across midline. Move on to Assessment Level 2. Assessment Level 2- Stretch and Point   1. With patient in seated position at the side of the bed, have patient place both feet on the floor (or stool) with knees no higher than hips. 2. Ask patient to stretch one leg and straighten the knee, then bend the ankle/flex and point the toes. If appropriate, repeat with the other leg. Pass- Patient is able to demonstrate appropriate quad strength on intended weight bearing limb(s). Move onto Assessment Level 3. Assessment Level 3- Stand   1. Ask patient to elevate off the bed or chair (seated to standing) using an assistive device (cane, bedrail). 2. Patient should be able to raise buttocks off be and hold for a count of five. May repeat once. Pass- Patient maintains standing stability for at least 5 seconds, proceed to assessment level 4. Assessment Level 4- Walk   1. Ask patient to march in place at bedside. 2. Then ask patient to advance step and return each foot. Some medical conditions may render a patient from stepping backwards, use your best clinical judgement. Pass- Patient demonstrates balance while shifting weight and ability to step, takes independent steps, does not use assistive device patient is MOBILITY LEVEL 4. Mobility Level- 4    Patient is able to demonstrate the ability to move from a reclining position to an upright position within the recliner.

## 2022-08-18 NOTE — ED NOTES
Admitting RN at bedside, report given. Admitting RN taking pt to admitting room.       Edna Benitez RN  08/18/22 2831

## 2022-08-19 PROBLEM — K57.20 DIVERTICULITIS OF LARGE INTESTINE WITH PERFORATION WITHOUT ABSCESS OR BLEEDING: Status: ACTIVE | Noted: 2022-08-19

## 2022-08-19 LAB
ANION GAP SERPL CALCULATED.3IONS-SCNC: 11 MMOL/L (ref 3–16)
BASOPHILS ABSOLUTE: 0 K/UL (ref 0–0.2)
BASOPHILS RELATIVE PERCENT: 0.3 %
BUN BLDV-MCNC: 14 MG/DL (ref 7–20)
CALCIUM SERPL-MCNC: 8.8 MG/DL (ref 8.3–10.6)
CHLORIDE BLD-SCNC: 100 MMOL/L (ref 99–110)
CO2: 28 MMOL/L (ref 21–32)
CREAT SERPL-MCNC: 1.1 MG/DL (ref 0.9–1.3)
EOSINOPHILS ABSOLUTE: 0.1 K/UL (ref 0–0.6)
EOSINOPHILS RELATIVE PERCENT: 1.1 %
GFR AFRICAN AMERICAN: >60
GFR NON-AFRICAN AMERICAN: >60
GLUCOSE BLD-MCNC: 100 MG/DL (ref 70–99)
GLUCOSE BLD-MCNC: 114 MG/DL (ref 70–99)
GLUCOSE BLD-MCNC: 140 MG/DL (ref 70–99)
GLUCOSE BLD-MCNC: 81 MG/DL (ref 70–99)
HCT VFR BLD CALC: 37.1 % (ref 40.5–52.5)
HEMOGLOBIN: 12.6 G/DL (ref 13.5–17.5)
LYMPHOCYTES ABSOLUTE: 1.3 K/UL (ref 1–5.1)
LYMPHOCYTES RELATIVE PERCENT: 11.9 %
MCH RBC QN AUTO: 32.2 PG (ref 26–34)
MCHC RBC AUTO-ENTMCNC: 33.9 G/DL (ref 31–36)
MCV RBC AUTO: 94.9 FL (ref 80–100)
MONOCYTES ABSOLUTE: 0.8 K/UL (ref 0–1.3)
MONOCYTES RELATIVE PERCENT: 7.4 %
NEUTROPHILS ABSOLUTE: 8.6 K/UL (ref 1.7–7.7)
NEUTROPHILS RELATIVE PERCENT: 79.3 %
PDW BLD-RTO: 13.1 % (ref 12.4–15.4)
PERFORMED ON: ABNORMAL
PLATELET # BLD: 212 K/UL (ref 135–450)
PMV BLD AUTO: 7.8 FL (ref 5–10.5)
POTASSIUM SERPL-SCNC: 3.6 MMOL/L (ref 3.5–5.1)
RBC # BLD: 3.91 M/UL (ref 4.2–5.9)
SODIUM BLD-SCNC: 139 MMOL/L (ref 136–145)
WBC # BLD: 10.9 K/UL (ref 4–11)

## 2022-08-19 PROCEDURE — 80053 COMPREHEN METABOLIC PANEL: CPT

## 2022-08-19 PROCEDURE — 94761 N-INVAS EAR/PLS OXIMETRY MLT: CPT

## 2022-08-19 PROCEDURE — 1200000000 HC SEMI PRIVATE

## 2022-08-19 PROCEDURE — 94640 AIRWAY INHALATION TREATMENT: CPT

## 2022-08-19 PROCEDURE — 2500000003 HC RX 250 WO HCPCS: Performed by: NURSE PRACTITIONER

## 2022-08-19 PROCEDURE — 2580000003 HC RX 258: Performed by: NURSE PRACTITIONER

## 2022-08-19 PROCEDURE — 96372 THER/PROPH/DIAG INJ SC/IM: CPT

## 2022-08-19 PROCEDURE — 85025 COMPLETE CBC W/AUTO DIFF WBC: CPT

## 2022-08-19 PROCEDURE — 6370000000 HC RX 637 (ALT 250 FOR IP): Performed by: INTERNAL MEDICINE

## 2022-08-19 PROCEDURE — G0378 HOSPITAL OBSERVATION PER HR: HCPCS

## 2022-08-19 PROCEDURE — 6360000002 HC RX W HCPCS: Performed by: NURSE PRACTITIONER

## 2022-08-19 PROCEDURE — 96366 THER/PROPH/DIAG IV INF ADDON: CPT

## 2022-08-19 PROCEDURE — 80048 BASIC METABOLIC PNL TOTAL CA: CPT

## 2022-08-19 PROCEDURE — 99232 SBSQ HOSP IP/OBS MODERATE 35: CPT | Performed by: SURGERY

## 2022-08-19 PROCEDURE — 6370000000 HC RX 637 (ALT 250 FOR IP): Performed by: NURSE PRACTITIONER

## 2022-08-19 PROCEDURE — 99233 SBSQ HOSP IP/OBS HIGH 50: CPT | Performed by: INTERNAL MEDICINE

## 2022-08-19 PROCEDURE — 36415 COLL VENOUS BLD VENIPUNCTURE: CPT

## 2022-08-19 RX ADMIN — LISINOPRIL 5 MG: 5 TABLET ORAL at 08:55

## 2022-08-19 RX ADMIN — Medication 2 PUFF: at 19:53

## 2022-08-19 RX ADMIN — SODIUM CHLORIDE: 9 INJECTION, SOLUTION INTRAVENOUS at 13:28

## 2022-08-19 RX ADMIN — AMLODIPINE BESYLATE 5 MG: 5 TABLET ORAL at 08:55

## 2022-08-19 RX ADMIN — METRONIDAZOLE 500 MG: 500 INJECTION, SOLUTION INTRAVENOUS at 06:29

## 2022-08-19 RX ADMIN — PANTOPRAZOLE SODIUM 40 MG: 40 TABLET, DELAYED RELEASE ORAL at 08:55

## 2022-08-19 RX ADMIN — HYDROMORPHONE HYDROCHLORIDE 1 MG: 1 INJECTION, SOLUTION INTRAMUSCULAR; INTRAVENOUS; SUBCUTANEOUS at 06:26

## 2022-08-19 RX ADMIN — ATORVASTATIN CALCIUM 20 MG: 10 TABLET, FILM COATED ORAL at 08:55

## 2022-08-19 RX ADMIN — HYDROMORPHONE HYDROCHLORIDE 1 MG: 1 INJECTION, SOLUTION INTRAMUSCULAR; INTRAVENOUS; SUBCUTANEOUS at 23:20

## 2022-08-19 RX ADMIN — HYDROMORPHONE HYDROCHLORIDE 1 MG: 1 INJECTION, SOLUTION INTRAMUSCULAR; INTRAVENOUS; SUBCUTANEOUS at 20:35

## 2022-08-19 RX ADMIN — METRONIDAZOLE 500 MG: 500 INJECTION, SOLUTION INTRAVENOUS at 13:25

## 2022-08-19 RX ADMIN — Medication 10 ML: at 08:55

## 2022-08-19 RX ADMIN — FOLIC ACID 1 MG: 1 TABLET ORAL at 08:55

## 2022-08-19 RX ADMIN — ENOXAPARIN SODIUM 40 MG: 100 INJECTION SUBCUTANEOUS at 08:54

## 2022-08-19 RX ADMIN — Medication 2 PUFF: at 08:06

## 2022-08-19 RX ADMIN — METRONIDAZOLE 500 MG: 500 INJECTION, SOLUTION INTRAVENOUS at 20:33

## 2022-08-19 RX ADMIN — CEFTRIAXONE SODIUM 1000 MG: 1 INJECTION, POWDER, FOR SOLUTION INTRAMUSCULAR; INTRAVENOUS at 13:29

## 2022-08-19 RX ADMIN — TIOTROPIUM BROMIDE INHALATION SPRAY 2 PUFF: 3.12 SPRAY, METERED RESPIRATORY (INHALATION) at 08:05

## 2022-08-19 ASSESSMENT — PAIN DESCRIPTION - ORIENTATION
ORIENTATION: RIGHT;LEFT;LOWER
ORIENTATION: LOWER

## 2022-08-19 ASSESSMENT — PAIN DESCRIPTION - DESCRIPTORS
DESCRIPTORS: ACHING
DESCRIPTORS: ACHING;DISCOMFORT
DESCRIPTORS: DISCOMFORT

## 2022-08-19 ASSESSMENT — PAIN SCALES - GENERAL
PAINLEVEL_OUTOF10: 0
PAINLEVEL_OUTOF10: 7

## 2022-08-19 ASSESSMENT — PAIN DESCRIPTION - LOCATION
LOCATION: ABDOMEN

## 2022-08-19 ASSESSMENT — PAIN DESCRIPTION - PAIN TYPE
TYPE: ACUTE PAIN
TYPE: ACUTE PAIN

## 2022-08-19 ASSESSMENT — PAIN - FUNCTIONAL ASSESSMENT: PAIN_FUNCTIONAL_ASSESSMENT: ACTIVITIES ARE NOT PREVENTED

## 2022-08-19 NOTE — PROGRESS NOTES
Handoff report and transfer of care given at bedside to [facility]. Patient in stable condition, denies needs/concerns at this time. Call light within reach.

## 2022-08-19 NOTE — PROGRESS NOTES
General Surgery   Daily Progress Note    Pt Name: Alena Maher  Medical Record Number: [id number]  Date of Birth 1970   Today's Date: 8/19/2022    ASSESSMENT  CT abd and pelvis 8/18: acute sigmoid diverticulitis with contained perforation. ABD: soft, + mild distention, + lower abdominal tenderness with some guarding, no N/V, + flatus, no BM, last Bm was 3 days ago and loose. Leuks 15.3->10.9  VSS  Pt states \"I feel a little better but, my urine looks like dark apple juice      PLAN  Full liquids  Ambulate  Rocephin and Flagyl  Pt with complicated sigmoid diverticulitis. If he tolerates full liquids and his pain continues to improve. Hopefully he can be discharged home on oral antibiotics in 1-2 days. F/U with Dr. Corey Schumacher in the office in 2 weeks to discuss elective sigmoid colectomy. Ayah Goldsmith has improved from yesterday. Pain is well controlled. He has no nausea and no vomiting. He has passed flatus and has not had a bowel movement. He is tolerating ice chips. Current activity is up with assistance    OBJECTIVE  VITALS:  height is 5' 3\" (1.6 m) and weight is 157 lb (71.2 kg). His oral temperature is 98.2 °F (36.8 °C). His blood pressure is 119/73 and his pulse is 55. His respiration is 16 and oxygen saturation is 92%. VITALS:  /73   Pulse 55   Temp 98.2 °F (36.8 °C) (Oral)   Resp 16   Ht 5' 3\" (1.6 m)   Wt 157 lb (71.2 kg)   SpO2 92%   BMI 27.81 kg/m²   INTAKE/OUTPUT:  No intake or output data in the 24 hours ending 08/19/22 1139  GENERAL: alert, cooperative, no distress  No intake/output data recorded. No intake/output data recorded.     LABS  Recent Labs     08/18/22  1130 08/18/22  1345 08/19/22  0525   WBC 15.3*  --  10.9   HGB 14.4  --  12.6*   HCT 42.4  --  37.1*     --  212     --   --    K 3.6  --   --    CL 98*  --   --    CO2 28  --   --    BUN 11  --   --    CREATININE 1.0  --   --    CALCIUM 9.6  --   --    AST 14*  --   --    ALT 11  --   -- BILITOT 0.6  --   --    NITRU  --  Negative  --    COLORU  --  Yellow  --    BACTERIA  --  Rare*  --    CBC with Differential:    Lab Results   Component Value Date/Time    WBC 10.9 08/19/2022 05:25 AM    RBC 3.91 08/19/2022 05:25 AM    HGB 12.6 08/19/2022 05:25 AM    HCT 37.1 08/19/2022 05:25 AM     08/19/2022 05:25 AM    MCV 94.9 08/19/2022 05:25 AM    MCH 32.2 08/19/2022 05:25 AM    MCHC 33.9 08/19/2022 05:25 AM    RDW 13.1 08/19/2022 05:25 AM    SEGSPCT 68.2 12/26/2012 04:49 PM    LYMPHOPCT 11.9 08/19/2022 05:25 AM    MONOPCT 7.4 08/19/2022 05:25 AM    BASOPCT 0.3 08/19/2022 05:25 AM    MONOSABS 0.8 08/19/2022 05:25 AM    LYMPHSABS 1.3 08/19/2022 05:25 AM    EOSABS 0.1 08/19/2022 05:25 AM    BASOSABS 0.0 08/19/2022 05:25 AM    DIFFTYPE Auto 12/26/2012 04:49 PM     CMP:    Lab Results   Component Value Date/Time     08/19/2022 10:54 AM    K 3.6 08/19/2022 10:54 AM    K 3.6 08/18/2022 11:30 AM     08/19/2022 10:54 AM    CO2 28 08/19/2022 10:54 AM    BUN 14 08/19/2022 10:54 AM    CREATININE 1.1 08/19/2022 10:54 AM    GFRAA >60 08/19/2022 10:54 AM    GFRAA >60 01/28/2013 04:28 PM    AGRATIO 1.3 08/18/2022 11:30 AM    LABGLOM >60 08/19/2022 10:54 AM    GLUCOSE 81 08/19/2022 10:54 AM    PROT 7.7 08/18/2022 11:30 AM    PROT 7.0 01/28/2013 04:28 PM    LABALBU 4.3 08/18/2022 11:30 AM    CALCIUM 8.8 08/19/2022 10:54 AM    BILITOT 0.6 08/18/2022 11:30 AM    ALKPHOS 119 08/18/2022 11:30 AM    AST 14 08/18/2022 11:30 AM    ALT 11 08/18/2022 11:30 AM         ANAI Castro CNP  Electronically signed 8/19/2022 at 10:57 AM     I have personally performed a face to face diagnostic evaluation on this patient and agree with the progress note and care plan of Kaitlin Figueredo CNP. More than half of the time spent on this encounter was completed by me including the history, physical examination and the entire medical decision making. My findings are as follows:    Patient feels better today with less pain. He denies nausea or vomiting. No fevers    /73   Pulse 55   Temp 98.2 °F (36.8 °C) (Oral)   Resp 16   Ht 5' 3\" (1.6 m)   Wt 157 lb (71.2 kg)   SpO2 92%   BMI 27.81 kg/m²   Awake and alert in NAD  RESP: unlabored, no distress  CV: Bradycardic  ABD: Bowel sounds positive, soft, less tender in the left lower quadrant and suprapubic area  SKIN: Warm and dry    Leukocytosis improved    Assessment/plan: Contained perforated sigmoid diverticulitis, clinically improved. Continue antibiotics and supportive care. Start full liquids and assess tolerance prior to advancing.   Okay for discharge in the next day or 2 on oral antibiotics if he tolerates a diet      London Wang MD

## 2022-08-19 NOTE — CONSULTS
Gastroenterology Consult Note    Patient:   Bhargav Diver   :    1970   Facility:   [facility]   Referring/PCP: Felecia Isaacs DO  Date:     2022  Consultant:   Meng Sullivan MD, MD      Chief Complaint   Patient presents with    Abdominal Pain     Patient states that he has had lower abdominal pain for a few days, last night began with fever and emesis. History of Present illness     45year old male with history of HTN, HLD, GERD, COPD, chronic back pain, presented to ER with abdominal pain. He developed severe LLQ pain and constipation for past 24h. He denies any recent change in medications, travel history or exposure to sick contacts. He has never had previous GI illness or diverticulitis. His last colonoscopy was normal in 2018. He denies nausea, vomiting, rectal bleeding, melena and weight loss.     Past Medical History:   Diagnosis Date    Cervical disc herniation     COPD (chronic obstructive pulmonary disease) ([facility].)     Folate deficiency 2013    GERD (gastroesophageal reflux disease)     Hyperlipidemia     Hypertriglyceridemia, Good HDL    Hypertension : age 37    Prediabetes 2016    Vitamin D deficiency 2013    resolved     Past Surgical History:   Procedure Laterality Date    ANKLE FRACTURE SURGERY Left     BRONCHOSCOPY  -    CERVICAL FUSION N/A 2019    ANTERIOR CERVICAL DISCECTOMY AND FUSION WITH ALLOGRAFT, MEDTRONICS AND EVOKES  CPT CODE - 72473 performed by Afia Barbour MD at [place] [address]      NM NJX DX/THER SBST INTRLMNR CRV/THRC W/IMG GDN Right 2018    RIGHT CERVICAL SEVEN THORACIC EPIDURAL STEROID INJECTION SITE CONFIRMED BY FLUOROSCOPY performed by Jailyn Neumann MD at [place] [address]       Social:   Social History     Tobacco Use    Smoking status: Former     Packs/day: 2.00     Years: 35.00     Pack years: 70.00     Types: Cigarettes     Start date:      Quit date: 1/1/2019     Years since quitting: 3.6    Smokeless tobacco: Never   Substance Use Topics    Alcohol use: Not Currently     Alcohol/week: 1.0 standard drink     Types: 1 Cans of beer per week     Comment: occasional     Family:   Family History   Problem Relation Age of Onset    COPD Mother 39    COPD Father 39     No current facility-administered medications on file prior to encounter. Current Outpatient Medications on File Prior to Encounter   Medication Sig Dispense Refill    pantoprazole (PROTONIX) 40 MG tablet TAKE ONE TABLET BY MOUTH DAILY 90 tablet 1    meloxicam (MOBIC) 15 MG tablet Take 1 tablet by mouth daily as needed for Pain 30 tablet 5    lisinopril (PRINIVIL;ZESTRIL) 5 MG tablet Take 1 tablet by mouth in the morning. 90 tablet 1    albuterol sulfate HFA (VENTOLIN HFA) 108 (90 Base) MCG/ACT inhaler Inhale 2 puffs into the lungs 4 times daily as needed for Wheezing 18 g 5    atorvastatin (LIPITOR) 20 MG tablet TAKE ONE TABLET BY MOUTH DAILY 90 tablet 1    amLODIPine (NORVASC) 5 MG tablet TAKE ONE TABLET BY MOUTH DAILY 90 tablet 1    TRELEGY ELLIPTA 100-62.5-25 MCG/INH AEPB INHALE ONE PUFF BY MOUTH DAILY 60 each 5    albuterol sulfate  (90 Base) MCG/ACT inhaler INHALE TWO PUFFS BY MOUTH FOUR TIMES A DAY AS NEEDED FOR WHEEZING 18 g 5    ipratropium-albuterol (DUONEB) 0.5-2.5 (3) MG/3ML SOLN nebulizer solution Inhale 3 mLs into the lungs every 4 hours as needed for Shortness of Breath 358 mL 5    folic acid (FOLVITE) 1 MG tablet TAKE ONE TABLET BY MOUTH DAILY 60 tablet 9      Infusions:    sodium chloride 5 mL/hr at 08/19/22 1328     PRN Medications: albuterol sulfate HFA, ipratropium-albuterol, sodium chloride flush, sodium chloride, ondansetron **OR** ondansetron, polyethylene glycol, acetaminophen **OR** acetaminophen, HYDROmorphone **OR** HYDROmorphone  Allergies:    Allergies   Allergen Reactions    No Known Allergies        ROS: Constitutional: negative for chills, fevers and sweats  Eyes: negative for cataracts, icterus and redness  Ears, nose, mouth, throat, and face: negative for epistaxis, hearing loss and sore throat  Respiratory: negative for cough, hemoptysis and sputum  Cardiovascular: negative for chest pain, dyspnea and lower extremity edema  Gastrointestinal: as per HPI  Genitourinary:negative for dysuria, frequency and hematuria  Neurological: negative for coordination problems, dizziness and gait problems  Behavioral/Psych: negative for anxiety, depression and mood swings    Physical Exam   /73   Pulse 55   Temp 98.2 °F (36.8 °C) (Oral)   Resp 16   Ht 5' 3\" (1.6 m)   Wt 157 lb (71.2 kg)   SpO2 92%   BMI 27.81 kg/m²     General appearance: alert, appears stated age, and cooperative  Head: Normocephalic, without obvious abnormality  Eyes: negative findings: conjunctivae and sclerae normal  Neck: no adenopathy and supple, symmetrical, trachea midline  Lungs: clear to auscultation bilaterally  Heart: regular rate and rhythm  Abdomen: soft, non-tender; bowel sounds normal; no masses,  no organomegaly  Extremities: extremities normal, atraumatic, no cyanosis or edema    Lab and Imaging Review   Labs:  CBC:   Recent Labs     08/18/22  1130 08/19/22  0525   WBC 15.3* 10.9   HGB 14.4 12.6*   HCT 42.4 37.1*   MCV 93.8 94.9    212     BMP:   Recent Labs     08/18/22  1130 08/19/22  1054    139   K 3.6 3.6   CL 98* 100   CO2 28 28   BUN 11 14   CREATININE 1.0 1.1     LIVER PROFILE:   Recent Labs     08/18/22  1130   AST 14*   ALT 11   LIPASE 14.0   PROT 7.7   BILITOT 0.6   ALKPHOS 119     PT/INR: No results for input(s): INR in the last 72 hours. Invalid input(s): PT  Triglycerides: No results for input(s): TRIG in the last 72 hours. Iron panel:  Lab Results   Component Value Date/Time    IRON 80 01/28/2013 04:28 PM    LABIRON 27 01/28/2013 04:28 PM    [id number] 381 02/13/2014 09:24 AM    FOLATE 4.06 02/13/2014 09:24 AM     CT abd/pelvis:  1.  Findings compatible with acute sigmoid colon diverticulitis with small   foci of adjacent extraluminal gas compatible with a contained perforation. No drainable fluid collection. An underlying neoplasm is not entirely   excluded and nonemergent colonoscopy should be considered. 2. 12 mm indeterminate cystic lesion in the upper pole the right kidney   increased in size from 2015. Recommend a nonemergent renal protocol MRI or   CT.        Assessment:     45year old male with history of HTN, COPD, GERD admitted with acute diverticulitis complicated by contained perforation    Plan:   Continue supportive care  Broad spectrum antibiotics  Surgery consult  Full liquids today  Advance to low fiber as tolerated  Outpatient colonoscopy in [facility] MD Michelle, MD  4:57 PM 8/19/2022

## 2022-08-19 NOTE — PLAN OF CARE
Problem: Discharge Planning  Goal: Discharge to home or other facility with appropriate resources  8/19/2022 0237 by Orlando Daniels RN  Outcome: Progressing  8/18/2022 1835 by Josse Yin, RN  Outcome: Progressing  Flowsheets  Taken 8/18/2022 1820  Discharge to home or other facility with appropriate resources:   Identify barriers to discharge with patient and caregiver   Identify discharge learning needs (meds, wound care, etc)   Refer to discharge planning if patient needs post-hospital services based on physician order or complex needs related to functional status, cognitive ability or social support system   Arrange for needed discharge resources and transportation as appropriate  Taken 8/18/2022 1758  Discharge to home or other facility with appropriate resources:   Identify barriers to discharge with patient and caregiver   Arrange for needed discharge resources and transportation as appropriate   Identify discharge learning needs (meds, wound care, etc)     Problem: Pain  Goal: Verbalizes/displays adequate comfort level or baseline comfort level  8/19/2022 0237 by Orlando Daniels RN  Outcome: Progressing  8/18/2022 1835 by Josse Yin, RN  Outcome: Progressing     Problem: Safety - Adult  Goal: Free from fall injury  8/19/2022 0237 by Orlando Daniels RN  Outcome: Progressing  8/18/2022 1835 by Josse Yin, RN  Outcome: Progressing

## 2022-08-19 NOTE — PROGRESS NOTES
Resting in bed awake eating lunch. No complaints or needs at present time. Heart monitor removed per order. Call light in reach.

## 2022-08-19 NOTE — PROGRESS NOTES
Progress Note    Admit Date:  8/18/2022    46 y/o male with pmhx of COPD, folate deficiency, GERD, HLD, DM   -presented for abdominal pain     Subjective:  Mr. Juan Arndt today is feeling much better, he tolerated his full liquid diet today. Has not had any more vomiting. No diarrhea   Has not had BM in 5 days, he wants something for constipation     Objective:   Patient Vitals for the past 4 hrs:   BP   08/19/22 0855 119/73        No intake or output data in the 24 hours ending 08/19/22 1209    Physical Exam:    Gen: No distress. Alert. Pleasant male   Eyes: PERRL. No sclera icterus. No conjunctival injection. Neck: No JVD. Trachea midline. Resp: No accessory muscle use. No crackles. No wheezes. No rhonchi. CV: Regular rate. Regular rhythm. No murmur. No rub. No edema. Peripheral Pulses: +2 palpable, equal bilaterally   GI: Non-distended. Normal bowel sounds. +diffuse tenderness   Skin: Warm and dry. No nodule on exposed extremities. No rash on exposed extremities. M/S: No cyanosis. No joint deformity. No clubbing. Neuro: Awake. Grossly nonfocal    Psych: Oriented x 3. No anxiety or agitation.          Medications:  metroNIDAZOLE, 500 mg, Q8H  cefTRIAXone (ROCEPHIN) IV, 1,000 mg, Q24H  amLODIPine, 5 mg, Daily  atorvastatin, 20 mg, Daily  folic acid, 1 mg, Daily  lisinopril, 5 mg, Daily  pantoprazole, 40 mg, Daily  sodium chloride flush, 5-40 mL, 2 times per day  enoxaparin, 40 mg, Daily  budesonide-formoterol, 2 puff, BID  tiotropium, 2 puff, Daily      PRN Medications:  albuterol sulfate HFA, 2 puff, 4x Daily PRN  ipratropium-albuterol, 1 vial, Q4H PRN  sodium chloride flush, 5-40 mL, PRN  sodium chloride, , PRN  ondansetron, 4 mg, Q8H PRN   Or  ondansetron, 4 mg, Q6H PRN  polyethylene glycol, 17 g, Daily PRN  acetaminophen, 650 mg, Q6H PRN   Or  acetaminophen, 650 mg, Q6H PRN  HYDROmorphone, 0.5 mg, Q3H PRN   Or  HYDROmorphone, 1 mg, Q3H PRN          Data:  CBC:   Recent Labs     08/18/22  1130 08/19/22  0525   WBC 15.3* 10.9   HGB 14.4 12.6*   HCT 42.4 37.1*   MCV 93.8 94.9    212     BMP:   Recent Labs     08/18/22  1130 08/19/22  1054    139   K 3.6 3.6   CL 98* 100   CO2 28 28   BUN 11 14   CREATININE 1.0 1.1     LIVER PROFILE:   Recent Labs     08/18/22  1130   AST 14*   ALT 11   LIPASE 14.0   BILITOT 0.6   ALKPHOS 119         CULTURES  COVID/Flu not detected      RADIOLOGY  XR CHEST (2 VW)   Final Result   No acute cardiopulmonary disease         CT ABDOMEN PELVIS W IV CONTRAST Additional Contrast? None   Final Result   1. Findings compatible with acute sigmoid colon diverticulitis with small   foci of adjacent extraluminal gas compatible with a contained perforation. No drainable fluid collection. An underlying neoplasm is not entirely   excluded and nonemergent colonoscopy should be considered. 2. 12 mm indeterminate cystic lesion in the upper pole the right kidney   increased in size from 2015. Recommend a nonemergent renal protocol MRI or   CT. Mikal Gutiérrez MD have reviewed the chart on [facility] and personally interviewed and examined patient, reviewed the data (labs and imaging) and after discussion with my PA formulated the plan. Agree with note with the following edits. HPI:     47-year-old white male admitted for abdominal pain. Work-up consistent with acute sigmoid diverticulitis with microperforation. Today is feeling better. He has not eaten anything in 3 to 4 days. No fever or chills. He has had a prior colonoscopy. I reviewed the patient's Past Medical History, Past Surgical History, Medications, and Allergies. Physical exam:    /73   Pulse 55   Temp 98.2 °F (36.8 °C) (Oral)   Resp 16   Ht 5' 3\" (1.6 m)   Wt 157 lb (71.2 kg)   SpO2 92%   BMI 27.81 kg/m²     Gen: No distress. Alert. Eyes: PERRL. No sclera icterus. No conjunctival injection. ENT: No discharge. Pharynx clear. Neck: Trachea midline. Normal thyroid. Resp: No accessory muscle use. No crackles. No wheezes. No rhonchi. No dullness on percussion. CV: Regular rate. Regular rhythm. No murmur or rub. No edema. GI: Left lower quadrant tenderness without any rigidity or guarding. Non-distended. No masses. No organomegaly. Normal bowel sounds. No hernia. Assessment/Plan:  Acute diverticulitis with microperforation  -contained perforation  -admitted for further management/care  -GI and General surgery consulted. -needs outpatient colonoscopy. Patient is aware  -no surgical intervention at this time   -NPO, IVF's, --> tolerating full liquids   -leukocytosis has improved   -Rocephin, Flagyl D#2  -Dilaudid prn     Cystic lesion of the right kidney  -recommend renal protocol MRI or CT  -patient is aware. Leukocytosis   -related to above  -continue IVF's, antibiotics  -repeat CBC--> resolved      Hypertension  -BP stable. Continue Lisinopril, Amlodipine     Hyperlipidemia  -on Atorvastatin    COPD  -stable. No AE  -continue Symbicort, Spiriva  -Albuterol, Duonebs prn    GERD  -on PPI     Folate deficiency   -cont folic acid       DVT Prophylaxis: Lovenox   Diet: ADULT DIET;  Full Liquid  Code Status: Full Code    GUANAKITO Perales  08/19/22  12:34 PM    Ever Bravo MD 8/19/2022 1:01 PM

## 2022-08-19 NOTE — PROGRESS NOTES
Resting in bed awake. No complaints or needs at present time. Am assessment complete. Alert and oriented. Patient is able to demonstrate the ability to move from a reclining position to an upright position within the recliner. Bedside Mobility Assessment Tool (BMAT):     Assessment Level 1- Sit and Shake    1. From a semi-reclined position, ask patient to sit up and rotate to a seated position at the side of the bed. Can use the bedrail. 2. Ask patient to reach out and grab your hand and shake making sure patient reaches across his/her midline. Pass- Patient is able to come to a seated position, maintain core strength. Maintains seated balance while reaching across midline. Move on to Assessment Level 2. Assessment Level 2- Stretch and Point   1. With patient in seated position at the side of the bed, have patient place both feet on the floor (or stool) with knees no higher than hips. 2. Ask patient to stretch one leg and straighten the knee, then bend the ankle/flex and point the toes. If appropriate, repeat with the other leg. Pass- Patient is able to demonstrate appropriate quad strength on intended weight bearing limb(s). Move onto Assessment Level 3. Assessment Level 3- Stand   1. Ask patient to elevate off the bed or chair (seated to standing) using an assistive device (cane, bedrail). 2. Patient should be able to raise buttocks off be and hold for a count of five. May repeat once. Pass- Patient maintains standing stability for at least 5 seconds, proceed to assessment level 4. Assessment Level 4- Walk   1. Ask patient to march in place at bedside. 2. Then ask patient to advance step and return each foot. Some medical conditions may render a patient from stepping backwards, use your best clinical judgement.    Pass- Patient demonstrates balance while shifting weight and ability to step, takes independent steps, does not use assistive device patient is MOBILITY LEVEL 4.       Mobility Level- 4

## 2022-08-19 NOTE — PROGRESS NOTES
Shift assessment complete; see flow sheet. Scheduled medications administered; See MAR. IV infusing without difficulty. Pt c/o abd pain 7/10 PRN dilaudid given at this time. Pt denies any needs at this time.  Pt educated on use of call light and to call out with needs, verbalized understanding, bed in low locked position for pt safety

## 2022-08-20 VITALS
SYSTOLIC BLOOD PRESSURE: 126 MMHG | OXYGEN SATURATION: 94 % | HEART RATE: 67 BPM | BODY MASS INDEX: 27.82 KG/M2 | WEIGHT: 157 LBS | TEMPERATURE: 98.7 F | HEIGHT: 63 IN | RESPIRATION RATE: 16 BRPM | DIASTOLIC BLOOD PRESSURE: 67 MMHG

## 2022-08-20 PROCEDURE — G0378 HOSPITAL OBSERVATION PER HR: HCPCS

## 2022-08-20 PROCEDURE — 94761 N-INVAS EAR/PLS OXIMETRY MLT: CPT

## 2022-08-20 PROCEDURE — 2500000003 HC RX 250 WO HCPCS: Performed by: NURSE PRACTITIONER

## 2022-08-20 PROCEDURE — 99231 SBSQ HOSP IP/OBS SF/LOW 25: CPT | Performed by: SURGERY

## 2022-08-20 PROCEDURE — 6370000000 HC RX 637 (ALT 250 FOR IP): Performed by: NURSE PRACTITIONER

## 2022-08-20 PROCEDURE — 6360000002 HC RX W HCPCS: Performed by: NURSE PRACTITIONER

## 2022-08-20 PROCEDURE — 2580000003 HC RX 258: Performed by: NURSE PRACTITIONER

## 2022-08-20 PROCEDURE — 94640 AIRWAY INHALATION TREATMENT: CPT

## 2022-08-20 PROCEDURE — 96372 THER/PROPH/DIAG INJ SC/IM: CPT

## 2022-08-20 PROCEDURE — 96366 THER/PROPH/DIAG IV INF ADDON: CPT

## 2022-08-20 PROCEDURE — 99238 HOSP IP/OBS DSCHRG MGMT 30/<: CPT | Performed by: INTERNAL MEDICINE

## 2022-08-20 RX ORDER — AMOXICILLIN AND CLAVULANATE POTASSIUM 875; 125 MG/1; MG/1
1 TABLET, FILM COATED ORAL 3 TIMES DAILY
Qty: 30 TABLET | Refills: 0 | Status: SHIPPED | OUTPATIENT
Start: 2022-08-20 | End: 2022-08-30

## 2022-08-20 RX ADMIN — ATORVASTATIN CALCIUM 20 MG: 10 TABLET, FILM COATED ORAL at 09:06

## 2022-08-20 RX ADMIN — METRONIDAZOLE 500 MG: 500 INJECTION, SOLUTION INTRAVENOUS at 13:08

## 2022-08-20 RX ADMIN — Medication 2 PUFF: at 07:39

## 2022-08-20 RX ADMIN — PANTOPRAZOLE SODIUM 40 MG: 40 TABLET, DELAYED RELEASE ORAL at 09:06

## 2022-08-20 RX ADMIN — Medication 10 ML: at 09:07

## 2022-08-20 RX ADMIN — LISINOPRIL 5 MG: 5 TABLET ORAL at 09:06

## 2022-08-20 RX ADMIN — ENOXAPARIN SODIUM 40 MG: 100 INJECTION SUBCUTANEOUS at 09:06

## 2022-08-20 RX ADMIN — CEFTRIAXONE SODIUM 1000 MG: 1 INJECTION, POWDER, FOR SOLUTION INTRAMUSCULAR; INTRAVENOUS at 13:08

## 2022-08-20 RX ADMIN — TIOTROPIUM BROMIDE INHALATION SPRAY 2 PUFF: 3.12 SPRAY, METERED RESPIRATORY (INHALATION) at 07:39

## 2022-08-20 RX ADMIN — FOLIC ACID 1 MG: 1 TABLET ORAL at 09:06

## 2022-08-20 RX ADMIN — METRONIDAZOLE 500 MG: 500 INJECTION, SOLUTION INTRAVENOUS at 05:32

## 2022-08-20 RX ADMIN — AMLODIPINE BESYLATE 5 MG: 5 TABLET ORAL at 09:06

## 2022-08-20 ASSESSMENT — PAIN SCALES - GENERAL: PAINLEVEL_OUTOF10: 0

## 2022-08-20 NOTE — PROGRESS NOTES
Gastroenterology Progress Note    Patient:   Bebeto Valdez   :    1970   Facility:   [facility]   Referring/PCP: Rubia Estes DO  Date:     2022  Consultant:   Johnny Marti MD, MD      Chief Complaint   Patient presents with    Abdominal Pain     Patient states that he has had lower abdominal pain for a few days, last night began with fever and emesis. History of Present illness     45year old male with history of HTN, HLD, GERD, COPD, chronic back pain, presented to ER with abdominal pain. He developed severe LLQ pain and constipation for past 24h. He denies any recent change in medications, travel history or exposure to sick contacts. He has never had previous GI illness or diverticulitis. His last colonoscopy was normal in 2018. He denies nausea, vomiting, rectal bleeding, melena and weight loss.     Past Medical History:   Diagnosis Date    Cervical disc herniation     [facility] ([facility]     Folate deficiency 2013    GERD (gastroesophageal reflux disease)     Hyperlipidemia     Hypertriglyceridemia, Good HDL    Hypertension : age 37    Prediabetes 2016    Vitamin D deficiency 2013    resolved     Past Surgical History:   Procedure Laterality Date    ANKLE FRACTURE SURGERY Left     BRONCHOSCOPY  2015    CERVICAL FUSION N/A 2019    ANTERIOR CERVICAL DISCECTOMY AND FUSION WITH ALLOGRAFT, MEDTRONICS AND EVOKES  CPT CODE - 65064 performed by Mohsen Hewitt MD at [place] [address]      UT NJX DX/THER SBST INTRLMNR CRV/THRC W/IMG GDN Right 2018    RIGHT CERVICAL SEVEN THORACIC EPIDURAL STEROID INJECTION SITE CONFIRMED BY FLUOROSCOPY performed by Josiah Hanson MD at [place] [address]       Social:   Social History     Tobacco Use    Smoking status: Former     Packs/day: 2.00     Years: 35.00     Pack years: 70.00     Types: Cigarettes     Start date:      Quit date: 2019 Years since quitting: 3.6    Smokeless tobacco: Never   Substance Use Topics    Alcohol use: Not Currently     Alcohol/week: 1.0 standard drink     Types: 1 Cans of beer per week     Comment: occasional     Family:   Family History   Problem Relation Age of Onset    COPD Mother 39    COPD Father 39     No current facility-administered medications on file prior to encounter. Current Outpatient Medications on File Prior to Encounter   Medication Sig Dispense Refill    pantoprazole (PROTONIX) 40 MG tablet TAKE ONE TABLET BY MOUTH DAILY 90 tablet 1    meloxicam (MOBIC) 15 MG tablet Take 1 tablet by mouth daily as needed for Pain 30 tablet 5    lisinopril (PRINIVIL;ZESTRIL) 5 MG tablet Take 1 tablet by mouth in the morning. 90 tablet 1    albuterol sulfate HFA (VENTOLIN HFA) 108 (90 Base) MCG/ACT inhaler Inhale 2 puffs into the lungs 4 times daily as needed for Wheezing 18 g 5    atorvastatin (LIPITOR) 20 MG tablet TAKE ONE TABLET BY MOUTH DAILY 90 tablet 1    amLODIPine (NORVASC) 5 MG tablet TAKE ONE TABLET BY MOUTH DAILY 90 tablet 1    TRELEGY ELLIPTA 100-62.5-25 MCG/INH AEPB INHALE ONE PUFF BY MOUTH DAILY 60 each 5    albuterol sulfate  (90 Base) MCG/ACT inhaler INHALE TWO PUFFS BY MOUTH FOUR TIMES A DAY AS NEEDED FOR WHEEZING 18 g 5    ipratropium-albuterol (DUONEB) 0.5-2.5 (3) MG/3ML SOLN nebulizer solution Inhale 3 mLs into the lungs every 4 hours as needed for Shortness of Breath 657 mL 5    folic acid (FOLVITE) 1 MG tablet TAKE ONE TABLET BY MOUTH DAILY 60 tablet 9      Infusions:    sodium chloride 5 mL/hr at 08/19/22 1328     PRN Medications: albuterol sulfate HFA, ipratropium-albuterol, sodium chloride flush, sodium chloride, ondansetron **OR** ondansetron, polyethylene glycol, acetaminophen **OR** acetaminophen, HYDROmorphone **OR** HYDROmorphone  Allergies:    Allergies   Allergen Reactions    No Known Allergies        ROS: Constitutional: negative for chills, fevers and sweats  Eyes: negative for cystic lesion in the upper pole the right kidney   increased in size from 2015. Recommend a nonemergent renal protocol MRI or   CT.        Assessment:     45year old male with history of HTN, COPD, GERD admitted with acute diverticulitis complicated by contained perforation    Plan:   Continue supportive care  Broad spectrum antibiotics  Surgery consult  OK to D/C from GI standpoint  Outpatient colonoscopy in [facility] MD, MD  7:14 AM 8/20/2022

## 2022-08-20 NOTE — PROGRESS NOTES
[facility]    PATIENT NAME: Francheska Mejía     TODAY'S DATE: 8/20/2022    CHIEF COMPLAINT: none    INTERVAL HISTORY/HPI:    Pt feeling well, reports minimal pain, moving bowels, taking PO well. OBJECTIVE:  VITALS:  BP (!) 121/54   Pulse 56   Temp 99 °F (37.2 °C) (Oral)   Resp 18   Ht 5' 3\" (1.6 m)   Wt 157 lb (71.2 kg)   SpO2 92%   BMI 27.81 kg/m²     INTAKE/OUTPUT:    I/O last 3 completed shifts: In: 480 [P.O.:480]  Out: -   No intake/output data recorded. CONSTITUTIONAL:  awake and alert  LUNGS:     ABDOMEN:  normal bowel sounds, soft, non-distended, tenderness noted in the left lower quadrant, less tender than on prior exams per patient     Data:  CBC:   Recent Labs     08/18/22  1130 08/19/22  0525   WBC 15.3* 10.9   HGB 14.4 12.6*   HCT 42.4 37.1*    212     BMP:    Recent Labs     08/18/22  1130 08/19/22  1054    139   K 3.6 3.6   CL 98* 100   CO2 28 28   BUN 11 14   CREATININE 1.0 1.1   GLUCOSE 120* 81     Hepatic:   Recent Labs     08/18/22  1130   AST 14*   ALT 11   BILITOT 0.6   ALKPHOS 119     Mag:    No results for input(s): MG in the last 72 hours. Phos:   No results for input(s): PHOS in the last 72 hours. INR: No results for input(s): INR in the last 72 hours.     Radiology Review:         ASSESSMENT AND PLAN:  Acute sigmoid diverticulitis with contained perforation, steadily improving   - low fiber diet   - should be ok to d/c home w/ PO antibiotics; f/u with Dr Belén Causey in office in next 1-2 weeks to discuss interval elective sigmoid colectomy    Electronically signed by Sapphire Shepard MD

## 2022-08-20 NOTE — FLOWSHEET NOTE
08/19/22 2030   Vital Signs   Temp 99.4 °F (37.4 °C)   Temp Source Oral   Heart Rate 64   Resp 16   /67   BP Location Left upper arm   MAP (Calculated) 90   Level of Consciousness Alert (0)   MEWS Score 1   Oxygen Therapy   SpO2 92 %   O2 Device None (Room air)   Pt c/o lower abd pain 7/10. Dilaudid 1 mg IV given.  Pt given maria luisa mist to drink per his request. Duglas Whitehead, RN

## 2022-08-20 NOTE — PROGRESS NOTES
Prescriptions sent to [facility] by MD, and discharge instructions given. Pt verbalized understanding/ denies questions/ needs at this time. Transport called to transport pt to vehicle for discharge home.

## 2022-08-20 NOTE — FLOWSHEET NOTE
08/20/22 0310   Vital Signs   Temp 99 °F (37.2 °C)   Temp Source Oral   Heart Rate 56   Resp 18   BP (!) 121/54   MAP (Calculated) 76.33   Level of Consciousness Alert (0)   MEWS Score 1   Oxygen Therapy   SpO2 94 %   O2 Device None (Room air)   Pt resting in bed, no acute distress.  Michel Valentin RN

## 2022-08-20 NOTE — DISCHARGE SUMMARY
Name:  Beka Woods  Room:  0214/0214-01  MRN:    [id number]    Discharge Summary      This discharge summary is in conjunction with a complete physical exam done on the day of discharge. Discharging Physician: Milo Jimenez MD      Admit: 8/18/2022  Discharge:  8/20/2022     Diagnoses this Admission    Principal Problem:    Acute diverticulitis of intestine  Active Problems:    Diverticulitis of colon with perforation    Leukocytosis    Cyst of right kidney    Diverticulitis of large intestine with perforation without abscess or bleeding    Hypertension, essential    COPD (chronic obstructive pulmonary disease) (HCC)    Hyperlipidemia, mixed  Resolved Problems:    * No resolved hospital problems. *      Procedures (Please Review Full Report for Details)    none    Consults    IP CONSULT TO GENERAL SURGERY  IP CONSULT TO HOSPITALIST  IP CONSULT TO GI      HPI:      The patient is a 45 y.o. male with COPD, folate deficiency, GERD, hyperlipidemia, DM who presents to [facility] with c/o abdominal pain. He states onset was 2 days ago. The pain worsened last night. He states the pain was severe. Located to his lower abdomen. It radiates to her back. Associated symptoms are fever (up to 104 last night), nausea, vomiting. He has not had a BM. He reports cough and dyspnea at baseline due to COPD. Vitals stable. Labs with leukocytosis. CT abdomen/pelvis with acute sigmoid diverticulitis, contained perforation, no drainable fluid collection, an underlying neoplasm is not entirely excluded, non-emergent colonoscopy should be considered, 12 mm indeterminate cystic lesion of the right kidney. Admitted to med-surg. General surgery consulted. GI consulted. Physical Exam at Discharge:  /71   Pulse 72   Temp 99.8 °F (37.7 °C) (Oral)   Resp 16   Ht 5' 3\" (1.6 m)   Wt 157 lb (71.2 kg)   SpO2 94%   BMI 27.81 kg/m²        Gen: No distress. Alert. Eyes: PERRL.  No sclera icterus. No conjunctival injection. ENT: No discharge. Pharynx clear. Neck: Trachea midline. Normal thyroid. Resp: No accessory muscle use. No crackles. No wheezes. No rhonchi. No dullness on percussion. CV: Regular rate. Regular rhythm. No murmur or rub. No edema. GI: Left lower quadrant tenderness without any rigidity or guarding. Non-distended. No masses. No organomegaly. Normal bowel sounds. No hernia. Hospital Course    Acute diverticulitis with microperforation  -contained perforation  -admitted for further management/care  -GI and General surgery consulted. -needs outpatient colonoscopy. Patient is aware  -no surgical intervention at this time   -NPO, IVF's, --> tolerating full liquids   -leukocytosis has improved   -Rocephin, Flagyl D#2 --> continue PO augmentin for 10 days   -Dilaudid prn given here      Cystic lesion of the right kidney  -recommend renal protocol MRI or CT  -patient is aware. Leukocytosis   -related to above  -continue IVF's, antibiotics  -repeat CBC--> resolved      Hypertension  -BP stable. Continue Lisinopril, Amlodipine     Hyperlipidemia  -on Atorvastatin    COPD  -stable.  No AE  -continue Symbicort, Spiriva  -Albuterol, Duonebs prn    GERD  -on PPI     Folate deficiency   -cont folic acid    CBC:   Recent Labs     08/18/22  1130 08/19/22  0525   WBC 15.3* 10.9   HGB 14.4 12.6*   HCT 42.4 37.1*   MCV 93.8 94.9    212     BMP:   Recent Labs     08/18/22  1130 08/19/22  1054    139   K 3.6 3.6   CL 98* 100   CO2 28 28   BUN 11 14   CREATININE 1.0 1.1     LIVER PROFILE:   Recent Labs     08/18/22  1130   AST 14*   ALT 11   LIPASE 14.0   BILITOT 0.6   ALKPHOS 119       UA:  Recent Labs     08/18/22  1345   COLORU Yellow   PHUR 6.5   WBCUA 0-2   RBCUA 0-2   BACTERIA Rare*   CLARITYU SL CLOUDY*   SPECGRAV <=1.005   LEUKOCYTESUR Negative   UROBILINOGEN 0.2   BILIRUBINUR Negative   BLOODU TRACE-INTACT*   GLUCOSEU Negative           XR CHEST (2 VW)   Final Result   No acute cardiopulmonary disease         CT ABDOMEN PELVIS W IV CONTRAST Additional Contrast? None   Final Result   1. Findings compatible with acute sigmoid colon diverticulitis with small   foci of adjacent extraluminal gas compatible with a contained perforation. No drainable fluid collection. An underlying neoplasm is not entirely   excluded and nonemergent colonoscopy should be considered. 2. 12 mm indeterminate cystic lesion in the upper pole the right kidney   increased in size from 2015. Recommend a nonemergent renal protocol MRI or   CT. Discharge Medications     Medication List        START taking these medications      amoxicillin-clavulanate 875-125 MG per tablet  Commonly known as: Augmentin  Take 1 tablet by mouth in the morning, at noon, and at bedtime for 10 days TID dosing for acute diverticulitis            CONTINUE taking these medications      * albuterol sulfate  (90 Base) MCG/ACT inhaler  Commonly known as: PROVENTIL;VENTOLIN;PROAIR  INHALE TWO PUFFS BY MOUTH FOUR TIMES A DAY AS NEEDED FOR WHEEZING     * albuterol sulfate  (90 Base) MCG/ACT inhaler  Commonly known as: Ventolin HFA  Inhale 2 puffs into the lungs 4 times daily as needed for Wheezing     amLODIPine 5 MG tablet  Commonly known as: NORVASC  TAKE ONE TABLET BY MOUTH DAILY     atorvastatin 20 MG tablet  Commonly known as: LIPITOR  TAKE ONE TABLET BY MOUTH DAILY     folic acid 1 MG tablet  Commonly known as: FOLVITE  TAKE ONE TABLET BY MOUTH DAILY     ipratropium-albuterol 0.5-2.5 (3) MG/3ML Soln nebulizer solution  Commonly known as: DUONEB  Inhale 3 mLs into the lungs every 4 hours as needed for Shortness of Breath     lisinopril 5 MG tablet  Commonly known as: PRINIVIL;ZESTRIL  Take 1 tablet by mouth in the morning.      pantoprazole 40 MG tablet  Commonly known as: PROTONIX  TAKE ONE TABLET BY MOUTH DAILY     Trelegy Ellipta 100-62.5-25 MCG/INH Aepb  Generic drug:

## 2022-08-21 LAB
A/G RATIO: 1.6 (ref 1.1–2.2)
ALBUMIN SERPL-MCNC: 3.6 G/DL (ref 3.4–5)
ALP BLD-CCNC: 91 U/L (ref 40–129)
ALT SERPL-CCNC: 8 U/L (ref 10–40)
ANION GAP SERPL CALCULATED.3IONS-SCNC: 10 MMOL/L (ref 3–16)
AST SERPL-CCNC: 11 U/L (ref 15–37)
BILIRUB SERPL-MCNC: 0.5 MG/DL (ref 0–1)
BUN BLDV-MCNC: 12 MG/DL (ref 7–20)
CALCIUM SERPL-MCNC: 8.6 MG/DL (ref 8.3–10.6)
CHLORIDE BLD-SCNC: 100 MMOL/L (ref 99–110)
CO2: 27 MMOL/L (ref 21–32)
CREAT SERPL-MCNC: 1 MG/DL (ref 0.9–1.3)
GFR AFRICAN AMERICAN: >60
GFR NON-AFRICAN AMERICAN: >60
GLUCOSE BLD-MCNC: 92 MG/DL (ref 70–99)
POTASSIUM REFLEX MAGNESIUM: 3.7 MMOL/L (ref 3.5–5.1)
SODIUM BLD-SCNC: 137 MMOL/L (ref 136–145)
TOTAL PROTEIN: 5.9 G/DL (ref 6.4–8.2)

## 2022-08-21 NOTE — ED PROVIDER NOTES
I personally saw Alexandra Barnhart and performed a substantive portion of the visit including all aspects of the medical decision making. .    In brief, with a past medical history of COPD who presents to the ED for evaluation of abdominal pain and vomiting. The patient states the pain started on Sunday, has been worsening. He has not had a bowel movement in the past several days, he denies vomiting. He also had a fever of 104 at home. On exam, he is nontoxic-appearing. Mucous membranes are dry. Abdomen is distended but soft. There is diffuse tenderness and guarding however abdomen is nonperitoneal.      ED course: Patient is a 75-year-old presenting with abdominal pain and vomiting. Vital signs are within normal limits although he did report fever at home. He is nontoxic-appearing on exam, abdomen is distended however nonperitoneal.  Work-up is obtained. He does have a leukocytosis of 15.3. Otherwise does not meet SIRS criteria lactate is normal at 0.7. No YURI or significant [place] abnormality. CT does show microperforation due to perforated diverticulum. No abscess noted. General surgery was consulted, they recommended IV antibiotics and hospitalist admission. All diagnostic, treatment, and disposition decisions were made by myself in conjunction with the advanced practice provider. For all further details of the patient's emergency department visit, please see the advanced practice provider's documentation. Comment: Please note this report has been produced using speech recognition software and may contain errors related to that system including errors in grammar, punctuation, and spelling, as well as words and phrases that may be inappropriate. If there are any questions or concerns please feel free to contact the dictating provider for clarification.        Lis [place]  08/21/22 0013

## 2022-08-22 ENCOUNTER — CARE COORDINATION (OUTPATIENT)
Dept: CASE MANAGEMENT | Age: 52
End: 2022-08-22

## 2022-08-22 ENCOUNTER — TELEPHONE (OUTPATIENT)
Dept: SURGERY | Age: 52
End: 2022-08-22

## 2022-08-22 NOTE — TELEPHONE ENCOUNTER
Pt called in asking to be scheduled for a colonoscopy, I advised pt that we do not perform those and she would need to call [facility]. In previous notes Dr. Shawn Garcia stated pt is to come in for a follow up for a sigmoid colectomy, but pt states that they are only to be seen for a colonoscopy.  Advised I would have MA call  back       [phone]

## 2022-08-22 NOTE — TELEPHONE ENCOUNTER
I called and spoke to Diane pt's girlfriend, I explained that he will need to finish antibiotics and we had planned to have him follow up, but they were wanted to know if he is going to have to be scoped at any point prior to discussing surgery- I didn't think so, but please advise.

## 2022-08-22 NOTE — CARE COORDINATION
Juan [facility] Transitions Initial Follow Up Call    Call within 2 business days of discharge: Yes    Patient: Simin Herbert Patient : 1970   MRN: [id number]  Reason for Admission: abd pain, acute diverticulitis with microperforation (no surgical intervention at this time), cystic lesion of the right kidney, leukocytosis, HTN, HLD, COPD no AE, GERD, folate deficiency -> home no services, needs OP colonoscopy 4-6 weeks  Discharge Date: 22 RARS: Readmission Risk Score: 6.8      Last Discharge [facility]       Date Complaint Diagnosis Description Type Department Provider    22 Abdominal Pain Diverticulitis of colon with perforation . .. ED to Hosp-Admission (Discharged) (ADMITTED) [facility] 2 Corina Esquivel MD; [place]. .. Was this an external facility discharge? No Discharge Facility: NA    1st attempt - CTN attempted follow-up outreach to patient. Voice mailbox not set up so unable to leave message.        Follow Up  Future Appointments   Date Time Provider Simone Marcelo   2023  9:30 AM Vicky Menendez MD [facility]       Cheo Jones RN

## 2022-08-23 ENCOUNTER — CARE COORDINATION (OUTPATIENT)
Dept: CASE MANAGEMENT | Age: 52
End: 2022-08-23

## 2022-08-23 DIAGNOSIS — K57.92 ACUTE DIVERTICULITIS OF INTESTINE: Primary | ICD-10-CM

## 2022-08-23 PROCEDURE — 1111F DSCHRG MED/CURRENT MED MERGE: CPT | Performed by: STUDENT IN AN ORGANIZED HEALTH CARE EDUCATION/TRAINING PROGRAM

## 2022-08-23 NOTE — CARE COORDINATION
questions and does not have any further questions or concerns at this time. Were discharge instructions available to patient? Yes. Reviewed appropriate site of care based on symptoms and resources available to patient including: PCP  Specialist. The patient agrees to contact the PCP office for questions related to their healthcare. Medication reconciliation was performed with patient, who verbalizes understanding of administration of home medications. Was patient discharged with a pulse oximeter? no    Taking abx. Tolerating PO without nausea, vomiting, abd pain. Stool is loose but having normal amount BM. Contacted surgery already to schedule - date TBD. States he has appt with PCP but not noted below. Denies needs/referrals at this time. CTN provided contact information. Plan for follow-up call in 7-14 days based on severity of symptoms and risk factors.     Plan for next call: symptom management-abd pain; abx; loose stool; ACP doc referral    Care Transitions 24 Hour Call    Schedule Follow Up Appointment with PCP: Completed  Do you have a copy of your discharge instructions?: Yes  Do you have all of your prescriptions and are they filled?: Yes  Have you been contacted by a [facility] Pharmacist?: No  Have you scheduled your follow up appointment?: Yes  How are you going to get to your appointment?: Car - drive self  Patient Home Equipment: Nebulizer  Do you have support at home?: Partner/Spouse/SO  Do you feel like you have everything you need to keep you well at home?: Yes  Are you an active caregiver in your home?: No  Care Transitions Interventions  No Identified Needs    Social Work: Declined             Follow Up  Future Appointments   Date Time Provider Simone Marcelo   2/22/2023  9:30 AM Jorge [facility]       Jose Rock RN

## 2022-08-25 ENCOUNTER — HOSPITAL ENCOUNTER (OUTPATIENT)
Age: 52
Discharge: HOME | End: 2022-08-25
Payer: COMMERCIAL

## 2022-08-25 ENCOUNTER — OFFICE VISIT (OUTPATIENT)
Dept: PRIMARY CARE CLINIC | Age: 52
End: 2022-08-25
Payer: COMMERCIAL

## 2022-08-25 VITALS
HEART RATE: 67 BPM | DIASTOLIC BLOOD PRESSURE: 72 MMHG | TEMPERATURE: 97.8 F | HEIGHT: 63 IN | SYSTOLIC BLOOD PRESSURE: 128 MMHG | OXYGEN SATURATION: 96 % | BODY MASS INDEX: 27.29 KG/M2 | RESPIRATION RATE: 18 BRPM | WEIGHT: 154 LBS

## 2022-08-25 DIAGNOSIS — K57.20 DIVERTICULITIS OF COLON WITH PERFORATION: ICD-10-CM

## 2022-08-25 DIAGNOSIS — I10 HYPERTENSION, ESSENTIAL: ICD-10-CM

## 2022-08-25 DIAGNOSIS — M50.30 DDD (DEGENERATIVE DISC DISEASE), CERVICAL: ICD-10-CM

## 2022-08-25 DIAGNOSIS — N28.1 CYST OF RIGHT KIDNEY: ICD-10-CM

## 2022-08-25 DIAGNOSIS — Z09 HOSPITAL DISCHARGE FOLLOW-UP: Primary | ICD-10-CM

## 2022-08-25 LAB
A/G RATIO: 1.2 (ref 1.1–2.2)
ALBUMIN SERPL-MCNC: 4.1 G/DL (ref 3.4–5)
ALP BLD-CCNC: 89 U/L (ref 40–129)
ALT SERPL-CCNC: 21 U/L (ref 10–40)
ANION GAP SERPL CALCULATED.3IONS-SCNC: 7 MMOL/L (ref 3–16)
AST SERPL-CCNC: 24 U/L (ref 15–37)
BASOPHILS ABSOLUTE: 0.1 K/UL (ref 0–0.2)
BASOPHILS RELATIVE PERCENT: 0.8 %
BILIRUB SERPL-MCNC: <0.2 MG/DL (ref 0–1)
BUN BLDV-MCNC: 12 MG/DL (ref 7–20)
CALCIUM SERPL-MCNC: 10 MG/DL (ref 8.3–10.6)
CHLORIDE BLD-SCNC: 100 MMOL/L (ref 99–110)
CO2: 29 MMOL/L (ref 21–32)
CREAT SERPL-MCNC: 0.9 MG/DL (ref 0.9–1.3)
EOSINOPHILS ABSOLUTE: 0.3 K/UL (ref 0–0.6)
EOSINOPHILS RELATIVE PERCENT: 4.2 %
GFR AFRICAN AMERICAN: >60
GFR NON-AFRICAN AMERICAN: >60
GLUCOSE BLD-MCNC: 105 MG/DL (ref 70–99)
HCT VFR BLD CALC: 43.5 % (ref 40.5–52.5)
HEMOGLOBIN: 14.6 G/DL (ref 13.5–17.5)
LYMPHOCYTES ABSOLUTE: 1.6 K/UL (ref 1–5.1)
LYMPHOCYTES RELATIVE PERCENT: 20.9 %
MCH RBC QN AUTO: 31.9 PG (ref 26–34)
MCHC RBC AUTO-ENTMCNC: 33.5 G/DL (ref 31–36)
MCV RBC AUTO: 95 FL (ref 80–100)
MONOCYTES ABSOLUTE: 0.6 K/UL (ref 0–1.3)
MONOCYTES RELATIVE PERCENT: 8.3 %
NEUTROPHILS ABSOLUTE: 5.1 K/UL (ref 1.7–7.7)
NEUTROPHILS RELATIVE PERCENT: 65.8 %
PDW BLD-RTO: 13.3 % (ref 12.4–15.4)
PLATELET # BLD: 303 K/UL (ref 135–450)
PMV BLD AUTO: 8.1 FL (ref 5–10.5)
POTASSIUM SERPL-SCNC: 5.3 MMOL/L (ref 3.5–5.1)
RBC # BLD: 4.58 M/UL (ref 4.2–5.9)
SODIUM BLD-SCNC: 136 MMOL/L (ref 136–145)
TOTAL PROTEIN: 7.5 G/DL (ref 6.4–8.2)
WBC # BLD: 7.7 K/UL (ref 4–11)

## 2022-08-25 PROCEDURE — 36415 COLL VENOUS BLD VENIPUNCTURE: CPT

## 2022-08-25 PROCEDURE — 85025 COMPLETE CBC W/AUTO DIFF WBC: CPT

## 2022-08-25 PROCEDURE — 80053 COMPREHEN METABOLIC PANEL: CPT

## 2022-08-25 PROCEDURE — 1111F DSCHRG MED/CURRENT MED MERGE: CPT | Performed by: STUDENT IN AN ORGANIZED HEALTH CARE EDUCATION/TRAINING PROGRAM

## 2022-08-25 PROCEDURE — 99215 OFFICE O/P EST HI 40 MIN: CPT | Performed by: STUDENT IN AN ORGANIZED HEALTH CARE EDUCATION/TRAINING PROGRAM

## 2022-08-25 SDOH — ECONOMIC STABILITY: TRANSPORTATION INSECURITY
IN THE PAST 12 MONTHS, HAS THE LACK OF TRANSPORTATION KEPT YOU FROM MEDICAL APPOINTMENTS OR FROM GETTING MEDICATIONS?: NO

## 2022-08-25 SDOH — ECONOMIC STABILITY: TRANSPORTATION INSECURITY
IN THE PAST 12 MONTHS, HAS LACK OF TRANSPORTATION KEPT YOU FROM MEETINGS, WORK, OR FROM GETTING THINGS NEEDED FOR DAILY LIVING?: NO

## 2022-08-25 ASSESSMENT — PATIENT HEALTH QUESTIONNAIRE - PHQ9
2. FEELING DOWN, DEPRESSED OR HOPELESS: 0
SUM OF ALL RESPONSES TO PHQ QUESTIONS 1-9: 0
SUM OF ALL RESPONSES TO PHQ QUESTIONS 1-9: 0
SUM OF ALL RESPONSES TO PHQ9 QUESTIONS 1 & 2: 0
1. LITTLE INTEREST OR PLEASURE IN DOING THINGS: 0
SUM OF ALL RESPONSES TO PHQ QUESTIONS 1-9: 0
SUM OF ALL RESPONSES TO PHQ QUESTIONS 1-9: 0

## 2022-08-25 NOTE — PROGRESS NOTES
Post-Discharge Transitional Care  Follow Up      Araceli Ford   YOB: 1970    Date of Office Visit:  8/25/2022  Date of Hospital Admission: 8/18/22  Date of Hospital Discharge: 8/20/22  Risk of hospital readmission (high >=14%. Medium >=10%) :Readmission Risk Score: 6.8      Care management risk score Rising risk (score 2-5) and Complex Care (Scores >=6): No Risk Score On File     Non face to face  following discharge, date last encounter closed (first attempt may have been earlier): 08/23/2022    Call initiated 2 business days of discharge: Yes    ASSESSMENT/PLAN:   Hospital discharge follow-up  -     [place] DISCHARGE MEDS RECONCILED W/ CURRENT OUTPATIENT MED LIST  Hypertension, essential  -     Comprehensive Metabolic Panel; Future  - Pt has new onset dizziness and lightheadedness. Hold Amlodipine 5mg today as this can be an AE. Told pt to call office on 9/1/22 with BP from Gen Surg visit. Pt to f/u in 1 month for BP check and see if Amlodipine needs to be restarted. BP currently at goal with 128/72. Pt can continue Lisinopril 5mg daily until then. DDD (degenerative disc disease), cervical   - Pt d/c on meloxicam at last visit. Pt not currently taking medication and based on recent episode of diverticulitis counseled on the fact that he should avoid NSAID usage. Diverticulitis of colon with perforation  -     CBC with Auto Differential; Future  -     Comprehensive Metabolic Panel; Future  - Pt to f/u with Gen Surg on 9/1/22.  - Pt currently working to schedule colonoscopy with GI soon. - Given recent leukocytosis and anemia, ordered CBC today. - I counseled pt on proper dietary choices again.   - RTC in 4 weeks to re-evaluate sx and BP check. Cyst of right kidney  -     MRI ABDOMEN W CONTRAST; Future  -     Comprehensive Metabolic Panel; Future  - Ordered MRI Renal Protocol today per Radiology recommendation to re-evaluate cystic lesion.   - Ordered CMP to assess renal function. Medical Decision Making: straightforward  Return in 2 weeks (on 9/8/2022). On this date 8/25/2022 I have spent 90 minutes reviewing previous notes, test results and face to face with the patient discussing the diagnosis and importance of compliance with the treatment plan as well as documenting on the day of the visit. Subjective:   HPI:  Follow up of Hospital problems/diagnosis(es): Acute Diverticulitis with Microperforation. Patient states that on 8/16/2022 patient started to have right and left lower quadrant pain. This persisted until 8/18/2022 when he started to develop right and left lower quadrant pain with nausea, vomiting, constipation. Patient states that pain became severe in addition to having a fever of 104 along with leukocytosis. Patient was seen at [address] and then admitted to the inpatient unit. General surgery was consulted with Dr. Heena Vásquez. GI was also consulted with Dr. Jose Ngo, [facility]. General surgery stated that there was no need for surgical intervention at that time and stated to f/u for discussion on elective sigmoid colectomy on 9/1/2022. Patient admitting to poor diet with fatty greasy foods and previously [facility] daily. General surgery advised risk factors of poor diet leading to constipation and diverticulitis. Patient told that he would need colonoscopy in 6 to 8 weeks after discharge. Patient was discharged on 8/20/2022. During, the inpatient service patient received IV Rocephin 1 g and Flagyl 500 mg. Patient was discharged on amoxicillin-clavulanic acid 875-125 mg for 10 days. Patient states that he has had some increased diarrhea since discharge since starting this medication. Patient was previously stopped on meloxicam 15 mg daily as needed last visit. Pt stated he did not take medication beyond last visit.  Patient reinforced at discharge that he is no longer able to take medication given recent diagnosis of diverticulosis and acute diverticulitis. I told pt to call office and confirm how many he took. CT performed inpatient showed right 12 mm upper pole cystic kidney lesion. This seems to have grown in size since last CT in 2015. Patient made aware of these results in office today. Inpatient course: Discharge summary reviewed- see chart. Interval history/Current status: Patient states that he continues to have increased diarrhea since hospital discharge. Pt states he has never had any GI issues in the past including, Peptic Ulcer Dx, Diverticulitis, or IBD. Patient is currently taking amoxicillin-clavulanic acid 875-125 mg.  I advised patient to take this medication with food to avoid these effects. Advised patient to continue taking antibiotics until full course complete. Patient complains of dizziness and lightheadedness since last visit. I advised patient this could be secondary to his amlodipine 5 mg. Given his BP today was 128/72 heart rate of 67, will hold amlodipine 5 mg until reevaluation 2 weeks. BP well controlled today compared to 8/4/2022 which was 150/70 6/11/2022 which was 140/62. Per recommendation from radiology, will reorder MRI renal protocol to reassess right upper pole renal lesion. Patient will continue to follow-up with general surgery on 9/1/2022. Patient will follow GI at unspecified time for repeat colonoscopy in 6-8 weeks. Colonoscopy in 2018 was normal.  Ordered CBC and CMP today to reassess for anemia and possible leukocytosis and kidney function given lesion enlargement.     Patient Active Problem List   Diagnosis    Hypertension, essential    Gastroesophageal reflux disease without esophagitis    COPD (chronic obstructive pulmonary disease) (HCC)    Prediabetes    Hyperlipidemia, mixed    Esophageal dysphagia    DDD (degenerative disc disease), cervical    COPD exacerbation (HCC)    Acute respiratory failure with hypoxia (HCC)    Marijuana use, episodic Acute diverticulitis of intestine    Diverticulitis of colon with perforation    Leukocytosis    Cyst of right kidney    Diverticulitis of large intestine with perforation without abscess or bleeding       Medications listed as ordered at the time of discharge from hospital     Medication List            Accurate as of August 25, 2022  4:48 PM. If you have any questions, ask your nurse or doctor. CONTINUE taking these medications      * albuterol sulfate  (90 Base) MCG/ACT inhaler  Commonly known as: PROVENTIL;VENTOLIN;PROAIR  INHALE TWO PUFFS BY MOUTH FOUR TIMES A DAY AS NEEDED FOR WHEEZING     * albuterol sulfate  (90 Base) MCG/ACT inhaler  Commonly known as: Ventolin HFA  Inhale 2 puffs into the lungs 4 times daily as needed for Wheezing     amoxicillin-clavulanate 875-125 MG per tablet  Commonly known as: Augmentin  Take 1 tablet by mouth in the morning, at noon, and at bedtime for 10 days TID dosing for acute diverticulitis     atorvastatin 20 MG tablet  Commonly known as: LIPITOR  TAKE ONE TABLET BY MOUTH DAILY     folic acid 1 MG tablet  Commonly known as: FOLVITE  TAKE ONE TABLET BY MOUTH DAILY     ipratropium-albuterol 0.5-2.5 (3) MG/3ML Soln nebulizer solution  Commonly known as: DUONEB  Inhale 3 mLs into the lungs every 4 hours as needed for Shortness of Breath     lisinopril 5 MG tablet  Commonly known as: PRINIVIL;ZESTRIL  Take 1 tablet by mouth in the morning. pantoprazole 40 MG tablet  Commonly known as: PROTONIX  TAKE ONE TABLET BY MOUTH DAILY     Trelegy Ellipta 100-62.5-25 MCG/INH Aepb  Generic drug: fluticasone-umeclidin-vilant  INHALE ONE PUFF BY MOUTH DAILY           * This list has 2 medication(s) that are the same as other medications prescribed for you. Read the directions carefully, and ask your doctor or other care provider to review them with you.                 STOP taking these medications      amLODIPine 5 MG tablet  Commonly known as: NORVASC  Stopped by: Rosana Gallardo DO                Medications marked \"taking\" at this time  Outpatient Medications Marked as Taking for the 8/25/22 encounter (Office Visit) with Rosana Gallardo DO   Medication Sig Dispense Refill    amoxicillin-clavulanate (AUGMENTIN) 875-125 MG per tablet Take 1 tablet by mouth in the morning, at noon, and at bedtime for 10 days TID dosing for acute diverticulitis 30 tablet 0    pantoprazole (PROTONIX) 40 MG tablet TAKE ONE TABLET BY MOUTH DAILY 90 tablet 1    lisinopril (PRINIVIL;ZESTRIL) 5 MG tablet Take 1 tablet by mouth in the morning. 90 tablet 1    albuterol sulfate HFA (VENTOLIN HFA) 108 (90 Base) MCG/ACT inhaler Inhale 2 puffs into the lungs 4 times daily as needed for Wheezing 18 g 5    atorvastatin (LIPITOR) 20 MG tablet TAKE ONE TABLET BY MOUTH DAILY 90 tablet 1    TRELEGY ELLIPTA 100-62.5-25 MCG/INH AEPB INHALE ONE PUFF BY MOUTH DAILY 60 each 5    albuterol sulfate  (90 Base) MCG/ACT inhaler INHALE TWO PUFFS BY MOUTH FOUR TIMES A DAY AS NEEDED FOR WHEEZING 18 g 5    ipratropium-albuterol (DUONEB) 0.5-2.5 (3) MG/3ML SOLN nebulizer solution Inhale 3 mLs into the lungs every 4 hours as needed for Shortness of Breath 202 mL 5    folic acid (FOLVITE) 1 MG tablet TAKE ONE TABLET BY MOUTH DAILY 60 tablet 9        Medications patient taking as of now reconciled against medications ordered at time of hospital discharge:  Yes    A comprehensive review of systems was negative except for: Dizziness and Lightheadedness     Objective:    /72 (Site: Left Upper Arm, Position: Sitting, Cuff Size: Medium Adult)   Pulse 67   Temp 97.8 °F (36.6 °C) (Infrared)   Resp 18   Ht 5' 3\" (1.6 m)   Wt 154 lb (69.9 kg)   SpO2 96%   BMI 27.28 kg/m²   General Appearance: alert and oriented to person, place and time, well developed and well- nourished, in no acute distress  Skin: warm and dry, no rash or erythema  Head: normocephalic and atraumatic  Eyes: pupils equal, round, and reactive to light, extraocular eye movements intact, conjunctivae normal  ENT: tympanic membrane, external ear and ear canal normal bilaterally, nose without deformity, nasal mucosa and turbinates normal without polyps  Neck: supple and non-tender without mass, no thyromegaly or thyroid nodules, no cervical lymphadenopathy  Pulmonary/Chest: clear to auscultation bilaterally- no wheezes, rales or rhonchi, normal air movement, no respiratory distress  Cardiovascular: normal rate, regular rhythm, normal S1 and S2, no murmurs, rubs, clicks, or gallops, distal pulses intact, no carotid bruits  Abdomen: soft, non-tender, non-distended, normal bowel sounds, no masses or organomegaly  Extremities: no cyanosis, clubbing or edema  Musculoskeletal: normal range of motion, no joint swelling, deformity or tenderness  Neurologic: reflexes normal and symmetric, no cranial nerve deficit, gait, coordination and speech normal  Abdomen: soft, non-tender, non-distended, normal bowel sounds, no masses or organomegaly    An electronic signature was used to authenticate this note.   --Johnie Weston, DO

## 2022-08-26 DIAGNOSIS — E87.5 HYPERKALEMIA: Primary | ICD-10-CM

## 2022-08-31 ENCOUNTER — TELEPHONE (OUTPATIENT)
Dept: PRIMARY CARE CLINIC | Age: 52
End: 2022-08-31

## 2022-08-31 ENCOUNTER — CARE COORDINATION (OUTPATIENT)
Dept: CASE MANAGEMENT | Age: 52
End: 2022-08-31

## 2022-08-31 NOTE — CARE COORDINATION
[facility] Transitions Follow Up Call    2022    Patient: Chris Rea  Patient : 1970   MRN: [id number]  Reason for Admission: abd pain, acute diverticulitis with microperforation (no surgical intervention at this time), cystic lesion of the right kidney, leukocytosis, HTN, HLD, COPD no AE, GERD, folate deficiency -> home no services, needs OP colonoscopy 4-6 weeks, f/up Dr Charlie Ryan  discuss elective sigmoidoscopy  Discharge Date: 22 RARS: Readmission Risk Score: 6.8       Spoke with: Chris Rea (patient)    Needs to be reviewed by the provider   Additional needs identified to be addressed with provider: Yes  See note         Method of communication with provider : chart routing    Care Transition Nurse (CTN) contacted the patient by telephone to follow up after recent hospital admission. Addressed changes since last contact: medications-amlodipine stopped at [facility] visit - reviewed during call  Discussed follow-up appointments. If no appointment was previously scheduled, appointment scheduling offered: No.   Non-[facility] follow up appointment(s): NA    Discussed appropriate site of care based on symptoms and resources available to patient including: PCP  Specialist. The patient agrees to contact the PCP office for questions related to their healthcare. Patients top risk factors for readmission: medical condition-see above  Interventions to address risk factors: Education of patient/family/caregiver/guardian to support self-management-f/up as scheduled; report worsening or new symptoms same day for recommendation    Increased BM frequency \"loose\" with some abd pain \"not bad a little bit\". Denies bloating, firm abd, fever, chills, n/v. Discussed C Diff with odor which he says his BM has had strong odor and he cannot be away from bathroom for long with constant loose stool. CTN will contact PCP to see if he wants to order a C Diff specimen.  Noted there is BMP ordered for this week and he has appt with surgeon tomorrow morning at [facility]. Instructed him that if PCP orders C Diff lab then when he is at hospital for his appt tomorrow he can get lab drawn,  kit to collect C Diff while there. He voices understanding. Educated him that C Diff is infectious - wash hands thoroughly with soap & water, that hand  does not wash away C Diff spores, clean with bleach, wash hands before handling food, have a designated bathroom if able - until C Diff is ruled out or treated. He voices understanding. No longer dizzy or lightheaded since the bp meds stopped. Denies needs. Agrees with plan. CTN provided contact information. Routing note to PCP     Plan for follow-up call in 1-2 days based on severity of symptoms and risk factors.   Plan for next call: symptom management-diarrhea, OV with surgeon, lab      Follow Up  Future Appointments   Date Time Provider Simone Marcelo   9/1/2022 10:30 AM MD MARISSA Jean [facility]   9/9/2022  4:00 PM [facility] MRI RM 1 [facility] [place]   9/27/2022 12:30  [facility] AND [facility]   2/22/2023  9:30 AM Conrad Johnson MD [facility]UL[facility] KAYKAY Burrell, GANGA

## 2022-08-31 NOTE — TELEPHONE ENCOUNTER
----- Message from Timmy Sullivan sent at 8/26/2022 11:15 AM EDT -----  Subject: Message to Provider    QUESTIONS  Information for Provider? nerve pain pills were thrown out because the   were discontinued and as instructed (Meloxicin 15mg)  ---------------------------------------------------------------------------  --------------  Lucero STEVENS  [phone]; OK to leave message on voicemail  ---------------------------------------------------------------------------  --------------  SCRIPT ANSWERS  Relationship to Patient?  Self

## 2022-09-01 ENCOUNTER — CARE COORDINATION (OUTPATIENT)
Dept: CASE MANAGEMENT | Age: 52
End: 2022-09-01

## 2022-09-01 ENCOUNTER — OFFICE VISIT (OUTPATIENT)
Dept: SURGERY | Age: 52
End: 2022-09-01
Payer: COMMERCIAL

## 2022-09-01 VITALS
WEIGHT: 151 LBS | SYSTOLIC BLOOD PRESSURE: 142 MMHG | DIASTOLIC BLOOD PRESSURE: 84 MMHG | BODY MASS INDEX: 26.75 KG/M2 | HEIGHT: 63 IN

## 2022-09-01 DIAGNOSIS — R19.7 DIARRHEA, UNSPECIFIED TYPE: Primary | ICD-10-CM

## 2022-09-01 DIAGNOSIS — K57.20 DIVERTICULITIS OF LARGE INTESTINE WITH PERFORATION WITHOUT ABSCESS OR BLEEDING: Primary | ICD-10-CM

## 2022-09-01 PROCEDURE — 3017F COLORECTAL CA SCREEN DOC REV: CPT | Performed by: SURGERY

## 2022-09-01 PROCEDURE — G8427 DOCREV CUR MEDS BY ELIG CLIN: HCPCS | Performed by: SURGERY

## 2022-09-01 PROCEDURE — 1111F DSCHRG MED/CURRENT MED MERGE: CPT | Performed by: SURGERY

## 2022-09-01 PROCEDURE — 99213 OFFICE O/P EST LOW 20 MIN: CPT | Performed by: SURGERY

## 2022-09-01 PROCEDURE — 1036F TOBACCO NON-USER: CPT | Performed by: SURGERY

## 2022-09-01 PROCEDURE — G8419 CALC BMI OUT NRM PARAM NOF/U: HCPCS | Performed by: SURGERY

## 2022-09-01 NOTE — PROGRESS NOTES
[facility] SURGERY    CHIEF COMPLAINT: None, doing well    SUBJECTIVE:   Patient presents for follow up of his diverticulitis. He reports doing well. His pain is resolved. His bowels are working. He has an appointment for colonoscopy. Allergies   Allergen Reactions    No Known Allergies      Outpatient Medications Marked as Taking for the 9/1/22 encounter (Office Visit) with Grant Cruz MD   Medication Sig Dispense Refill    pantoprazole (PROTONIX) 40 MG tablet TAKE ONE TABLET BY MOUTH DAILY 90 tablet 1    lisinopril (PRINIVIL;ZESTRIL) 5 MG tablet Take 1 tablet by mouth in the morning.  90 tablet 1    albuterol sulfate HFA (VENTOLIN HFA) 108 (90 Base) MCG/ACT inhaler Inhale 2 puffs into the lungs 4 times daily as needed for Wheezing 18 g 5    atorvastatin (LIPITOR) 20 MG tablet TAKE ONE TABLET BY MOUTH DAILY 90 tablet 1    TRELEGY ELLIPTA 100-62.5-25 MCG/INH AEPB INHALE ONE PUFF BY MOUTH DAILY 60 each 5    albuterol sulfate  (90 Base) MCG/ACT inhaler INHALE TWO PUFFS BY MOUTH FOUR TIMES A DAY AS NEEDED FOR WHEEZING 18 g 5    ipratropium-albuterol (DUONEB) 0.5-2.5 (3) MG/3ML SOLN nebulizer solution Inhale 3 mLs into the lungs every 4 hours as needed for Shortness of Breath 503 mL 5    folic acid (FOLVITE) 1 MG tablet TAKE ONE TABLET BY MOUTH DAILY 60 tablet 9       Past Medical History:   Diagnosis Date    Cervical disc herniation     COPD (chronic obstructive pulmonary disease) (HCC)     Diverticulitis     Folate deficiency 01/2013    GERD (gastroesophageal reflux disease)     Hyperlipidemia 02/2014    Hypertriglyceridemia, Good HDL    Hypertension 12/12: age 37    Prediabetes 11/2016    Vitamin D deficiency 01/2013    resolved     Past Surgical History:   Procedure Laterality Date    ANKLE FRACTURE SURGERY Left 1998    BRONCHOSCOPY  12-    CERVICAL FUSION N/A 1/16/2019    ANTERIOR CERVICAL DISCECTOMY AND FUSION WITH ALLOGRAFT, MEDTRONICS AND EVOKES  CPT CODE - 55765 performed by Trinity Mccracken MD at [facility] 43  2/13    CA Deandre Sami Palma 82 DX/THER SBST INTRLMNR CRV/THRC W/IMG GDN Right 11/19/2018    RIGHT CERVICAL SEVEN THORACIC EPIDURAL STEROID INJECTION SITE CONFIRMED BY FLUOROSCOPY performed by Petr Little MD at [facility] 73     Family History   Problem Relation Age of Onset    COPD Mother 39    COPD Father 39     Social History     Socioeconomic History    Marital status: Single     Spouse name: Rebecca Langley    Number of children: 1    Years of education: 10th grade    Highest education level: Not on file   Occupational History    Occupation: [employer]    Tobacco Use    Smoking status: Former     Packs/day: 2.00     Years: 35.00     Pack years: 70.00     Types: Cigarettes     Start date: 1984     Quit date: 1/1/2019     Years since quitting: 3.6    Smokeless tobacco: Never   Vaping Use    Vaping Use: Never used   Substance and Sexual Activity    Alcohol use: Not Currently     Alcohol/week: 1.0 standard drink     Types: 1 Cans of beer per week     Comment: occasional    Drug use: Yes     Types: Marijuana (Weed)     Comment: 1 joint per week per patient    Sexual activity: Yes     Partners: Female   Other Topics Concern    Not on file   Social History Narrative    \"wife\" (longterm  Rebecca Langley)     Social Determinants of Health     Financial Resource Strain: Medium Risk    Difficulty of Paying Living Expenses: Somewhat hard   Food Insecurity: No Food Insecurity    Worried About Running Out of Food in the Last Year: Never true    Ran Out of Food in the Last Year: Never true   Transportation Needs: No Transportation Needs    Lack of Transportation (Medical): No    Lack of Transportation (Non-Medical):  No   Physical Activity: Not on file   Stress: Not on file   Social Connections: Not on file   Intimate Partner Violence: Not on file   Housing Stability: Not on file          Vitals:    09/01/22 1026 09/01/22 1029   BP: (!) 152/98 (!) 142/84   Site: Left Upper Arm Left Upper Arm   Position: Sitting Sitting   Cuff Size: Large Adult Large Adult   Weight: 151 lb (68.5 kg)    Height: 5' 3\" (1.6 m)      Body mass index is 26.75 kg/m². ROS:  As per HPI, otherwise reviewed and negative    PHYSICAL EXAM:     Constitutional:  Well developed, well nourished, no acute distress, non-toxic appearance   Respiratory:  No respiratory distress, normal breath sounds, no rales, no wheezing   Cardiovascular:  Normal rate, normal rhythm, no murmurs. GI: Bowel sounds positive, soft, minimal tenderness in left lower quadrant  Integument: Warm and dry  Neurologic:  Alert & oriented x 3, normal motor function, normal sensory function, no focal deficits noted   Psychiatric:  Speech and behavior appropriate             DATA:  N/A    ASSESSMENT:   1. Diverticulitis of large intestine with perforation without abscess or bleeding           PLAN: At this point I encouraged him to switch over to a high-fiber diet with a daily fiber supplementation. He will call in the meantime if he has any problems.   Otherwise he will get the colonoscopy and follow-up sometime after that to discuss interval sigmoid colectomy

## 2022-09-06 ENCOUNTER — HOSPITAL ENCOUNTER (OUTPATIENT)
Age: 52
Discharge: HOME | End: 2022-09-06
Payer: COMMERCIAL

## 2022-09-06 DIAGNOSIS — E87.5 HYPERKALEMIA: ICD-10-CM

## 2022-09-06 LAB
ANION GAP SERPL CALCULATED.3IONS-SCNC: 7 MMOL/L (ref 3–16)
BUN BLDV-MCNC: 12 MG/DL (ref 7–20)
CALCIUM SERPL-MCNC: 10 MG/DL (ref 8.3–10.6)
CHLORIDE BLD-SCNC: 104 MMOL/L (ref 99–110)
CO2: 31 MMOL/L (ref 21–32)
CREAT SERPL-MCNC: 1 MG/DL (ref 0.9–1.3)
GFR AFRICAN AMERICAN: >60
GFR NON-AFRICAN AMERICAN: >60
GLUCOSE BLD-MCNC: 101 MG/DL (ref 70–99)
POTASSIUM SERPL-SCNC: 4.7 MMOL/L (ref 3.5–5.1)
SODIUM BLD-SCNC: 142 MMOL/L (ref 136–145)

## 2022-09-06 PROCEDURE — 36415 COLL VENOUS BLD VENIPUNCTURE: CPT

## 2022-09-06 PROCEDURE — 80048 BASIC METABOLIC PNL TOTAL CA: CPT

## 2022-09-09 ENCOUNTER — TELEPHONE (OUTPATIENT)
Dept: PRIMARY CARE CLINIC | Age: 52
End: 2022-09-09

## 2022-09-09 ENCOUNTER — HOSPITAL ENCOUNTER (OUTPATIENT)
Dept: MRI IMAGING | Age: 52
Discharge: HOME | End: 2022-09-09
Payer: COMMERCIAL

## 2022-09-09 ENCOUNTER — CARE COORDINATION (OUTPATIENT)
Dept: CASE MANAGEMENT | Age: 52
End: 2022-09-09

## 2022-09-09 DIAGNOSIS — N28.1 CYST OF RIGHT KIDNEY: ICD-10-CM

## 2022-09-09 PROCEDURE — 74183 MRI ABD W/O CNTR FLWD CNTR: CPT

## 2022-09-09 PROCEDURE — 6360000004 HC RX CONTRAST MEDICATION: Performed by: STUDENT IN AN ORGANIZED HEALTH CARE EDUCATION/TRAINING PROGRAM

## 2022-09-09 PROCEDURE — A9579 GAD-BASE MR CONTRAST NOS,1ML: HCPCS | Performed by: STUDENT IN AN ORGANIZED HEALTH CARE EDUCATION/TRAINING PROGRAM

## 2022-09-09 RX ADMIN — GADOTERIDOL 14 ML: 279.3 INJECTION, SOLUTION INTRAVENOUS at 17:00

## 2022-09-09 NOTE — TELEPHONE ENCOUNTER
Order change    MRI ABDOMEN W CONTRAST       Needs to be with and without            Can call them and give a verbal change  [phone]          Pt is scheduled for 3pm.

## 2022-09-09 NOTE — CARE COORDINATION
appointment scheduled within 7 days of discharge? yes    Advance Care Planning:   Does patient have an Advance Directive: not on file. CTN reviewed discharge instructions, medical action plan and red flags with patient and discussed any barriers to care and/or understanding of plan of care after discharge. Discussed appropriate site of care based on symptoms and resources available to patient including: PCP. The patient agrees to contact the PCP office for questions related to their healthcare. Patients top risk factors for readmission: medical condition-diverticulitis  Interventions to address risk factors: Education of patient/family/caregiver/guardian to support self-management-on monitoring diet, keeping appts      Non-[facility] follow up appointment(s):     CTN provided contact information for future needs. Plan for follow-up call in 7-10 days based on severity of symptoms and risk factors.   Plan for next call: symptom management-  assess overall status, abnormal signs and symptoms, availability and effectiveness of medications, activity level and tolerance, falls/other unexpected events, findings from follow up appointments, seeking medical attention, who/when to call prn any needs, etc.          Myah Quijano RN

## 2022-09-11 ENCOUNTER — HOSPITAL ENCOUNTER (OUTPATIENT)
Age: 52
Discharge: HOME | End: 2022-09-11
Payer: COMMERCIAL

## 2022-09-11 DIAGNOSIS — R19.7 DIARRHEA, UNSPECIFIED TYPE: ICD-10-CM

## 2022-09-11 LAB — C DIFF TOXIN/ANTIGEN: NORMAL

## 2022-09-11 PROCEDURE — 87324 CLOSTRIDIUM AG IA: CPT

## 2022-09-11 PROCEDURE — 87449 NOS EACH ORGANISM AG IA: CPT

## 2022-09-12 ENCOUNTER — TELEPHONE (OUTPATIENT)
Dept: PRIMARY CARE CLINIC | Age: 52
End: 2022-09-12

## 2022-09-12 NOTE — TELEPHONE ENCOUNTER
Pt's BP has been high the past couple of days. 165/84 this morning it was 179/91. Dr. Virgil Meyer instructed the Pt to call if his BP was high.       10 minutes ago: 155/76            [phone]

## 2022-09-16 ENCOUNTER — CARE COORDINATION (OUTPATIENT)
Dept: CASE MANAGEMENT | Age: 52
End: 2022-09-16

## 2022-09-27 ENCOUNTER — OFFICE VISIT (OUTPATIENT)
Dept: PRIMARY CARE CLINIC | Age: 52
End: 2022-09-27
Payer: COMMERCIAL

## 2022-09-27 VITALS
HEART RATE: 60 BPM | WEIGHT: 153 LBS | BODY MASS INDEX: 27.1 KG/M2 | TEMPERATURE: 98.2 F | DIASTOLIC BLOOD PRESSURE: 62 MMHG | SYSTOLIC BLOOD PRESSURE: 144 MMHG | OXYGEN SATURATION: 98 %

## 2022-09-27 DIAGNOSIS — I10 HYPERTENSION, ESSENTIAL: Primary | ICD-10-CM

## 2022-09-27 DIAGNOSIS — G56.91 UNSPECIFIED MONONEUROPATHY OF RIGHT UPPER LIMB: ICD-10-CM

## 2022-09-27 DIAGNOSIS — R35.0 URINARY FREQUENCY: ICD-10-CM

## 2022-09-27 DIAGNOSIS — M50.30 DDD (DEGENERATIVE DISC DISEASE), CERVICAL: ICD-10-CM

## 2022-09-27 DIAGNOSIS — R73.03 PREDIABETES: ICD-10-CM

## 2022-09-27 DIAGNOSIS — N28.1 CYST OF RIGHT KIDNEY: ICD-10-CM

## 2022-09-27 DIAGNOSIS — E78.2 HYPERLIPIDEMIA, MIXED: ICD-10-CM

## 2022-09-27 DIAGNOSIS — K57.20 DIVERTICULITIS OF COLON WITH PERFORATION: ICD-10-CM

## 2022-09-27 DIAGNOSIS — J44.9 CHRONIC OBSTRUCTIVE PULMONARY DISEASE, UNSPECIFIED COPD TYPE (HCC): ICD-10-CM

## 2022-09-27 DIAGNOSIS — Z87.891 PERSONAL HISTORY OF TOBACCO USE: ICD-10-CM

## 2022-09-27 DIAGNOSIS — K21.9 GASTROESOPHAGEAL REFLUX DISEASE WITHOUT ESOPHAGITIS: ICD-10-CM

## 2022-09-27 PROCEDURE — 1036F TOBACCO NON-USER: CPT | Performed by: STUDENT IN AN ORGANIZED HEALTH CARE EDUCATION/TRAINING PROGRAM

## 2022-09-27 PROCEDURE — G8419 CALC BMI OUT NRM PARAM NOF/U: HCPCS | Performed by: STUDENT IN AN ORGANIZED HEALTH CARE EDUCATION/TRAINING PROGRAM

## 2022-09-27 PROCEDURE — 99214 OFFICE O/P EST MOD 30 MIN: CPT | Performed by: STUDENT IN AN ORGANIZED HEALTH CARE EDUCATION/TRAINING PROGRAM

## 2022-09-27 PROCEDURE — 3017F COLORECTAL CA SCREEN DOC REV: CPT | Performed by: STUDENT IN AN ORGANIZED HEALTH CARE EDUCATION/TRAINING PROGRAM

## 2022-09-27 PROCEDURE — G8427 DOCREV CUR MEDS BY ELIG CLIN: HCPCS | Performed by: STUDENT IN AN ORGANIZED HEALTH CARE EDUCATION/TRAINING PROGRAM

## 2022-09-27 PROCEDURE — 3023F SPIROM DOC REV: CPT | Performed by: STUDENT IN AN ORGANIZED HEALTH CARE EDUCATION/TRAINING PROGRAM

## 2022-09-27 RX ORDER — VALSARTAN 160 MG/1
160 TABLET ORAL DAILY
Qty: 30 TABLET | Refills: 5 | Status: ON HOLD | OUTPATIENT
Start: 2022-09-27 | End: 2022-10-21

## 2022-09-27 ASSESSMENT — ENCOUNTER SYMPTOMS
CHEST TIGHTNESS: 0
CONSTIPATION: 0
COUGH: 0
NAUSEA: 0
ANAL BLEEDING: 0
RECTAL PAIN: 0
BLOOD IN STOOL: 0
DIARRHEA: 1
BACK PAIN: 0
VOMITING: 0
ABDOMINAL PAIN: 1
SHORTNESS OF BREATH: 1
EYES NEGATIVE: 1
WHEEZING: 1
STRIDOR: 0

## 2022-09-27 ASSESSMENT — ANXIETY QUESTIONNAIRES
1. FEELING NERVOUS, ANXIOUS, OR ON EDGE: 0
GAD7 TOTAL SCORE: 0
6. BECOMING EASILY ANNOYED OR IRRITABLE: 0-NOT AT ALL
4. TROUBLE RELAXING: 0-NOT AT ALL
7. FEELING AFRAID AS IF SOMETHING AWFUL MIGHT HAPPEN: 0-NOT AT ALL
5. BEING SO RESTLESS THAT IT IS HARD TO SIT STILL: 0-NOT AT ALL
3. WORRYING TOO MUCH ABOUT DIFFERENT THINGS: 0-NOT AT ALL
2. NOT BEING ABLE TO STOP OR CONTROL WORRYING: 0-NOT AT ALL

## 2022-09-27 ASSESSMENT — PATIENT HEALTH QUESTIONNAIRE - PHQ9
2. FEELING DOWN, DEPRESSED OR HOPELESS: 0
9. THOUGHTS THAT YOU WOULD BE BETTER OFF DEAD, OR OF HURTING YOURSELF: 0
SUM OF ALL RESPONSES TO PHQ QUESTIONS 1-9: 3
SUM OF ALL RESPONSES TO PHQ QUESTIONS 1-9: 3
10. IF YOU CHECKED OFF ANY PROBLEMS, HOW DIFFICULT HAVE THESE PROBLEMS MADE IT FOR YOU TO DO YOUR WORK, TAKE CARE OF THINGS AT HOME, OR GET ALONG WITH OTHER PEOPLE: 1
SUM OF ALL RESPONSES TO PHQ QUESTIONS 1-9: 3
SUM OF ALL RESPONSES TO PHQ QUESTIONS 1-9: 3
6. FEELING BAD ABOUT YOURSELF - OR THAT YOU ARE A FAILURE OR HAVE LET YOURSELF OR YOUR FAMILY DOWN: 0
5. POOR APPETITE OR OVEREATING: 0
4. FEELING TIRED OR HAVING LITTLE ENERGY: 3
7. TROUBLE CONCENTRATING ON THINGS, SUCH AS READING THE NEWSPAPER OR WATCHING TELEVISION: 0
8. MOVING OR SPEAKING SO SLOWLY THAT OTHER PEOPLE COULD HAVE NOTICED. OR THE OPPOSITE, BEING SO FIGETY OR RESTLESS THAT YOU HAVE BEEN MOVING AROUND A LOT MORE THAN USUAL: 0
SUM OF ALL RESPONSES TO PHQ9 QUESTIONS 1 & 2: 0
3. TROUBLE FALLING OR STAYING ASLEEP: 0
1. LITTLE INTEREST OR PLEASURE IN DOING THINGS: 0

## 2022-09-27 NOTE — PROGRESS NOTES
[address] and [facility]                                             [address] [facility], [address] [place], [place] [address], [address] [place] [address]        Phone: [phone]      Name:  Gema Rodarte  :    1970    Consultants:   Patient Care Team:  Edward Meyer DO as PCP - General (Family Medicine)  Naldo Gann DO as PCP - [facility]  Zora Vasquez MD as Consulting Physician (Pulmonology)  ANAI Hammer CNP as Consulting Physician (Nurse Practitioner)  Parish Stauffer MD as Consulting Physician (Orthopedic Surgery)  Zora Vasquez MD as Consulting Physician (Pulmonology)    Chief Complaint:     Gema Rodarte is a 45 y.o. male  who presents today for an established patient care visit with Personalized Prevention Plan Services as noted below. HPI:     Patient is a 73-year-old male with medical history of hypertension, hyperlipidemia, GERD, COPD, prediabetes, degenerative disc disease, history of acute diverticulitis, right cystic kidney. Hypertension  Patient states that he called the office 2 weeks ago because his blood pressure continued to trend high around 170/80 on average. Patient was told to increase lisinopril from 5 mg daily to 10 mg daily. Patient states that blood pressure continues to trend around the same level without resolution of symptoms. Patient states that he is currently experiencing increased dry cough, headaches, fatigue (\"run down\"), lightheadedness/dizziness. Patient denies any changes in vision, blurry vision. Patient was previously on amlodipine 5 mg, however discontinue due to symptoms of lightheadedness and dizziness. Patient was switched to lisinopril 5 mg after that time. Recent BMP on 2022 was normal for kidney function. Hyperlipidemia  Patient continues to take atorvastatin 20 mg daily.   Patient denies any myalgia or other associated side effects. GERD  Patient continues to take pantoprazole 40 mg daily. Recent magnesium level was within normal limits. Patient denies any chest pain, heartburn, cough, nausea, vomiting. Prediabetes  Recent A1c was 5.8%. Patient has made multiple dietary lifestyle changes in order to improve this. Pt not currently taking medication. Degenerative disc disease/neuropathy   Patient is currently experiencing headaches located at the base of the skull and the posterior aspect of the head. Patient describes the pain as throbbing and denies any aura. Patient states that he has occasional photophobia along with tension like headache symptoms. Patient states that he has only taken Tylenol p.o. for relief. Patient describes headaches as constant. Patient continues to have residual right arm pain and neuropathy. Patient is status post cervical fusion. Patient states that he trialed physical therapy in the past after surgery however has not had it since which he states was about 4-5 years ago. History of tobacco use  Patient has had approximately 70-pack-year history of smoking however does not currently smoke. Patient is aware of risks and has no intention of starting again. Right cystic kidney  Patient recently completed MRI of the abdomen which showed 2 right simple cysts in the upper lobe of the kidney. Patient denies any flank pain, hematuria, or dysuria. Radiology recommended no further imaging at that point. History of acute diverticulitis  Patient states that he completed colonoscopy and saw GI yesterday. Patient states that he continues to have right lower quadrant left lower quadrant pain secondary to procedure and has not had BM today. Patient continues to have ongoing episodes of diarrhea that are not related to C. difficile. Patient states that GI told him that his colonoscopy looked okay.   Patient states that he was deemed normal and he was cleared with no additional follow-up at this time. Patient recently saw general surgery earlier this month. Patient states that there is no clear follow-up for him there either and was likely cleared. There was no indication for surgical intervention at this time given both visits and patient does not have follow-up visit scheduled yet. Patient had previously been extensively counseled on need to make dietary changes. Urinary frequency  Patient states that recently as he has had increased frequency and increased urgency. Recent PSA was within normal limits. Patient denies any blood in the urine, fever, chills. COPD  Patient states that he continues to be on albuterol and Trelegy and DuoNeb inhalers. Patient states that he continues to have occasional shortness of breath and dry cough today.         Patient Active Problem List   Diagnosis    Hypertension, essential    Gastroesophageal reflux disease without esophagitis    COPD (chronic obstructive pulmonary disease) ([facility])    Prediabetes    Hyperlipidemia, mixed    Esophageal dysphagia    DDD (degenerative disc disease), cervical    COPD exacerbation ([facility])    Acute respiratory failure with hypoxia ([facility])    Marijuana use, episodic    Acute diverticulitis of intestine    Diverticulitis of colon with perforation    Leukocytosis    Cyst of right kidney    Diverticulitis of large intestine with perforation without abscess or bleeding         Past Medical History:    Past Medical History:   Diagnosis Date    Cervical disc herniation     COPD (chronic obstructive pulmonary disease) ([facility] [address]     Diverticulitis     Folate deficiency 01/2013    GERD (gastroesophageal reflux disease)     Hyperlipidemia 02/2014    Hypertriglyceridemia, Good HDL    Hypertension 12/12: age 37    Prediabetes 11/2016    Vitamin D deficiency 01/2013    resolved       Past Surgical History:  Past Surgical History:   Procedure Laterality Date    ANKLE FRACTURE SURGERY Left 1998    BRONCHOSCOPY  12-    CERVICAL FUSION N/A 1/16/2019    ANTERIOR CERVICAL DISCECTOMY AND FUSION WITH ALLOGRAFT, MEDTRONICS AND EVOKES  CPT CODE - 75868 performed by Keane Cheadle, MD at [place] [address]  2/13    IA Deandre Sami [address] DX/THER SBST INTRLMNR CRV/THRC W/IMG GDN Right 11/19/2018    RIGHT CERVICAL SEVEN THORACIC EPIDURAL STEROID INJECTION SITE CONFIRMED BY FLUOROSCOPY performed by George Samano MD at [facility].:  Prior to Visit Medications    Medication Sig Taking?  Authorizing Provider   valsartan (DIOVAN) 160 MG tablet Take 1 tablet by mouth daily Yes Rosana Paulina, DO   pantoprazole (PROTONIX) 40 MG tablet TAKE ONE TABLET BY MOUTH DAILY Yes Rosana Gallardo, DO   atorvastatin (LIPITOR) 20 MG tablet TAKE ONE TABLET BY MOUTH DAILY Yes Sarina Gomez DO   TRELEGY ELLIPTA 100-62.5-25 MCG/INH AEPB INHALE ONE PUFF BY MOUTH DAILY Yes Subha Denis PA-C   ipratropium-albuterol (DUONEB) 0.5-2.5 (3) MG/3ML SOLN nebulizer solution Inhale 3 mLs into the lungs every 4 hours as needed for Shortness of Breath Yes Subha Denis PA-C   folic acid (FOLVITE) 1 MG tablet TAKE ONE TABLET BY MOUTH DAILY Yes Sarina Gomez DO   albuterol sulfate HFA (VENTOLIN HFA) 108 (90 Base) MCG/ACT inhaler Inhale 2 puffs into the lungs 4 times daily as needed for Wheezing  Rosana Gallardo DO       Allergies:    No known allergies    Family History:       Problem Relation Age of Onset    COPD Mother 39    COPD Father 39         Health Maintenance Completed:  Health Maintenance   Topic Date Due    Flu vaccine (1) 09/27/2023 (Originally 8/1/2022)    Shingles vaccine (1 of 2) 08/11/2024 (Originally 1/1/2018)    COVID-19 Vaccine (1) 08/11/2024 (Originally 1970)    Annual Wellness Visit (AWV)  11/30/2022    A1C test (Diabetic or Prediabetic)  08/06/2023    Lipids  08/06/2023    Low dose CT lung screening  08/13/2023    Depression Screen  08/25/2023    DTaP/Tdap/Td vaccine (2 - Td or Tdap) 02/13/2024    Colorectal Cancer Screen 04/03/2028    Pneumococcal 0-64 years Vaccine  Completed    Hepatitis C screen  Completed    HIV screen  Completed    Hepatitis A vaccine  Aged Out    Hepatitis B vaccine  Aged Out    Hib vaccine  Aged Out    Meningococcal (ACWY) vaccine  Aged [employer] History   Administered Date(s) Administered    Influenza Nasal 11/10/2015    Influenza Virus Vaccine 02/13/2014, 11/07/2014    Pneumococcal Polysaccharide ([id number]) 08/10/2015    Tdap (Boostrix, Adacel) 02/13/2014         Review of Systems:  Review of Systems   Constitutional:  Negative for appetite change, chills, fatigue, fever and unexpected weight change. HENT: Negative. Eyes: Negative. Respiratory:  Positive for shortness of breath and wheezing. Negative for cough, chest tightness and stridor. Cardiovascular:  Negative for chest pain, palpitations and leg swelling. Gastrointestinal:  Positive for abdominal pain and diarrhea. Negative for anal bleeding, blood in stool, constipation, nausea, rectal pain and vomiting. Endocrine: Negative. Genitourinary:  Positive for difficulty urinating, dysuria, frequency and urgency. Negative for decreased urine volume, enuresis, flank pain and hematuria. Musculoskeletal:  Positive for arthralgias and neck pain. Negative for back pain, gait problem and myalgias. Skin: Negative. Neurological:  Negative for dizziness, tremors, seizures, syncope, weakness, light-headedness, numbness and headaches. Psychiatric/Behavioral: Negative. Physical Exam:   Vitals:    09/27/22 1228 09/27/22 1236   BP: (!) 156/92 (!) 144/62   Site: Left Upper Arm Left Upper Arm   Position: Sitting Sitting   Cuff Size: Small Adult Small Adult   Pulse: 60    Temp: 98.2 °F (36.8 °C)    TempSrc: Temporal    SpO2: 98%    Weight: 153 lb (69.4 kg)      Body mass index is 27.1 kg/m².     Wt Readings from Last 3 Encounters:   09/27/22 153 lb (69.4 kg)   09/01/22 151 lb (68.5 kg)   09/09/22 151 lb (68.5 kg)       BP Readings from Last 3 Encounters:   09/27/22 (!) 144/62   09/01/22 (!) 142/84   08/25/22 128/72       Physical Exam  Constitutional:       General: He is not in acute distress. Appearance: Normal appearance. He is not ill-appearing or toxic-appearing. HENT:      Head: Normocephalic and atraumatic. Eyes:      Extraocular Movements: Extraocular movements intact. Conjunctiva/sclera: Conjunctivae normal.      Pupils: Pupils are equal, round, and reactive to light. Cardiovascular:      Rate and Rhythm: Normal rate and regular rhythm. Pulses: Normal pulses. Heart sounds: Normal heart sounds. No murmur heard. No friction rub. No gallop. Pulmonary:      Effort: Pulmonary effort is normal. No respiratory distress. Breath sounds: No stridor. Wheezing present. No rhonchi or rales. Abdominal:      General: There is no distension. Palpations: Abdomen is soft. There is no mass. Tenderness: There is abdominal tenderness. There is no right CVA tenderness, left CVA tenderness or guarding. Hernia: No hernia is present. Comments: RLQ and LLQ Pain   Musculoskeletal:         General: No deformity or signs of injury. Normal range of motion. Cervical back: Neck supple. Skin:     General: Skin is warm and dry. Capillary Refill: Capillary refill takes less than 2 seconds. Neurological:      General: No focal deficit present. Mental Status: He is alert. Mental status is at baseline.    Psychiatric:         Mood and Affect: Mood normal.         Behavior: Behavior normal.            Lab Review:   Hospital Outpatient Visit on 09/11/2022   Component Date Value    C.diff Toxin/Antigen 09/11/2022                      Value:Negative for Clostridium difficile antigen and toxin  Normal Range: Negative     Hospital Outpatient Visit on 09/06/2022   Component Date Value    Sodium 09/06/2022 142     Potassium 09/06/2022 4.7     Chloride 09/06/2022 104     CO2 09/06/2022 31 Anion Gap 09/06/2022 7     Glucose 09/06/2022 101 (A)     BUN 09/06/2022 12     Creatinine 09/06/2022 1.0     GFR Non- 09/06/2022 >60     GFR  09/06/2022 >60     Calcium 09/06/2022 10.0    Hospital Outpatient Visit on 08/25/2022   Component Date Value    Sodium 08/25/2022 136     Potassium 08/25/2022 5.3 (A)     Chloride 08/25/2022 100     CO2 08/25/2022 29     Anion Gap 08/25/2022 7     Glucose 08/25/2022 105 (A)     BUN 08/25/2022 12     Creatinine 08/25/2022 0.9     GFR Non- 08/25/2022 >60     GFR  08/25/2022 >60     Calcium 08/25/2022 10.0     Total Protein 08/25/2022 7.5     Albumin 08/25/2022 4.1     Albumin/Globulin Ratio 08/25/2022 1.2     Total Bilirubin 08/25/2022 <0.2     Alkaline Phosphatase 08/25/2022 89     ALT 08/25/2022 21     AST 08/25/2022 24     WBC 08/25/2022 7.7     RBC 08/25/2022 4.58     Hemoglobin 08/25/2022 14.6     Hematocrit 08/25/2022 43.5     MCV 08/25/2022 95.0     MCH 08/25/2022 31.9     MCHC 08/25/2022 33.5     RDW 08/25/2022 13.3     Platelets 52/87/5834 303     MPV 08/25/2022 8.1     Neutrophils % 08/25/2022 65.8     Lymphocytes % 08/25/2022 20.9     Monocytes % 08/25/2022 8.3     Eosinophils % 08/25/2022 4.2     Basophils % 08/25/2022 0.8     Neutrophils Absolute 08/25/2022 5.1     Lymphocytes Absolute 08/25/2022 1.6     Monocytes Absolute 08/25/2022 0.6     Eosinophils Absolute 08/25/2022 0.3     Basophils Absolute 08/25/2022 0.1    Admission on 08/18/2022, Discharged on 08/20/2022   Component Date Value    WBC 08/18/2022 15.3 (A)     RBC 08/18/2022 4.52     Hemoglobin 08/18/2022 14.4     Hematocrit 08/18/2022 42.4     MCV 08/18/2022 93.8     MCH 08/18/2022 31.8     MCHC 08/18/2022 33.9     RDW 08/18/2022 13.1     Platelets 68/56/2381 245     MPV 08/18/2022 7.9     Neutrophils % 08/18/2022 83.1     Lymphocytes % 08/18/2022 7.7     Monocytes % 08/18/2022 8.1     Eosinophils % 08/18/2022 0.5     Basophils % 08/18/2022 0.6     Neutrophils Absolute 08/18/2022 12.7 (A)     Lymphocytes Absolute 08/18/2022 1.2     Monocytes Absolute 08/18/2022 1.2     Eosinophils Absolute 08/18/2022 0.1     Basophils Absolute 08/18/2022 0.1     Sodium 08/18/2022 137     Potassium reflex Magnesi* 08/18/2022 3.6     Chloride 08/18/2022 98 (A)     CO2 08/18/2022 28     Anion Gap 08/18/2022 11     Glucose 08/18/2022 120 (A)     BUN 08/18/2022 11     Creatinine 08/18/2022 1.0     GFR Non- 08/18/2022 >60     GFR  08/18/2022 >60     Calcium 08/18/2022 9.6     Total Protein 08/18/2022 7.7     Albumin 08/18/2022 4.3     Albumin/Globulin Ratio 08/18/2022 1.3     Total Bilirubin 08/18/2022 0.6     Alkaline Phosphatase 08/18/2022 119     ALT 08/18/2022 11     AST 08/18/2022 14 (A)     Lactic Acid 08/18/2022 0.7     Lipase 08/18/2022 14.0     Color, UA 08/18/2022 Yellow     Clarity, UA 08/18/2022 SL CLOUDY (A)     Glucose, Ur 08/18/2022 Negative     Bilirubin Urine 08/18/2022 Negative     Ketones, Urine 08/18/2022 Negative     Specific Gravity, UA 08/18/2022 <=1.005     Blood, Urine 08/18/2022 TRACE-INTACT (A)     pH, UA 08/18/2022 6.5     Protein, UA 08/18/2022 TRACE (A)     Urobilinogen, Urine 08/18/2022 0.2     Nitrite, Urine 08/18/2022 Negative     Leukocyte Esterase, Urine 08/18/2022 Negative     Microscopic Examination 08/18/2022 YES     Urine Type 08/18/2022 NotGiven     Urine Reflex to Culture 08/18/2022 Not Indicated     SARS-CoV-2 RNA, RT PCR 08/18/2022 NOT DETECTED     INFLUENZA A 08/18/2022 NOT DETECTED     INFLUENZA B 08/18/2022 NOT DETECTED     Ventricular Rate 08/18/2022 68     Atrial Rate 08/18/2022 68     P-R Interval 08/18/2022 200     QRS Duration 08/18/2022 96     Q-T Interval 08/18/2022 386     QTc Calculation (Bazett) 08/18/2022 410     P [place] 08/18/2022 66     R [place] 08/18/2022 34     T Axis 08/18/2022 70     Diagnosis 08/18/2022 Normal sinus rhythmIncomplete right bundle branch blockBorderline ECGWhen American 08/19/2022 >60     GFR  08/19/2022 >60     Calcium 08/19/2022 8.8     POC Glucose 08/19/2022 140 (A)     Performed on 08/19/2022 [facility] Outpatient Visit on 08/06/2022   Component Date Value    Cholesterol, Total 08/06/2022 129     Triglycerides 08/06/2022 68     HDL 08/06/2022 60     LDL Calculated 08/06/2022 55     VLDL Cholesterol Calcula* 08/06/2022 14     Sodium 08/06/2022 141     Potassium 08/06/2022 4.4     Chloride 08/06/2022 103     CO2 08/06/2022 28     Anion Gap 08/06/2022 10     Glucose 08/06/2022 84     BUN 08/06/2022 16     Creatinine 08/06/2022 0.9     GFR Non- 08/06/2022 >60     GFR  08/06/2022 >60     Calcium 08/06/2022 8.6     Total Protein 08/06/2022 6.5     Albumin 08/06/2022 4.0     Albumin/Globulin Ratio 08/06/2022 1.6     Total Bilirubin 08/06/2022 0.3     Alkaline Phosphatase 08/06/2022 114     ALT 08/06/2022 19     AST 08/06/2022 19     Magnesium 08/06/2022 1.80     Microalbumin, Random Uri* 08/06/2022 <1.20     Creatinine, Ur 08/06/2022 85.1     Microalbumin Creatinine * 08/06/2022 see below     Hemoglobin A1C 08/06/2022 5.8     eAG 08/06/2022 119.8    Office Visit on 06/16/2022   Component Date Value    Cholesterol, Total 06/16/2022 160     Triglycerides 06/16/2022 115     HDL 06/16/2022 60     LDL Calculated 06/16/2022 77     VLDL Cholesterol Calcula* 06/16/2022 23     Sodium 06/16/2022 141     Potassium 06/16/2022 4.4     Chloride 06/16/2022 101     CO2 06/16/2022 24     Anion Gap 06/16/2022 16     Glucose 06/16/2022 89     BUN 06/16/2022 15     Creatinine 06/16/2022 1.1     GFR Non- 06/16/2022 >60     GFR  06/16/2022 >60     Calcium 06/16/2022 9.9     Total Protein 06/16/2022 7.3     Albumin 06/16/2022 4.7     Albumin/Globulin Ratio 06/16/2022 1.8     Total Bilirubin 06/16/2022 0.4     Alkaline Phosphatase 06/16/2022 101     ALT 06/16/2022 16     AST 06/16/2022 22     PSA 06/16/2022 0.28        Assessment/Plan:  Jose Martinez was seen today for hypertension. Diagnoses and all orders for this visit:    Hypertension, essential  -     Basic Metabolic Panel; Future    Diverticulitis of colon with perforation    Cyst of right kidney    DDD (degenerative disc disease), cervical  -     [facility]    Chronic obstructive pulmonary disease, unspecified COPD type ([facility] [address]    Hyperlipidemia, mixed    Gastroesophageal reflux disease without esophagitis    Prediabetes    Unspecified mononeuropathy of right upper limb     Personal history of tobacco use    Urinary frequency    Other orders  -     valsartan (DIOVAN) 160 MG tablet; Take 1 tablet by mouth daily      Patient is a 28-year-old male with medical history of hypertension, hyperlipidemia, GERD, COPD, prediabetes, degenerative disc disease, history of acute diverticulitis, right cystic kidney. Hypertension  Not controlled, not at goal  Initial in office BP was 156/92 and repeat was 144/62. Given the patient is having side effects of dry cough with lisinopril 5 mg, will change to Valsartan 160 mg given high blood pressure is reaching hypertensive urgency levels. I advised patient on risk and side effects associated with medication. Pt aware to discontinue Lisinopril. I advised patient to call office if blood pressure continues to not be controlled. I advised patient to continue cataloging BP reads and to bring BP log and cuff to next visit. I advised patient to improve diet with low-salt options. Will repeat BMP in 3 weeks, 1 week before next visit to assess kidney function. RTC in 4 weeks. Hyperlipidemia  Well-controlled, at goal  Patient should continue atorvastatin 20 mg daily. Recent lipid panel on 8/6/2022 was normal.  Repeat lipid panel in 3 to 6 months. GERD  Well-controlled, at goal  Patient to continue take pantoprazole 40 mg daily.   RTC in 6 to 12 months or sooner if symptoms worsen. Prediabetes  Well-controlled, at goal  Patient not currently taking any medications and managing with dietary and exercise. Consider ordering repeat A1c in 3 to 6 months. Patient has made significant dietary improvements and I encouraged him to continue doing so. RTC in 3 to 6 months. Degenerative disc disease/neuropathy   Not controlled, not at goal  Patient continues to suffer from upper C-spine and right arm residual pain and neuropathy. Clinical suspicion at this time is his headache is related to previous cervical fusion. DDx of headache includes tension headache, migraine, and HTN. Migraine less likely at this time. Patient does not have a history of migraines or family history. Patient can continue to take Tylenol OTC as needed for headache. Patient told to avoid NSAID usage. Will refer patient to physical therapy today to evaluate and treat C-spine pain, right arm pain, related headaches. RTC in 4 weeks to reevaluate symptoms. History of tobacco use  Well-controlled, at goal  Patient not currently smoking. Patient is aware of risks associated with smoking. RTC in 6 months for follow-up. Right cystic kidney  Well-controlled, at goal  Per radiology recommendation, no further imaging work-up indicated at this time. I advised patient to RTC if patient experiences blood in the urine, flank pain, decreased urination. RTC in 6 months for reevaluation. History of acute diverticulitis  Well-controlled, at goal  Patient tentatively cleared from general surgery and GI. Patient counseled extensively on high-fiber diet, increasing fluid hydration with water, and daily exercise. Patient should continue to make dietary lifestyle changes. Patient advised on the fact that diarrhea and loose stools may persist with increasing fluid hydration. Patient told to monitor and we will revisit at next visit.   If required, patient should continue to follow-up with general surgery and GI.  RTC in 3 to 6 months for reevaluation of symptoms. Urinary frequency  Not controlled, new complaint today  PSA within normal notes on 6/16/2022. Consider DDx of BPH versus prostatitis versus lower urinary tract symptoms. Will continue to monitor the next 4 weeks for symptom improvement. There is no indication to start medical intervention with tamsulosin at this visit. No signs of infection today. RTC in 4 weeks for reevaluation. COPD  Well-controlled, not at goal  Patient should continue triple therapy with inhalers. Continue albuterol, DuoNeb, Trelegy. Patient continues to have productive and dry cough and shortness of breath. RTC in 4 weeks for reevaluation. Health Maintenance Due:  There are no preventive care reminders to display for this patient. Health care decision maker:  <72years old      Health Maintenance: (USPSTF Recommendations)  (M) Prostate Cancer Screen: (54-77 yo discuss benefits/harm, does not recommend testing PSA in men >73 yo (D): Pt not 54  (M) AAA Screen: (men 73-67 yo who has ever smoked (B), consider in nonsmokers if high risk): Pt not 65  CRC/Colonoscopy Screening: (adults 45-49 (B), 50-75 (A)) Scheduled. Lung Ca Screening: Annual LDCT (+smoker age 49-80, smoked within 15 years, total of 20 pack yr history (B)): Completed. DEXA Screen: (women >65 and older, <65 if at risk/postmenopausal (B))  HIV Screen: (16-65 yr old, and all pregnant patients (A)): Completed in 2018. Hep C Screen: (18-79 yr old (B)): Completed in 2018. [facility] [address] Screen: (all pts with cirrhosis and high risk Hep B (US q6 mo)):  Immunizations:    RTC:  Return in about 4 weeks (around 10/25/2022). EMR Dragon/transcription disclaimer:  Much of this encounter note is electronic transcription/translation of spoken language to printed texts. The electronic translation of spoken language may be erroneous, or at times, nonsensical words or phrases may be inadvertently transcribed.   Although I have reviewed the note for such errors, some may still exist.

## 2022-10-05 ENCOUNTER — HOSPITAL ENCOUNTER (EMERGENCY)
Age: 52
Discharge: HOME | End: 2022-10-05
Attending: EMERGENCY MEDICINE
Payer: COMMERCIAL

## 2022-10-05 VITALS
OXYGEN SATURATION: 98 % | DIASTOLIC BLOOD PRESSURE: 74 MMHG | SYSTOLIC BLOOD PRESSURE: 132 MMHG | HEART RATE: 70 BPM | WEIGHT: 160 LBS | BODY MASS INDEX: 27.31 KG/M2 | HEIGHT: 64 IN | RESPIRATION RATE: 18 BRPM | TEMPERATURE: 98.3 F

## 2022-10-05 DIAGNOSIS — H60.501 ACUTE OTITIS EXTERNA OF RIGHT EAR, UNSPECIFIED TYPE: Primary | ICD-10-CM

## 2022-10-05 DIAGNOSIS — L30.9 DERMATITIS: ICD-10-CM

## 2022-10-05 PROCEDURE — 99283 EMERGENCY DEPT VISIT LOW MDM: CPT

## 2022-10-05 RX ORDER — KETOCONAZOLE 20 MG/G
CREAM TOPICAL
Qty: 30 G | Refills: 0 | Status: SHIPPED | OUTPATIENT
Start: 2022-10-05

## 2022-10-05 RX ORDER — ACETAMINOPHEN 325 MG/1
650 TABLET ORAL EVERY 6 HOURS PRN
COMMUNITY

## 2022-10-05 RX ORDER — OFLOXACIN 3 MG/ML
5 SOLUTION AURICULAR (OTIC) 2 TIMES DAILY
Qty: 10 ML | Refills: 0 | Status: SHIPPED | OUTPATIENT
Start: 2022-10-05 | End: 2022-10-15

## 2022-10-05 ASSESSMENT — PAIN DESCRIPTION - ORIENTATION: ORIENTATION: RIGHT

## 2022-10-05 ASSESSMENT — ENCOUNTER SYMPTOMS
WHEEZING: 0
TROUBLE SWALLOWING: 0
VOICE CHANGE: 0
SHORTNESS OF BREATH: 0

## 2022-10-05 ASSESSMENT — PAIN - FUNCTIONAL ASSESSMENT
PAIN_FUNCTIONAL_ASSESSMENT: 0-10
PAIN_FUNCTIONAL_ASSESSMENT: NONE - DENIES PAIN

## 2022-10-05 ASSESSMENT — PAIN DESCRIPTION - DESCRIPTORS: DESCRIPTORS: SHARP

## 2022-10-05 ASSESSMENT — PAIN SCALES - GENERAL: PAINLEVEL_OUTOF10: 4

## 2022-10-05 ASSESSMENT — PAIN DESCRIPTION - LOCATION: LOCATION: EAR

## 2022-10-05 NOTE — ED PROVIDER NOTES
[facility]  eMERGENCY dEPARTMENT eNCOUnter      Pt Name: Meenakshi Nielsen  MRN: [id number]  [place] 1970  Date of evaluation: 10/5/2022  Provider: Alisa Aldrich MD    [address] [facility]       Chief Complaint   Patient presents with    Otalgia     C/o R ear pain     HISTORY OF PRESENT ILLNESS   (Location/Symptom, Timing/Onset, Context/Setting, Quality, Duration, Modifying Factors, Severity)  Note limiting factors. Meenakshi Nielsen is a 45 y.o. male who presents for 6 days of right ear pain as well as 6 days of erythematous painful rash to his right axilla. Patient reports his ear pain is mild to moderate constant and gradually worsening. He reports mild decreased hearing but hearing is still present and not completely absent. Denies any drainage. Denies any trauma. Denies any fever confusion altered mental status. Patient reports irritated red rash to his axilla. Denies any fever or pus drainage or red streaking. Reports putting deodorant on seems to irritate the rash. HPI    Nursing Notes were reviewed. REVIEW OFSYSTEMS    (2-9 systems for level 4, 10 or more for level 5)     Review of Systems   Constitutional:  Negative for fever. HENT:  Positive for ear pain. Negative for drooling, trouble swallowing and voice change. Eyes:  Negative for visual disturbance. Respiratory:  Negative for shortness of breath and wheezing. Cardiovascular:  Negative for chest pain and palpitations. Skin:  Positive for rash. Neurological:  Negative for seizures and syncope. Psychiatric/Behavioral:  Negative for self-injury and suicidal ideas. Except as noted above the remainder of the review of systems was reviewed and negative.        PAST MEDICAL HISTORY     Past Medical History:   Diagnosis Date    Cervical disc herniation     COPD (chronic obstructive pulmonary disease) ([facility] [address]     Diverticulitis     Folate deficiency 01/2013    GERD (gastroesophageal reflux disease) Hyperlipidemia 02/2014    Hypertriglyceridemia, Good HDL    Hypertension 12/12: age 37    Prediabetes 11/2016    Vitamin D deficiency 01/2013    resolved         SURGICAL HISTORY       Past Surgical History:   Procedure Laterality Date    ANKLE FRACTURE SURGERY Left 1998    BRONCHOSCOPY  12-    CERVICAL FUSION N/A 1/16/2019    ANTERIOR CERVICAL DISCECTOMY AND FUSION WITH ALLOGRAFT, MEDTRONICS AND EVOKES  CPT CODE - 00192 performed by Nora Quinteros MD at [address]  2/13    DC Deandre Sami [address] DX/THER SBST INTRLMNR CRV/THRC W/IMG GDN Right 11/19/2018    RIGHT CERVICAL SEVEN THORACIC EPIDURAL STEROID INJECTION SITE CONFIRMED BY FLUOROSCOPY performed by Taz Ornelas MD at [address]       Previous Medications    ACETAMINOPHEN (TYLENOL) 325 MG TABLET    Take 650 mg by mouth every 6 hours as needed for Pain    ALBUTEROL SULFATE HFA (VENTOLIN HFA) 108 (90 BASE) MCG/ACT INHALER    Inhale 2 puffs into the lungs 4 times daily as needed for Wheezing    ATORVASTATIN (LIPITOR) 20 MG TABLET    TAKE ONE TABLET BY MOUTH DAILY    FOLIC ACID (FOLVITE) 1 MG TABLET    TAKE ONE TABLET BY MOUTH DAILY    IPRATROPIUM-ALBUTEROL (DUONEB) 0.5-2.5 (3) MG/3ML SOLN NEBULIZER SOLUTION    Inhale 3 mLs into the lungs every 4 hours as needed for Shortness of Breath    PANTOPRAZOLE (PROTONIX) 40 MG TABLET    TAKE ONE TABLET BY MOUTH DAILY    TRELEGY ELLIPTA 100-62.5-25 MCG/INH AEPB    INHALE ONE PUFF BY MOUTH DAILY    VALSARTAN (DIOVAN) 160 MG TABLET    Take 1 tablet by mouth daily       ALLERGIES     No known allergies    FAMILY HISTORY       Family History   Problem Relation Age of Onset    COPD Mother 39    COPD Father 39          SOCIAL HISTORY       Social History     Socioeconomic History    Marital status: Single     Spouse name: Susan Lopez    Number of children: 1    Years of education: 10th grade    Highest education level: None   Occupational History    Occupation: Remodeling Construction    Tobacco Use    Smoking status: Former     Packs/day: 2.00     Years: 35.00     Pack years: 70.00     Types: Cigarettes     Start date: 1984     Quit date: 1/1/2019     Years since quitting: 3.7    Smokeless tobacco: Never   Vaping Use    Vaping Use: Never used   Substance and Sexual Activity    Alcohol use: Not Currently     Alcohol/week: 1.0 standard drink     Types: 1 Cans of beer per week     Comment: occasional    Drug use: Yes     Types: Marijuana Daril Danie)     Comment: 1 joint per week per patient    Sexual activity: Yes     Partners: Female   Social History Narrative    \"wife\" (longterm  Marci Jiménez)     Social Determinants of Health     Financial Resource Strain: Medium Risk    Difficulty of Paying Living Expenses: Somewhat hard   Food Insecurity: No Food Insecurity    Worried About Running Out of Food in the Last Year: Never true    Ran Out of Food in the Last Year: Never true   Transportation Needs: No Transportation Needs    Lack of Transportation (Medical): No    Lack of Transportation (Non-Medical): No         PHYSICAL EXAM    (up to 7 for level 4, 8 or more for level 5)     ED Triage Vitals [10/05/22 1109]   BP Temp Temp Source Heart Rate Resp SpO2 Height Weight   128/78 98 °F (36.7 °C) Oral 62 18 98 % 5' 4\" (1.626 m) 160 lb (72.6 kg)       Physical Exam  Vitals and nursing note reviewed. Constitutional:       General: He is not in acute distress. Appearance: He is well-developed. Comments: Pleasant and cooperative. Nontoxic-appearing. In no acute distress. HENT:      Head: Normocephalic and atraumatic. Left Ear: Tympanic membrane, ear canal and external ear normal.      Ears:      Comments: Right external ear canal with mild erythema and edema. TM able to be visualized with no signs of perforation  Eyes:      Conjunctiva/sclera: Conjunctivae normal.   Neck:      Vascular: No JVD. Trachea: No tracheal deviation.    Cardiovascular:      Rate and Rhythm: Normal rate. Pulmonary:      Effort: Pulmonary effort is normal. No respiratory distress. Skin:     General: Skin is warm and dry. Findings: Rash present. Comments: Mild erythema to right axilla consistent with dermatitis. No beefy red induration. No purulent drainage. No red streaking. Neurological:      General: No focal deficit present. Mental Status: He is alert and oriented to person, place, and time. Comments: 5/5 strength in all 4 extremities. Normal gait       EMERGENCY DEPARTMENT COURSE and DIFFERENTIAL DIAGNOSIS/MDM:   Vitals:    Vitals:    10/05/22 1109   BP: 128/78   Pulse: 62   Resp: 18   Temp: 98 °F (36.7 °C)   TempSrc: Oral   SpO2: 98%   Weight: 160 lb (72.6 kg)   Height: 5' 4\" (1.626 m)       MDM  All vital signs within normal limits. Patient very well-appearing, nontoxic-appearing, alert and oriented. Physical exam is consistent with acute otitis externa of the right ear as well as dermatitis of the right axilla possibly from irritant dermatitis or possibly fungal infection. No evidence of bacterial cellulitis or abscess. Feel patient is appropriate for trial of topical otic antibiotics, dual topical ointments of corticosteroid and antifungals to right axilla, refrain from using deodorant, and close primary care follow-up. Strict ER return precautions given for new or worsening symptoms. Patient expresses understanding and agreement with this plan and is discharged home. Procedures    FINAL IMPRESSION      1. Acute otitis externa of right ear, unspecified type    2. Dermatitis          DISPOSITION/PLAN   DISPOSITION Decision To Discharge 10/05/2022 11:25:42 AM      PATIENT REFERRED TO:  Car Andrea DO  [address]  [address] [place] [id number]  [phone]    In 1 week      [facility].  [facility]  [address]  [phone]    If symptoms worsen    MEDICATIONS:  New Prescriptions    HYDROCORTISONE 2.5 % CREAM    Apply topically 2 times daily. KETOCONAZOLE (NIZORAL) 2 % CREAM    Apply topically daily. OFLOXACIN (FLOXIN) 0.3 % OTIC SOLUTION    Place 5 drops into the right ear 2 times daily for 10 days          (Please note that portions of this note were completed with a voice recognition program.  Efforts were made to edit the dictations but occasionally words aremis-transcribed. )    Marcelo Mclaughlin MD (electronically signed)  Attending Emergency Physician           Marcelo Mclaughlin MD  10/05/22 [phone]

## 2022-10-06 ENCOUNTER — CARE COORDINATION (OUTPATIENT)
Dept: CARE COORDINATION | Age: 52
End: 2022-10-06

## 2022-10-07 ENCOUNTER — CARE COORDINATION (OUTPATIENT)
Dept: CARE COORDINATION | Age: 52
End: 2022-10-07

## 2022-10-07 NOTE — CARE COORDINATION
Ambulatory Care Coordination Note  10/7/2022    ACC: Tristan Jimenez RN    Returning call. Reports went to ER for rt ear pain which is still bothersome. Using drops as directed. Plans to call the office for an ER fu appt to make sure his ear clears up. Denies any sob at this time. /74 last visit. Offered patient enrollment in the Remote Patient Monitoring (RPM) program for in-home monitoring: NA  PLAN  1) fu appts  2) review sob  3) review sats  4) review meds    COPD Assessment    Does the patient understand envrionmental exposure?: Yes  Is the patient able to verbalize Rescue vs. Long Acting medications?: Yes  Does the patient have a nebulizer?: Yes  Does the patient use a space with inhaled medications?: No     No patient-reported symptoms         Symptoms:  None: Yes                 Ambulatory Care Coordination Assessment    Care Coordination Protocol  Referral from Primary Care Provider: No  Week 1 - Initial Assessment     Do you have all of your prescriptions and are they filled?: Yes (Comment: 10/7/22-reviewed all and no changes)  Barriers to medication adherence: None  Are you able to afford your medications?: Yes  How often do you have trouble taking your medications the way you have been told to take them?: I always take them as prescribed. Do you have Home O2 Therapy?: No      Ability to seek help/take action for Emergent Urgent situations i.e. fire, crime, inclement weather or health crisis. : Independent  Ability to ambulate to restroom: Independent  Ability handle personal hygeine needs (bathing/dressing/grooming): Independent  Ability to manage Medications: Independent  Ability to prepare Food Preparation: Independent  Ability to maintain home (clean home, laundry): Independent  Ability to drive and/or has transportation: Independent  Ability to do shopping: Independent  Ability to manage finances:  Independent  Is patient able to live independently?: Yes     Current Housing: Private Residence        Per the Fall Risk Screening, did the patient have 2 or more falls or 1 fall with injury in the past year?: No     Frequent urination at night?: No  Do you use rails/bars?: No  Do you have a non-slip tub mat?: Yes     Are you experiencing loss of meaning?: No  Are you experiencing loss of hope and peace?: No     Thinking about your patient's physical health needs, are there any symptoms or problems (risk indicators) you are unsure about that require further investigation?: No identified areas of uncertainly or problems already being investigated   Are the patients physical health problems impacting on their mental well-being?: No identified areas of concern   Are there any problems with your patients lifestyle behaviors (alcohol, drugs, diet, exercise) that are impacting on physical or mental well-being?: No identified areas of concern   Do you have any other concerns about your patients mental well-being?  How would you rate their severity and impact on the patient?: No identified areas of concern   How would you rate their home environment in terms of safety and stability (including domestic violence, insecure housing, neighbor harassment)?: Consistently safe, supportive, stable, no identified problems   How do daily activities impact on the patient's well-being? (include current or anticipated unemployment, work, caregiving, access to transportation or other): No identified problems or perceived positive benefits   How would you rate their social network (family, work, friends)?: Good participation with social networks   How would you rate their financial resources (including ability to afford all required medical care)?: Financially secure, resources adequate, no identified problems   How wells does the patient now understand their health and well-being (symptoms, signs or risk factors) and what they need to do to manage their health?: Reasonable to good understanding and already engages in managing health or is willing to undertake better management   How well do you think your patient can engage in healthcare discussions? (Barriers include language, deafness, aphasia, alcohol or drug problems, learning difficulties, concentration): Clear and open communication, no identified barriers   Do other services need to be involved to help this patient?: Other care/services not required at this time   Are current services involved with this patient well-coordinated? (Include coordination with other services you are now recommendation): All required care/services in place and well-coordinated   Suggested Interventions and Community Resources   Disease Assocation: In Process   Registered Dietician: Not Started   Zone Management Tools: In Process                  Prior to Admission medications    Medication Sig Start Date End Date Taking? Authorizing Provider   acetaminophen (TYLENOL) 325 MG tablet Take 650 mg by mouth every 6 hours as needed for Pain    Historical Provider, MD   ofloxacin (FLOXIN) 0.3 % otic solution Place 5 drops into the right ear 2 times daily for 10 days 10/5/22 10/15/22  Alisa Aldrich MD   hydrocortisone 2.5 % cream Apply topically 2 times daily. 10/5/22   Alisa Aldrich MD   ketoconazole (NIZORAL) 2 % cream Apply topically daily.  10/5/22   Alisa Aldrich MD   valsartan (DIOVAN) 160 MG tablet Take 1 tablet by mouth daily 9/27/22   Lena Chaidez DO   pantoprazole (PROTONIX) 40 MG tablet TAKE ONE TABLET BY MOUTH DAILY 8/16/22   Lena Chaidez DO   albuterol sulfate HFA (VENTOLIN HFA) 108 (90 Base) MCG/ACT inhaler Inhale 2 puffs into the lungs 4 times daily as needed for Wheezing 8/4/22   Lnea Chaidez DO   atorvastatin (LIPITOR) 20 MG tablet TAKE ONE TABLET BY MOUTH DAILY 6/30/22   Sarina Gomez DO   TRELEGY ELLIPTA 100-62.5-25 MCG/INH AEPB INHALE ONE PUFF BY MOUTH DAILY 6/7/22   Luigi Corcoran PA-C   ipratropium-albuterol (DUONEB) 0.5-2.5 (3) MG/3ML SOLN nebulizer solution Inhale 3 mLs into the lungs every 4 hours as needed for Shortness of Breath 11/16/21   [facility], YOSELIN   folic acid (FOLVITE) 1 MG tablet TAKE ONE TABLET BY MOUTH DAILY 2/16/21   Sarina Duran,        Future Appointments   Date Time Provider Simone Marcelo   10/25/2022  4:15 PM Sherrileindiana Ho, PT [facility] PT [facility]   10/28/2022  2:30 PM Lefty Carvajal,  [facility]   11/3/2022  4:15 PM Sherrilee Vic, PT [facility] PT [facility]   11/8/2022  4:15 PM Sherrilee Vic, PT [facility] PT [facility]   11/10/2022  4:15 PM Sherrilee Bolls, PT [facility] PT [facility]   11/15/2022  4:15 PM Sherrilee Vic, PT [facility]   11/17/2022  4:15 PM Sherrilee Vic, PT [facility]   11/22/2022  4:15 PM Sherrilee Bolls, PT [facility]   11/29/2022  4:15 PM Sherrilee Bolpaco, PT [facility]   12/1/2022  4:15 PM Sherrilee Bolpaco, PT [facility] PT [facility]   2/22/2023  9:30 AM Simon Rios MD [facility]

## 2022-10-14 ENCOUNTER — CARE COORDINATION (OUTPATIENT)
Dept: CARE COORDINATION | Age: 52
End: 2022-10-14

## 2022-10-21 ENCOUNTER — CARE COORDINATION (OUTPATIENT)
Dept: CARE COORDINATION | Age: 52
End: 2022-10-21

## 2022-10-21 ENCOUNTER — HOSPITAL ENCOUNTER (OUTPATIENT)
Age: 52
Discharge: HOME | DRG: 392 | End: 2022-10-21
Payer: COMMERCIAL

## 2022-10-21 ENCOUNTER — APPOINTMENT (OUTPATIENT)
Dept: CT IMAGING | Age: 52
DRG: 392 | End: 2022-10-21
Payer: COMMERCIAL

## 2022-10-21 ENCOUNTER — HOSPITAL ENCOUNTER (INPATIENT)
Age: 52
LOS: 2 days | Discharge: HOME | DRG: 392 | End: 2022-10-23
Attending: STUDENT IN AN ORGANIZED HEALTH CARE EDUCATION/TRAINING PROGRAM | Admitting: INTERNAL MEDICINE
Payer: COMMERCIAL

## 2022-10-21 DIAGNOSIS — K57.92 DIVERTICULITIS: ICD-10-CM

## 2022-10-21 DIAGNOSIS — I10 HYPERTENSION, ESSENTIAL: ICD-10-CM

## 2022-10-21 DIAGNOSIS — K57.92 ACUTE DIVERTICULITIS OF INTESTINE: Primary | ICD-10-CM

## 2022-10-21 PROBLEM — R10.32 LEFT LOWER QUADRANT ABDOMINAL PAIN: Status: ACTIVE | Noted: 2022-10-21

## 2022-10-21 LAB
A/G RATIO: 1.5 (ref 1.1–2.2)
ALBUMIN SERPL-MCNC: 4.4 G/DL (ref 3.4–5)
ALP BLD-CCNC: 100 U/L (ref 40–129)
ALT SERPL-CCNC: 20 U/L (ref 10–40)
ANION GAP SERPL CALCULATED.3IONS-SCNC: 5 MMOL/L (ref 3–16)
ANION GAP SERPL CALCULATED.3IONS-SCNC: 9 MMOL/L (ref 3–16)
AST SERPL-CCNC: 19 U/L (ref 15–37)
BASOPHILS ABSOLUTE: 0.1 K/UL (ref 0–0.2)
BASOPHILS RELATIVE PERCENT: 0.6 %
BILIRUB SERPL-MCNC: 0.3 MG/DL (ref 0–1)
BILIRUBIN URINE: NEGATIVE
BLOOD, URINE: NEGATIVE
BUN BLDV-MCNC: 12 MG/DL (ref 7–20)
BUN BLDV-MCNC: 13 MG/DL (ref 7–20)
CALCIUM SERPL-MCNC: 9.2 MG/DL (ref 8.3–10.6)
CALCIUM SERPL-MCNC: 9.5 MG/DL (ref 8.3–10.6)
CHLORIDE BLD-SCNC: 98 MMOL/L (ref 99–110)
CHLORIDE BLD-SCNC: 99 MMOL/L (ref 99–110)
CLARITY: CLEAR
CO2: 31 MMOL/L (ref 21–32)
CO2: 32 MMOL/L (ref 21–32)
COLOR: YELLOW
CREAT SERPL-MCNC: 1.1 MG/DL (ref 0.9–1.3)
CREAT SERPL-MCNC: 1.2 MG/DL (ref 0.9–1.3)
EOSINOPHILS ABSOLUTE: 0.2 K/UL (ref 0–0.6)
EOSINOPHILS RELATIVE PERCENT: 2.2 %
GFR SERPL CREATININE-BSD FRML MDRD: >60 ML/MIN/{1.73_M2}
GFR SERPL CREATININE-BSD FRML MDRD: >60 ML/MIN/{1.73_M2}
GLUCOSE BLD-MCNC: 101 MG/DL (ref 70–99)
GLUCOSE BLD-MCNC: 116 MG/DL (ref 70–99)
GLUCOSE BLD-MCNC: 82 MG/DL (ref 70–99)
GLUCOSE BLD-MCNC: 96 MG/DL (ref 70–99)
GLUCOSE URINE: NEGATIVE MG/DL
HCT VFR BLD CALC: 44.8 % (ref 40.5–52.5)
HEMOGLOBIN: 14.5 G/DL (ref 13.5–17.5)
KETONES, URINE: NEGATIVE MG/DL
LACTIC ACID, SEPSIS: 0.8 MMOL/L (ref 0.4–1.9)
LEUKOCYTE ESTERASE, URINE: ABNORMAL
LIPASE: 28 U/L (ref 13–60)
LYMPHOCYTES ABSOLUTE: 1.3 K/UL (ref 1–5.1)
LYMPHOCYTES RELATIVE PERCENT: 13.6 %
MCH RBC QN AUTO: 30.9 PG (ref 26–34)
MCHC RBC AUTO-ENTMCNC: 32.4 G/DL (ref 31–36)
MCV RBC AUTO: 95.4 FL (ref 80–100)
MICROSCOPIC EXAMINATION: YES
MONOCYTES ABSOLUTE: 0.6 K/UL (ref 0–1.3)
MONOCYTES RELATIVE PERCENT: 6.7 %
NEUTROPHILS ABSOLUTE: 7.3 K/UL (ref 1.7–7.7)
NEUTROPHILS RELATIVE PERCENT: 76.9 %
NITRITE, URINE: NEGATIVE
PDW BLD-RTO: 14.6 % (ref 12.4–15.4)
PERFORMED ON: ABNORMAL
PERFORMED ON: NORMAL
PH UA: 7.5 (ref 5–8)
PLATELET # BLD: 253 K/UL (ref 135–450)
PMV BLD AUTO: 7.7 FL (ref 5–10.5)
POTASSIUM REFLEX MAGNESIUM: 4 MMOL/L (ref 3.5–5.1)
POTASSIUM SERPL-SCNC: 4.4 MMOL/L (ref 3.5–5.1)
PROTEIN UA: NEGATIVE MG/DL
RBC # BLD: 4.7 M/UL (ref 4.2–5.9)
RBC UA: ABNORMAL /HPF (ref 0–4)
SARS-COV-2, NAAT: NOT DETECTED
SODIUM BLD-SCNC: 136 MMOL/L (ref 136–145)
SODIUM BLD-SCNC: 138 MMOL/L (ref 136–145)
SPECIFIC GRAVITY UA: 1.01 (ref 1–1.03)
TOTAL PROTEIN: 7.3 G/DL (ref 6.4–8.2)
URINE TYPE: ABNORMAL
UROBILINOGEN, URINE: 0.2 E.U./DL
WBC # BLD: 9.5 K/UL (ref 4–11)
WBC UA: ABNORMAL /HPF (ref 0–5)

## 2022-10-21 PROCEDURE — 81001 URINALYSIS AUTO W/SCOPE: CPT

## 2022-10-21 PROCEDURE — 6370000000 HC RX 637 (ALT 250 FOR IP): Performed by: PHYSICIAN ASSISTANT

## 2022-10-21 PROCEDURE — 6370000000 HC RX 637 (ALT 250 FOR IP)

## 2022-10-21 PROCEDURE — 96376 TX/PRO/DX INJ SAME DRUG ADON: CPT

## 2022-10-21 PROCEDURE — 6370000000 HC RX 637 (ALT 250 FOR IP): Performed by: NURSE PRACTITIONER

## 2022-10-21 PROCEDURE — 2580000003 HC RX 258

## 2022-10-21 PROCEDURE — 80053 COMPREHEN METABOLIC PANEL: CPT

## 2022-10-21 PROCEDURE — G0378 HOSPITAL OBSERVATION PER HR: HCPCS

## 2022-10-21 PROCEDURE — 2580000003 HC RX 258: Performed by: STUDENT IN AN ORGANIZED HEALTH CARE EDUCATION/TRAINING PROGRAM

## 2022-10-21 PROCEDURE — 96372 THER/PROPH/DIAG INJ SC/IM: CPT

## 2022-10-21 PROCEDURE — 6360000004 HC RX CONTRAST MEDICATION: Performed by: STUDENT IN AN ORGANIZED HEALTH CARE EDUCATION/TRAINING PROGRAM

## 2022-10-21 PROCEDURE — 99285 EMERGENCY DEPT VISIT HI MDM: CPT

## 2022-10-21 PROCEDURE — 80048 BASIC METABOLIC PNL TOTAL CA: CPT

## 2022-10-21 PROCEDURE — 6360000002 HC RX W HCPCS

## 2022-10-21 PROCEDURE — 74177 CT ABD & PELVIS W/CONTRAST: CPT

## 2022-10-21 PROCEDURE — 96375 TX/PRO/DX INJ NEW DRUG ADDON: CPT

## 2022-10-21 PROCEDURE — 94640 AIRWAY INHALATION TREATMENT: CPT

## 2022-10-21 PROCEDURE — 36415 COLL VENOUS BLD VENIPUNCTURE: CPT

## 2022-10-21 PROCEDURE — 83690 ASSAY OF LIPASE: CPT

## 2022-10-21 PROCEDURE — 96374 THER/PROPH/DIAG INJ IV PUSH: CPT

## 2022-10-21 PROCEDURE — 6370000000 HC RX 637 (ALT 250 FOR IP): Performed by: INTERNAL MEDICINE

## 2022-10-21 PROCEDURE — 85025 COMPLETE CBC W/AUTO DIFF WBC: CPT

## 2022-10-21 PROCEDURE — 83605 ASSAY OF LACTIC ACID: CPT

## 2022-10-21 PROCEDURE — 99221 1ST HOSP IP/OBS SF/LOW 40: CPT | Performed by: SURGERY

## 2022-10-21 PROCEDURE — 1200000000 HC SEMI PRIVATE

## 2022-10-21 PROCEDURE — 94761 N-INVAS EAR/PLS OXIMETRY MLT: CPT

## 2022-10-21 PROCEDURE — 6360000002 HC RX W HCPCS: Performed by: STUDENT IN AN ORGANIZED HEALTH CARE EDUCATION/TRAINING PROGRAM

## 2022-10-21 PROCEDURE — 87635 SARS-COV-2 COVID-19 AMP PRB: CPT

## 2022-10-21 PROCEDURE — 99221 1ST HOSP IP/OBS SF/LOW 40: CPT | Performed by: NURSE PRACTITIONER

## 2022-10-21 RX ORDER — ATORVASTATIN CALCIUM 10 MG/1
20 TABLET, FILM COATED ORAL DAILY
Status: DISCONTINUED | OUTPATIENT
Start: 2022-10-21 | End: 2022-10-23 | Stop reason: HOSPADM

## 2022-10-21 RX ORDER — SODIUM CHLORIDE 9 MG/ML
INJECTION, SOLUTION INTRAVENOUS PRN
Status: DISCONTINUED | OUTPATIENT
Start: 2022-10-21 | End: 2022-10-23 | Stop reason: HOSPADM

## 2022-10-21 RX ORDER — ALBUTEROL SULFATE 90 UG/1
2 AEROSOL, METERED RESPIRATORY (INHALATION) EVERY 6 HOURS PRN
Status: DISCONTINUED | OUTPATIENT
Start: 2022-10-21 | End: 2022-10-23 | Stop reason: HOSPADM

## 2022-10-21 RX ORDER — SACCHAROMYCES BOULARDII 250 MG
250 CAPSULE ORAL 2 TIMES DAILY
Status: DISCONTINUED | OUTPATIENT
Start: 2022-10-21 | End: 2022-10-23 | Stop reason: HOSPADM

## 2022-10-21 RX ORDER — ACETAMINOPHEN 325 MG/1
650 TABLET ORAL EVERY 6 HOURS PRN
Status: DISCONTINUED | OUTPATIENT
Start: 2022-10-21 | End: 2022-10-23 | Stop reason: HOSPADM

## 2022-10-21 RX ORDER — FOLIC ACID 1 MG/1
1000 TABLET ORAL DAILY
Status: DISCONTINUED | OUTPATIENT
Start: 2022-10-21 | End: 2022-10-23 | Stop reason: HOSPADM

## 2022-10-21 RX ORDER — MORPHINE SULFATE 4 MG/ML
4 INJECTION, SOLUTION INTRAMUSCULAR; INTRAVENOUS ONCE
Status: COMPLETED | OUTPATIENT
Start: 2022-10-21 | End: 2022-10-21

## 2022-10-21 RX ORDER — MORPHINE SULFATE 2 MG/ML
2 INJECTION, SOLUTION INTRAMUSCULAR; INTRAVENOUS ONCE
Status: COMPLETED | OUTPATIENT
Start: 2022-10-21 | End: 2022-10-21

## 2022-10-21 RX ORDER — SODIUM CHLORIDE 9 MG/ML
INJECTION, SOLUTION INTRAVENOUS CONTINUOUS
Status: DISCONTINUED | OUTPATIENT
Start: 2022-10-21 | End: 2022-10-23 | Stop reason: HOSPADM

## 2022-10-21 RX ORDER — MORPHINE SULFATE 2 MG/ML
2 INJECTION, SOLUTION INTRAMUSCULAR; INTRAVENOUS
Status: DISCONTINUED | OUTPATIENT
Start: 2022-10-21 | End: 2022-10-23

## 2022-10-21 RX ORDER — BUDESONIDE AND FORMOTEROL FUMARATE DIHYDRATE 160; 4.5 UG/1; UG/1
2 AEROSOL RESPIRATORY (INHALATION) 2 TIMES DAILY
Status: DISCONTINUED | OUTPATIENT
Start: 2022-10-21 | End: 2022-10-23 | Stop reason: HOSPADM

## 2022-10-21 RX ORDER — ONDANSETRON 2 MG/ML
4 INJECTION INTRAMUSCULAR; INTRAVENOUS EVERY 6 HOURS PRN
Status: DISCONTINUED | OUTPATIENT
Start: 2022-10-21 | End: 2022-10-21

## 2022-10-21 RX ORDER — MORPHINE SULFATE 4 MG/ML
4 INJECTION, SOLUTION INTRAMUSCULAR; INTRAVENOUS
Status: DISCONTINUED | OUTPATIENT
Start: 2022-10-21 | End: 2022-10-23

## 2022-10-21 RX ORDER — MAGNESIUM SULFATE 1 G/100ML
1000 INJECTION INTRAVENOUS PRN
Status: DISCONTINUED | OUTPATIENT
Start: 2022-10-21 | End: 2022-10-23 | Stop reason: HOSPADM

## 2022-10-21 RX ORDER — ACETAMINOPHEN 650 MG/1
650 SUPPOSITORY RECTAL EVERY 6 HOURS PRN
Status: DISCONTINUED | OUTPATIENT
Start: 2022-10-21 | End: 2022-10-23 | Stop reason: HOSPADM

## 2022-10-21 RX ORDER — PANTOPRAZOLE SODIUM 40 MG/1
40 TABLET, DELAYED RELEASE ORAL DAILY
Status: DISCONTINUED | OUTPATIENT
Start: 2022-10-21 | End: 2022-10-23 | Stop reason: HOSPADM

## 2022-10-21 RX ORDER — SODIUM CHLORIDE 0.9 % (FLUSH) 0.9 %
10 SYRINGE (ML) INJECTION PRN
Status: DISCONTINUED | OUTPATIENT
Start: 2022-10-21 | End: 2022-10-23 | Stop reason: HOSPADM

## 2022-10-21 RX ORDER — IPRATROPIUM BROMIDE AND ALBUTEROL SULFATE 2.5; .5 MG/3ML; MG/3ML
1 SOLUTION RESPIRATORY (INHALATION) 2 TIMES DAILY
Status: DISCONTINUED | OUTPATIENT
Start: 2022-10-21 | End: 2022-10-23 | Stop reason: HOSPADM

## 2022-10-21 RX ORDER — ENOXAPARIN SODIUM 100 MG/ML
40 INJECTION SUBCUTANEOUS DAILY
Status: DISCONTINUED | OUTPATIENT
Start: 2022-10-21 | End: 2022-10-23 | Stop reason: HOSPADM

## 2022-10-21 RX ORDER — HYDRALAZINE HYDROCHLORIDE 20 MG/ML
10 INJECTION INTRAMUSCULAR; INTRAVENOUS EVERY 6 HOURS PRN
Status: DISCONTINUED | OUTPATIENT
Start: 2022-10-21 | End: 2022-10-23 | Stop reason: HOSPADM

## 2022-10-21 RX ORDER — AMLODIPINE BESYLATE 5 MG/1
5 TABLET ORAL DAILY
Status: DISCONTINUED | OUTPATIENT
Start: 2022-10-21 | End: 2022-10-23 | Stop reason: HOSPADM

## 2022-10-21 RX ORDER — ONDANSETRON 4 MG/1
4 TABLET, ORALLY DISINTEGRATING ORAL EVERY 8 HOURS PRN
Status: DISCONTINUED | OUTPATIENT
Start: 2022-10-21 | End: 2022-10-23 | Stop reason: HOSPADM

## 2022-10-21 RX ORDER — POLYETHYLENE GLYCOL 3350 17 G/17G
17 POWDER, FOR SOLUTION ORAL DAILY PRN
Status: DISCONTINUED | OUTPATIENT
Start: 2022-10-21 | End: 2022-10-23 | Stop reason: HOSPADM

## 2022-10-21 RX ORDER — SODIUM CHLORIDE 0.9 % (FLUSH) 0.9 %
5-40 SYRINGE (ML) INJECTION EVERY 12 HOURS SCHEDULED
Status: DISCONTINUED | OUTPATIENT
Start: 2022-10-21 | End: 2022-10-23 | Stop reason: HOSPADM

## 2022-10-21 RX ORDER — VALSARTAN 80 MG/1
160 TABLET ORAL DAILY
Status: DISCONTINUED | OUTPATIENT
Start: 2022-10-21 | End: 2022-10-21

## 2022-10-21 RX ORDER — POTASSIUM CHLORIDE 7.45 MG/ML
10 INJECTION INTRAVENOUS PRN
Status: DISCONTINUED | OUTPATIENT
Start: 2022-10-21 | End: 2022-10-23 | Stop reason: HOSPADM

## 2022-10-21 RX ORDER — POTASSIUM CHLORIDE 20 MEQ/1
40 TABLET, EXTENDED RELEASE ORAL PRN
Status: DISCONTINUED | OUTPATIENT
Start: 2022-10-21 | End: 2022-10-23 | Stop reason: HOSPADM

## 2022-10-21 RX ORDER — ONDANSETRON 2 MG/ML
4 INJECTION INTRAMUSCULAR; INTRAVENOUS EVERY 6 HOURS PRN
Status: DISCONTINUED | OUTPATIENT
Start: 2022-10-21 | End: 2022-10-23 | Stop reason: HOSPADM

## 2022-10-21 RX ORDER — IPRATROPIUM BROMIDE AND ALBUTEROL SULFATE 2.5; .5 MG/3ML; MG/3ML
1 SOLUTION RESPIRATORY (INHALATION) EVERY 4 HOURS PRN
Status: DISCONTINUED | OUTPATIENT
Start: 2022-10-21 | End: 2022-10-21

## 2022-10-21 RX ADMIN — RDII 250 MG CAPSULE 250 MG: at 21:17

## 2022-10-21 RX ADMIN — ENOXAPARIN SODIUM 40 MG: 100 INJECTION SUBCUTANEOUS at 16:20

## 2022-10-21 RX ADMIN — IOPAMIDOL 75 ML: 755 INJECTION, SOLUTION INTRAVENOUS at 12:18

## 2022-10-21 RX ADMIN — IPRATROPIUM BROMIDE AND ALBUTEROL SULFATE 1 AMPULE: 2.5; .5 SOLUTION RESPIRATORY (INHALATION) at 18:56

## 2022-10-21 RX ADMIN — Medication 2 PUFF: at 18:56

## 2022-10-21 RX ADMIN — AMLODIPINE BESYLATE 5 MG: 5 TABLET ORAL at 16:20

## 2022-10-21 RX ADMIN — MORPHINE SULFATE 4 MG: 4 INJECTION, SOLUTION INTRAMUSCULAR; INTRAVENOUS at 11:56

## 2022-10-21 RX ADMIN — MORPHINE SULFATE 2 MG: 2 INJECTION, SOLUTION INTRAMUSCULAR; INTRAVENOUS at 13:05

## 2022-10-21 RX ADMIN — SODIUM CHLORIDE: 9 INJECTION, SOLUTION INTRAVENOUS at 16:23

## 2022-10-21 RX ADMIN — PIPERACILLIN AND TAZOBACTAM 3375 MG: 3; .375 INJECTION, POWDER, FOR SOLUTION INTRAVENOUS at 14:18

## 2022-10-21 RX ADMIN — PIPERACILLIN AND TAZOBACTAM 3375 MG: 3; .375 INJECTION, POWDER, FOR SOLUTION INTRAVENOUS at 21:19

## 2022-10-21 RX ADMIN — ONDANSETRON HYDROCHLORIDE 4 MG: 2 INJECTION, SOLUTION INTRAMUSCULAR; INTRAVENOUS at 11:56

## 2022-10-21 RX ADMIN — ACETAMINOPHEN 650 MG: 325 TABLET ORAL at 21:23

## 2022-10-21 RX ADMIN — TIOTROPIUM BROMIDE INHALATION SPRAY 2 PUFF: 3.12 SPRAY, METERED RESPIRATORY (INHALATION) at 16:03

## 2022-10-21 ASSESSMENT — PAIN SCALES - GENERAL
PAINLEVEL_OUTOF10: 5
PAINLEVEL_OUTOF10: 7
PAINLEVEL_OUTOF10: 8
PAINLEVEL_OUTOF10: 3
PAINLEVEL_OUTOF10: 7

## 2022-10-21 ASSESSMENT — LIFESTYLE VARIABLES
HOW MANY STANDARD DRINKS CONTAINING ALCOHOL DO YOU HAVE ON A TYPICAL DAY: 3 OR 4
HOW OFTEN DO YOU HAVE A DRINK CONTAINING ALCOHOL: 4 OR MORE TIMES A WEEK

## 2022-10-21 ASSESSMENT — PAIN DESCRIPTION - LOCATION
LOCATION: ABDOMEN
LOCATION: ABDOMEN

## 2022-10-21 ASSESSMENT — PAIN DESCRIPTION - DESCRIPTORS: DESCRIPTORS: PRESSURE

## 2022-10-21 ASSESSMENT — PAIN DESCRIPTION - ORIENTATION
ORIENTATION: LEFT;LOWER
ORIENTATION: LEFT;LOWER

## 2022-10-21 ASSESSMENT — PAIN - FUNCTIONAL ASSESSMENT: PAIN_FUNCTIONAL_ASSESSMENT: 0-10

## 2022-10-21 NOTE — PROGRESS NOTES
RT Inhaler-Nebulizer Bronchodilator Protocol Note    There is a bronchodilator order in the chart from a provider indicating to follow the RT Bronchodilator Protocol and there is an Initiate RT Inhaler-Nebulizer Bronchodilator Protocol order as well (see protocol at bottom of note). CXR Findings:  No results found. The findings from the last RT Protocol Assessment were as follows:   History Pulmonary Disease: Chronic pulmonary disease  Respiratory Pattern: Regular pattern and RR 12-20 bpm  Breath Sounds: Slightly diminished and/or crackles  Cough: Strong, spontaneous, non-productive  Indication for Bronchodilator Therapy: Decreased or absent breath sounds  Bronchodilator Assessment Score: 4    Aerosolized bronchodilator medication orders have been revised according to the RT Inhaler-Nebulizer Bronchodilator Protocol below. Respiratory Therapist to perform RT Therapy Protocol Assessment initially then follow the protocol. Repeat RT Therapy Protocol Assessment PRN for score 0-3 or on second treatment, BID, and PRN for scores above 3. No Indications - adjust the frequency to every 6 hours PRN wheezing or bronchospasm, if no treatments needed after 48 hours then discontinue using Per Protocol order mode. If indication present, adjust the RT bronchodilator orders based on the Bronchodilator Assessment Score as indicated below. Use Inhaler orders unless patient has one or more of the following: on home nebulizer, not able to hold breath for 10 seconds, is not alert and oriented, cannot activate and use MDI correctly, or respiratory rate 25 breaths per minute or more, then use the equivalent nebulizer order(s) with same Frequency and PRN reasons based on the score. If a patient is on this medication at home then do not decrease Frequency below that used at home.     0-3 - enter or revise RT bronchodilator order(s) to equivalent RT Bronchodilator order with Frequency of every 4 hours PRN for wheezing or increased work of breathing using Per Protocol order mode. 4-6 - enter or revise RT Bronchodilator order(s) to two equivalent RT bronchodilator orders with one order with BID Frequency and one order with Frequency of every 4 hours PRN wheezing or increased work of breathing using Per Protocol order mode. 7-10 - enter or revise RT Bronchodilator order(s) to two equivalent RT bronchodilator orders with one order with TID Frequency and one order with Frequency of every 4 hours PRN wheezing or increased work of breathing using Per Protocol order mode. 11-13 - enter or revise RT Bronchodilator order(s) to one equivalent RT bronchodilator order with QID Frequency and an Albuterol order with Frequency of every 4 hours PRN wheezing or increased work of breathing using Per Protocol order mode. Greater than 13 - enter or revise RT Bronchodilator order(s) to one equivalent RT bronchodilator order with every 4 hours Frequency and an Albuterol order with Frequency of every 2 hours PRN wheezing or increased work of breathing using Per Protocol order mode. RT to enter RT Home Evaluation for COPD & MDI Assessment order using Per Protocol order mode.     Electronically signed by [employer] on 10/21/2022 at 4:09 PM

## 2022-10-21 NOTE — PLAN OF CARE
Marni FUCHS pain  Recurrent diverticulitis    CT abd/pelvis   Impression   1. Findings most consistent with presumed recurrent sigmoid diverticulitis. Tiny bubbles of air adjacent to the diseased colon may represent air within   diverticula versus tiny foci of extraluminal air. Follow-up endoscopy and/or   CT examination after treatment recommended to document resolution of the   colonic findings. 2. No evidence of bowel obstruction, edwar intraperitoneal free air, or   abscess.      Ary Johnson PA-C  10/21/2022 1:56 PM

## 2022-10-21 NOTE — CONSULTS
Department of General Surgery Consult    PATIENT NAME: Louisa Nance OF BIRTH: 1970    ADMISSION DATE: 10/21/2022 11:25 AM      TODAY'S DATE: 10/21/2022    Reason for Consult: Diverticulitis    Chief Complaint: Abdominal pain    Requesting Physician: Fito Avila    HISTORY OF PRESENT ILLNESS:              The patient is a 45 y.o. male who presents with abdominal pain. He states he woke up with this this morning. The pain is a sharp stabbing pain that is crampy in his left lower quadrant. He had nausea earlier today and several episodes of vomiting. He denies fever or chills. Since seeing me in the office he did get his colonoscopy, but he has not been really following a high-fiber diet or taking a fiber supplement, which I did recommend to him.     Past Medical History:        Diagnosis Date    Cervical disc herniation     COPD (chronic obstructive pulmonary disease) ([facility] [address]     Diverticulitis     Folate deficiency 01/2013    GERD (gastroesophageal reflux disease)     Hyperlipidemia 02/2014    Hypertriglyceridemia, Good HDL    Hypertension 12/12: age 37    Prediabetes 11/2016    Vitamin D deficiency 01/2013    resolved       Past Surgical History:        Procedure Laterality Date    ANKLE FRACTURE SURGERY Left 1998    BRONCHOSCOPY  12/29/2015    CERVICAL FUSION N/A 01/16/2019    ANTERIOR CERVICAL DISCECTOMY AND FUSION WITH ALLOGRAFT, MEDTRONICS AND EVOKES  CPT CODE - 48769 performed by Haris Michelle MD at [address]  02/2013    ENDOSCOPY, COLON, DIAGNOSTIC      RI NJX DX/THER SBST INTRLMNR CRV/THRC W/IMG GDN Right 11/19/2018    RIGHT CERVICAL SEVEN THORACIC EPIDURAL STEROID INJECTION SITE CONFIRMED BY FLUOROSCOPY performed by Shivani Walters MD at [address]       Current Medications:   Current Facility-Administered Medications: budesonide-formoterol (SYMBICORT) 160-4.5 MCG/ACT inhaler 2 puff, 2 puff, Inhalation, BID  tiotropium (SPIRIVA RESPIMAT) 2. 5 MCG/ACT inhaler 2 puff, 2 puff, Inhalation, Daily  albuterol sulfate HFA (PROVENTIL;VENTOLIN;PROAIR) 108 (90 Base) MCG/ACT inhaler 2 puff, 2 puff, Inhalation, Q6H PRN  ipratropium-albuterol (DUONEB) nebulizer solution 1 ampule, 1 ampule, Inhalation, Q4H PRN  sodium chloride flush 0.9 % injection 5-40 mL, 5-40 mL, IntraVENous, 2 times per day  sodium chloride flush 0.9 % injection 10 mL, 10 mL, IntraVENous, PRN  0.9 % sodium chloride infusion, , IntraVENous, PRN  potassium chloride (KLOR-CON M) extended release tablet 40 mEq, 40 mEq, Oral, PRN **OR** potassium bicarb-citric acid (EFFER-K) effervescent tablet 40 mEq, 40 mEq, Oral, PRN **OR** potassium chloride 10 mEq/100 mL IVPB (Peripheral Line), 10 mEq, IntraVENous, PRN  magnesium sulfate 1000 mg in dextrose 5% 100 mL IVPB, 1,000 mg, IntraVENous, PRN  enoxaparin (LOVENOX) injection 40 mg, 40 mg, SubCUTAneous, Daily  ondansetron (ZOFRAN-ODT) disintegrating tablet 4 mg, 4 mg, Oral, Q8H PRN **OR** ondansetron (ZOFRAN) injection 4 mg, 4 mg, IntraVENous, Q6H PRN  polyethylene glycol (GLYCOLAX) packet 17 g, 17 g, Oral, Daily PRN  acetaminophen (TYLENOL) tablet 650 mg, 650 mg, Oral, Q6H PRN **OR** acetaminophen (TYLENOL) suppository 650 mg, 650 mg, Rectal, Q6H PRN  hydrALAZINE (APRESOLINE) injection 10 mg, 10 mg, IntraVENous, Q6H PRN  0.9 % sodium chloride infusion, , IntraVENous, Continuous  morphine (PF) injection 2 mg, 2 mg, IntraVENous, Q3H PRN **OR** morphine sulfate (PF) injection 4 mg, 4 mg, IntraVENous, Q3H PRN  piperacillin-tazobactam (ZOSYN) 3,375 mg in sodium chloride 0.9 % 100 mL IVPB extended infusion (mini-bag), 3,375 mg, IntraVENous, Q8H  amLODIPine (NORVASC) tablet 5 mg, 5 mg, Oral, Daily  atorvastatin (LIPITOR) tablet 20 mg, 20 mg, Oral, Daily  folic acid (FOLVITE) tablet 1,000 mcg, 1,000 mcg, Oral, Daily  pantoprazole (PROTONIX) tablet 40 mg, 40 mg, Oral, Daily  saccharomyces boulardii (FLORASTOR) capsule 250 mg, 250 mg, Oral, BID  Prior to Admission medications    Medication Sig Start Date End Date Taking? Authorizing Provider   acetaminophen (TYLENOL) 325 MG tablet Take 650 mg by mouth every 6 hours as needed for Pain    Historical Provider, MD   hydrocortisone 2.5 % cream Apply topically 2 times daily. 10/5/22   Geri Joseph MD   ketoconazole (NIZORAL) 2 % cream Apply topically daily. 10/5/22   Geri Joseph MD   pantoprazole (PROTONIX) 40 MG tablet TAKE ONE TABLET BY MOUTH DAILY 8/16/22   Car Andrea DO   albuterol sulfate HFA (VENTOLIN HFA) 108 (90 Base) MCG/ACT inhaler Inhale 2 puffs into the lungs 4 times daily as needed for Wheezing 8/4/22   Car Anrdea DO   atorvastatin (LIPITOR) 20 MG tablet TAKE ONE TABLET BY MOUTH DAILY 6/30/22   Sarina Gomez DO   TRELEGY ELLIPTA 100-62.5-25 MCG/INH AEPB INHALE ONE PUFF BY MOUTH DAILY 6/7/22   YOSELIN Truong   ipratropium-albuterol (DUONEB) 0.5-2.5 (3) MG/3ML SOLN nebulizer solution Inhale 3 mLs into the lungs every 4 hours as needed for Shortness of Breath 11/16/21   Dorita Orozco PA-C   folic acid (FOLVITE) 1 MG tablet TAKE ONE TABLET BY MOUTH DAILY 2/16/21   Sarina Lancaster DO        Allergies:  No known allergies    Social History:   TOBACCO:   reports that he quit smoking about 3 years ago. His smoking use included cigarettes. He started smoking about 38 years ago. He has a 70.00 pack-year smoking history. He has never used smokeless tobacco.  ETOH:   reports that he does not currently use alcohol after a past usage of about 1.0 standard drink per week. DRUGS:   reports current drug use. Drug: Marijuana Shellye Burkitt).     Family History:        Problem Relation Age of Onset    COPD Mother 39    COPD Father 39       REVIEW OF SYSTEMS:  CONSTITUTIONAL:  negative  HEENT:  negative  RESPIRATORY:  negative  CARDIOVASCULAR:  negative  GASTROINTESTINAL:  negative except for nausea, vomiting, and abdominal pain  GENITOURINARY:  negative  HEMATOLOGIC/LYMPHATIC: negative  NEUROLOGICAL:  Negative  * All other ROS reviewed and negative. PHYSICAL EXAM:  VITALS:  BP (!) 187/87   Pulse 58   Temp 96.9 °F (36.1 °C) (Oral)   Resp 16   Ht 5' 4\" (1.626 m)   Wt 160 lb (72.6 kg)   SpO2 99%   BMI 27.46 kg/m²   24HR INTAKE/OUTPUT:    No intake/output data recorded. No intake/output data recorded. CONSTITUTIONAL:  alert, no apparent distress and normal weight  EYES:  PERRL, sclera clear  ENT:  Normocephalic,atraumatic, without obvious abnormality  NECK:  supple, symmetrical, trachea midline  LUNGS: Resp effort easy and unlabored, clear to auscultation  CARDIOVASCULAR:  NO JVD, bradycardic with regular rhythm and no murmur noted  ABDOMEN:  normal bowel sounds, soft, non-distended, tenderness noted in the suprapubic region and in the left lower quadrant, voluntary guarding absent, no masses palpated and hernia absent  MUSCULOSKELETAL: No clubbing or cyanosis, 0+ pitting edema lower extremities  NEUROLOGIC:  Mental Status Exam:  Level of Alertness:   awake  PSYCHIATRIC:   person, place, time  SKIN:  no bruising or bleeding, normal skin color, texture, turgor, and no redness, warmth, or swelling    DATA:    CBC:   Recent Labs     10/21/22  1146   WBC 9.5   HGB 14.5   HCT 44.8        BMP:    Recent Labs     10/21/22  1146      K 4.0   CL 99   CO2 32   BUN 12   CREATININE 1.1   GLUCOSE 116*     Hepatic:   Recent Labs     10/21/22  1146   AST 19   ALT 20   BILITOT 0.3   ALKPHOS 100     Mag:    No results for input(s): MG in the last 72 hours. Phos:   No results for input(s): PHOS in the last 72 hours. INR: No results for input(s): INR in the last 72 hours. Radiology Review: Images personally reviewed by me. CT shows sigmoid diverticulitis. It was read as possible small bubbles of extraluminal air, my review suggests these are actually within diverticuli      IMPRESSION/RECOMMENDATIONS:    Acute recurrent sigmoid diverticulitis.   Admit for IV antibiotics, parenteral narcotics, and supportive care. Ideally we could try to get him through this second acute episode with nonoperative management. However, as he is noncompliant with long-term management recommendations I think it would be best served with interval sigmoid colectomy.     Electronically signed by Julian Castano MD     [address] [facility]  [id number]

## 2022-10-21 NOTE — CONSULTS
Gastroenterology Consult Note    Patient:   Ever Evans   :    1970   Facility:   [facility]  Referring/PCP: Emily Vang DO  Date:     10/21/2022  Consultant:   Manuel Aguilera PA-C      Chief Complaint   Patient presents with    Abdominal Pain     Diagnosed with diverticulitis one month ago. Woke up this morning with pain 8/10 in LLQ. History of Present illness     45year old male with a history of DM, HLD, COPD, folate deficiency, GERD, and diverticulitis presented to the ED with abdominal pain. He woke up this morning with LLQ abdominal pain characterized as cramping and pressure. He admits to nausea and vomited twice thus far today. His last BM was this a.m. He is hungry. He denies hematemesis, dysphagia, heartburn, bloating, rectal bleeding, melena, change in bowel habits, decreased appetite, and weight loss. He drinks 3-4 servings of alcohol per week on average. He uses marijuana daily recreationally. He takes Tylenol daily for headache relief. His last EGD with esophageal dilation + colonoscopy in 2018 showed normal colon and mild gastritis. His last colonoscopy in  showed benign precancerous polyps. GI was consulted for evaluation of diverticulitis. CT abdomen and pelvis showed recurrent sigmoid diverticulitis.       Past Medical History:   Diagnosis Date    Cervical disc herniation     COPD (chronic obstructive pulmonary disease) ([facility] [address]     Diverticulitis     Folate deficiency 2013    GERD (gastroesophageal reflux disease)     Hyperlipidemia 2014    Hypertriglyceridemia, Good HDL    Hypertension : age 37    Prediabetes 2016    Vitamin D deficiency 2013    resolved     Past Surgical History:   Procedure Laterality Date    ANKLE FRACTURE SURGERY Left     BRONCHOSCOPY  2015    CERVICAL FUSION N/A 2019    ANTERIOR CERVICAL DISCECTOMY AND FUSION WITH ALLOGRAFT, MEDTRONICS AND EVOKES  CPT CODE - 95505 performed by Michoacano Boucher MD at [address]  02/2013    ENDOSCOPY, COLON, DIAGNOSTIC      SD NJX DX/THER SBST INTRLMNR CRV/THRC W/IMG GDN Right 11/19/2018    RIGHT CERVICAL SEVEN THORACIC EPIDURAL STEROID INJECTION SITE CONFIRMED BY FLUOROSCOPY performed by Jessica Crocker MD at [address]       Social:   Social History     Tobacco Use    Smoking status: Former     Packs/day: 2.00     Years: 35.00     Pack years: 70.00     Types: Cigarettes     Start date: 1984     Quit date: 1/1/2019     Years since quitting: 3.8    Smokeless tobacco: Never   Substance Use Topics    Alcohol use: Not Currently     Alcohol/week: 1.0 standard drink     Types: 1 Cans of beer per week     Comment: occasional     Family:   Family History   Problem Relation Age of Onset    COPD Mother 39    COPD Father 39     No current facility-administered medications on file prior to encounter. Current Outpatient Medications on File Prior to Encounter   Medication Sig Dispense Refill    acetaminophen (TYLENOL) 325 MG tablet Take 650 mg by mouth every 6 hours as needed for Pain      hydrocortisone 2.5 % cream Apply topically 2 times daily. 30 g 0    ketoconazole (NIZORAL) 2 % cream Apply topically daily.  30 g 0    pantoprazole (PROTONIX) 40 MG tablet TAKE ONE TABLET BY MOUTH DAILY 90 tablet 1    albuterol sulfate HFA (VENTOLIN HFA) 108 (90 Base) MCG/ACT inhaler Inhale 2 puffs into the lungs 4 times daily as needed for Wheezing 18 g 5    atorvastatin (LIPITOR) 20 MG tablet TAKE ONE TABLET BY MOUTH DAILY 90 tablet 1    TRELEGY ELLIPTA 100-62.5-25 MCG/INH AEPB INHALE ONE PUFF BY MOUTH DAILY 60 each 5    ipratropium-albuterol (DUONEB) 0.5-2.5 (3) MG/3ML SOLN nebulizer solution Inhale 3 mLs into the lungs every 4 hours as needed for Shortness of Breath 378 mL 5    folic acid (FOLVITE) 1 MG tablet TAKE ONE TABLET BY MOUTH DAILY 60 tablet 9      Infusions:    sodium chloride      sodium chloride       PRN Medications: albuterol sulfate HFA, ipratropium-albuterol, sodium chloride flush, sodium chloride, potassium chloride **OR** potassium alternative oral replacement **OR** potassium chloride, magnesium sulfate, ondansetron **OR** ondansetron, polyethylene glycol, acetaminophen **OR** acetaminophen, hydrALAZINE, morphine **OR** morphine  Allergies: Allergies   Allergen Reactions    No Known Allergies        ROS:  Constitutional: negative for fevers and sweats. Positive for chills. Eyes: negative for cataracts, icterus and redness  Ears, nose, mouth, throat, and face: negative for epistaxis, hearing loss and sore throat  Respiratory: negative for cough, hemoptysis and sputum  Cardiovascular: negative for chest pain, dyspnea and lower extremity edema  Gastrointestinal: as per HPI  Genitourinary:negative for dysuria, frequency and hematuria  Neurological: negative for coordination problems, dizziness and gait problems  Behavioral/Psych: negative for anxiety, depression and mood swings      Physical Exam   BP (!) 187/87   Pulse 58   Temp 96.9 °F (36.1 °C) (Oral)   Resp 16   Ht 5' 4\" (1.626 m)   Wt 160 lb (72.6 kg)   SpO2 99%   BMI 27.46 kg/m²       General appearance: alert, appears stated age, cooperative, and no distress  Head: Normocephalic, without obvious abnormality, atraumatic  Eyes: conjunctivae/corneas clear. PERRL, EOM's intact. Fundi benign.   Neck: supple, symmetrical, trachea midline  Lungs: clear to auscultation bilaterally  Heart: regular rate and rhythm, S1, S2 normal, no murmur, click, rub or gallop  Abdomen: normal findings: bowel sounds normal, no masses palpable, and symmetric and abnormal findings:  tenderness mild in the LLQ  Extremities: extremities normal, atraumatic, no cyanosis or edema    Lab and Imaging Review   Labs:  CBC:   Recent Labs     10/21/22  1146   WBC 9.5   HGB 14.5   HCT 44.8   MCV 95.4        BMP:   Recent Labs     10/21/22  1146      K 4.0   CL 99   CO2 32   BUN 12 CREATININE 1.1     LIVER PROFILE:   Recent Labs     10/21/22  1146   AST 19   ALT 20   LIPASE 28.0   PROT 7.3   BILITOT 0.3   ALKPHOS 100     PT/INR: No results for input(s): INR in the last 72 hours. Invalid input(s): PT      IMAGING:  CT ABDOMEN PELVIS W IV CONTRAST Additional Contrast? None   Preliminary Result   1. Findings most consistent with presumed recurrent sigmoid diverticulitis. Tiny bubbles of air adjacent to the diseased colon may represent air within   diverticula versus tiny foci of extraluminal air. Follow-up endoscopy and/or   CT examination after treatment recommended to document resolution of the   colonic findings. 2. No evidence of bowel obstruction, edwar intraperitoneal free air, or   abscess. Attending Supervising [de-identified] Attestation Statement  The patient is a 45 y.o. male. I have performed a history and physical examination of the patient. I discussed the case with my physician assistant Sunil Denny PA-C    I reviewed the patient's Past Medical History, Past Surgical History, Medications, and Allergies. Physical Exam:  Vitals:    10/21/22 1332 10/21/22 1450 10/21/22 1606 10/21/22 1620   BP: (!) 210/93 (!) 187/87  (!) 188/84   Pulse: 56 58 60    Resp: 11 16 16    Temp:  96.9 °F (36.1 °C)     TempSrc:  Oral     SpO2: 100% 99% 95%    Weight:       Height:           Physical Examination: General appearance - in mild to moderate distress  Mental status - alert, oriented to person, place, and time  Eyes - sclera anicteric  Neck - supple, no significant adenopathy  Chest - not examined  Heart - normal rate and regular rhythm  Abdomen - tenderness noted LLQ  Extremities - no pedal edema noted       Assessment:     45year old male with a history of DM, HLD, COPD, folate deficiency, GERD, and diverticulitis admitted with recurrent sigmoid colon diverticulitis.       Plan:   Continue supportive care  Monitor and document output  Continue broad spectrum antibiotics  Bowel regimen with probiotics daily  Continue Pantoprazole 40 mg qAM   General surgery consulted  Advance to low fiber diet as tolerated per surgery recommendations  Will follow      Noris Adkins PA-C  3:52 PM 10/21/2022                      61-year-old male with history of diabetes, hypertension, hyperlipidemia, GERD, diverticulitis with contained perforation admitted with recurrent diverticulitis. His recent colonoscopy in September 2022 showed benign precancerous polyps and severe sigmoid diverticulosis. Continue supportive care. Broad-spectrum antibiotics. Surgery consultation. Nothing by mouth, bowel rest and pain control. He may benefit from left michelle-colectomy given the recurrent flareups.     Herson Mackay MD          [phone]  [phone]

## 2022-10-21 NOTE — CARE COORDINATION
Review of chart for any potential discharge needs. No needs identified for discharge intervention at this time. MD and bedside RN  if needs arise please consult case management for discharge intervention. CM not following at this time.         Elizabeth Maya RN

## 2022-10-21 NOTE — PROGRESS NOTES
Dr Cachorro Knight approached this RN with colonoscopy results at per the requests of ryann from [phone]. This RN put this result in the patients chart at this time.

## 2022-10-21 NOTE — ED PROVIDER NOTES
Emergency Department Encounter    Patient: Josefina Magaña  MRN: [id number]  : 1970  Date of Evaluation: 10/21/2022  ED Provider:  Remi Pradhan MD    Triage Chief Complaint:   Abdominal Pain (Diagnosed with diverticulitis one month ago. Woke up this morning with pain 8/10 in LLQ. )    [place]:  Josefina Magaña is a 45 y.o. male with history seen below presenting with left lower quadrant abdominal pain. Patient states about a month ago he had similar symptoms and was found to have acute diverticulitis for concern for perforation. Patient was admitted at that time on antibiotics. States he did obtain a colonoscopy that time. Patient states he has been doing well with no pain but this morning woke up with left lower quadrant pain once again. States the pain is severe, constant, stabbing, worse with palpation without relieving factors. States he did have 1 episode of nausea vomiting that was nonbilious nonbloody this morning. Denies any change in bowel habits. States he always has some mildly loose stools but states that is baseline for him denies any blood or melena stools. Denies urinary symptoms including dysuria, increased frequency, urgency, hematuria. Denies fevers or chills, recent falls or trauma. Denies previous abdominal surgeries. Denies any chest pain, shortness of breath, cough, sputum production. Denies headache, blurred vision, focal deficits or motor or sensory changes. Denies any current alcohol use. Denies drug use other than marijuana.     ROS - see HPI, below listed is current ROS at time of my eval:  At least 14 systems reviewed, negative other HPI    Past Medical History:   Diagnosis Date    Cervical disc herniation     COPD (chronic obstructive pulmonary disease) (HCC)     Diverticulitis     Folate deficiency 2013    GERD (gastroesophageal reflux disease)     Hyperlipidemia 2014    Hypertriglyceridemia, Good HDL    Hypertension : age 37    Prediabetes 11/2016    Vitamin D deficiency 01/2013    resolved     Past Surgical History:   Procedure Laterality Date    ANKLE FRACTURE SURGERY Left 1998    BRONCHOSCOPY  12-    CERVICAL FUSION N/A 1/16/2019    ANTERIOR CERVICAL DISCECTOMY AND FUSION WITH ALLOGRAFT, MEDTRONICS AND EVOKES  CPT CODE - 16044 performed by Jenny Higginbotham MD at [place] [address]  2/13    [place] NJX DX/THER SBST INTRLMNR CRV/THRC W/IMG GDN Right 11/19/2018    RIGHT CERVICAL SEVEN THORACIC EPIDURAL STEROID INJECTION SITE CONFIRMED BY FLUOROSCOPY performed by Serafin Morales MD at [place] [address]     Family History   Problem Relation Age of Onset    COPD Mother 39    COPD Father 39     Social History     Socioeconomic History    Marital status: Single     Spouse name: Noe Mock    Number of children: 1    Years of education: 10th grade    Highest education level: Not on file   Occupational History    Occupation: [employer]    Tobacco Use    Smoking status: Former     Packs/day: 2.00     Years: 35.00     Pack years: 70.00     Types: Cigarettes     Start date: 1984     Quit date: 1/1/2019     Years since quitting: 3.8    Smokeless tobacco: Never   Vaping Use    Vaping Use: Never used   Substance and Sexual Activity    Alcohol use: Not Currently     Alcohol/week: 1.0 standard drink     Types: 1 Cans of beer per week     Comment: occasional    Drug use: Yes     Types: Marijuana (Weed)     Comment: 1 joint per week per patient    Sexual activity: Yes     Partners: Female   Other Topics Concern    Not on file   Social History Narrative    \"wife\" (longterm  Noe Mock)     Social Determinants of Health     Financial Resource Strain: Medium Risk    Difficulty of Paying Living Expenses: Somewhat hard   Food Insecurity: No Food Insecurity    Worried About Running Out of Food in the Last Year: Never true    Ran Out of Food in the Last Year: Never true   Transportation Needs: No Transportation Needs    Lack of Transportation (Medical): No    Lack of Transportation (Non-Medical): No   Physical Activity: Not on file   Stress: Not on file   Social Connections: Not on file   Intimate Partner Violence: Not on file   Housing Stability: Not on file     No current facility-administered medications for this encounter. Current Outpatient Medications   Medication Sig Dispense Refill    acetaminophen (TYLENOL) 325 MG tablet Take 650 mg by mouth every 6 hours as needed for Pain      hydrocortisone 2.5 % cream Apply topically 2 times daily. 30 g 0    ketoconazole (NIZORAL) 2 % cream Apply topically daily. 30 g 0    valsartan (DIOVAN) 160 MG tablet Take 1 tablet by mouth daily 30 tablet 5    pantoprazole (PROTONIX) 40 MG tablet TAKE ONE TABLET BY MOUTH DAILY 90 tablet 1    albuterol sulfate HFA (VENTOLIN HFA) 108 (90 Base) MCG/ACT inhaler Inhale 2 puffs into the lungs 4 times daily as needed for Wheezing 18 g 5    atorvastatin (LIPITOR) 20 MG tablet TAKE ONE TABLET BY MOUTH DAILY 90 tablet 1    TRELEGY ELLIPTA 100-62.5-25 MCG/INH AEPB INHALE ONE PUFF BY MOUTH DAILY 60 each 5    ipratropium-albuterol (DUONEB) 0.5-2.5 (3) MG/3ML SOLN nebulizer solution Inhale 3 mLs into the lungs every 4 hours as needed for Shortness of Breath 960 mL 5    folic acid (FOLVITE) 1 MG tablet TAKE ONE TABLET BY MOUTH DAILY 60 tablet 9     Allergies   Allergen Reactions    No Known Allergies        Nursing Notes Reviewed    Physical Exam:  Triage VS:    ED Triage Vitals   Enc Vitals Group      BP       Pulse       Resp       Temp       Temp src       SpO2       Weight       Height       Head Circumference       Peak Flow       Pain Score       Pain Loc       Pain Edu? Excl. in [address]? My pulse ox interpretation is - normal    General appearance:  No acute distress. Skin:  Warm. Dry. Eye:  Extraocular movements intact. Ears, nose, mouth and throat:  Oral mucosa moist   Neck:  Trachea midline. Extremity:  No swelling.   Normal ROM     Heart: Regular rate and rhythm, normal S1 & S2, no extra heart sounds. Perfusion:  intact  Respiratory:  Lungs clear to auscultation bilaterally. Respirations nonlabored. Abdominal:  Normal bowel sounds. Soft. Tenderness palpation left lower quadrant with voluntary guarding without rebound, no flank pain, no CVA tenderness, no central spinal tenderness  Back:  No CVA tenderness to palpation     Neurological:  Alert and oriented times 3. No focal neuro deficits. Psychiatric:  Appropriate    I have reviewed and interpreted all of the currently available lab results from this visit (if applicable):  No results found for this visit on 10/21/22. Radiographs (if obtained):  Radiologist's Report Reviewed:  No results found. MDM:    24-year-old male presenting with history seen above. Patient is hypertensive at 205/90 on presentation. This is likely secondary to pain and will treat with pain control first before giving antihypertensives. Otherwise vitals are reassuring and patient afebrile satting well on room air. Physical exam can be seen above. CBC, CMP, lipase, UA, lactate as well as CT abdomen pelvis ordered. Patient given morphine and Zofran in the ED. patient's pain and blood pressure improved for short period time but on reevaluation patient states his pain is significantly worsened again. Laboratory evaluation is overall reassuring. CT abdomen pelvis reveals recurrence of diverticulitis with possible extraluminal air concerning for perforation. Discussion with patient his pain continues to be severe. We will admit patient to hospital service for further evaluation and treatment pain control. Clinical Impression:  1.  Acute diverticulitis of intestine          Comment: Please note this report has been produced using speech recognition software and may contain errors related to that system including errors in grammar, punctuation, and spelling, as well as words and phrases that may be inappropriate. Efforts were made to edit the dictations.         Diane Gregg MD  10/21/22 5333

## 2022-10-21 NOTE — ED NOTES
Chief Complaint   Patient presents with    Abdominal Pain     Diagnosed with diverticulitis one month ago. Woke up this morning with pain 8/10 in LLQ.         / Level of Consciousness: Alert (0)    Vitals:    10/21/22 1134 10/21/22 1223 10/21/22 1233 10/21/22 1332   BP:  (!) 176/93 (!) 187/92 (!) 210/93   Pulse:   58 56   Resp:   14 11   Temp:       TempSrc: Oral      SpO2:   95% 100%   Weight: 160 lb (72.6 kg)      Height: 5' 4\" (1.626 m)             Allergies   Allergen Reactions    No Known Allergies        Current Facility-Administered Medications   Medication Dose Route Frequency Provider Last Rate Last Admin    ondansetron (ZOFRAN) injection 4 mg  4 mg IntraVENous Q6H PRN Mitzi Gann MD   4 mg at 10/21/22 1156    piperacillin-tazobactam (ZOSYN) 3,375 mg in sodium chloride 0.9 % 100 mL IVPB (mini-bag)  3,375 mg IntraVENous Once Mitzi Gann MD         Current Outpatient Medications   Medication Sig Dispense Refill    acetaminophen (TYLENOL) 325 MG tablet Take 650 mg by mouth every 6 hours as needed for Pain      hydrocortisone 2.5 % cream Apply topically 2 times daily. 30 g 0    ketoconazole (NIZORAL) 2 % cream Apply topically daily.  30 g 0    valsartan (DIOVAN) 160 MG tablet Take 1 tablet by mouth daily 30 tablet 5    pantoprazole (PROTONIX) 40 MG tablet TAKE ONE TABLET BY MOUTH DAILY 90 tablet 1    albuterol sulfate HFA (VENTOLIN HFA) 108 (90 Base) MCG/ACT inhaler Inhale 2 puffs into the lungs 4 times daily as needed for Wheezing 18 g 5    atorvastatin (LIPITOR) 20 MG tablet TAKE ONE TABLET BY MOUTH DAILY 90 tablet 1    TRELEGY ELLIPTA 100-62.5-25 MCG/INH AEPB INHALE ONE PUFF BY MOUTH DAILY 60 each 5    ipratropium-albuterol (DUONEB) 0.5-2.5 (3) MG/3ML SOLN nebulizer solution Inhale 3 mLs into the lungs every 4 hours as needed for Shortness of Breath 745 mL 5    folic acid (FOLVITE) 1 MG tablet TAKE ONE TABLET BY MOUTH DAILY 60 tablet 9      List of medications patient is currently taking is complete. Source of medications in list are Epic. Patient Active Problem List   Diagnosis    Hypertension, essential    Gastroesophageal reflux disease without esophagitis    COPD (chronic obstructive pulmonary disease) (HCC)    Prediabetes    Hyperlipidemia, mixed    Esophageal dysphagia    DDD (degenerative disc disease), cervical    COPD exacerbation (HCC)    [facility] respiratory failure with hypoxia ([facility]    Marijuana use, episodic    Acute diverticulitis of intestine    Diverticulitis of colon with perforation    Leukocytosis    Cyst of right kidney    Diverticulitis of large intestine with perforation without abscess or bleeding    Diverticulitis                     Vitals:    10/21/22 1134 10/21/22 1223 10/21/22 1233 10/21/22 1332   BP:  (!) 176/93 (!) 187/92 (!) 210/93   Pulse:   58 56   Resp:   14 11   Temp:       TempSrc: Oral      SpO2:   95% 100%   Weight: 160 lb (72.6 kg)      Height: 5' 4\" (1.626 m)             -----------------------------------------------------------------------------------------    A&O 52y/o male, onset of abdominal pain this morning. Reports feeling similar pain 1 month ago and was diagnosed with diverticulitis. Multiple episodes of vomiting. Symptoms controlled with morphine x2 doses and zofran. Hypertensive in ER, states is only taking 1 of his prescribed blood pressure medications because they make him \"feel lightheaded and dizzy\". O2 dropped after receiving morphine, placed on nasal cannula to normalize oxygen levels. CT showing recurrent sigmoid diverticulitis. Receiving IV antibiotics. Independent with mobility. *To be filled out after report is complete*    Bed assignment: 217    Report called to        On       . Pertinent labs, allergies, medications and conditions were reviewed. Family present at bedside.     Electronically signed by Murali Landers RN on 10/21/22 at 2:17 PM EDT       Murali Landers RN  10/21/22 0541

## 2022-10-21 NOTE — H&P
Hospital Medicine History & Physical      PCP: Bill Becker DO    Date of Admission: 10/21/2022    Date of Service: Pt seen/examined on 10/21/2022     Chief Complaint:    Chief Complaint   Patient presents with    Abdominal Pain     Diagnosed with diverticulitis one month ago. Woke up this morning with pain 8/10 in LLQ. History Of Present Illness: The patient is a 45 y.o. male with COPD, folate deficiency, GERD, hyperlipidemia, DM who presents to [address] [place] [address] with c/o abdominal pain. Onset was this morning. [facility]ated to [facility]. Pain is constant, severe, and sharp in nature. Associated symptoms are nausea, vomiting, diarrhea, and chills. BP elevated. Labs stable. Imaging with recurrent diverticulitis, tiny bubbles of air adjacent to diseased colon. No evidence of bowel obstruction, edwar intraperitoneal free air or abscess. Admitted to med-surg. GI and General surgery consulted.       Past Medical History:        Diagnosis Date    Cervical disc herniation     [facility]) [facility]     Diverticulitis     Folate deficiency 01/2013    GERD (gastroesophageal reflux disease)     Hyperlipidemia 02/2014    Hypertriglyceridemia, Good HDL    Hypertension 12/12: age 37    Prediabetes 11/2016    Vitamin D deficiency 01/2013    resolved       Past Surgical History:        Procedure Laterality Date    ANKLE FRACTURE SURGERY Left 1998    BRONCHOSCOPY  12-    CERVICAL FUSION N/A 1/16/2019    ANTERIOR CERVICAL DISCECTOMY AND FUSION WITH ALLOGRAFT, MEDTRONICS AND EVOKES  CPT CODE - 23606 performed by Nora Quinteros MD at [place] [address]  2/13    SD Deandre [address] DX/THER SBST INTRLMNR CRV/THRC W/IMG GDN Right 11/19/2018    RIGHT CERVICAL SEVEN THORACIC EPIDURAL STEROID INJECTION SITE CONFIRMED BY FLUOROSCOPY performed by Taz Ornelas MD at [place] [address]       Medications Prior to Admission:    Prior to Admission medications    Medication Sig Start Date End Date Taking? Authorizing Provider   acetaminophen (TYLENOL) 325 MG tablet Take 650 mg by mouth every 6 hours as needed for Pain    Historical Provider, MD   hydrocortisone 2.5 % cream Apply topically 2 times daily. 10/5/22   Guero Waters MD   ketoconazole (NIZORAL) 2 % cream Apply topically daily. 10/5/22   Guero Waters MD   valsartan (DIOVAN) 160 MG tablet Take 1 tablet by mouth daily 9/27/22   Burns Bilmila, DO   pantoprazole (PROTONIX) 40 MG tablet TAKE ONE TABLET BY MOUTH DAILY 8/16/22   Burns Bilberry, DO   albuterol sulfate HFA (VENTOLIN HFA) 108 (90 Base) MCG/ACT inhaler Inhale 2 puffs into the lungs 4 times daily as needed for Wheezing 8/4/22   Burns Bilberry, DO   atorvastatin (LIPITOR) 20 MG tablet TAKE ONE TABLET BY MOUTH DAILY 6/30/22   Sarina Gomez DO   TRELEGY ELLIPTA 100-62.5-25 MCG/INH AEPB INHALE ONE PUFF BY MOUTH DAILY 6/7/22   Luigi Corcoran PA-C   ipratropium-albuterol (DUONEB) 0.5-2.5 (3) MG/3ML SOLN nebulizer solution Inhale 3 mLs into the lungs every 4 hours as needed for Shortness of Breath 11/16/21   Dorita Orozco PA-C   folic acid (FOLVITE) 1 MG tablet TAKE ONE TABLET BY MOUTH DAILY 2/16/21   Sarina Gilliland DO       Allergies:  No known allergies    Social History:    TOBACCO:   reports that he quit smoking about 3 years ago. His smoking use included cigarettes. He started smoking about 38 years ago. He has a 70.00 pack-year smoking history. He has never used smokeless tobacco.  ETOH:   reports that he does not currently use alcohol after a past usage of about 1.0 standard drink per week. Family History:   Positive as follows:        Problem Relation Age of Onset    COPD Mother 39    COPD Father 39       REVIEW OF SYSTEMS:       Constitutional: Negative for fever + chills  Respiratory: Negative  for dyspnea, cough   Cardiovascular: Negative for chest pain   Gastrointestinal: +abd.  Pain, nausea, vomiting, diarrhea   Genitourinary: Negative for hematuria   Musculoskeletal: Negative for arthralgias   Skin: Negative for rash   Neurological: Negative for syncope   Psychiatric/Behavorial: Negative for anxiety    PHYSICAL EXAM:    BP (!) 210/93   Pulse 56   Temp 97.6 °F (36.4 °C)   Resp 11   Ht 5' 4\" (1.626 m)   Wt 160 lb (72.6 kg)   SpO2 100%   BMI 27.46 kg/m²     Gen: No distress. Alert. Eyes: PERRL. No sclera icterus. No conjunctival injection. ENT: No discharge. Pharynx clear. Neck: Trachea midline. Resp: No accessory muscle use. No crackles. No wheezes. No rhonchi. CV: Regular rate. Regular rhythm. No murmur. No rub. No edema. GI: LLQ tender. Non-distended. Normal bowel sounds. No hernia. Skin: Warm and dry. No nodule on exposed extremities. No rash on exposed extremities. M/S: No cyanosis. No joint deformity. No clubbing. Neuro: Awake. Grossly nonfocal    Psych: Oriented x 3. No anxiety or agitation. CBC:   Recent Labs     10/21/22  1146   WBC 9.5   HGB 14.5   HCT 44.8   MCV 95.4        BMP:   Recent Labs     10/21/22  1146      K 4.0   CL 99   CO2 32   BUN 12   CREATININE 1.1     LIVER PROFILE:   Recent Labs     10/21/22  1146   AST 19   ALT 20   LIPASE 28.0   BILITOT 0.3   ALKPHOS 100       UA:  Recent Labs     10/21/22  1157   COLORU Yellow   PHUR 7.5   WBCUA 3-5   RBCUA 0-2   CLARITYU Clear   SPECGRAV 1.010   LEUKOCYTESUR TRACE*   UROBILINOGEN 0.2   BILIRUBINUR Negative   BLOODU Negative   GLUCOSEU Negative       CULTURES  COVID: not detected    EKG:  I have reviewed the EKG with the following interpretation:   N/A    RADIOLOGY  CT ABDOMEN PELVIS W IV CONTRAST Additional Contrast? None   Preliminary Result   1. Findings most consistent with presumed recurrent sigmoid diverticulitis. Tiny bubbles of air adjacent to the diseased colon may represent air within   diverticula versus tiny foci of extraluminal air.   Follow-up endoscopy and/or   CT examination after treatment recommended to document resolution of the   colonic findings. 2. No evidence of bowel obstruction, edwar intraperitoneal free air, or   abscess. MRI abdomen 9/9/22  1. Bosniak type 1 right renal cysts, no follow-up imaging is recommended. Principal Problem:    Diverticulitis  Resolved Problems:    * No resolved hospital problems. *        ASSESSMENT/PLAN:  Acute diverticulitis  -admitted to med-surg. GI and general surgery consulted  -NPO, ice chips, sips of clears  -IVF's. Zosyn D#1  -pain control: morphine prn    Hypertension  -BP elevated. -Hydralazine prn     Hyperlipidemia  -on Atorvastatin    COPD  -stable.  No AE  -continue Symbicort, Spiriva  -Albuterol, Duonebs prn    GERD  -on PPI     Folate deficiency   -cont folic acid    DVT Prophylaxis: Lovenox  Diet: No diet orders on file  Code Status: Prior    [facility]  10/21/2022

## 2022-10-21 NOTE — ED NOTES
Pt observed actively vomiting upon arrival to room, medicated as ordered. Aware of need for UA. Pending labs.      Radha Lyon RN  10/21/22 8187

## 2022-10-21 NOTE — PROGRESS NOTES
Patient admitted to room 217 from ED. Side rails up x2. Patient does not have an order for telemetry. Bed is locked and in lowest position. Call light placed in patient reach. Patient explained the routine of the hospital, including but not limited to lab work, vital signs, hourly rounding, etc. Care plans and education updated, CHG wipes were completed at time of admission. BP (!) 187/87   Pulse 58   Temp 96.9 °F (36.1 °C) (Oral)   Resp 16   Ht 5' 4\" (1.626 m)   Wt 160 lb (72.6 kg)   SpO2 99%   BMI 27.46 kg/m²     Most recent set of vitals as shown. Patient has an LDA that does not require CHG wipes, including possible a surgery incision, cabrera catheter, or a central line. 4 Eyes Skin Assessment     The patient is being assess for   Admission    I agree that 2 RN's have performed a thorough Head to Toe Skin Assessment on the patient. ALL assessment sites listed below have been assessed. Areas assessed for pressure by both nurses:   [x]   Head, Face, and Ears   [x]   Shoulders, Back, and Chest, Abdomen  [x]   Arms, Elbows, and Hands   [x]   Coccyx, Sacrum, and Ischium  [x]   Legs, Feet, and Heels        Nothing noted; patient also stated nothing to be concerned about    Skin Assessed Under all Medical Devices by both nurses:  none               All Mepilex Borders were peeled back and area peeked at by both nurses:  No: NA  Please list where Mepilex Borders are located:  npne             **SHARE this note so that the co-signing nurse is able to place an eSignature**    Co-signer eSignature: Electronically signed by Ave Galvan RN on 10/21/22 at 3:34 PM EDT    Does the Patient have Skin Breakdown related to pressure?   No            Eric Prevention initiated:  NA   Wound Care Orders initiated:  NA      WOC nurse consulted for Pressure Injury (Stage 3,4, Unstageable, DTI, NWPT, Complex wounds)and New or Established Ostomies:  NA      Primary Nurse eSignature: Electronically signed by Poncho Bailey RN on 10/21/22 at 3:29 PM EDT      Bedside Mobility Assessment Tool (BMAT):     Assessment Level 1- Sit and Shake    1. From a semi-reclined position, ask patient to sit up and rotate to a seated position at the side of the bed. Can use the bedrail. 2. Ask patient to reach out and grab your hand and shake making sure patient reaches across his/her midline. Pass- Patient is able to come to a seated position, maintain core strength. Maintains seated balance while reaching across midline. Move on to Assessment Level 2. Assessment Level 2- Stretch and Point   1. With patient in seated position at the side of the bed, have patient place both feet on the floor (or stool) with knees no higher than hips. 2. Ask patient to stretch one leg and straighten the knee, then bend the ankle/flex and point the toes. If appropriate, repeat with the other leg. Pass- Patient is able to demonstrate appropriate quad strength on intended weight bearing limb(s). Move onto Assessment Level 3. Assessment Level 3- Stand   1. Ask patient to elevate off the bed or chair (seated to standing) using an assistive device (cane, bedrail). 2. Patient should be able to raise buttocks off be and hold for a count of five. May repeat once. Pass- Patient maintains standing stability for at least 5 seconds, proceed to assessment level 4. Assessment Level 4- Walk   1. Ask patient to march in place at bedside. 2. Then ask patient to advance step and return each foot. Some medical conditions may render a patient from stepping backwards, use your best clinical judgement. Pass- Patient demonstrates balance while shifting weight and ability to step, takes independent steps, does not use assistive device patient is MOBILITY LEVEL 4. Mobility Level- 4    Patient is able to demonstrate the ability to move from a reclining position to an upright position within the recliner.

## 2022-10-21 NOTE — PROGRESS NOTES
Called consults to Dr. Carlie Vasquez and [address] [facility] @ [phone] 10/21/2022 [place]

## 2022-10-21 NOTE — PROGRESS NOTES
10/21/22 1900   RT Protocol   History Pulmonary Disease 2   Respiratory pattern 0   Breath sounds 2   Cough 0   Indications for Bronchodilator Therapy Decreased or absent breath sounds   Bronchodilator Assessment Score 4   RT Inhaler-Nebulizer Bronchodilator Protocol Note    There is a bronchodilator order in the chart from a provider indicating to follow the RT Bronchodilator Protocol and there is an Initiate RT Inhaler-Nebulizer Bronchodilator Protocol order as well (see protocol at bottom of note). CXR Findings:  No results found. The findings from the last RT Protocol Assessment were as follows:   History Pulmonary Disease: Chronic pulmonary disease  Respiratory Pattern: Regular pattern and RR 12-20 bpm  Breath Sounds: Slightly diminished and/or crackles  Cough: Strong, spontaneous, non-productive  Indication for Bronchodilator Therapy: Decreased or absent breath sounds  Bronchodilator Assessment Score: 4    Aerosolized bronchodilator medication orders have been revised according to the RT Inhaler-Nebulizer Bronchodilator Protocol below. Respiratory Therapist to perform RT Therapy Protocol Assessment initially then follow the protocol. Repeat RT Therapy Protocol Assessment PRN for score 0-3 or on second treatment, BID, and PRN for scores above 3. No Indications - adjust the frequency to every 6 hours PRN wheezing or bronchospasm, if no treatments needed after 48 hours then discontinue using Per Protocol order mode. If indication present, adjust the RT bronchodilator orders based on the Bronchodilator Assessment Score as indicated below. Use Inhaler orders unless patient has one or more of the following: on home nebulizer, not able to hold breath for 10 seconds, is not alert and oriented, cannot activate and use MDI correctly, or respiratory rate 25 breaths per minute or more, then use the equivalent nebulizer order(s) with same Frequency and PRN reasons based on the score.   If a patient is on this medication at home then do not decrease Frequency below that used at home. 0-3 - enter or revise RT bronchodilator order(s) to equivalent RT Bronchodilator order with Frequency of every 4 hours PRN for wheezing or increased work of breathing using Per Protocol order mode. 4-6 - enter or revise RT Bronchodilator order(s) to two equivalent RT bronchodilator orders with one order with BID Frequency and one order with Frequency of every 4 hours PRN wheezing or increased work of breathing using Per Protocol order mode. 7-10 - enter or revise RT Bronchodilator order(s) to two equivalent RT bronchodilator orders with one order with TID Frequency and one order with Frequency of every 4 hours PRN wheezing or increased work of breathing using Per Protocol order mode. 11-13 - enter or revise RT Bronchodilator order(s) to one equivalent RT bronchodilator order with QID Frequency and an Albuterol order with Frequency of every 4 hours PRN wheezing or increased work of breathing using Per Protocol order mode. Greater than 13 - enter or revise RT Bronchodilator order(s) to one equivalent RT bronchodilator order with every 4 hours Frequency and an Albuterol order with Frequency of every 2 hours PRN wheezing or increased work of breathing using Per Protocol order mode.        Electronically signed by Santa Nicholson RCP on 10/21/2022 at 7:00 PM

## 2022-10-22 LAB
ANION GAP SERPL CALCULATED.3IONS-SCNC: 6 MMOL/L (ref 3–16)
BASOPHILS ABSOLUTE: 0.1 K/UL (ref 0–0.2)
BASOPHILS RELATIVE PERCENT: 0.8 %
BUN BLDV-MCNC: 11 MG/DL (ref 7–20)
CALCIUM SERPL-MCNC: 8.6 MG/DL (ref 8.3–10.6)
CHLORIDE BLD-SCNC: 101 MMOL/L (ref 99–110)
CO2: 31 MMOL/L (ref 21–32)
CREAT SERPL-MCNC: 1 MG/DL (ref 0.9–1.3)
EOSINOPHILS ABSOLUTE: 0.1 K/UL (ref 0–0.6)
EOSINOPHILS RELATIVE PERCENT: 1.3 %
GFR SERPL CREATININE-BSD FRML MDRD: >60 ML/MIN/{1.73_M2}
GLUCOSE BLD-MCNC: 88 MG/DL (ref 70–99)
GLUCOSE BLD-MCNC: 88 MG/DL (ref 70–99)
GLUCOSE BLD-MCNC: 92 MG/DL (ref 70–99)
HCT VFR BLD CALC: 38.9 % (ref 40.5–52.5)
HEMOGLOBIN: 12.8 G/DL (ref 13.5–17.5)
LYMPHOCYTES ABSOLUTE: 1.7 K/UL (ref 1–5.1)
LYMPHOCYTES RELATIVE PERCENT: 15.2 %
MCH RBC QN AUTO: 31 PG (ref 26–34)
MCHC RBC AUTO-ENTMCNC: 32.9 G/DL (ref 31–36)
MCV RBC AUTO: 94.2 FL (ref 80–100)
MONOCYTES ABSOLUTE: 1 K/UL (ref 0–1.3)
MONOCYTES RELATIVE PERCENT: 8.6 %
NEUTROPHILS ABSOLUTE: 8.2 K/UL (ref 1.7–7.7)
NEUTROPHILS RELATIVE PERCENT: 74.1 %
PDW BLD-RTO: 13.9 % (ref 12.4–15.4)
PERFORMED ON: NORMAL
PERFORMED ON: NORMAL
PLATELET # BLD: 238 K/UL (ref 135–450)
PMV BLD AUTO: 7.8 FL (ref 5–10.5)
POTASSIUM REFLEX MAGNESIUM: 4.2 MMOL/L (ref 3.5–5.1)
RBC # BLD: 4.13 M/UL (ref 4.2–5.9)
SODIUM BLD-SCNC: 138 MMOL/L (ref 136–145)
WBC # BLD: 11 K/UL (ref 4–11)

## 2022-10-22 PROCEDURE — 99232 SBSQ HOSP IP/OBS MODERATE 35: CPT | Performed by: INTERNAL MEDICINE

## 2022-10-22 PROCEDURE — 6360000002 HC RX W HCPCS

## 2022-10-22 PROCEDURE — 94761 N-INVAS EAR/PLS OXIMETRY MLT: CPT

## 2022-10-22 PROCEDURE — G0378 HOSPITAL OBSERVATION PER HR: HCPCS

## 2022-10-22 PROCEDURE — 6370000000 HC RX 637 (ALT 250 FOR IP)

## 2022-10-22 PROCEDURE — 1200000000 HC SEMI PRIVATE

## 2022-10-22 PROCEDURE — 80048 BASIC METABOLIC PNL TOTAL CA: CPT

## 2022-10-22 PROCEDURE — 6370000000 HC RX 637 (ALT 250 FOR IP): Performed by: INTERNAL MEDICINE

## 2022-10-22 PROCEDURE — 85025 COMPLETE CBC W/AUTO DIFF WBC: CPT

## 2022-10-22 PROCEDURE — 6370000000 HC RX 637 (ALT 250 FOR IP): Performed by: PHYSICIAN ASSISTANT

## 2022-10-22 PROCEDURE — 6370000000 HC RX 637 (ALT 250 FOR IP): Performed by: NURSE PRACTITIONER

## 2022-10-22 PROCEDURE — 36415 COLL VENOUS BLD VENIPUNCTURE: CPT

## 2022-10-22 PROCEDURE — 96375 TX/PRO/DX INJ NEW DRUG ADDON: CPT

## 2022-10-22 PROCEDURE — 99232 SBSQ HOSP IP/OBS MODERATE 35: CPT | Performed by: SURGERY

## 2022-10-22 PROCEDURE — 2580000003 HC RX 258

## 2022-10-22 PROCEDURE — 94640 AIRWAY INHALATION TREATMENT: CPT

## 2022-10-22 PROCEDURE — 96372 THER/PROPH/DIAG INJ SC/IM: CPT

## 2022-10-22 RX ADMIN — Medication 2 PUFF: at 19:53

## 2022-10-22 RX ADMIN — RDII 250 MG CAPSULE 250 MG: at 20:25

## 2022-10-22 RX ADMIN — TIOTROPIUM BROMIDE INHALATION SPRAY 2 PUFF: 3.12 SPRAY, METERED RESPIRATORY (INHALATION) at 07:40

## 2022-10-22 RX ADMIN — PIPERACILLIN AND TAZOBACTAM 3375 MG: 3; .375 INJECTION, POWDER, FOR SOLUTION INTRAVENOUS at 20:28

## 2022-10-22 RX ADMIN — PIPERACILLIN AND TAZOBACTAM 3375 MG: 3; .375 INJECTION, POWDER, FOR SOLUTION INTRAVENOUS at 11:51

## 2022-10-22 RX ADMIN — ACETAMINOPHEN 650 MG: 325 TABLET ORAL at 02:24

## 2022-10-22 RX ADMIN — FOLIC ACID 1000 MCG: 1 TABLET ORAL at 08:09

## 2022-10-22 RX ADMIN — RDII 250 MG CAPSULE 250 MG: at 08:09

## 2022-10-22 RX ADMIN — IPRATROPIUM BROMIDE AND ALBUTEROL SULFATE 1 AMPULE: 2.5; .5 SOLUTION RESPIRATORY (INHALATION) at 07:40

## 2022-10-22 RX ADMIN — SODIUM CHLORIDE: 9 INJECTION, SOLUTION INTRAVENOUS at 08:12

## 2022-10-22 RX ADMIN — Medication 2 PUFF: at 07:40

## 2022-10-22 RX ADMIN — PANTOPRAZOLE SODIUM 40 MG: 40 TABLET, DELAYED RELEASE ORAL at 08:09

## 2022-10-22 RX ADMIN — PIPERACILLIN AND TAZOBACTAM 3375 MG: 3; .375 INJECTION, POWDER, FOR SOLUTION INTRAVENOUS at 02:30

## 2022-10-22 RX ADMIN — AMLODIPINE BESYLATE 5 MG: 5 TABLET ORAL at 08:09

## 2022-10-22 RX ADMIN — ENOXAPARIN SODIUM 40 MG: 100 INJECTION SUBCUTANEOUS at 08:09

## 2022-10-22 RX ADMIN — IPRATROPIUM BROMIDE AND ALBUTEROL SULFATE 1 AMPULE: 2.5; .5 SOLUTION RESPIRATORY (INHALATION) at 19:52

## 2022-10-22 RX ADMIN — HYDRALAZINE HYDROCHLORIDE 10 MG: 20 INJECTION INTRAMUSCULAR; INTRAVENOUS at 20:25

## 2022-10-22 RX ADMIN — ATORVASTATIN CALCIUM 20 MG: 10 TABLET, FILM COATED ORAL at 08:09

## 2022-10-22 RX ADMIN — SODIUM CHLORIDE: 9 INJECTION, SOLUTION INTRAVENOUS at 18:45

## 2022-10-22 ASSESSMENT — PAIN SCALES - GENERAL: PAINLEVEL_OUTOF10: 3

## 2022-10-22 ASSESSMENT — PAIN DESCRIPTION - LOCATION: LOCATION: ABDOMEN

## 2022-10-22 NOTE — DISCHARGE INSTRUCTIONS
Renal cyst on right side - you need follow up with PCP for this     Call [phone], Dr. Lobito Geller (surgeon), for follow up appointment in 1-2 weeks.      Monitor BP

## 2022-10-22 NOTE — PROGRESS NOTES
/72   Pulse 68   Temp 98.2 °F (36.8 °C) (Oral)   Resp 18   Ht 5' 4\" (1.626 m)   Wt 160 lb (72.6 kg)   SpO2 93%   BMI 27.46 kg/m²     Assessment complete. Meds passed. Pt denies needs at this time    Small sip of water given with meds. Patient rates pain in abdomen 3 out of 10 with no nausea. Patient complains of headache, tylenol not available at this time. Patient said he will fall back asleep instead. Bedside Mobility Assessment Tool (BMAT):     Assessment Level 1- Sit and Shake    1. From a semi-reclined position, ask patient to sit up and rotate to a seated position at the side of the bed. Can use the bedrail. 2. Ask patient to reach out and grab your hand and shake making sure patient reaches across his/her midline. Pass- Patient is able to come to a seated position, maintain core strength. Maintains seated balance while reaching across midline. Move on to Assessment Level 2. Assessment Level 2- Stretch and Point   1. With patient in seated position at the side of the bed, have patient place both feet on the floor (or stool) with knees no higher than hips. 2. Ask patient to stretch one leg and straighten the knee, then bend the ankle/flex and point the toes. If appropriate, repeat with the other leg. Pass- Patient is able to demonstrate appropriate quad strength on intended weight bearing limb(s). Move onto Assessment Level 3. Assessment Level 3- Stand   1. Ask patient to elevate off the bed or chair (seated to standing) using an assistive device (cane, bedrail). 2. Patient should be able to raise buttocks off be and hold for a count of five. May repeat once. Pass- Patient maintains standing stability for at least 5 seconds, proceed to assessment level 4. Assessment Level 4- Walk   1. Ask patient to march in place at bedside. 2. Then ask patient to advance step and return each foot.  Some medical conditions may render a patient from stepping backwards, use your best clinical judgement. Pass- Patient demonstrates balance while shifting weight and ability to step, takes independent steps, does not use assistive device patient is MOBILITY LEVEL 4.       Mobility Level- 4

## 2022-10-22 NOTE — PROGRESS NOTES
RT Inhaler-Nebulizer Bronchodilator Protocol Note    There is a bronchodilator order in the chart from a provider indicating to follow the RT Bronchodilator Protocol and there is an Initiate RT Inhaler-Nebulizer Bronchodilator Protocol order as well (see protocol at bottom of note). CXR Findings:  No results found. The findings from the last RT Protocol Assessment were as follows:   History Pulmonary Disease: (P) Chronic pulmonary disease  Respiratory Pattern: (P) Regular pattern and RR 12-20 bpm  Breath Sounds: (P) Slightly diminished and/or crackles  Cough: (P) Strong, spontaneous, non-productive  Indication for Bronchodilator Therapy: (P) Decreased or absent breath sounds  Bronchodilator Assessment Score: (P) 4    Aerosolized bronchodilator medication orders have been revised according to the RT Inhaler-Nebulizer Bronchodilator Protocol below. Respiratory Therapist to perform RT Therapy Protocol Assessment initially then follow the protocol. Repeat RT Therapy Protocol Assessment PRN for score 0-3 or on second treatment, BID, and PRN for scores above 3. No Indications - adjust the frequency to every 6 hours PRN wheezing or bronchospasm, if no treatments needed after 48 hours then discontinue using Per Protocol order mode. If indication present, adjust the RT bronchodilator orders based on the Bronchodilator Assessment Score as indicated below. Use Inhaler orders unless patient has one or more of the following: on home nebulizer, not able to hold breath for 10 seconds, is not alert and oriented, cannot activate and use MDI correctly, or respiratory rate 25 breaths per minute or more, then use the equivalent nebulizer order(s) with same Frequency and PRN reasons based on the score. If a patient is on this medication at home then do not decrease Frequency below that used at home.     0-3 - enter or revise RT bronchodilator order(s) to equivalent RT Bronchodilator order with Frequency of every 4 hours PRN for wheezing or increased work of breathing using Per Protocol order mode. 4-6 - enter or revise RT Bronchodilator order(s) to two equivalent RT bronchodilator orders with one order with BID Frequency and one order with Frequency of every 4 hours PRN wheezing or increased work of breathing using Per Protocol order mode. 7-10 - enter or revise RT Bronchodilator order(s) to two equivalent RT bronchodilator orders with one order with TID Frequency and one order with Frequency of every 4 hours PRN wheezing or increased work of breathing using Per Protocol order mode. 11-13 - enter or revise RT Bronchodilator order(s) to one equivalent RT bronchodilator order with QID Frequency and an Albuterol order with Frequency of every 4 hours PRN wheezing or increased work of breathing using Per Protocol order mode. Greater than 13 - enter or revise RT Bronchodilator order(s) to one equivalent RT bronchodilator order with every 4 hours Frequency and an Albuterol order with Frequency of every 2 hours PRN wheezing or increased work of breathing using Per Protocol order mode.      Electronically signed by Roseanne Garcia RCP on 10/22/2022 at 7:55 PM

## 2022-10-22 NOTE — PROGRESS NOTES
PROGRESS NOTE  S:52 yrs Patient  admitted on 10/21/2022 with Diverticulitis [K57.92]  Acute diverticulitis of intestine [K57.92] . Today he complains of Abdominal Pain LLQ, improved    Exam:   Vitals:    10/22/22 0800   BP: 128/72   Pulse: 68   Resp: 18   Temp: 98.2 °F (36.8 °C)   SpO2: 93%      General appearance: alert, appears stated age, and cooperative  HEENT: Sclera clear, anicteric  Neck: supple, symmetrical, trachea midline  Lungs:  not examined  Heart: regular rate and rhythm  Abdomen: abnormal findings:  tenderness mild in the epigastrium and in the LLQ  Extremities: extremities normal, atraumatic, no cyanosis or edema     Medications: Reviewed    Labs:  CBC:   Recent Labs     10/21/22  1146 10/22/22  0527   WBC 9.5 11.0   HGB 14.5 12.8*   HCT 44.8 38.9*   MCV 95.4 94.2    238     BMP:   Recent Labs     10/21/22  1016 10/21/22  1146 10/22/22  0527    136 138   K 4.4 4.0 4.2   CL 98* 99 101   CO2 31 32 31   BUN 13 12 11   CREATININE 1.2 1.1 1.0     LIVER PROFILE:   Recent Labs     10/21/22  1146   AST 19   ALT 20   LIPASE 28.0   PROT 7.3   BILITOT 0.3   ALKPHOS 100         Impression: 13-year-old male with history of DM, HTN, HLD, GERD, diverticulitis with contained perforation admitted with recurrent diverticulitis. His recent colonoscopy in September 2022 showed benign precancerous polyps and severe sigmoid diverticulosis.     Recommendation:  Continue supportive care  Continue antibiotics  Start full liquids and advance as tolerated  Possible d/c tomorrow  Consider elective sigmoid colectomy  Will follow      Lavinia Fregoso MD, MD  11:44 AM 10/22/2022

## 2022-10-22 NOTE — CARE COORDINATION
Case Management Assessment  Initial Evaluation      Patient Name: Adam Piña  YOB: 1970  Diagnosis: Diverticulitis [K57.92]  Acute diverticulitis of intestine [K57.92]  Date / Time: 10/21/2022 11:25 AM    Admission status/Date:INPT 10/21/22  Chart Reviewed: Yes      Patient Interviewed: Yes   Family Interviewed:  No      Hospitalization in the last 30 days:  No      Health Care Decision Maker :   Primary Decision Maker: Trudy Ojeda - Brother/Sister - [phone]    Secondary Decision Maker: Giorgi Cottrell - Domestic Partner - [phone]    (CM - must 1st enter selection under Navigator - emergency contact- [place] Relationship and pick relationship)   Who do you trust or have selected to make healthcare decisions for you      Met with: pt at bedside  Interview conducted  (bedside/phone):    Current PCP: Luigi Roth: [address] 43 required for SNF : Y, N          3 night stay required - Y, N    ADLS  Support Systems/Care Needs: Spouse/Significant Other  Transportation: self    Meal Preparation: self    Housing  Living Arrangements: home alone  Steps: 3-4  Intent for return to present living arrangements: Yes  Identified Issues: -    Home Care Information  Active with Home Health Care : No Agency:(Services)  Type of Home Care Services: None  Passport/Waiver : No  :                      Phone Number:    Passport/Waiver Services: -          Durable Medical Equiptment   DME Provider: n/a  Equipment: n/a  Walker___Cane___RTS___ BSC___Shower Chair___Hospital Bed___W/C____Other________  02 at ____Liter(s)---wears(frequency)_______ HHN ___ CPAP___ BiPap___   N/A____      Home O2 Use :  No    If No for home O2---if presently on O2 during hospitalization:  No  if yes CM to follow for potential DC O2 need  Informed of need for care provider to bring portable home O2 tank on day of discharge for nursing to connect prior to leaving:   Not Indicated  Verbalized agreement/Understanding:   Not Indicated    Community Service Affiliation  Dialysis:  No    Agency:  Location:  Dialysis Schedule:  Phone:   Fax: Other Community Services: (ex:PT/OT,Mental Health,Wound Clinic, Cardio/Pul [address])    DISCHARGE PLAN: Explained Case Management role/services. CM reviewed chart and met with pt. Pt reports to live at home alone and states that he is IPTA. Pt plans to return home at discharge and denies needs. CM will not follow. Please notify CM if discharge needs identified.

## 2022-10-22 NOTE — PROGRESS NOTES
Patient had a small, brown, formed bowel movement. Patient stated he was having diarrhea prior to admission. This stool sample was not sent for C Diff rule out because it was formed. Will continue to monitor his bowel movements to see if diarrhea occurs again.

## 2022-10-22 NOTE — PROGRESS NOTES
[facility] GENERAL SURGERY DAILY PROGRESS NOTE    SUBJECTIVE: Awake, alert. Feels better. OBJECTIVE: CURRENT VITALS:  /72   Pulse 68   Temp 98.2 °F (36.8 °C) (Oral)   Resp 18   Ht 5' 4\" (1.626 m)   Wt 160 lb (72.6 kg)   SpO2 93%   BMI 27.46 kg/m²          ABD: Soft. Mild tenderness.      LABS:    CBC:   Recent Labs     10/21/22  1146 10/22/22  0527   WBC 9.5 11.0   RBC 4.70 4.13*   HGB 14.5 12.8*   HCT 44.8 38.9*   MCV 95.4 94.2   RDW 14.6 13.9    238     BMP:   Recent Labs     10/21/22  1016 10/21/22  1146 10/22/22  0527    136 138   K 4.4 4.0 4.2   CL 98* 99 101   CO2 31 32 31   BUN 13 12 11   CREATININE 1.2 1.1 1.0         ASSESSMENT:   Recurrent sigmoid diverticulitis      PLAN:   Diet trial  Possible discharge tomorrow  Case discussed with Dr. Gertrudis Gutiérrez MD

## 2022-10-22 NOTE — PROGRESS NOTES
Handoff report and transfer of care given at bedside to [facility]. Patient in stable condition, denies needs/concerns at this time. Call light within reach.

## 2022-10-22 NOTE — PROGRESS NOTES
RT Inhaler-Nebulizer Bronchodilator Protocol Note    There is a bronchodilator order in the chart from a provider indicating to follow the RT Bronchodilator Protocol and there is an Initiate RT Inhaler-Nebulizer Bronchodilator Protocol order as well (see protocol at bottom of note). CXR Findings:  No results found. The findings from the last RT Protocol Assessment were as follows:   History Pulmonary Disease: Chronic pulmonary disease  Respiratory Pattern: Regular pattern and RR 12-20 bpm  Breath Sounds: Slightly diminished and/or crackles  Cough: Strong, spontaneous, non-productive  Indication for Bronchodilator Therapy: Decreased or absent breath sounds  Bronchodilator Assessment Score: 4    Aerosolized bronchodilator medication orders have been revised according to the RT Inhaler-Nebulizer Bronchodilator Protocol below. Respiratory Therapist to perform RT Therapy Protocol Assessment initially then follow the protocol. Repeat RT Therapy Protocol Assessment PRN for score 0-3 or on second treatment, BID, and PRN for scores above 3. No Indications - adjust the frequency to every 6 hours PRN wheezing or bronchospasm, if no treatments needed after 48 hours then discontinue using Per Protocol order mode. If indication present, adjust the RT bronchodilator orders based on the Bronchodilator Assessment Score as indicated below. Use Inhaler orders unless patient has one or more of the following: on home nebulizer, not able to hold breath for 10 seconds, is not alert and oriented, cannot activate and use MDI correctly, or respiratory rate 25 breaths per minute or more, then use the equivalent nebulizer order(s) with same Frequency and PRN reasons based on the score. If a patient is on this medication at home then do not decrease Frequency below that used at home.     0-3 - enter or revise RT bronchodilator order(s) to equivalent RT Bronchodilator order with Frequency of every 4 hours PRN for wheezing or increased work of breathing using Per Protocol order mode. 4-6 - enter or revise RT Bronchodilator order(s) to two equivalent RT bronchodilator orders with one order with BID Frequency and one order with Frequency of every 4 hours PRN wheezing or increased work of breathing using Per Protocol order mode. 7-10 - enter or revise RT Bronchodilator order(s) to two equivalent RT bronchodilator orders with one order with TID Frequency and one order with Frequency of every 4 hours PRN wheezing or increased work of breathing using Per Protocol order mode. 11-13 - enter or revise RT Bronchodilator order(s) to one equivalent RT bronchodilator order with QID Frequency and an Albuterol order with Frequency of every 4 hours PRN wheezing or increased work of breathing using Per Protocol order mode. Greater than 13 - enter or revise RT Bronchodilator order(s) to one equivalent RT bronchodilator order with every 4 hours Frequency and an Albuterol order with Frequency of every 2 hours PRN wheezing or increased work of breathing using Per Protocol order mode. RT to enter RT Home Evaluation for COPD & MDI Assessment order using Per Protocol order mode.     Electronically signed by Martínez Adorno on 10/22/2022 at 7:50 AM

## 2022-10-22 NOTE — PLAN OF CARE
Problem: Discharge Planning  Goal: Discharge to home or other facility with appropriate resources  10/22/2022 5091 by Liliane Cheek RN  Outcome: Progressing     Problem: Pain  Goal: Verbalizes/displays adequate comfort level or baseline comfort level  10/22/2022 0931 by Chad Perez RN  Outcome: Progressing  10/22/2022 0633 by Liliane Cheek RN  Outcome: Progressing     Problem: Safety - Adult  Goal: Free from fall injury  10/22/2022 0931 by Chad Perez RN  Outcome: Progressing  10/22/2022 0633 by Liliane Cheek RN  Outcome: Progressing

## 2022-10-23 VITALS
SYSTOLIC BLOOD PRESSURE: 159 MMHG | OXYGEN SATURATION: 96 % | TEMPERATURE: 97.3 F | DIASTOLIC BLOOD PRESSURE: 90 MMHG | HEIGHT: 64 IN | BODY MASS INDEX: 27.31 KG/M2 | WEIGHT: 160 LBS | RESPIRATION RATE: 16 BRPM | HEART RATE: 71 BPM

## 2022-10-23 PROCEDURE — 6370000000 HC RX 637 (ALT 250 FOR IP): Performed by: INTERNAL MEDICINE

## 2022-10-23 PROCEDURE — 99238 HOSP IP/OBS DSCHRG MGMT 30/<: CPT | Performed by: INTERNAL MEDICINE

## 2022-10-23 PROCEDURE — 2580000003 HC RX 258

## 2022-10-23 PROCEDURE — 6360000002 HC RX W HCPCS

## 2022-10-23 PROCEDURE — 94761 N-INVAS EAR/PLS OXIMETRY MLT: CPT

## 2022-10-23 PROCEDURE — G0378 HOSPITAL OBSERVATION PER HR: HCPCS

## 2022-10-23 PROCEDURE — 6370000000 HC RX 637 (ALT 250 FOR IP): Performed by: PHYSICIAN ASSISTANT

## 2022-10-23 PROCEDURE — 94640 AIRWAY INHALATION TREATMENT: CPT

## 2022-10-23 PROCEDURE — 6370000000 HC RX 637 (ALT 250 FOR IP): Performed by: NURSE PRACTITIONER

## 2022-10-23 RX ORDER — AMLODIPINE BESYLATE 5 MG/1
5 TABLET ORAL DAILY
Qty: 30 TABLET | Refills: 3 | Status: SHIPPED | OUTPATIENT
Start: 2022-10-24 | End: 2022-11-01 | Stop reason: SDUPTHER

## 2022-10-23 RX ORDER — OXYCODONE HYDROCHLORIDE AND ACETAMINOPHEN 5; 325 MG/1; MG/1
1 TABLET ORAL EVERY 6 HOURS PRN
Status: DISCONTINUED | OUTPATIENT
Start: 2022-10-23 | End: 2022-10-23 | Stop reason: HOSPADM

## 2022-10-23 RX ORDER — OXYCODONE HYDROCHLORIDE AND ACETAMINOPHEN 5; 325 MG/1; MG/1
1 TABLET ORAL EVERY 8 HOURS PRN
Qty: 9 TABLET | Refills: 0 | Status: SHIPPED | OUTPATIENT
Start: 2022-10-23 | End: 2022-10-26

## 2022-10-23 RX ORDER — AMOXICILLIN AND CLAVULANATE POTASSIUM 875; 125 MG/1; MG/1
1 TABLET, FILM COATED ORAL 2 TIMES DAILY
Qty: 20 TABLET | Refills: 0 | Status: SHIPPED | OUTPATIENT
Start: 2022-10-23 | End: 2022-11-02

## 2022-10-23 RX ADMIN — TIOTROPIUM BROMIDE INHALATION SPRAY 2 PUFF: 3.12 SPRAY, METERED RESPIRATORY (INHALATION) at 07:52

## 2022-10-23 RX ADMIN — Medication 2 PUFF: at 07:52

## 2022-10-23 RX ADMIN — PIPERACILLIN AND TAZOBACTAM 3375 MG: 3; .375 INJECTION, POWDER, FOR SOLUTION INTRAVENOUS at 04:20

## 2022-10-23 RX ADMIN — AMLODIPINE BESYLATE 5 MG: 5 TABLET ORAL at 09:49

## 2022-10-23 RX ADMIN — FOLIC ACID 1000 MCG: 1 TABLET ORAL at 09:49

## 2022-10-23 RX ADMIN — RDII 250 MG CAPSULE 250 MG: at 09:49

## 2022-10-23 RX ADMIN — SODIUM CHLORIDE: 9 INJECTION, SOLUTION INTRAVENOUS at 09:55

## 2022-10-23 RX ADMIN — ATORVASTATIN CALCIUM 20 MG: 10 TABLET, FILM COATED ORAL at 09:49

## 2022-10-23 RX ADMIN — PANTOPRAZOLE SODIUM 40 MG: 40 TABLET, DELAYED RELEASE ORAL at 09:49

## 2022-10-23 RX ADMIN — IPRATROPIUM BROMIDE AND ALBUTEROL SULFATE 1 AMPULE: 2.5; .5 SOLUTION RESPIRATORY (INHALATION) at 07:51

## 2022-10-23 NOTE — PROGRESS NOTES
See PM shift assessment. Pleasant; states he has had chronic diarrhea since he was a child and the diarrhea he is currently having is not a change. Watching TV; call light in reach.

## 2022-10-23 NOTE — PROGRESS NOTES
BP (!) 159/90   Pulse 71   Temp 97.3 °F (36.3 °C) (Oral)   Resp 16   Ht 5' 4\" (1.626 m)   Wt 160 lb (72.6 kg)   SpO2 96%   BMI 27.46 kg/m²     Assessment complete. Meds passed. Patient refused lovenox. Pt denies needs at this time    Patient denies pain, nausea or abdominal discomfort at this time. Bedside Mobility Assessment Tool (BMAT):     Assessment Level 1- Sit and Shake    1. From a semi-reclined position, ask patient to sit up and rotate to a seated position at the side of the bed. Can use the bedrail. 2. Ask patient to reach out and grab your hand and shake making sure patient reaches across his/her midline. Pass- Patient is able to come to a seated position, maintain core strength. Maintains seated balance while reaching across midline. Move on to Assessment Level 2. Assessment Level 2- Stretch and Point   1. With patient in seated position at the side of the bed, have patient place both feet on the floor (or stool) with knees no higher than hips. 2. Ask patient to stretch one leg and straighten the knee, then bend the ankle/flex and point the toes. If appropriate, repeat with the other leg. Pass- Patient is able to demonstrate appropriate quad strength on intended weight bearing limb(s). Move onto Assessment Level 3. Assessment Level 3- Stand   1. Ask patient to elevate off the bed or chair (seated to standing) using an assistive device (cane, bedrail). 2. Patient should be able to raise buttocks off be and hold for a count of five. May repeat once. Pass- Patient maintains standing stability for at least 5 seconds, proceed to assessment level 4. Assessment Level 4- Walk   1. Ask patient to march in place at bedside. 2. Then ask patient to advance step and return each foot. Some medical conditions may render a patient from stepping backwards, use your best clinical judgement.    Pass- Patient demonstrates balance while shifting weight and ability to step, takes independent steps, does not use assistive device patient is MOBILITY LEVEL 4.       Mobility Level- 4

## 2022-10-23 NOTE — PROGRESS NOTES
RT Inhaler-Nebulizer Bronchodilator Protocol Note    There is a bronchodilator order in the chart from a provider indicating to follow the RT Bronchodilator Protocol and there is an Initiate RT Inhaler-Nebulizer Bronchodilator Protocol order as well (see protocol at bottom of note). CXR Findings:  No results found. The findings from the last RT Protocol Assessment were as follows:   History Pulmonary Disease: Chronic pulmonary disease  Respiratory Pattern: Regular pattern and RR 12-20 bpm  Breath Sounds: Slightly diminished and/or crackles  Cough: Strong, spontaneous, non-productive  Indication for Bronchodilator Therapy: Decreased or absent breath sounds  Bronchodilator Assessment Score: 4    Aerosolized bronchodilator medication orders have been revised according to the RT Inhaler-Nebulizer Bronchodilator Protocol below. Respiratory Therapist to perform RT Therapy Protocol Assessment initially then follow the protocol. Repeat RT Therapy Protocol Assessment PRN for score 0-3 or on second treatment, BID, and PRN for scores above 3. No Indications - adjust the frequency to every 6 hours PRN wheezing or bronchospasm, if no treatments needed after 48 hours then discontinue using Per Protocol order mode. If indication present, adjust the RT bronchodilator orders based on the Bronchodilator Assessment Score as indicated below. Use Inhaler orders unless patient has one or more of the following: on home nebulizer, not able to hold breath for 10 seconds, is not alert and oriented, cannot activate and use MDI correctly, or respiratory rate 25 breaths per minute or more, then use the equivalent nebulizer order(s) with same Frequency and PRN reasons based on the score. If a patient is on this medication at home then do not decrease Frequency below that used at home.     0-3 - enter or revise RT bronchodilator order(s) to equivalent RT Bronchodilator order with Frequency of every 4 hours PRN for wheezing or increased work of breathing using Per Protocol order mode. 4-6 - enter or revise RT Bronchodilator order(s) to two equivalent RT bronchodilator orders with one order with BID Frequency and one order with Frequency of every 4 hours PRN wheezing or increased work of breathing using Per Protocol order mode. 7-10 - enter or revise RT Bronchodilator order(s) to two equivalent RT bronchodilator orders with one order with TID Frequency and one order with Frequency of every 4 hours PRN wheezing or increased work of breathing using Per Protocol order mode. 11-13 - enter or revise RT Bronchodilator order(s) to one equivalent RT bronchodilator order with QID Frequency and an Albuterol order with Frequency of every 4 hours PRN wheezing or increased work of breathing using Per Protocol order mode. Greater than 13 - enter or revise RT Bronchodilator order(s) to one equivalent RT bronchodilator order with every 4 hours Frequency and an Albuterol order with Frequency of every 2 hours PRN wheezing or increased work of breathing using Per Protocol order mode. RT to enter RT Home Evaluation for COPD & MDI Assessment order using Per Protocol order mode.     Electronically signed by Susanna Adorno on 10/23/2022 at 7:58 AM

## 2022-10-23 NOTE — PLAN OF CARE
Problem: Discharge Planning  Goal: Discharge to home or other facility with appropriate resources  Outcome: Completed     Problem: Pain  Goal: Verbalizes/displays adequate comfort level or baseline comfort level  10/23/2022 1039 by Ajith Nelson RN  Outcome: Completed  10/23/2022 1003 by Ajith Nelson RN  Outcome: Progressing     Problem: Safety - Adult  Goal: Free from fall injury  10/23/2022 1039 by Ajith Nelson RN  Outcome: Completed  10/23/2022 1003 by Ajith Nelson RN  Outcome: Progressing

## 2022-10-23 NOTE — DISCHARGE INSTR - COC
Continuity of Care Form    Patient Name: Hakan Barrett   :  1970  MRN:  [id number]    Admit date:  10/21/2022  Discharge date:  ***    Code Status Order: Full Code   Advance Directives:     Admitting Physician:  Diandra Spangler MD  PCP: Lyssa Alcantara DO    Discharging Nurse: [facility] Unit/Room#: 0217/0217-02  Discharging Unit Phone Number: ***    Emergency Contact:   Extended Emergency Contact Information  Primary Emergency Contact: [address] Phone: [phone]  Mobile Phone: [phone]  Relation: Domestic Partner  Preferred language: [address] needed?  No  Secondary Emergency Contact: Lisa Sevilla, [address] Phone: [phone]  Relation: Brother/Sister    Past Surgical History:  Past Surgical History:   Procedure Laterality Date    ANKLE FRACTURE SURGERY Left     BRONCHOSCOPY  2015    CERVICAL FUSION N/A 2019    ANTERIOR CERVICAL DISCECTOMY AND FUSION WITH ALLOGRAFT, MEDTRONICS AND EVOKES  CPT CODE - 72249 performed by Amy Iqbal MD at [address]  2013    ENDOSCOPY, COLON, DIAGNOSTIC      RI NJX DX/THER SBST INTRLMNR CRV/THRC W/IMG GDN Right 2018    RIGHT CERVICAL SEVEN THORACIC EPIDURAL STEROID INJECTION SITE CONFIRMED BY FLUOROSCOPY performed by Ozzie Israel MD at [address]       Immunization History:   Immunization History   Administered Date(s) Administered    Influenza Nasal 11/10/2015    Influenza Virus Vaccine 2014, 2014    Pneumococcal Polysaccharide (Yyptrmlmo92) 08/10/2015    Tdap (Boostrix, Adacel) 2014       Active Problems:  Patient Active Problem List   Diagnosis Code    Benign essential HTN I10    Gastroesophageal reflux disease without esophagitis K21.9    COPD (chronic obstructive pulmonary disease) ([facility]) J44.9    Prediabetes R73.03    Hyperlipidemia, mixed E78.2    Esophageal dysphagia R13.19 DDD (degenerative disc disease), cervical M50.30    COPD exacerbation (HCC) J44.1    Acute respiratory failure with hypoxia (HCC) J96.01    Marijuana use, episodic F12.90    Acute diverticulitis of intestine K57.92    Diverticulitis of colon with perforation K57.20    Leukocytosis D72.829    Cyst of right kidney N28.1    Diverticulitis of large intestine with perforation without abscess or bleeding K57.20    Diverticulitis K57.92    Left lower quadrant abdominal pain R10.32       Isolation/Infection:   Isolation            C Diff Contact          Patient Infection Status       Infection Onset Added Last Indicated Last Indicated By Review Planned Expiration Resolved Resolved By    C-diff Rule Out 10/21/22 10/21/22 10/21/22 Clostridium difficile toxin/antigen (Ordered) 10/28/22 10/31/22      Resolved    C-diff Rule Out 09/11/22 09/11/22 09/11/22 C DIFF TOXIN/ANTIGEN (Ordered)   09/11/22 Rule-Out Test Resulted    COVID-19 (Rule Out) 08/18/22 08/18/22 08/18/22 COVID-19 & Influenza Combo (Ordered)   08/18/22 Rule-Out Test Resulted    C-diff Rule Out 07/27/21 07/27/21 07/27/21 Clostridium difficile toxin/antigen (Ordered)   07/30/21 Linda Lu RN    Order cancelled    COVID-19 (Rule Out) 07/27/21 07/27/21 07/27/21 COVID-19, Rapid (Ordered)   07/27/21 Rule-Out Test Resulted            Nurse Assessment:  Last Vital Signs: BP (!) 159/90   Pulse 71   Temp 97.3 °F (36.3 °C) (Oral)   Resp 16   Ht 5' 4\" (1.626 m)   Wt 160 lb (72.6 kg)   SpO2 96%   BMI 27.46 kg/m²     Last documented pain score (0-10 scale): Pain Level: 3  Last Weight:   Wt Readings from Last 1 Encounters:   10/21/22 160 lb (72.6 kg)     Mental Status:  {IP PT MENTAL STATUS:20030}    IV Access:  {[facility] IV ACCESS:763821217}    Nursing Mobility/ADLs:  Walking   {[facility] DME TWHE:840799404}  Transfer  {[facility] DME BJXF:217466907}  Bathing  {[facility] DME LMBI:477041576}  Dressing  {[facility] DME DQYY:414197318}  Toileting  {[facility] DME PXYF:944545970}  Feeding  {[facility] DME RHFQ:987446266}  Med Admin  {[facility] DME FASO:100579169}  Med Delivery   { [facility] MED Delivery:173785066}    Wound Care Documentation and Therapy:        Elimination:  Continence: Bowel: {YES / QJ:02879}  Bladder: {YES / O}  Urinary Catheter: {Urinary Catheter:575797446}   Colostomy/Ileostomy/Ileal Conduit: {YES / ZR:78693}       Date of Last BM: ***  No intake or output data in the 24 hours ending 10/23/22 1035  No intake/output data recorded.     Safety Concerns:     [address] Safety Concerns:636507741}    Impairments/Disabilities:      [address] Impairments/Disabilities:108189208}    Nutrition Therapy:  Current Nutrition Therapy:   [address] Diet List:244978162}    Routes of Feeding: {[facility] DME Other Feedings:023226853}  Liquids: {Slp liquid thickness:30875}  Daily Fluid Restriction: {[facility] DME Yes amt example:307663430}  Last Modified Barium Swallow with Video (Video Swallowing Test): {Done Not Done ODKL:647737341}    Treatments at the Time of Hospital Discharge:   Respiratory Treatments: ***  Oxygen Therapy:  {Therapy; copd oxygen:61018}  Ventilator:    { CC Vent NDQR:609151079}    Rehab Therapies: {THERAPEUTIC INTERVENTION:4541052592}  Weight Bearing Status/Restrictions: [address] Weight Bearin}  Other Medical Equipment (for information only, NOT a DME order):  {EQUIPMENT:537363792}  Other Treatments: ***    Patient's personal belongings (please select all that are sent with patient):  {[facility] DME Belongings:626804518}    RN SIGNATURE:  {Esignature:239620593}    CASE MANAGEMENT/SOCIAL WORK SECTION    Inpatient Status Date: ***    Readmission Risk Assessment Score:  Readmission Risk              Risk of Unplanned Readmission:  22           Discharging to Facility/ Agency   Name:   Address:  Phone:  Fax:    Dialysis Facility (if applicable)   Name:  Address:  Dialysis Schedule:  Phone:  Fax:    / signature: {Esignature:277218312}    PHYSICIAN SECTION    Prognosis: {Prognosis:6229185278}    Condition at Discharge: Erinn Lemon Patient Condition:905065840}    Rehab Potential (if transferring to Rehab): {Prognosis:5841803294}    Recommended Labs or Other Treatments After Discharge: ***    Physician Certification: I certify the above information and transfer of Simon Shukla  is necessary for the continuing treatment of the diagnosis listed and that he requires {Admit to Appropriate Level of Care:15379} for {GREATER/LESS:356431425} 30 days.      Update Admission H&P: {[facility] DME Changes in RZBEW:879950387}    PHYSICIAN SIGNATURE:  {Esignature:836289073}

## 2022-10-23 NOTE — PROGRESS NOTES
IM Progress Note    Admit Date:  10/21/2022  2    Interval history:  abd pain, diverticulitis     Subjective:  Mr. Stacey Ayala feels much improved. Pain controlled and no fevers   Had loose BM with no blood  Wishes to go home      Objective:   BP (!) 145/76   Pulse 75   Temp 97.2 °F (36.2 °C) (Oral)   Resp 16   Ht 5' 4\" (1.626 m)   Wt 160 lb (72.6 kg)   SpO2 94%   BMI 27.46 kg/m²   No intake or output data in the 24 hours ending 10/23/22 0749    Physical Exam:      General:  middle aged male, healthy appearing  Awake, alert and oriented. Appears to be not in any distress  Mucous Membranes:  Pink , anicteric  Neck: No JVD, no carotid bruit, no thyromegaly  Chest:  Clear to auscultation bilaterally, no added sounds  Cardiovascular:  RRR S1S2 heard, no murmurs or gallops  Abdomen:  Soft, undistended, left LQ tenderness resolved , no organomegaly, BS present  Large tattoos noted  Extremities: No edema or cyanosis.  Distal pulses well felt  Neurological : grossly normal    Medications:   Scheduled Medications:    budesonide-formoterol  2 puff Inhalation BID    tiotropium  2 puff Inhalation Daily    sodium chloride flush  5-40 mL IntraVENous 2 times per day    enoxaparin  40 mg SubCUTAneous Daily    piperacillin-tazobactam  3,375 mg IntraVENous Q8H    amLODIPine  5 mg Oral Daily    atorvastatin  20 mg Oral Daily    folic acid  7,190 mcg Oral Daily    pantoprazole  40 mg Oral Daily    saccharomyces boulardii  250 mg Oral BID    ipratropium-albuterol  1 ampule Inhalation BID     I   sodium chloride      sodium chloride 100 mL/hr at 10/22/22 1845     albuterol sulfate HFA, sodium chloride flush, sodium chloride, potassium chloride **OR** potassium alternative oral replacement **OR** potassium chloride, magnesium sulfate, ondansetron **OR** ondansetron, polyethylene glycol, acetaminophen **OR** acetaminophen, hydrALAZINE, morphine **OR** morphine    Lab Data:  Recent Labs     10/21/22  1146 10/22/22  0527   WBC 9.5 11.0 HGB 14.5 12.8*   HCT 44.8 38.9*   MCV 95.4 94.2    238       Recent Labs     10/21/22  1016 10/21/22  1146 10/22/22  0527    136 138   K 4.4 4.0 4.2   CL 98* 99 101   CO2 31 32 31   BUN 13 12 11   CREATININE 1.2 1.1 1.0       No results for input(s): CKTOTAL, CKMB, CKMBINDEX, TROPONINI in the last 72 hours. Coagulation:   Lab Results   Component Value Date/Time    INR 0.95 08/15/2016 07:23 AM     Cardiac markers:   Lab Results   Component Value Date/Time    TROPONINI <0.01 08/18/2022 11:30 AM         Lab Results   Component Value Date    ALT 20 10/21/2022    AST 19 10/21/2022    ALKPHOS 100 10/21/2022    BILITOT 0.3 10/21/2022       Lab Results   Component Value Date    INR 0.95 08/15/2016    PROTIME 10.8 08/15/2016       Radiology  CULTURES  COVID: not detected     EKG:  I have reviewed the EKG with the following interpretation:   N/A     RADIOLOGY  CT ABDOMEN PELVIS W IV CONTRAST Additional Contrast? None   Preliminary Result   1. Findings most consistent with presumed recurrent sigmoid diverticulitis. Tiny bubbles of air adjacent to the diseased colon may represent air within   diverticula versus tiny foci of extraluminal air. Follow-up endoscopy and/or   CT examination after treatment recommended to document resolution of the   colonic findings. 2. No evidence of bowel obstruction, edwar intraperitoneal free air, or   abscess. MRI abdomen 9/9/22  1. Bosniak type 1 right renal cysts, no follow-up imaging is recommended. ASSESSMENT/PLAN:    Acute recurrent sigmoid  diverticulitis  -admitted to med-surg. GI and general surgery consulted  -NPO, ice chips, sips of clears  -IVF's.  Zosyn D#3  -pain controlled and started on diet , adv to regular today   - surgery recommends sigmoid colectomy once acute issues resolved given recurrence    - dc to home today     Hypertension  -BP elevated with pain ,added norvasc  improved now      Hyperlipidemia  -on Atorvastatin    COPD  -stable.  No AE  -continue Symbicort, Spiriva  -Albuterol, Duonebs prn    GERD  -on PPI     Folate deficiency   -cont folic acid    Renal cyst on right side - need f.w - d/w pt      DVT Prophylaxis: Lovenox  Diet: low fat diet  Full code    Updated spouse in room  Dc to home    Laneta Gosselin, MD, 10/23/2022 7:49 AM

## 2022-10-23 NOTE — PROGRESS NOTES
PROGRESS NOTE  S:52 yrs Patient  admitted on 10/21/2022 with Diverticulitis [K57.92]  Acute diverticulitis of intestine [K57.92] . Today he feels well. Tolerating diet. Pain is improved    Exam:   Vitals:    10/23/22 0945   BP: (!) 159/90   Pulse: 71   Resp: 16   Temp: 97.3 °F (36.3 °C)   SpO2: 96%      General appearance: alert, appears stated age, and cooperative  HEENT: Oropharynx clear, no lesions  Neck: supple, symmetrical, trachea midline  Lungs:  not examined  Heart: regular rate and rhythm  Abdomen: soft, non-tender; bowel sounds normal; no masses,  no organomegaly  Extremities: extremities normal, atraumatic, no cyanosis or edema     Medications: Reviewed    Labs:  CBC:   Recent Labs     10/21/22  1146 10/22/22  0527   WBC 9.5 11.0   HGB 14.5 12.8*   HCT 44.8 38.9*   MCV 95.4 94.2    238     BMP:   Recent Labs     10/21/22  1016 10/21/22  1146 10/22/22  0527    136 138   K 4.4 4.0 4.2   CL 98* 99 101   CO2 31 32 31   BUN 13 12 11   CREATININE 1.2 1.1 1.0     LIVER PROFILE:   Recent Labs     10/21/22  1146   AST 19   ALT 20   LIPASE 28.0   PROT 7.3   BILITOT 0.3   ALKPHOS 100       Impression: 58-year-old male with history of DM, HTN, HLD, GERD, diverticulitis with contained perforation admitted with recurrent diverticulitis. His recent colonoscopy in September 2022 showed benign precancerous polyps and severe sigmoid diverticulosis.     Recommendation:  Continue supportive care  Transition to oral antibiotics  Low fiber diet  Outpatient f/u with surgery for sigmoid resection      Lavinia Fregoso MD, MD  11:35 AM 10/23/2022

## 2022-10-23 NOTE — DISCHARGE INSTR - DIET

## 2022-10-24 ENCOUNTER — CARE COORDINATION (OUTPATIENT)
Dept: CASE MANAGEMENT | Age: 52
End: 2022-10-24

## 2022-10-24 DIAGNOSIS — K57.92 ACUTE DIVERTICULITIS OF INTESTINE: Primary | ICD-10-CM

## 2022-10-24 PROCEDURE — 1111F DSCHRG MED/CURRENT MED MERGE: CPT | Performed by: STUDENT IN AN ORGANIZED HEALTH CARE EDUCATION/TRAINING PROGRAM

## 2022-10-24 NOTE — DISCHARGE SUMMARY
Name:  Valente Flores  Room:  0217/0217-02  MRN:    [id number]    Discharge Summary      This discharge summary is in conjunction with a complete physical exam done on the day of discharge. Discharging Physician: Dr. Jasso Moreland: 10/21/2022  Discharge:  10/23/2022    HPI taken from admission H&P:    The patient is a 45 y.o. male with COPD, folate deficiency, GERD, hyperlipidemia, DM who presents to [facility] with c/o abdominal pain. Onset was this morning. Located to [facility]. Pain is constant, severe, and sharp in nature. Associated symptoms are nausea, vomiting, diarrhea, and chills. BP elevated. Labs stable. Imaging with recurrent diverticulitis, tiny bubbles of air adjacent to diseased colon. No evidence of bowel obstruction, edwar intraperitoneal free air or abscess. Admitted to med-surg. GI and General surgery consulted. Diagnoses this Admission and Hospital Course     Acute recurrent sigmoid  diverticulitis  -admitted to med-surg. GI and general surgery consulted  -NPO, ice chips, sips of clears  -IVF's. Zosyn D#3  -pain controlled and started on diet , adv to regular today   - surgery recommends sigmoid colectomy once acute issues resolved given recurrence    - dc to home today   -stop IV abx   -continue oral augmentin      Hypertension  -BP elevated with pain ,added norvasc  improved now      Hyperlipidemia  -on Atorvastatin    COPD  -stable.  No AE  -continue Symbicort, Spiriva  -Albuterol, Duonebs prn    GERD  -on PPI     Folate deficiency   -cont folic acid     Renal cyst on right side - need f.w - d/w pt         Updated spouse in room  Dc to home    Procedures (Please Review Full Report for Details)  N/A    Consults    General Surgery   GI       Physical Exam at Discharge:    BP (!) 159/90   Pulse 71   Temp 97.3 °F (36.3 °C) (Oral)   Resp 16   Ht 5' 4\" (1.626 m)   Wt 160 lb (72.6 kg)   SpO2 96%   BMI 27.46 kg/m²   General:  middle aged male, healthy appearing  Awake, alert and oriented. Appears to be not in any distress  Mucous Membranes:  Pink , anicteric  Neck: No JVD, no carotid bruit, no thyromegaly  Chest:  Clear to auscultation bilaterally, no added sounds  Cardiovascular:  RRR S1S2 heard, no murmurs or gallops  Abdomen:  Soft, undistended, left LQ tenderness resolved , no organomegaly, BS present  Large tattoos noted  Extremities: No edema or cyanosis. Distal pulses well felt  Neurological : grossly normal    Lab Results   Component Value Date    WBC 11.0 10/22/2022    HGB 12.8 (L) 10/22/2022    HCT 38.9 (L) 10/22/2022    MCV 94.2 10/22/2022     10/22/2022     Lab Results   Component Value Date     10/22/2022    K 4.2 10/22/2022     10/22/2022    CO2 31 10/22/2022    BUN 11 10/22/2022    CREATININE 1.0 10/22/2022    GLUCOSE 92 10/22/2022    CALCIUM 8.6 10/22/2022    PROT 7.3 10/21/2022    LABALBU 4.4 10/21/2022    BILITOT 0.3 10/21/2022    ALKPHOS 100 10/21/2022    AST 19 10/21/2022    ALT 20 10/21/2022    LABGLOM >60 10/22/2022    GFRAA >60 09/06/2022    AGRATIO 1.5 10/21/2022    GLOB 3.2 07/27/2021       CULTURES  COVID not detected     RADIOLOGY  CT ABDOMEN PELVIS W IV CONTRAST Additional Contrast? None   Final Result   1. Findings most consistent with presumed recurrent sigmoid diverticulitis. Tiny bubbles of air adjacent to the diseased colon may represent air within   diverticula versus tiny foci of extraluminal air. Follow-up endoscopy and/or   CT examination after treatment recommended to document resolution of the   colonic findings. 2. No evidence of bowel obstruction, edwar intraperitoneal free air, or   abscess. Discharge Medications     Medication List        START taking these medications      amLODIPine 5 MG tablet  Commonly known as: NORVASC  Take 1 tablet by mouth daily  Notes to patient: Amlodipine (Norvasc ®)  Use: lower blood pressure. Side effects: dizziness, headache, and    constipation. amoxicillin-clavulanate 875-125 MG per tablet  Commonly known as: Augmentin  Take 1 tablet by mouth 2 times daily for 10 days  Notes to patient: Augmentin  Use: treat bacterial infections  Side effects:rash; hives; itching; shortness of breath; wheezing; cough     oxyCODONE-acetaminophen 5-325 MG per tablet  Commonly known as: PERCOCET  Take 1 tablet by mouth every 8 hours as needed for Pain for up to 3 days. Notes to patient: Oxycontin(Oxycodone)  Use:  pain    Side effects: sleepiness, constipation            CONTINUE taking these medications      acetaminophen 325 MG tablet  Commonly known as: TYLENOL     albuterol sulfate  (90 Base) MCG/ACT inhaler  Commonly known as: Ventolin HFA  Inhale 2 puffs into the lungs 4 times daily as needed for Wheezing     atorvastatin 20 MG tablet  Commonly known as: LIPITOR  TAKE ONE TABLET BY MOUTH DAILY     folic acid 1 MG tablet  Commonly known as: FOLVITE  TAKE ONE TABLET BY MOUTH DAILY     hydrocortisone 2.5 % cream  Apply topically 2 times daily. ipratropium-albuterol 0.5-2.5 (3) MG/3ML Soln nebulizer solution  Commonly known as: DUONEB  Inhale 3 mLs into the lungs every 4 hours as needed for Shortness of Breath     ketoconazole 2 % cream  Commonly known as: NIZORAL  Apply topically daily. pantoprazole 40 MG tablet  Commonly known as: PROTONIX  TAKE ONE TABLET BY MOUTH DAILY     Trelegy Ellipta 100-62.5-25 MCG/INH Aepb  Generic drug: fluticasone-umeclidin-vilant  INHALE ONE PUFF BY MOUTH DAILY            STOP taking these medications      valsartan 160 MG tablet  Commonly known as: DIOVAN               Where to Get Your Medications        You can get these medications from any pharmacy    Bring a paper prescription for each of these medications  amLODIPine 5 MG tablet  amoxicillin-clavulanate 875-125 MG per tablet  oxyCODONE-acetaminophen 5-325 MG per tablet           Discharged in stable condition to home     Follow Up:   Follow up with PCP in 1 week    Yaneli Almonte Tony Arana MD, 11/16/2022 8:30 PM

## 2022-10-24 NOTE — CARE COORDINATION
[facility] Care Transitions Initial Follow Up Call    Call within 2 business days of discharge: Yes    Patient: Markos Jaimes Patient : 1970   MRN: [id number]  Reason for Admission: acute recurrent sigmoid diverticulitis, HTN, HLD, COPD no AE, GERD, folate deficiency, renal cyst on right side -> home no services  Discharge Date: 10/23/22 RARS: Readmission Risk Score: 12.7    Last Discharge  Street       Date Complaint Diagnosis Description Type Department Provider    10/21/22 Abdominal Pain Acute diverticulitis of intestine . .. ED to Hosp-Admission (Discharged) (ADMITTED) [facility] 2 Diane Oden MD; Chandu. .. Was this an external facility discharge? No Discharge Facility: NA    Challenges to be reviewed by the provider   Additional needs identified to be addressed with provider: No         Method of communication with provider: none. Care Transition Nurse contacted the patient by telephone to perform post hospital discharge assessment. Provided introduction to self, and explanation of the Care Transition Nurse role. Admits to stopping valsartan several weeks ago. He did start new meds including amlodipine. Has BP cuff and monitor. Encouraged new batteries in it and daily monitoring of BP and P take log to f/up appts and report if having symptoms. Nebulizer works but he does not use it often. Reviewed s/s to report for recommendation. He declines RPM enrollment as he feels very connected with all of his providers at this time. He denies referrals. Still having some diarrhea but overall improved. Tolerating PO without n/v. Denies fever. Denies needs. Care Transition Nurse reviewed discharge instructions, medical action plan, and red flags with patient who verbalized understanding. The patient was given an opportunity to ask questions and does not have any further questions or concerns at this time. Were discharge instructions available to patient? Yes.  Reviewed appropriate site of care based on symptoms and resources available to patient including: PCP  Specialist. The patient agrees to contact the PCP office for questions related to their healthcare. Advance Care Planning:   Does patient have an Advance Directive: decision maker updated and declines referral to  for completion or info on documents . Medication reconciliation was performed with patient, who verbalizes understanding of administration of home medications. Medications reviewed, 1111F entered: yes    Was patient discharged with a pulse oximeter? no    Non-face-to-face services provided:  Obtained and reviewed discharge summary and/or continuity of care documents  Education of patient/family/caregiver/guardian to support self-management-monitor BP at home, take log to PCP appt; report symptoms  Assessment and support for treatment adherence and medication management-1111F completed    Offered patient enrollment in the Remote Patient Monitoring (RPM) program for in-home monitoring: Patient declined.     Care Transitions 24 Hour Call    Schedule Follow Up Appointment with PCP: Completed  Do you have a copy of your discharge instructions?: Yes  Do you have all of your prescriptions and are they filled?: Yes  Have you been contacted by a [employer] [address]?: Yes  Have you scheduled your follow up appointment?: Yes  How are you going to get to your appointment?: Car - drive self  Patient Home Equipment: Nebulizer  Do you have support at home?: Partner/Spouse/SO  Do you feel like you have everything you need to keep you well at home?: Yes  Are you an active caregiver in your home?: No  Care Transitions Interventions  No Identified Needs    Social Work: Declined             Follow Up  Future Appointments   Date Time Provider Simone Marcelo   10/25/2022  4:15 PM Reagan Antunez PT MARIAH PT [place] [facility]   10/28/2022  2:30 PM Darylene Saupe, DO [place] [facility]   11/3/2022  4:15 PM Reagan Antunez, [facility] PT [place] HOD   11/8/2022  4:15 PM Sherrilee Bolls, PT MHAZ PT [place] HOD   11/10/2022  4:15 PM Sherrilee Bolls, PT [facility] PT [place] HOD   11/15/2022  4:15 PM Sherrilee Bolls, PT [facility] PT [place] HOD   11/17/2022  4:15 PM Sherrilee Bolls, PT [facility] PT [place] HOD   11/22/2022  4:15 PM Sherrilee Bolls, PT [facility] PT [place] HOD   11/29/2022  4:15 PM Sherrilee Bolls, PT [facility] PT [place] HOD   12/1/2022  4:15 PM Sherrilee Bolls, PT [facility] PT [place]   2/22/2023  9:30 AM MD Shavonne Davidson [facility]       Care Transition Nurse provided contact information. Plan for follow-up call in 5-7 days based on severity of symptoms and risk factors.   Plan for next call: symptom management-BP, Diarrhea  self management-diarrhea  follow-up appointment-10/28 PCP    Jose Alfredo Mccormack RN  Care Transition Nurse  [phone] mobile

## 2022-10-28 ENCOUNTER — TELEPHONE (OUTPATIENT)
Dept: PRIMARY CARE CLINIC | Age: 52
End: 2022-10-28

## 2022-10-28 ENCOUNTER — OFFICE VISIT (OUTPATIENT)
Dept: PRIMARY CARE CLINIC | Age: 52
End: 2022-10-28
Payer: COMMERCIAL

## 2022-10-28 VITALS
HEART RATE: 74 BPM | SYSTOLIC BLOOD PRESSURE: 130 MMHG | OXYGEN SATURATION: 97 % | TEMPERATURE: 98.4 F | DIASTOLIC BLOOD PRESSURE: 74 MMHG

## 2022-10-28 DIAGNOSIS — K21.9 GASTROESOPHAGEAL REFLUX DISEASE WITHOUT ESOPHAGITIS: ICD-10-CM

## 2022-10-28 DIAGNOSIS — I10 BENIGN ESSENTIAL HTN: ICD-10-CM

## 2022-10-28 DIAGNOSIS — F51.01 PRIMARY INSOMNIA: ICD-10-CM

## 2022-10-28 DIAGNOSIS — F12.90 MARIJUANA USE, EPISODIC: ICD-10-CM

## 2022-10-28 DIAGNOSIS — M50.30 DDD (DEGENERATIVE DISC DISEASE), CERVICAL: ICD-10-CM

## 2022-10-28 DIAGNOSIS — Z09 HOSPITAL DISCHARGE FOLLOW-UP: Primary | ICD-10-CM

## 2022-10-28 DIAGNOSIS — K57.92 ACUTE DIVERTICULITIS OF INTESTINE: ICD-10-CM

## 2022-10-28 PROCEDURE — 1111F DSCHRG MED/CURRENT MED MERGE: CPT | Performed by: STUDENT IN AN ORGANIZED HEALTH CARE EDUCATION/TRAINING PROGRAM

## 2022-10-28 PROCEDURE — 99496 TRANSJ CARE MGMT HIGH F2F 7D: CPT | Performed by: STUDENT IN AN ORGANIZED HEALTH CARE EDUCATION/TRAINING PROGRAM

## 2022-10-28 RX ORDER — VALSARTAN 160 MG/1
TABLET ORAL
COMMUNITY
Start: 2022-10-24 | End: 2022-10-28

## 2022-10-28 ASSESSMENT — PATIENT HEALTH QUESTIONNAIRE - PHQ9
4. FEELING TIRED OR HAVING LITTLE ENERGY: 0
SUM OF ALL RESPONSES TO PHQ QUESTIONS 1-9: 3
2. FEELING DOWN, DEPRESSED OR HOPELESS: 0
5. POOR APPETITE OR OVEREATING: 0
9. THOUGHTS THAT YOU WOULD BE BETTER OFF DEAD, OR OF HURTING YOURSELF: 0
SUM OF ALL RESPONSES TO PHQ QUESTIONS 1-9: 3
1. LITTLE INTEREST OR PLEASURE IN DOING THINGS: 0
3. TROUBLE FALLING OR STAYING ASLEEP: 3
10. IF YOU CHECKED OFF ANY PROBLEMS, HOW DIFFICULT HAVE THESE PROBLEMS MADE IT FOR YOU TO DO YOUR WORK, TAKE CARE OF THINGS AT HOME, OR GET ALONG WITH OTHER PEOPLE: 0
SUM OF ALL RESPONSES TO PHQ9 QUESTIONS 1 & 2: 0
8. MOVING OR SPEAKING SO SLOWLY THAT OTHER PEOPLE COULD HAVE NOTICED. OR THE OPPOSITE, BEING SO FIGETY OR RESTLESS THAT YOU HAVE BEEN MOVING AROUND A LOT MORE THAN USUAL: 0
7. TROUBLE CONCENTRATING ON THINGS, SUCH AS READING THE NEWSPAPER OR WATCHING TELEVISION: 0
6. FEELING BAD ABOUT YOURSELF - OR THAT YOU ARE A FAILURE OR HAVE LET YOURSELF OR YOUR FAMILY DOWN: 0

## 2022-10-28 ASSESSMENT — ANXIETY QUESTIONNAIRES
7. FEELING AFRAID AS IF SOMETHING AWFUL MIGHT HAPPEN: 0
5. BEING SO RESTLESS THAT IT IS HARD TO SIT STILL: 0
4. TROUBLE RELAXING: 0
GAD7 TOTAL SCORE: 0
1. FEELING NERVOUS, ANXIOUS, OR ON EDGE: 0
2. NOT BEING ABLE TO STOP OR CONTROL WORRYING: 0
6. BECOMING EASILY ANNOYED OR IRRITABLE: 0
3. WORRYING TOO MUCH ABOUT DIFFERENT THINGS: 0

## 2022-10-28 NOTE — TELEPHONE ENCOUNTER
----- Message from Rebecca Nelson sent at 10/28/2022  1:12 PM EDT -----  Subject: Message to Provider    QUESTIONS  Information for Provider? [facility]  would like a copy   of an updated medication list faxed, and also the patient declines any   post discharge assessments. Fax number is [phone]  ---------------------------------------------------------------------------  --------------  [address] [facility]  [phone]; OK to leave message on voicemail  ---------------------------------------------------------------------------  --------------  SCRIPT ANSWERS  Relationship to Patient? Third Party  Third Party Type? Insurance?    Representative Name? case management

## 2022-10-28 NOTE — PROGRESS NOTES
Post-Discharge Transitional Care  Follow Up      Kelton Brown   YOB: 1970    Date of Office Visit:  10/28/2022  Date of Hospital Admission: 10/21/22  Date of Hospital Discharge: 10/23/22  Risk of hospital readmission (high >=14%. Medium >=10%) :Readmission Risk Score: 12.7      Care management risk score Rising risk (score 2-5) and Complex Care (Scores >=6): No Risk Score On File     Non face to face  following discharge, date last encounter closed (first attempt may have been earlier): 10/24/2022    Call initiated 2 business days of discharge: Yes    ASSESSMENT/PLAN:   Hospital discharge follow-up  -     PA DISCHARGE MEDS RECONCILED W/ CURRENT OUTPATIENT MED LIST  Acute diverticulitis of intestine  Gastroesophageal reflux disease without esophagitis  DDD (degenerative disc disease), cervical  Marijuana use, episodic  Primary insomnia  Benign essential HTN    Acute recurrent diverticulitis of the sigmoid colon  Not controlled, not at goal  Patient continues to be noncompliant with dietary modifications. Again, I advised patient on proper dietary modifications and the risks associated with recurrent diverticulitis without such modifications being made. Advised him to continue fiber supplement per general surgery recommendation. Patient to call Monday to schedule general surgery consultation for elective sigmoid colectomy. Patient to continue Augmentin as prescribed at discharge until course completed. RTC after surgery for follow-up. GERD  Well-controlled, at goal  Patient should continue to use pantoprazole 40 mg daily given recurrent diverticulitis. RTC in 6 months to reevaluate. Degenerative disc disease  Not controlled, not at goal  Patient continues to have residual paresthesias and pain in the right arm but without headaches at this time  Patient declined physical therapy at this time.   Patient stated today that he was willing to tolerate pain and paresthesias at this time.  RTC in 6 months to reevaluate. THC usage  Not controlled, not at goal  I continue to advise patient of risk associated with chronic THC usage. Patient aware. I counseled patient on need for THC cessation. RTC in 3 to 6 months to reevaluate. Insomnia  Not controlled, new complaint today. Patient has poor sleep hygiene techniques. I counseled patient on need to go to bed earlier, decreased visual stimulation with TV or Internet usage before bedtime, and need to not eat at least 3 hours before bedtime. We also discussed limiting fluid intake to only water before bedtime if necessary. I advised patient that he can use OTC melatonin no more than 3 mg nightly to aid in sleep. RTC in 3 months to reevaluate. Hypertension  Well-controlled, at goal in office BP today 122/70 and 130/74. Patient was discontinued on valsartan 160 mg during hospital stay and restarted on amlodipine 5 mg at discharge. Will continue this regimen for now, however, I advised patient that this is a low dose of medication and hypertension may come back. I advised patient that if symptoms recur including but not limited to headaches, changes with vision, lightheadedness/dizziness, chest pain, to call our office back for additional follow-up visit. RTC in 3 months to reevaluate. Medical Decision Making: high complexity  Return in 3 months (on 1/28/2023). On this date 10/28/2022 I have spent 60 minutes reviewing previous notes, test results and face to face with the patient discussing the diagnosis and importance of compliance with the treatment plan as well as documenting on the day of the visit. Subjective:   HPI:  Follow up of Hospital problems/diagnosis(es): Acute Recurrent Diverticulitis of the Sigmoid Colon     Inpatient course: Discharge summary reviewed- see chart. Interval history/Current status: Stable - s/p hospital admission for abx secondary to recurrent diverticulitis.      Acute recurrent diverticulitis of the sigmoid colon  Patient states that on 10/21/2022 he and his wife were going to doctors appointments in the morning when he started to have left lower quadrant pain. Patient states that he went back to the ED that same day. CT was performed and showed \"recurrent sigmoid diverticulitis with tiny bubbles of air adjacent to the disease which may represent air within the diverticula. \"  Patient was started on Zosyn and pain medication which aided in patient's hypertensive urgency on arrival.  GI and general surgery were consulted. Patient saw both specialties outpatient and initially elected for nonsurgical intervention. However after, after recent recurrence general surgery recommended outpatient elective sigmoid colectomy given noncompliance with dietary modifications. Per patient, GI was to follow-up as needed. At time of discharge (10/23/22), patient was started on Augmentin for 10 days and switch back from valsartan 160 to amlodipine 5 mg. Today patient denies having any abdominal pain, nausea, vomiting, diarrhea. Patient states that he has toast and eggs for breakfast.  At lunch, patient states that he does not usually have a big meal and usually snacks in size on chocolate, cookies, pancakes, dry cereal.  Patient states for dinner he continues to have high consumption of red meats including but not limited to pork chops, ribs, hamburgers and occasional chicken. Patient states he continues to [facility] however this is down from his consumption of 1 case per day. Patient states he is expected to have called general surgery on Monday morning to schedule an appointment for elective sigmoid colectomy. GERD  Patient continues to take pantoprazole 40 mg daily. Patient denies any symptoms today consistent with heartburn or acid reflux. Patient denies any vomiting, nausea.     Degenerative disc disease  Patient was previously prescribed physical therapy given ongoing discharge from hospital     Medication List            Accurate as of October 28, 2022 11:26 PM. If you have any questions, ask your nurse or doctor. CONTINUE taking these medications      acetaminophen 325 MG tablet  Commonly known as: TYLENOL     albuterol sulfate  (90 Base) MCG/ACT inhaler  Commonly known as: Ventolin HFA  Inhale 2 puffs into the lungs 4 times daily as needed for Wheezing     amLODIPine 5 MG tablet  Commonly known as: NORVASC  Take 1 tablet by mouth daily     amoxicillin-clavulanate 875-125 MG per tablet  Commonly known as: Augmentin  Take 1 tablet by mouth 2 times daily for 10 days     atorvastatin 20 MG tablet  Commonly known as: LIPITOR  TAKE ONE TABLET BY MOUTH DAILY     folic acid 1 MG tablet  Commonly known as: FOLVITE  TAKE ONE TABLET BY MOUTH DAILY     hydrocortisone 2.5 % cream  Apply topically 2 times daily. ipratropium-albuterol 0.5-2.5 (3) MG/3ML Soln nebulizer solution  Commonly known as: DUONEB  Inhale 3 mLs into the lungs every 4 hours as needed for Shortness of Breath     ketoconazole 2 % cream  Commonly known as: NIZORAL  Apply topically daily. pantoprazole 40 MG tablet  Commonly known as: PROTONIX  TAKE ONE TABLET BY MOUTH DAILY     Trelegy Ellipta 100-62.5-25 MCG/INH Aepb  Generic drug: fluticasone-umeclidin-vilant  INHALE ONE PUFF BY MOUTH DAILY                Medications marked \"taking\" at this time  Outpatient Medications Marked as Taking for the 10/28/22 encounter (Office Visit) with Jesika Mendez, DO   Medication Sig Dispense Refill    amLODIPine (NORVASC) 5 MG tablet Take 1 tablet by mouth daily 30 tablet 3    amoxicillin-clavulanate (AUGMENTIN) 875-125 MG per tablet Take 1 tablet by mouth 2 times daily for 10 days 20 tablet 0    hydrocortisone 2.5 % cream Apply topically 2 times daily. 30 g 0    ketoconazole (NIZORAL) 2 % cream Apply topically daily.  30 g 0    pantoprazole (PROTONIX) 40 MG tablet TAKE ONE TABLET BY MOUTH DAILY 90 tablet 1    albuterol sulfate HFA (VENTOLIN HFA) 108 (90 Base) MCG/ACT inhaler Inhale 2 puffs into the lungs 4 times daily as needed for Wheezing 18 g 5    atorvastatin (LIPITOR) 20 MG tablet TAKE ONE TABLET BY MOUTH DAILY 90 tablet 1    TRELEGY ELLIPTA 100-62.5-25 MCG/INH AEPB INHALE ONE PUFF BY MOUTH DAILY 60 each 5    ipratropium-albuterol (DUONEB) 0.5-2.5 (3) MG/3ML SOLN nebulizer solution Inhale 3 mLs into the lungs every 4 hours as needed for Shortness of Breath 537 mL 5    folic acid (FOLVITE) 1 MG tablet TAKE ONE TABLET BY MOUTH DAILY 60 tablet 9        Medications patient taking as of now reconciled against medications ordered at time of hospital discharge: Yes    A comprehensive review of systems was negative except for what was noted in the HPI.     Objective:    /74   Pulse 74   Temp 98.4 °F (36.9 °C) (Temporal)   SpO2 97%   General Appearance: alert and oriented to person, place and time, well developed and well- nourished, in no acute distress  Skin: warm and dry, no rash or erythema  Head: normocephalic and atraumatic  Eyes: pupils equal, round, and reactive to light, extraocular eye movements intact, conjunctivae normal  ENT: tympanic membrane, external ear and ear canal normal bilaterally, nose without deformity, nasal mucosa and turbinates normal without polyps  Neck: supple and non-tender without mass, no thyromegaly or thyroid nodules, no cervical lymphadenopathy  Pulmonary/Chest: clear to auscultation bilaterally- no wheezes, rales or rhonchi, normal air movement, no respiratory distress  Cardiovascular: normal rate, regular rhythm, normal S1 and S2, no murmurs, rubs, clicks, or gallops, distal pulses intact, no carotid bruits  Abdomen: soft, non-tender, non-distended, normal bowel sounds, no masses or organomegaly  Extremities: no cyanosis, clubbing or edema  Musculoskeletal: normal range of motion, no joint swelling, deformity or tenderness  Neurologic: reflexes normal and symmetric, no cranial nerve deficit, gait, coordination and speech normal      An electronic signature was used to authenticate this note.   --Gaby Ledbetter, DO

## 2022-10-31 ENCOUNTER — CARE COORDINATION (OUTPATIENT)
Dept: CASE MANAGEMENT | Age: 52
End: 2022-10-31

## 2022-10-31 NOTE — CARE COORDINATION
[facility] Care Transitions Follow Up Call      Patient: Elvia Dumont  Patient : 1970   MRN: [id number]  Reason for Admission: acute recurrent sigmoid diverticulitis, HTN, HLD, COPD no AE, GERD, folate deficiency, renal cyst on right side -> home no services  Discharge Date: 10/23/22 RARS: Readmission Risk Score: 12.7    Needs to be reviewed by the provider   Additional needs identified to be addressed with provider: No         Method of communication with provider: none. Care Transition Nurse contacted the patient by telephone to follow up after recent admission. Reports feeling well. BP cuff needs batteries. Encouraged him to replace batteries and monitor BP daily and prn. Reviewed symptoms to monitor - headache, lightheaded or dizziness when changing positions - he denies these at this time. Reports he feels pretty good. Reviewed TCM visit note. He denies needs at this time. Addressed changes since last contact:  completed TCm visit  Discussed follow-up appointments. If no appointment was previously scheduled, appointment scheduling offered: no.   Is follow up appointment scheduled within 7 days of discharge? NA. Follow Up  Future Appointments   Date Time Provider Simone Marcelo   2023  9:30 AM MD YOSI Weiss [facility]     Non-[facility] follow up appointment(s): NA    Care Transition Nurse reviewed medical action plan with patient and discussed any barriers to care and/or understanding of plan of care after discharge. Discussed appropriate site of care based on symptoms and resources available to patient including: PCP.  The patient agrees to contact the PCP office for questions related to their healthcare. s     Patients top risk factors for readmission: medical condition-see above  Interventions to address risk factors: Education of patient/family/caregiver/guardian to support self-management-s/s monitor; report changes/concerns; f/up as scheduled/recommended    Offered patient enrollment in the Remote Patient Monitoring (RPM) program for in-home monitoring: Patient declined. Care Transition Nurse provided contact information for future needs. Plan for follow-up call in 10-14 days based on severity of symptoms and risk factors.   Plan for next call: f/up    Camilla Weeks RN  Care Transition Nurse  [phone] mobile

## 2022-11-01 RX ORDER — AMLODIPINE BESYLATE 5 MG/1
5 TABLET ORAL DAILY
Qty: 30 TABLET | Refills: 3 | Status: SHIPPED | OUTPATIENT
Start: 2022-11-01

## 2022-11-01 NOTE — TELEPHONE ENCOUNTER
Refill Request       Last Seen: Last Seen Department: 10/28/2022  Last Seen by PCP: 10/28/2022    Last Written: 10/24, pt states he did not get hardcopy from hospital    Next Appointment:   Future Appointments   Date Time Provider Simone Marcelo   11/3/2022 10:45 AM MD MARISSA Perez [facility]   2/22/2023  9:30 AM MD YOSI Bright [facility]             Requested Prescriptions     Pending Prescriptions Disp Refills    amLODIPine (NORVASC) 5 MG tablet 30 tablet 3     Sig: Take 1 tablet by mouth daily

## 2022-11-01 NOTE — TELEPHONE ENCOUNTER
amLODIPine (NORVASC) 5 MG tablet      [facility] [id number] - [address], [address]   Phone:  [phone]  Fax:  [phone]      Pt states that he did not receive the printed Rx from the hospital at discharge.  Pt requesting refill

## 2022-11-03 ENCOUNTER — OFFICE VISIT (OUTPATIENT)
Dept: SURGERY | Age: 52
End: 2022-11-03
Payer: COMMERCIAL

## 2022-11-03 VITALS
HEIGHT: 64 IN | SYSTOLIC BLOOD PRESSURE: 130 MMHG | DIASTOLIC BLOOD PRESSURE: 74 MMHG | WEIGHT: 160 LBS | BODY MASS INDEX: 27.31 KG/M2

## 2022-11-03 DIAGNOSIS — K57.20 DIVERTICULITIS OF LARGE INTESTINE WITH PERFORATION WITHOUT ABSCESS OR BLEEDING: Primary | ICD-10-CM

## 2022-11-03 PROCEDURE — 3078F DIAST BP <80 MM HG: CPT | Performed by: SURGERY

## 2022-11-03 PROCEDURE — G8484 FLU IMMUNIZE NO ADMIN: HCPCS | Performed by: SURGERY

## 2022-11-03 PROCEDURE — 1111F DSCHRG MED/CURRENT MED MERGE: CPT | Performed by: SURGERY

## 2022-11-03 PROCEDURE — 3017F COLORECTAL CA SCREEN DOC REV: CPT | Performed by: SURGERY

## 2022-11-03 PROCEDURE — 1036F TOBACCO NON-USER: CPT | Performed by: SURGERY

## 2022-11-03 PROCEDURE — G8419 CALC BMI OUT NRM PARAM NOF/U: HCPCS | Performed by: SURGERY

## 2022-11-03 PROCEDURE — G8427 DOCREV CUR MEDS BY ELIG CLIN: HCPCS | Performed by: SURGERY

## 2022-11-03 PROCEDURE — 3074F SYST BP LT 130 MM HG: CPT | Performed by: SURGERY

## 2022-11-03 PROCEDURE — 99213 OFFICE O/P EST LOW 20 MIN: CPT | Performed by: SURGERY

## 2022-11-03 NOTE — PROGRESS NOTES
[facility] SURGERY    CHIEF COMPLAINT: None, feels well    SUBJECTIVE:   Patient presents for follow up of his diverticulitis. He reports feeling well. He denies abdominal pain. His bowels have been working normally. He has been trying to eat better. Allergies   Allergen Reactions    No Known Allergies      Outpatient Medications Marked as Taking for the 11/3/22 encounter (Office Visit) with Mildred Mckenzie MD   Medication Sig Dispense Refill    amLODIPine (NORVASC) 5 MG tablet Take 1 tablet by mouth daily 30 tablet 3    acetaminophen (TYLENOL) 325 MG tablet Take 650 mg by mouth every 6 hours as needed for Pain      hydrocortisone 2.5 % cream Apply topically 2 times daily. 30 g 0    ketoconazole (NIZORAL) 2 % cream Apply topically daily.  30 g 0    pantoprazole (PROTONIX) 40 MG tablet TAKE ONE TABLET BY MOUTH DAILY 90 tablet 1    albuterol sulfate HFA (VENTOLIN HFA) 108 (90 Base) MCG/ACT inhaler Inhale 2 puffs into the lungs 4 times daily as needed for Wheezing 18 g 5    atorvastatin (LIPITOR) 20 MG tablet TAKE ONE TABLET BY MOUTH DAILY 90 tablet 1    TRELEGY ELLIPTA 100-62.5-25 MCG/INH AEPB INHALE ONE PUFF BY MOUTH DAILY 60 each 5    ipratropium-albuterol (DUONEB) 0.5-2.5 (3) MG/3ML SOLN nebulizer solution Inhale 3 mLs into the lungs every 4 hours as needed for Shortness of Breath 045 mL 5    folic acid (FOLVITE) 1 MG tablet TAKE ONE TABLET BY MOUTH DAILY 60 tablet 9       Past Medical History:   Diagnosis Date    Cervical disc herniation     COPD (chronic obstructive pulmonary disease) (HCC)     Diverticulitis     Folate deficiency 01/2013    GERD (gastroesophageal reflux disease)     Hyperlipidemia 02/2014    Hypertriglyceridemia, Good HDL    Hypertension 12/12: age 37    Prediabetes 11/2016    Vitamin D deficiency 01/2013    resolved     Past Surgical History:   Procedure Laterality Date    ANKLE FRACTURE SURGERY Left 1998    BRONCHOSCOPY  12/29/2015    CERVICAL FUSION N/A 01/16/2019    ANTERIOR CERVICAL DISCECTOMY AND FUSION WITH ALLOGRAFT, MEDTRONICS AND EVOKES  CPT CODE - 99247 performed by Marlene Gallo MD at [address]  02/2013    ENDOSCOPY, COLON, DIAGNOSTIC      ME NJX DX/THER SBST INTRLMNR CRV/THRC W/IMG GDN Right 11/19/2018    RIGHT CERVICAL SEVEN THORACIC EPIDURAL STEROID INJECTION SITE CONFIRMED BY FLUOROSCOPY performed by Lucio Catalan MD at [facility]     Family History   Problem Relation Age of Onset    COPD Mother 39    COPD Father 39     Social History     Socioeconomic History    Marital status: Single     Spouse name: Pastor Stern    Number of children: 1    Years of education: 10th grade    Highest education level: Not on file   Occupational History    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    Tobacco Use    Smoking status: Former     Packs/day: 2.00     Years: 35.00     Pack years: 70.00     Types: Cigarettes     Start date: 1984     Quit date: 1/1/2019     Years since quitting: 3.8    Smokeless tobacco: Never   Vaping Use    Vaping Use: Never used   Substance and Sexual Activity    Alcohol use: Not Currently     Alcohol/week: 1.0 standard drink     Types: 1 Cans of beer per week     Comment: occasional    Drug use: Yes     Types: Marijuana (Weed)     Comment: 1 joint per week per patient    Sexual activity: Yes     Partners: Female   Other Topics Concern    Not on file   Social History Narrative    \"wife\" (longterm  Pastor Stern)     Social Determinants of Health     Financial Resource Strain: Medium Risk    Difficulty of Paying Living Expenses: Somewhat hard   Food Insecurity: No Food Insecurity    Worried About Running Out of Food in the Last Year: Never true    Ran Out of Food in the Last Year: Never true   Transportation Needs: No Transportation Needs    Lack of Transportation (Medical): No    Lack of Transportation (Non-Medical):  No   Physical Activity: Not on file   Stress: Not on file   Social Connections: Not on file   Intimate Partner Violence: Not on file   Housing Stability: Not on file          Vitals:    11/03/22 1041   BP: 130/74   Site: Left Upper Arm   Position: Sitting   Cuff Size: Large Adult   Weight: 160 lb (72.6 kg)   Height: 5' 4\" (1.626 m)     Body mass index is 27.46 kg/m². ROS:  As per HPI, otherwise reviewed and negative    PHYSICAL EXAM:     Constitutional:  Well developed, well nourished, no acute distress, non-toxic appearance   Respiratory:  No respiratory distress, normal breath sounds, no rales, no wheezing   Cardiovascular:  Normal rate, normal rhythm, no murmurs. GI: Bowel sounds positive, soft, nontender  Integument: Warm and dry  Neurologic:  Alert & oriented x 3, normal motor function, normal sensory function, no focal deficits noted   Psychiatric:  Speech and behavior appropriate             DATA:  No new data since discharge    ASSESSMENT:   1. Diverticulitis of large intestine with perforation without abscess or bleeding           PLAN: His current episode has resolved with nonoperative management. I recommend he switch to a high-fiber diet with daily fiber supplementation.   He will follow-up in about 6 weeks or sooner as needed

## 2022-11-08 ENCOUNTER — CARE COORDINATION (OUTPATIENT)
Dept: CASE MANAGEMENT | Age: 52
End: 2022-11-08

## 2022-11-08 NOTE — CARE COORDINATION
[facility] Care Transitions Follow Up Call      Patient: Merlyn Gould  Patient : 1970   MRN: [id number]  Reason for Admission: acute recurrent sigmoid diverticulitis, HTN, HLD, COPD no AE, GERD, folate deficiency, renal cyst on right side -> home no services  Discharge Date: 10/23/22 RARS: Readmission Risk Score: 12.7    Unable to reach or leave message at this time.      Follow Up  Future Appointments   Date Time Provider Simone Marcelo   12/15/2022  9:00 AM Purvis Canavan, MD [facility]   2023  9:30 AM Shawn Rao MD [facility]     Salo Emanuel, RN  Care Transition Nurse  [phone] mobile

## 2022-11-10 ENCOUNTER — CARE COORDINATION (OUTPATIENT)
Dept: CASE MANAGEMENT | Age: 52
End: 2022-11-10

## 2022-11-10 NOTE — CARE COORDINATION
[facility] Care Transitions Follow Up Call      Patient: Kelsey Ruiz  Patient : 1970   MRN: [id number]  Reason for Admission: acute recurrent sigmoid diverticulitis, HTN, HLD, COPD no AE, GERD, folate deficiency, renal cyst on right side -> home no services, completed OV with surgeon /3 now instructed in high fiber diet  Discharge Date: 10/23/22 RARS: Readmission Risk Score: 12.7      Needs to be reviewed by the provider   Additional needs identified to be addressed with provider: No         Method of communication with provider: none. Care Transition Nurse contacted the patient by telephone to follow up after recent admission. He is now on a high fiber diet. He declines referral to dietician. Tolerating PO without n/v/d. Bowels moving. Has been monitoring BP and running about 140/80 on amlodipine 5mg daily. Denies HA, lightheadedness, dizziness. Addressed changes since last contact:  surgeon OV - note reviewed  Discussed follow-up appointments. If no appointment was previously scheduled, appointment scheduling offered: no.   Is follow up appointment scheduled within 7 days of discharge? completed. Follow Up  Future Appointments   Date Time Provider Simone Marcelo   12/15/2022  9:00 AM MD SAADIA LaraRMONT [facility]   2023  9:30 AM Reanna Pickard MD [facility] follow up appointment(s): ELEN    Care Transition Nurse reviewed medical action plan and red flags with patient and discussed any barriers to care and/or understanding of plan of care after discharge. Discussed appropriate site of care based on symptoms and resources available to patient including: PCP. The patient agrees to contact the PCP office for questions related to their healthcare.      Patients top risk factors for readmission: medical condition-see above  Interventions to address risk factors: Education of patient/family/caregiver/guardian to support self-management-monitor symptoms, log BP for next outreach, f/up as scheduled    Offered patient enrollment in the Remote Patient Monitoring (RPM) program for in-home monitoring: Patient declined. Care Transition Nurse provided contact information for future needs. Plan for follow-up call in 7-10 days based on severity of symptoms and risk factors.   Plan for next call: self management-BP     Madyson Arroyo RN  Care Transition Nurse  [phone] mobile

## 2022-11-17 ENCOUNTER — CARE COORDINATION (OUTPATIENT)
Dept: CASE MANAGEMENT | Age: 52
End: 2022-11-17

## 2022-11-17 NOTE — CARE COORDINATION
[address] Care Transitions Follow Up Call      Patient: Hakan Barrett  Patient : 1970   MRN: [id number]  Reason for Admission: acute recurrent sigmoid diverticulitis, HTN, HLD, COPD no AE, GERD, folate deficiency, renal cyst on right side -> home no services, completed OV with surgeon 11/3 now instructed in high fiber diet  Discharge Date: 10/23/22 RARS: Readmission Risk Score: 12.7      Needs to be reviewed by the provider   Additional needs identified to be addressed with provider: No         Method of communication with provider: none. Care Transition Nurse contacted the patient by telephone to follow up after recent admission. Reports he is having no abd pain. BM WNL. States high fiber diet going well. Declines dietician consult at this time. Denies needs. Care transition outreach complete will handoff to [facility] at this time. Addressed changes since last contact:  none  Discussed follow-up appointments. If no appointment was previously scheduled, appointment scheduling offered: no.     Follow Up  Future Appointments   Date Time Provider Simone Marcelo   12/15/2022  9:00 AM MD SAADIA SanchezRMONT [facility]   2023  9:30 AM Kylie Nugent MD [facility]     Non-[facility] follow up appointment(s): NA    Care Transition Nurse reviewed medical action plan and red flags with patient and discussed any barriers to care and/or understanding of plan of care after discharge. Discussed appropriate site of care based on symptoms and resources available to patient including: PCP  Specialist. The patient agrees to contact the PCP office for questions related to their healthcare. Patients top risk factors for readmission: medical condition-see above  Interventions to address risk factors: Education of patient/family/caregiver/guardian to support self-management-monitor symptoms; f/up as scheduled.      Offered patient enrollment in the Remote Patient Monitoring (RPM) program for in-home monitoring: Patient declined. Care Transitions Subsequent and Final Call    Schedule Follow Up Appointment with PCP: Completed  Subsequent and Final Calls  Do you have any ongoing symptoms?: No  Have your medications changed?: No  Do you have any questions related to your medications?: No  Do you currently have any active services?: No  Do you have any needs or concerns that I can assist you with?: No  Identified Barriers: None  Care Transitions Interventions  No Identified Needs    Registered Dietician: Declined    Other Interventions:           Care Transition Nurse provided contact information for future needs. No further follow-up call indicated based on severity of symptoms and risk factors.     Darryle Ahmadi, RN  Care Transition Nurse  [phone] mobile

## 2022-11-21 ENCOUNTER — CARE COORDINATION (OUTPATIENT)
Dept: CARE COORDINATION | Age: 52
End: 2022-11-21

## 2022-11-29 ENCOUNTER — CARE COORDINATION (OUTPATIENT)
Dept: CARE COORDINATION | Age: 52
End: 2022-11-29

## 2022-11-30 ENCOUNTER — TELEPHONE (OUTPATIENT)
Dept: PRIMARY CARE CLINIC | Age: 52
End: 2022-11-30

## 2022-11-30 NOTE — TELEPHONE ENCOUNTER
Patient called stating that he thinks he is getting the \"cold\" that is going around the house and is going to urgent care and will keep us updated.   Fyi

## 2022-12-05 ENCOUNTER — CARE COORDINATION (OUTPATIENT)
Dept: CARE COORDINATION | Age: 52
End: 2022-12-05

## 2022-12-15 RX ORDER — VALSARTAN 160 MG/1
TABLET ORAL
COMMUNITY
Start: 2022-11-24

## 2022-12-15 NOTE — TELEPHONE ENCOUNTER
Refill Request       Last Seen: Last Seen Department: 10/28/2022  Last Seen by PCP: 10/28/2022    Last Written: unknown, historical provider    Next Appointment:   Future Appointments   Date Time Provider Simone Marcelo   12/20/2022  9:15 AM MD MARISSA Saleh [facility]   2/22/2023  9:30 AM [facility]             Requested Prescriptions     Pending Prescriptions Disp Refills    valsartan (DIOVAN) 160 MG tablet 90 tablet 1     Sig: Take 1 tablet by mouth daily

## 2022-12-16 RX ORDER — VALSARTAN 160 MG/1
160 TABLET ORAL DAILY
Qty: 90 TABLET | Refills: 1 | OUTPATIENT
Start: 2022-12-16

## 2022-12-16 NOTE — TELEPHONE ENCOUNTER
At pt's last hospitalization, he was d/c on this medication. He was restarted on Amlodipine 5 mg daily with good control. Pt is not currently taking Valsartan. If pt is experiencing difficulties controlling his BP, then pt needs to schedule f/u appt. Thank you.

## 2022-12-16 NOTE — TELEPHONE ENCOUNTER
Spoke with patient. He states he was not taken off of Valsartan and that he did not start taking amlodipine. He also states that the valsartan was working well. Please advise. **Pt called back stating he is on the amlodipine and it is working. **

## 2022-12-20 ENCOUNTER — OFFICE VISIT (OUTPATIENT)
Dept: SURGERY | Age: 52
End: 2022-12-20
Payer: COMMERCIAL

## 2022-12-20 VITALS
HEIGHT: 64 IN | SYSTOLIC BLOOD PRESSURE: 120 MMHG | WEIGHT: 160 LBS | DIASTOLIC BLOOD PRESSURE: 80 MMHG | BODY MASS INDEX: 27.31 KG/M2

## 2022-12-20 DIAGNOSIS — K57.20 DIVERTICULITIS OF LARGE INTESTINE WITH PERFORATION WITHOUT ABSCESS OR BLEEDING: Primary | ICD-10-CM

## 2022-12-20 PROCEDURE — 99214 OFFICE O/P EST MOD 30 MIN: CPT | Performed by: SURGERY

## 2022-12-20 PROCEDURE — 3078F DIAST BP <80 MM HG: CPT | Performed by: SURGERY

## 2022-12-20 PROCEDURE — G8484 FLU IMMUNIZE NO ADMIN: HCPCS | Performed by: SURGERY

## 2022-12-20 PROCEDURE — G8419 CALC BMI OUT NRM PARAM NOF/U: HCPCS | Performed by: SURGERY

## 2022-12-20 PROCEDURE — 3017F COLORECTAL CA SCREEN DOC REV: CPT | Performed by: SURGERY

## 2022-12-20 PROCEDURE — G8427 DOCREV CUR MEDS BY ELIG CLIN: HCPCS | Performed by: SURGERY

## 2022-12-20 PROCEDURE — 3074F SYST BP LT 130 MM HG: CPT | Performed by: SURGERY

## 2022-12-20 PROCEDURE — 1036F TOBACCO NON-USER: CPT | Performed by: SURGERY

## 2022-12-20 RX ORDER — SODIUM CHLORIDE 0.9 % (FLUSH) 0.9 %
5-40 SYRINGE (ML) INJECTION EVERY 12 HOURS SCHEDULED
OUTPATIENT
Start: 2022-12-20

## 2022-12-20 RX ORDER — HEPARIN SODIUM 5000 [USP'U]/ML
5000 INJECTION, SOLUTION INTRAVENOUS; SUBCUTANEOUS EVERY 8 HOURS SCHEDULED
OUTPATIENT
Start: 2022-12-20

## 2022-12-20 RX ORDER — SODIUM CHLORIDE 0.9 % (FLUSH) 0.9 %
5-40 SYRINGE (ML) INJECTION PRN
OUTPATIENT
Start: 2022-12-20

## 2022-12-20 RX ORDER — SODIUM CHLORIDE 9 MG/ML
INJECTION, SOLUTION INTRAVENOUS PRN
OUTPATIENT
Start: 2022-12-20

## 2022-12-20 NOTE — PROGRESS NOTES
[facility]    CHIEF COMPLAINT: Abdominal pain    SUBJECTIVE:   Patient presents for follow up of his diverticulitis. He reports having only occasional minimal pain. He has been pretty good about taking his fiber supplement, but does miss occasionally. He has been eating better, and his bowels are working better. He definitely notices if he forgets to take his fiber, because he has worse troubles with passing his bowels and with pain. Allergies   Allergen Reactions    No Known Allergies      Outpatient Medications Marked as Taking for the 12/20/22 encounter (Office Visit) with Aamir Huber MD   Medication Sig Dispense Refill    valsartan (DIOVAN) 160 MG tablet       amLODIPine (NORVASC) 5 MG tablet Take 1 tablet by mouth daily 30 tablet 3    acetaminophen (TYLENOL) 325 MG tablet Take 650 mg by mouth every 6 hours as needed for Pain      hydrocortisone 2.5 % cream Apply topically 2 times daily. 30 g 0    ketoconazole (NIZORAL) 2 % cream Apply topically daily.  30 g 0    pantoprazole (PROTONIX) 40 MG tablet TAKE ONE TABLET BY MOUTH DAILY 90 tablet 1    albuterol sulfate HFA (VENTOLIN HFA) 108 (90 Base) MCG/ACT inhaler Inhale 2 puffs into the lungs 4 times daily as needed for Wheezing 18 g 5    atorvastatin (LIPITOR) 20 MG tablet TAKE ONE TABLET BY MOUTH DAILY 90 tablet 1    TRELEGY ELLIPTA 100-62.5-25 MCG/INH AEPB INHALE ONE PUFF BY MOUTH DAILY 60 each 5    ipratropium-albuterol (DUONEB) 0.5-2.5 (3) MG/3ML SOLN nebulizer solution Inhale 3 mLs into the lungs every 4 hours as needed for Shortness of Breath 037 mL 5    folic acid (FOLVITE) 1 MG tablet TAKE ONE TABLET BY MOUTH DAILY 60 tablet 9       Past Medical History:   Diagnosis Date    Cervical disc herniation     COPD (chronic obstructive pulmonary disease) (HCC)     Diverticulitis     Folate deficiency 01/2013    GERD (gastroesophageal reflux disease)     Hyperlipidemia 02/2014    Hypertriglyceridemia, Good HDL    Hypertension 12/12: age 43    Prediabetes 11/2016    Vitamin D deficiency 01/2013    resolved     Past Surgical History:   Procedure Laterality Date    ANKLE FRACTURE SURGERY Left 1998    BRONCHOSCOPY  12/29/2015    CERVICAL FUSION N/A 01/16/2019    ANTERIOR CERVICAL DISCECTOMY AND FUSION WITH ALLOGRAFT, MEDTRONICS AND EVOKES  CPT CODE - 59264 performed by Hair Palomares MD at [address]  02/2013    ENDOSCOPY, COLON, DIAGNOSTIC      [place] NJX DX/THER SBST INTRLMNR CRV/THRC W/IMG GDN Right 11/19/2018    RIGHT CERVICAL SEVEN THORACIC EPIDURAL STEROID INJECTION SITE CONFIRMED BY FLUOROSCOPY performed by Aniya Agosto MD at [address]     Family History   Problem Relation Age of Onset    COPD Mother 39    COPD Father 39     Social History     Socioeconomic History    Marital status: Single     Spouse name: Britton Evans    Number of children: 1    Years of education: 10th grade    Highest education level: Not on file   Occupational History    Occupation: [employer]    Tobacco Use    Smoking status: Former     Packs/day: 2.00     Years: 35.00     Pack years: 70.00     Types: Cigarettes     Start date: 1984     Quit date: 1/1/2019     Years since quitting: 3.9    Smokeless tobacco: Never   Vaping Use    Vaping Use: Never used   Substance and Sexual Activity    Alcohol use: Not Currently     Alcohol/week: 1.0 standard drink     Types: 1 Cans of beer per week     Comment: occasional    Drug use: Yes     Types: Marijuana (Weed)     Comment: 1 joint per week per patient    Sexual activity: Yes     Partners: Female   Other Topics Concern    Not on file   Social History Narrative    \"wife\" (longterm  Britton Evans)     Social Determinants of Health     Financial Resource Strain: Medium Risk    Difficulty of Paying Living Expenses: Somewhat hard   Food Insecurity: No Food Insecurity    Worried About Running Out of Food in the Last Year: Never true    Ran Out of Food in the Last Year: Never true   Transportation Needs: No Transportation Needs    Lack of Transportation (Medical): No    Lack of Transportation (Non-Medical): No   Physical Activity: Not on file   Stress: Not on file   Social Connections: Not on file   Intimate Partner Violence: Not on file   Housing Stability: Not on file          Vitals:    12/20/22 0917   BP: 120/80   Site: Left Upper Arm   Position: Sitting   Cuff Size: Large Adult   Weight: 160 lb (72.6 kg)   Height: 5' 4\" (1.626 m)     Body mass index is 27.46 kg/m². ROS:  As per HPI, otherwise reviewed and negative    PHYSICAL EXAM:     Constitutional:  Well developed, well nourished, no acute distress, non-toxic appearance   Respiratory:  No respiratory distress, normal breath sounds, no rales, no wheezing   Cardiovascular:  Normal rate, normal rhythm, no murmurs. GI: Bowel sounds positive, soft, minimal tenderness in the suprapubic area  Integument: Warm and dry  Neurologic:  Alert & oriented x 3, normal motor function, normal sensory function, no focal deficits noted   Psychiatric:  Speech and behavior appropriate             DATA:  No new data    ASSESSMENT:   1. Diverticulitis of large intestine with perforation without abscess or bleeding           PLAN: We will give another month or so for the inflammation to settle down then plan for interval sigmoid colectomy with possible ostomy. I explained the procedure including risks, benefits, and alternatives. Questions were answered and the patient agrees to proceed. normocytic. hgb 11 range. no known baseline. possible GIB vs chronic dz.  - iron studies  - GIB Tx as above  - TSH wnl  - f/u b12, folate  - maintain active T&S

## 2023-01-09 ENCOUNTER — TELEPHONE (OUTPATIENT)
Dept: SURGERY | Age: 53
End: 2023-01-09

## 2023-01-09 NOTE — PROGRESS NOTES
PAT completed with patient orders in Epic placed by MD, patient aware of [phone] arrival time main entrance NPO the night prior to procedure. Fredy Silva RN

## 2023-01-09 NOTE — TELEPHONE ENCOUNTER
atorvastatin (LIPITOR) 20 MG tablet    [facility] [id number] - [place], [place] - [address] [facility] [phone] [address] [address] [place] [address] [address], [address]   Phone:  [phone]  Fax:  [phone]

## 2023-01-09 NOTE — TELEPHONE ENCOUNTER
Danya from [facility] prior [address] call back    In-patient surgery was denied and must be appealed.  It does not meet criteria and the clinic note states \"Belly Problem\"  Denial letter is available if you would like them to email it you or

## 2023-01-09 NOTE — TELEPHONE ENCOUNTER
I left a very detailed message regarding patients diagnosis on clinic note. Advised to call me back with any questions.

## 2023-01-09 NOTE — TELEPHONE ENCOUNTER
Refill Request       Last Seen: Last Seen Department: 10/28/2022  Last Seen by PCP: 10/28/2022    Last Written: 6/30/22    Next Appointment: due 2/23  Future Appointments   Date Time Provider Simone Marcelo   2/22/2023  9:30 AM MD YOSI Poon             Requested Prescriptions     Pending Prescriptions Disp Refills    atorvastatin (LIPITOR) 20 MG tablet 90 tablet 1     Sig: TAKE ONE TABLET BY MOUTH DAILY

## 2023-01-09 NOTE — PROGRESS NOTES

## 2023-01-10 RX ORDER — ATORVASTATIN CALCIUM 20 MG/1
TABLET, FILM COATED ORAL
Qty: 90 TABLET | Refills: 1 | Status: SHIPPED | OUTPATIENT
Start: 2023-01-10

## 2023-01-11 ENCOUNTER — TELEPHONE (OUTPATIENT)
Dept: SURGERY | Age: 53
End: 2023-01-11

## 2023-01-11 ENCOUNTER — ANESTHESIA EVENT (OUTPATIENT)
Dept: OPERATING ROOM | Age: 53
End: 2023-01-11
Payer: MEDICARE

## 2023-01-11 NOTE — TELEPHONE ENCOUNTER
Please call patient ASAP, he does not know if he should start the bowel prep for tomorrow's planned surgery.    [phone]

## 2023-01-12 ENCOUNTER — ANESTHESIA (OUTPATIENT)
Dept: OPERATING ROOM | Age: 53
End: 2023-01-12
Payer: MEDICARE

## 2023-01-12 ENCOUNTER — HOSPITAL ENCOUNTER (INPATIENT)
Age: 53
LOS: 3 days | Discharge: HOME | DRG: 331 | End: 2023-01-15
Attending: SURGERY | Admitting: SURGERY
Payer: MEDICARE

## 2023-01-12 DIAGNOSIS — K57.20 DIVERTICULITIS OF COLON WITH PERFORATION: ICD-10-CM

## 2023-01-12 LAB
ABO/RH: NORMAL
ANION GAP SERPL CALCULATED.3IONS-SCNC: 9 MMOL/L (ref 3–16)
ANTIBODY SCREEN: NORMAL
BUN BLDV-MCNC: 15 MG/DL (ref 7–20)
CALCIUM SERPL-MCNC: 9.2 MG/DL (ref 8.3–10.6)
CHLORIDE BLD-SCNC: 104 MMOL/L (ref 99–110)
CO2: 26 MMOL/L (ref 21–32)
CREAT SERPL-MCNC: 1 MG/DL (ref 0.9–1.3)
EKG ATRIAL RATE: 81 BPM
EKG DIAGNOSIS: NORMAL
EKG P AXIS: 82 DEGREES
EKG P-R INTERVAL: 174 MS
EKG Q-T INTERVAL: 368 MS
EKG QRS DURATION: 94 MS
EKG QTC CALCULATION (BAZETT): 427 MS
EKG R AXIS: -37 DEGREES
EKG T AXIS: 73 DEGREES
EKG VENTRICULAR RATE: 81 BPM
GFR SERPL CREATININE-BSD FRML MDRD: >60 ML/MIN/{1.73_M2}
GLUCOSE BLD-MCNC: 103 MG/DL (ref 70–99)
GLUCOSE BLD-MCNC: 123 MG/DL (ref 70–99)
HCT VFR BLD CALC: 45.2 % (ref 40.5–52.5)
HEMOGLOBIN: 15.2 G/DL (ref 13.5–17.5)
MCH RBC QN AUTO: 32.3 PG (ref 26–34)
MCHC RBC AUTO-ENTMCNC: 33.8 G/DL (ref 31–36)
MCV RBC AUTO: 95.6 FL (ref 80–100)
PDW BLD-RTO: 13.8 % (ref 12.4–15.4)
PERFORMED ON: ABNORMAL
PLATELET # BLD: 261 K/UL (ref 135–450)
PMV BLD AUTO: 8 FL (ref 5–10.5)
POTASSIUM SERPL-SCNC: 4.2 MMOL/L (ref 3.5–5.1)
RBC # BLD: 4.72 M/UL (ref 4.2–5.9)
SODIUM BLD-SCNC: 139 MMOL/L (ref 136–145)
WBC # BLD: 7.4 K/UL (ref 4–11)

## 2023-01-12 PROCEDURE — 6360000002 HC RX W HCPCS: Performed by: SURGERY

## 2023-01-12 PROCEDURE — C2626 INFUSION PUMP, NON-PROG,TEMP: HCPCS | Performed by: SURGERY

## 2023-01-12 PROCEDURE — 3600000004 HC SURGERY LEVEL 4 BASE: Performed by: SURGERY

## 2023-01-12 PROCEDURE — 2580000003 HC RX 258: Performed by: NURSE ANESTHETIST, CERTIFIED REGISTERED

## 2023-01-12 PROCEDURE — C1892 INTRO/SHEATH,FIXED,PEEL-AWAY: HCPCS | Performed by: SURGERY

## 2023-01-12 PROCEDURE — 2720000010 HC SURG SUPPLY STERILE: Performed by: SURGERY

## 2023-01-12 PROCEDURE — 86900 BLOOD TYPING SEROLOGIC ABO: CPT

## 2023-01-12 PROCEDURE — 6370000000 HC RX 637 (ALT 250 FOR IP): Performed by: SURGERY

## 2023-01-12 PROCEDURE — 6360000002 HC RX W HCPCS: Performed by: NURSE ANESTHETIST, CERTIFIED REGISTERED

## 2023-01-12 PROCEDURE — 3700000000 HC ANESTHESIA ATTENDED CARE: Performed by: SURGERY

## 2023-01-12 PROCEDURE — 36415 COLL VENOUS BLD VENIPUNCTURE: CPT

## 2023-01-12 PROCEDURE — 44140 PARTIAL REMOVAL OF COLON: CPT | Performed by: SURGERY

## 2023-01-12 PROCEDURE — 2500000003 HC RX 250 WO HCPCS: Performed by: ANESTHESIOLOGY

## 2023-01-12 PROCEDURE — 86901 BLOOD TYPING SEROLOGIC RH(D): CPT

## 2023-01-12 PROCEDURE — 93010 ELECTROCARDIOGRAM REPORT: CPT | Performed by: INTERNAL MEDICINE

## 2023-01-12 PROCEDURE — 88307 TISSUE EXAM BY PATHOLOGIST: CPT

## 2023-01-12 PROCEDURE — 94761 N-INVAS EAR/PLS OXIMETRY MLT: CPT

## 2023-01-12 PROCEDURE — 7100000000 HC PACU RECOVERY - FIRST 15 MIN: Performed by: SURGERY

## 2023-01-12 PROCEDURE — 93005 ELECTROCARDIOGRAM TRACING: CPT | Performed by: ANESTHESIOLOGY

## 2023-01-12 PROCEDURE — 6360000002 HC RX W HCPCS

## 2023-01-12 PROCEDURE — 2580000003 HC RX 258: Performed by: SURGERY

## 2023-01-12 PROCEDURE — 7100000001 HC PACU RECOVERY - ADDTL 15 MIN: Performed by: SURGERY

## 2023-01-12 PROCEDURE — 2500000003 HC RX 250 WO HCPCS: Performed by: SURGERY

## 2023-01-12 PROCEDURE — 86850 RBC ANTIBODY SCREEN: CPT

## 2023-01-12 PROCEDURE — 2700000000 HC OXYGEN THERAPY PER DAY

## 2023-01-12 PROCEDURE — 2709999900 HC NON-CHARGEABLE SUPPLY: Performed by: SURGERY

## 2023-01-12 PROCEDURE — 1200000000 HC SEMI PRIVATE

## 2023-01-12 PROCEDURE — 2500000003 HC RX 250 WO HCPCS: Performed by: NURSE ANESTHETIST, CERTIFIED REGISTERED

## 2023-01-12 PROCEDURE — 3600000014 HC SURGERY LEVEL 4 ADDTL 15MIN: Performed by: SURGERY

## 2023-01-12 PROCEDURE — 94640 AIRWAY INHALATION TREATMENT: CPT

## 2023-01-12 PROCEDURE — 85027 COMPLETE CBC AUTOMATED: CPT

## 2023-01-12 PROCEDURE — 6370000000 HC RX 637 (ALT 250 FOR IP)

## 2023-01-12 PROCEDURE — 94150 VITAL CAPACITY TEST: CPT

## 2023-01-12 PROCEDURE — 80048 BASIC METABOLIC PNL TOTAL CA: CPT

## 2023-01-12 PROCEDURE — 6360000002 HC RX W HCPCS: Performed by: ANESTHESIOLOGY

## 2023-01-12 PROCEDURE — 3700000001 HC ADD 15 MINUTES (ANESTHESIA): Performed by: SURGERY

## 2023-01-12 RX ORDER — METRONIDAZOLE 500 MG/100ML
500 INJECTION, SOLUTION INTRAVENOUS EVERY 8 HOURS
Status: DISCONTINUED | OUTPATIENT
Start: 2023-01-12 | End: 2023-01-12 | Stop reason: HOSPADM

## 2023-01-12 RX ORDER — ALBUTEROL SULFATE 90 UG/1
2 AEROSOL, METERED RESPIRATORY (INHALATION) 4 TIMES DAILY PRN
Status: DISCONTINUED | OUTPATIENT
Start: 2023-01-12 | End: 2023-01-15 | Stop reason: HOSPADM

## 2023-01-12 RX ORDER — FENTANYL CITRATE 50 UG/ML
INJECTION, SOLUTION INTRAMUSCULAR; INTRAVENOUS PRN
Status: DISCONTINUED | OUTPATIENT
Start: 2023-01-12 | End: 2023-01-12 | Stop reason: SDUPTHER

## 2023-01-12 RX ORDER — SODIUM CHLORIDE 9 MG/ML
25 INJECTION, SOLUTION INTRAVENOUS PRN
Status: DISCONTINUED | OUTPATIENT
Start: 2023-01-12 | End: 2023-01-12 | Stop reason: HOSPADM

## 2023-01-12 RX ORDER — KETOROLAC TROMETHAMINE 30 MG/ML
30 INJECTION, SOLUTION INTRAMUSCULAR; INTRAVENOUS ONCE
Status: DISCONTINUED | OUTPATIENT
Start: 2023-01-12 | End: 2023-01-12 | Stop reason: HOSPADM

## 2023-01-12 RX ORDER — SODIUM CHLORIDE, SODIUM LACTATE, POTASSIUM CHLORIDE, CALCIUM CHLORIDE 600; 310; 30; 20 MG/100ML; MG/100ML; MG/100ML; MG/100ML
INJECTION, SOLUTION INTRAVENOUS CONTINUOUS
Status: DISCONTINUED | OUTPATIENT
Start: 2023-01-12 | End: 2023-01-12 | Stop reason: HOSPADM

## 2023-01-12 RX ORDER — MIDAZOLAM HYDROCHLORIDE 1 MG/ML
1 INJECTION INTRAMUSCULAR; INTRAVENOUS EVERY 5 MIN PRN
Status: DISCONTINUED | OUTPATIENT
Start: 2023-01-12 | End: 2023-01-12 | Stop reason: HOSPADM

## 2023-01-12 RX ORDER — SODIUM CHLORIDE 9 MG/ML
INJECTION, SOLUTION INTRAVENOUS PRN
Status: DISCONTINUED | OUTPATIENT
Start: 2023-01-12 | End: 2023-01-12 | Stop reason: HOSPADM

## 2023-01-12 RX ORDER — SODIUM CHLORIDE 0.9 % (FLUSH) 0.9 %
5-40 SYRINGE (ML) INJECTION PRN
Status: DISCONTINUED | OUTPATIENT
Start: 2023-01-12 | End: 2023-01-12 | Stop reason: HOSPADM

## 2023-01-12 RX ORDER — SODIUM CHLORIDE 0.9 % (FLUSH) 0.9 %
5-40 SYRINGE (ML) INJECTION EVERY 12 HOURS SCHEDULED
Status: DISCONTINUED | OUTPATIENT
Start: 2023-01-12 | End: 2023-01-12 | Stop reason: HOSPADM

## 2023-01-12 RX ORDER — ONDANSETRON 2 MG/ML
4 INJECTION INTRAMUSCULAR; INTRAVENOUS EVERY 4 HOURS PRN
Status: DISCONTINUED | OUTPATIENT
Start: 2023-01-12 | End: 2023-01-15 | Stop reason: HOSPADM

## 2023-01-12 RX ORDER — HEPARIN SODIUM 5000 [USP'U]/ML
INJECTION, SOLUTION INTRAVENOUS; SUBCUTANEOUS
Status: COMPLETED
Start: 2023-01-12 | End: 2023-01-12

## 2023-01-12 RX ORDER — BUDESONIDE AND FORMOTEROL FUMARATE DIHYDRATE 160; 4.5 UG/1; UG/1
2 AEROSOL RESPIRATORY (INHALATION) 2 TIMES DAILY
Status: DISCONTINUED | OUTPATIENT
Start: 2023-01-12 | End: 2023-01-15 | Stop reason: HOSPADM

## 2023-01-12 RX ORDER — FAMOTIDINE 10 MG/ML
20 INJECTION, SOLUTION INTRAVENOUS ONCE
Status: COMPLETED | OUTPATIENT
Start: 2023-01-12 | End: 2023-01-12

## 2023-01-12 RX ORDER — HEPARIN SODIUM 5000 [USP'U]/ML
5000 INJECTION, SOLUTION INTRAVENOUS; SUBCUTANEOUS EVERY 8 HOURS SCHEDULED
Status: DISCONTINUED | OUTPATIENT
Start: 2023-01-12 | End: 2023-01-12 | Stop reason: HOSPADM

## 2023-01-12 RX ORDER — KETOROLAC TROMETHAMINE 30 MG/ML
INJECTION, SOLUTION INTRAMUSCULAR; INTRAVENOUS
Status: COMPLETED
Start: 2023-01-12 | End: 2023-01-12

## 2023-01-12 RX ORDER — BUDESONIDE AND FORMOTEROL FUMARATE DIHYDRATE 160; 4.5 UG/1; UG/1
2 AEROSOL RESPIRATORY (INHALATION) 2 TIMES DAILY
Status: DISCONTINUED | OUTPATIENT
Start: 2023-01-12 | End: 2023-01-12

## 2023-01-12 RX ORDER — KETOROLAC TROMETHAMINE 30 MG/ML
30 INJECTION, SOLUTION INTRAMUSCULAR; INTRAVENOUS EVERY 6 HOURS
Status: DISCONTINUED | OUTPATIENT
Start: 2023-01-12 | End: 2023-01-15 | Stop reason: HOSPADM

## 2023-01-12 RX ORDER — ACETAMINOPHEN 325 MG/1
650 TABLET ORAL EVERY 4 HOURS PRN
Status: DISCONTINUED | OUTPATIENT
Start: 2023-01-12 | End: 2023-01-15 | Stop reason: HOSPADM

## 2023-01-12 RX ORDER — ONDANSETRON 2 MG/ML
INJECTION INTRAMUSCULAR; INTRAVENOUS PRN
Status: DISCONTINUED | OUTPATIENT
Start: 2023-01-12 | End: 2023-01-12 | Stop reason: SDUPTHER

## 2023-01-12 RX ORDER — DIPHENHYDRAMINE HYDROCHLORIDE 50 MG/ML
25 INJECTION INTRAMUSCULAR; INTRAVENOUS EVERY 6 HOURS PRN
Status: DISCONTINUED | OUTPATIENT
Start: 2023-01-12 | End: 2023-01-15 | Stop reason: HOSPADM

## 2023-01-12 RX ORDER — ROCURONIUM BROMIDE 10 MG/ML
INJECTION, SOLUTION INTRAVENOUS PRN
Status: DISCONTINUED | OUTPATIENT
Start: 2023-01-12 | End: 2023-01-12 | Stop reason: SDUPTHER

## 2023-01-12 RX ORDER — MEPERIDINE HYDROCHLORIDE 25 MG/ML
12.5 INJECTION INTRAMUSCULAR; INTRAVENOUS; SUBCUTANEOUS EVERY 5 MIN PRN
Status: DISCONTINUED | OUTPATIENT
Start: 2023-01-12 | End: 2023-01-12 | Stop reason: HOSPADM

## 2023-01-12 RX ORDER — AMLODIPINE BESYLATE 5 MG/1
5 TABLET ORAL DAILY
Status: DISCONTINUED | OUTPATIENT
Start: 2023-01-13 | End: 2023-01-15 | Stop reason: HOSPADM

## 2023-01-12 RX ORDER — ONDANSETRON 2 MG/ML
4 INJECTION INTRAMUSCULAR; INTRAVENOUS EVERY 10 MIN PRN
Status: DISCONTINUED | OUTPATIENT
Start: 2023-01-12 | End: 2023-01-12 | Stop reason: HOSPADM

## 2023-01-12 RX ORDER — LIDOCAINE HYDROCHLORIDE 20 MG/ML
INJECTION, SOLUTION INFILTRATION; PERINEURAL PRN
Status: DISCONTINUED | OUTPATIENT
Start: 2023-01-12 | End: 2023-01-12 | Stop reason: SDUPTHER

## 2023-01-12 RX ORDER — SODIUM CHLORIDE, SODIUM LACTATE, POTASSIUM CHLORIDE, CALCIUM CHLORIDE 600; 310; 30; 20 MG/100ML; MG/100ML; MG/100ML; MG/100ML
INJECTION, SOLUTION INTRAVENOUS CONTINUOUS PRN
Status: DISCONTINUED | OUTPATIENT
Start: 2023-01-12 | End: 2023-01-12 | Stop reason: SDUPTHER

## 2023-01-12 RX ORDER — DIPHENHYDRAMINE HYDROCHLORIDE 50 MG/ML
12.5 INJECTION INTRAMUSCULAR; INTRAVENOUS
Status: DISCONTINUED | OUTPATIENT
Start: 2023-01-12 | End: 2023-01-12 | Stop reason: HOSPADM

## 2023-01-12 RX ORDER — HYDRALAZINE HYDROCHLORIDE 20 MG/ML
5 INJECTION INTRAMUSCULAR; INTRAVENOUS
Status: DISCONTINUED | OUTPATIENT
Start: 2023-01-12 | End: 2023-01-12 | Stop reason: HOSPADM

## 2023-01-12 RX ORDER — PROPOFOL 10 MG/ML
INJECTION, EMULSION INTRAVENOUS PRN
Status: DISCONTINUED | OUTPATIENT
Start: 2023-01-12 | End: 2023-01-12 | Stop reason: SDUPTHER

## 2023-01-12 RX ORDER — SODIUM CHLORIDE 9 MG/ML
INJECTION, SOLUTION INTRAVENOUS CONTINUOUS
Status: DISCONTINUED | OUTPATIENT
Start: 2023-01-12 | End: 2023-01-14

## 2023-01-12 RX ADMIN — HYDROMORPHONE HYDROCHLORIDE 0.5 MG: 1 INJECTION, SOLUTION INTRAMUSCULAR; INTRAVENOUS; SUBCUTANEOUS at 14:14

## 2023-01-12 RX ADMIN — PROPOFOL 200 MG: 10 INJECTION, EMULSION INTRAVENOUS at 12:48

## 2023-01-12 RX ADMIN — PHENYLEPHRINE HYDROCHLORIDE 200 MCG: 10 INJECTION INTRAVENOUS at 13:01

## 2023-01-12 RX ADMIN — FENTANYL CITRATE 50 MCG: 50 INJECTION INTRAMUSCULAR; INTRAVENOUS at 13:29

## 2023-01-12 RX ADMIN — FAMOTIDINE 20 MG: 10 INJECTION, SOLUTION INTRAVENOUS at 11:34

## 2023-01-12 RX ADMIN — SODIUM CHLORIDE: 9 INJECTION, SOLUTION INTRAVENOUS at 15:30

## 2023-01-12 RX ADMIN — BUPIVACAINE HYDROCHLORIDE 270 ML: 5 INJECTION, SOLUTION EPIDURAL; INTRACAUDAL; PERINEURAL at 13:59

## 2023-01-12 RX ADMIN — SODIUM CHLORIDE, POTASSIUM CHLORIDE, SODIUM LACTATE AND CALCIUM CHLORIDE: 600; 310; 30; 20 INJECTION, SOLUTION INTRAVENOUS at 12:45

## 2023-01-12 RX ADMIN — LIDOCAINE HYDROCHLORIDE 80 MG: 20 INJECTION, SOLUTION INFILTRATION; PERINEURAL at 12:48

## 2023-01-12 RX ADMIN — HYDROMORPHONE HYDROCHLORIDE 1 MG: 1 INJECTION, SOLUTION INTRAMUSCULAR; INTRAVENOUS; SUBCUTANEOUS at 18:43

## 2023-01-12 RX ADMIN — HYDROMORPHONE HYDROCHLORIDE 0.5 MG: 1 INJECTION, SOLUTION INTRAMUSCULAR; INTRAVENOUS; SUBCUTANEOUS at 14:03

## 2023-01-12 RX ADMIN — Medication 2 PUFF: at 19:53

## 2023-01-12 RX ADMIN — ROCURONIUM BROMIDE 50 MG: 10 INJECTION, SOLUTION INTRAVENOUS at 12:48

## 2023-01-12 RX ADMIN — HYDROMORPHONE HYDROCHLORIDE 1 MG: 1 INJECTION, SOLUTION INTRAMUSCULAR; INTRAVENOUS; SUBCUTANEOUS at 15:27

## 2023-01-12 RX ADMIN — KETOROLAC TROMETHAMINE 30 MG: 30 INJECTION, SOLUTION INTRAMUSCULAR; INTRAVENOUS at 14:11

## 2023-01-12 RX ADMIN — HEPARIN SODIUM 5000 UNITS: 5000 INJECTION INTRAVENOUS; SUBCUTANEOUS at 11:40

## 2023-01-12 RX ADMIN — ONDANSETRON HYDROCHLORIDE 4 MG: 2 INJECTION, SOLUTION INTRAMUSCULAR; INTRAVENOUS at 12:48

## 2023-01-12 RX ADMIN — FENTANYL CITRATE 50 MCG: 50 INJECTION INTRAMUSCULAR; INTRAVENOUS at 12:48

## 2023-01-12 RX ADMIN — SUGAMMADEX 200 MG: 100 INJECTION, SOLUTION INTRAVENOUS at 13:46

## 2023-01-12 RX ADMIN — NALOXEGOL OXALATE 25 MG: 12.5 TABLET, FILM COATED ORAL at 11:41

## 2023-01-12 RX ADMIN — CEFAZOLIN 2000 MG: 2 INJECTION, POWDER, FOR SOLUTION INTRAMUSCULAR; INTRAVENOUS at 11:40

## 2023-01-12 RX ADMIN — HYDROMORPHONE HYDROCHLORIDE 0.5 MG: 1 INJECTION, SOLUTION INTRAMUSCULAR; INTRAVENOUS; SUBCUTANEOUS at 14:21

## 2023-01-12 RX ADMIN — HYDROMORPHONE HYDROCHLORIDE 0.5 MG: 1 INJECTION, SOLUTION INTRAMUSCULAR; INTRAVENOUS; SUBCUTANEOUS at 14:34

## 2023-01-12 RX ADMIN — KETOROLAC TROMETHAMINE 30 MG: 30 INJECTION, SOLUTION INTRAMUSCULAR; INTRAVENOUS at 19:59

## 2023-01-12 ASSESSMENT — PAIN DESCRIPTION - LOCATION
LOCATION: ABDOMEN

## 2023-01-12 ASSESSMENT — PAIN DESCRIPTION - PAIN TYPE
TYPE: SURGICAL PAIN

## 2023-01-12 ASSESSMENT — PAIN SCALES - GENERAL
PAINLEVEL_OUTOF10: 10
PAINLEVEL_OUTOF10: 5
PAINLEVEL_OUTOF10: 7
PAINLEVEL_OUTOF10: 8
PAINLEVEL_OUTOF10: 10
PAINLEVEL_OUTOF10: 5
PAINLEVEL_OUTOF10: 7
PAINLEVEL_OUTOF10: 10
PAINLEVEL_OUTOF10: 8

## 2023-01-12 ASSESSMENT — PAIN - FUNCTIONAL ASSESSMENT
PAIN_FUNCTIONAL_ASSESSMENT: ACTIVITIES ARE NOT PREVENTED
PAIN_FUNCTIONAL_ASSESSMENT: 0-10
PAIN_FUNCTIONAL_ASSESSMENT: ACTIVITIES ARE NOT PREVENTED

## 2023-01-12 ASSESSMENT — PAIN SCALES - WONG BAKER
WONGBAKER_NUMERICALRESPONSE: 2
WONGBAKER_NUMERICALRESPONSE: 2

## 2023-01-12 ASSESSMENT — PAIN DESCRIPTION - ORIENTATION
ORIENTATION: MID
ORIENTATION: MID

## 2023-01-12 ASSESSMENT — PAIN DESCRIPTION - DESCRIPTORS
DESCRIPTORS: SORE
DESCRIPTORS: SORE

## 2023-01-12 ASSESSMENT — LIFESTYLE VARIABLES: SMOKING_STATUS: 1

## 2023-01-12 ASSESSMENT — COPD QUESTIONNAIRES: CAT_SEVERITY: MODERATE

## 2023-01-12 NOTE — FLOWSHEET NOTE
01/12/23 1515   Vital Signs   Temp 97 °F (36.1 °C)   Temp Source Oral   Heart Rate 75   Heart Rate Source Monitor   Resp 18   /75   MAP (Calculated) 95   BP Location Right upper arm   Patient Position Semi fowlers   Level of Consciousness 0   MEWS Score 1   Pain Assessment   Pain Assessment 0-10   Pain Level 8   Pain Location Abdomen   Pain Type Surgical pain   Oxygen Therapy   SpO2 96 %   O2 Device Nasal cannula   O2 Flow Rate (L/min) 2 L/min       Patient arrived rating pain 8/10 Prn pain medication given. Family at bedside. Admission questions started. Pillow given to patientand educated on holding firmly against abdomen when coughing,sneezing, or moving.  IS ordered

## 2023-01-12 NOTE — PROGRESS NOTES
RT Inhaler-Nebulizer Bronchodilator Protocol Note    There is a bronchodilator order in the chart from a provider indicating to follow the RT Bronchodilator Protocol and there is an Initiate RT Inhaler-Nebulizer Bronchodilator Protocol order as well (see protocol at bottom of note). CXR Findings:  No results found. The findings from the last RT Protocol Assessment were as follows:   History Pulmonary Disease: (P) Chronic pulmonary disease  Respiratory Pattern: (P) Regular pattern and RR 12-20 bpm  Breath Sounds: (P) Clear breath sounds  Cough: (P) Strong, spontaneous, non-productive  Indication for Bronchodilator Therapy: (P) None  Bronchodilator Assessment Score: (P) 2    Aerosolized bronchodilator medication orders have been revised according to the RT Inhaler-Nebulizer Bronchodilator Protocol below. Respiratory Therapist to perform RT Therapy Protocol Assessment initially then follow the protocol. Repeat RT Therapy Protocol Assessment PRN for score 0-3 or on second treatment, BID, and PRN for scores above 3. No Indications - adjust the frequency to every 6 hours PRN wheezing or bronchospasm, if no treatments needed after 48 hours then discontinue using Per Protocol order mode. If indication present, adjust the RT bronchodilator orders based on the Bronchodilator Assessment Score as indicated below. Use Inhaler orders unless patient has one or more of the following: on home nebulizer, not able to hold breath for 10 seconds, is not alert and oriented, cannot activate and use MDI correctly, or respiratory rate 25 breaths per minute or more, then use the equivalent nebulizer order(s) with same Frequency and PRN reasons based on the score. If a patient is on this medication at home then do not decrease Frequency below that used at home.     0-3 - enter or revise RT bronchodilator order(s) to equivalent RT Bronchodilator order with Frequency of every 4 hours PRN for wheezing or increased work of breathing using Per Protocol order mode. 4-6 - enter or revise RT Bronchodilator order(s) to two equivalent RT bronchodilator orders with one order with BID Frequency and one order with Frequency of every 4 hours PRN wheezing or increased work of breathing using Per Protocol order mode. 7-10 - enter or revise RT Bronchodilator order(s) to two equivalent RT bronchodilator orders with one order with TID Frequency and one order with Frequency of every 4 hours PRN wheezing or increased work of breathing using Per Protocol order mode. 11-13 - enter or revise RT Bronchodilator order(s) to one equivalent RT bronchodilator order with QID Frequency and an Albuterol order with Frequency of every 4 hours PRN wheezing or increased work of breathing using Per Protocol order mode. Greater than 13 - enter or revise RT Bronchodilator order(s) to one equivalent RT bronchodilator order with every 4 hours Frequency and an Albuterol order with Frequency of every 2 hours PRN wheezing or increased work of breathing using Per Protocol order mode.          Electronically signed by Syeda Paris RCP on 1/12/2023 at 3:51 PM

## 2023-01-12 NOTE — PROGRESS NOTES
Pt arrived from OR . Report from [address] [facility] and CRNA. Pain ball in place all clamps unclamped. Vital signs stable will monitor.

## 2023-01-12 NOTE — PROGRESS NOTES
Patient is able to demonstrate the ability to move from a reclining position to an upright position within the recliner. 4 Eyes Skin Assessment     The patient is being assess for   Admission    I agree that 2 RN's have performed a thorough Head to Toe Skin Assessment on the patient. ALL assessment sites listed below have been assessed. Areas assessed for pressure by both nurses:   [x]   Head, Face, and Ears   [x]   Shoulders, Back, and Chest, Abdomen  [x]   Arms, Elbows, and Hands   [x]   Coccyx, Sacrum, and Ischium  [x]   Legs, Feet, and Heels        Skin Assessed Under all Medical Devices by both nurses:  O2 device tubing, cabrera, and pain ball               Midline incision with bandage and pain ball minimal drainage noted. Cabrera intact and draining well. All Mepilex Borders were peeled back and area peeked at by both nurses:  No: NA  Please list where Mepilex Borders are located:  NA             **SHARE this note so that the co-signing nurse is able to place an eSignature**    Co-signer eSignature: Electronically signed by Vernell Richmond RN on 1/12/23 at 6:45 PM EST    Does the Patient have Skin Breakdown related to pressure?    NA               Eric Prevention initiated:  NA   Wound Care Orders initiated:  NA      [facility] nurse consulted for Pressure Injury (Stage 3,4, Unstageable, DTI, NWPT, Complex wounds)and New or Established Ostomies:  NA      Primary Nurse eSignature: Electronically signed by Linda Sutton RN on 3/89/07 at 6:06 PM EST

## 2023-01-12 NOTE — ANESTHESIA PRE PROCEDURE
Department of Anesthesiology  Preprocedure Note       Name:  Venu Mejía   Age:  53 y.o.  :  1970                                          MRN:  [id number]         Date:  2023      Surgeon: Surgeon(s):  Tigre Rojas MD    Procedure: Procedure(s):  SIGMOID COLECTOMY WITH POSSIBLE OSTOMY    Medications prior to admission:   Prior to Admission medications    Medication Sig Start Date End Date Taking? Authorizing Provider   atorvastatin (LIPITOR) 20 MG tablet TAKE ONE TABLET BY MOUTH DAILY 1/10/23   Lefty Hogue DO   amLODIPine (NORVASC) 5 MG tablet Take 1 tablet by mouth daily 22   Lefty Hogue DO   albuterol sulfate HFA (VENTOLIN HFA) 108 (90 Base) MCG/ACT inhaler Inhale 2 puffs into the lungs 4 times daily as needed for Wheezing 22   Lefty Hogue DO   TRELEGY ELLIPTA 100-62.5-25 MCG/INH AEPB INHALE ONE PUFF BY MOUTH DAILY 22   Dorita Orozco PA-C   folic acid (FOLVITE) 1 MG tablet TAKE ONE TABLET BY MOUTH DAILY 21   Sarina Gomez DO       Current medications:    Current Facility-Administered Medications   Medication Dose Route Frequency Provider Last Rate Last Admin   • famotidine (PEPCID) injection 20 mg  20 mg IntraVENous Once Jose Estrada MD       • lactated ringers infusion   IntraVENous Continuous Jose Estrada MD       • sodium chloride flush 0.9 % injection 5-40 mL  5-40 mL IntraVENous 2 times per day Jose Estrada MD       • sodium chloride flush 0.9 % injection 5-40 mL  5-40 mL IntraVENous PRN Jose Estrada MD       • 0.9 % sodium chloride infusion   IntraVENous PRN Jose Estrada MD       • sodium chloride flush 0.9 % injection 5-40 mL  5-40 mL IntraVENous 2 times per day Tigre Rojas MD       • sodium chloride flush 0.9 % injection 5-40 mL  5-40 mL IntraVENous PRN Tigre Rojas MD       • 0.9 % sodium chloride infusion   IntraVENous PRN Tigre Rojas MD       ceFAZolin (ANCEF) 2,000 mg in sodium chloride 0.9 % 50 mL IVPB (mini-bag)  2,000 mg IntraVENous Q8H Jaylen Bingham MD        metronidazole (FLAGYL) 500 mg in 0.9% NaCl 100 mL IVPB premix  500 mg IntraVENous Q8H Jaylen Bingham MD        heparin (porcine) injection 5,000 Units  5,000 Units SubCUTAneous 3 times per day Jaylen Bingham MD        naloxegol (MOVANTIK) tablet 25 mg  25 mg Oral QAM Jaylen Bingham MD        bupivacaine 0.5% (MARCAINE) elastomeric infusion 270 mL  270 mL Infiltration Continuous Jaylen Bingham MD           Allergies:     Allergies   Allergen Reactions    No Known Allergies        Problem List:    Patient Active Problem List   Diagnosis Code    Benign essential HTN I10    Gastroesophageal reflux disease without esophagitis K21.9    COPD (chronic obstructive pulmonary disease) ([facility]) J44.9    Prediabetes R73.03    Hyperlipidemia, mixed E78.2    Esophageal dysphagia R13.19    DDD (degenerative disc disease), cervical M50.30    COPD exacerbation ([address] J44.1    Acute respiratory failure with hypoxia ([address] J96.01    Marijuana use, episodic F12.90    Acute diverticulitis of intestine K57.92    Diverticulitis of colon with perforation K57.20    Leukocytosis D72.829    Cyst of right kidney N28.1    Diverticulitis of large intestine with perforation without abscess or bleeding K57.20    Diverticulitis K57.92    Left lower quadrant abdominal pain R10.32       Past Medical History:        Diagnosis Date    Cervical disc herniation     COPD (chronic obstructive pulmonary disease) ([facility])     Diverticulitis     Folate deficiency 01/2013    GERD (gastroesophageal reflux disease)     Hyperlipidemia 02/2014    Hypertriglyceridemia, Good HDL    Hypertension 12/12: age 37    Prediabetes 11/2016    Vitamin D deficiency 01/2013    resolved       Past Surgical History:        Procedure Laterality Date    ANKLE FRACTURE SURGERY Left 1998    BRONCHOSCOPY 2015    CERVICAL FUSION N/A 2019    ANTERIOR CERVICAL DISCECTOMY AND FUSION WITH ALLOGRAFT, MEDTRONICS AND EVOKES  CPT CODE - 85679 performed by Dustin Colon MD at [address] [place] [address]  2013    ENDOSCOPY, COLON, DIAGNOSTIC      AK NJX DX/THER SBST INTRLMNR CRV/THRC W/IMG GDN Right 2018    RIGHT CERVICAL SEVEN THORACIC EPIDURAL STEROID INJECTION SITE CONFIRMED BY FLUOROSCOPY performed by Letha Hargrove MD at [place] [address]       Social History:    Social History     Tobacco Use    Smoking status: Former     Packs/day: 2.00     Years: 35.00     Pack years: 70.00     Types: Cigarettes     Start date:      Quit date: 2019     Years since quittin.0    Smokeless tobacco: Never   Substance Use Topics    Alcohol use: Not Currently     Alcohol/week: 1.0 standard drink     Types: 1 Cans of beer per week     Comment: occasional                                Counseling given: Not Answered      Vital Signs (Current):   Vitals:    23 1134 23 1116   BP:  131/75   Pulse:  81   Resp:  18   Temp:  98.2 °F (36.8 °C)   TempSrc:  Temporal   SpO2:  94%   Weight: 160 lb (72.6 kg)    Height: 5' 4\" (1.626 m)                                               BP Readings from Last 3 Encounters:   23 131/75   22 120/80   22 130/74       NPO Status:                                                                                 BMI:   Wt Readings from Last 3 Encounters:   23 160 lb (72.6 kg)   22 160 lb (72.6 kg)   22 160 lb (72.6 kg)     Body mass index is 27.46 kg/m².     CBC:   Lab Results   Component Value Date/Time    WBC 11.0 10/22/2022 05:27 AM    RBC 4.13 10/22/2022 05:27 AM    HGB 12.8 10/22/2022 05:27 AM    HCT 38.9 10/22/2022 05:27 AM    MCV 94.2 10/22/2022 05:27 AM    RDW 13.9 10/22/2022 05:27 AM     10/22/2022 05:27 AM       CMP:   Lab Results   Component Value Date/Time     10/22/2022 05:27 AM    K 4.2 10/22/2022 05:27 AM     10/22/2022 05:27 AM    CO2 31 10/22/2022 05:27 AM    BUN 11 10/22/2022 05:27 AM    CREATININE 1.0 10/22/2022 05:27 AM    GFRAA >60 09/06/2022 11:12 AM    GFRAA >60 01/28/2013 04:28 PM    AGRATIO 1.5 10/21/2022 11:46 AM    LABGLOM >60 10/22/2022 05:27 AM    GLUCOSE 92 10/22/2022 05:27 AM    PROT 7.3 10/21/2022 11:46 AM    PROT 7.0 01/28/2013 04:28 PM    CALCIUM 8.6 10/22/2022 05:27 AM    BILITOT 0.3 10/21/2022 11:46 AM    ALKPHOS 100 10/21/2022 11:46 AM    AST 19 10/21/2022 11:46 AM    ALT 20 10/21/2022 11:46 AM       POC Tests: No results for input(s): POCGLU, POCNA, POCK, POCCL, POCBUN, POCHEMO, POCHCT in the last 72 hours. Coags:   Lab Results   Component Value Date/Time    PROTIME 10.8 08/15/2016 07:23 AM    INR 0.95 08/15/2016 07:23 AM       HCG (If Applicable): No results found for: PREGTESTUR, PREGSERUM, HCG, HCGQUANT     ABGs: No results found for: PHART, PO2ART, AWU9JLH, NSC5FXO, BEART, X4ZOZCEU     Type & Screen (If Applicable):  No results found for: LABABO, LABRH    Drug/Infectious Status (If Applicable):  No results found for: HIV, HEPCAB    COVID-19 Screening (If Applicable):   Lab Results   Component Value Date/Time    COVID19 Not Detected 10/21/2022 02:19 PM    COVID19 NOT DETECTED 08/18/2022 01:17 PM           Anesthesia Evaluation  Patient summary reviewed and Nursing notes reviewed no history of anesthetic complications:   Airway: Mallampati: II  TM distance: >3 FB   Neck ROM: full  Mouth opening: > = 3 FB   Dental:          Pulmonary:   (+) COPD: moderate,  current smoker                           Cardiovascular:    (+) hypertension:,                   Neuro/Psych:   Negative Neuro/Psych ROS              GI/Hepatic/Renal: Neg GI/Hepatic/Renal ROS  (+) GERD:,      (-) liver disease and no renal disease       Endo/Other: Negative Endo/Other ROS       (-) diabetes mellitus               Abdominal:             Vascular: negative vascular ROS.          Other Findings: Anesthesia Plan      general     ASA 3     (I discussed with the patient the risks and benefits of PIV, general anesthesia, IV Narcotics, PACU. All questions were answered the patient agrees with the plan)  Induction: intravenous. MIPS: Prophylactic antiemetics administered. Anesthetic plan and risks discussed with patient. Plan discussed with CRNA.                     Osiel Adorno MD   1/12/2023

## 2023-01-12 NOTE — H&P
I have reviewed the progress note serving as history and physical dated December/20/2022 and examined the patient and find no relevant changes. I have reviewed with the patient and/or family the risks, benefits, and alternatives to the procedure.

## 2023-01-12 NOTE — PROGRESS NOTES
Pt transferred to room 216 in stable condition. Family notified and will meet pt in his room.  Report to [address] [place] [address] Drive.

## 2023-01-12 NOTE — PROGRESS NOTES
Admission questions complete, home medications have been verified, and a head-to-toe assessment has been completed. Patient has been orientated to the unit and the room. Call light is in reach and has been explained. No further needs noted at this time. Will continue to monitor.

## 2023-01-12 NOTE — BRIEF OP NOTE
Brief Postoperative Note      Patient: Jasvir Cerda  YOB: 1970  MRN: [id number]    Date of Procedure: 1/12/2023    Pre-Op Diagnosis: Diverticulitis of colon with perforation [K57.20]    Post-Op Diagnosis: Same       Procedure(s):  SIGMOID COLECTOMY       Surgeon(s):  Brent Santos MD    Assistant:  Surgical Assistant: Marcial Diego    Anesthesia: General    Estimated Blood Loss (mL): less than 414     Complications: None    Specimens:   ID Type Source Tests Collected by Time Destination   A : sigmoid colon Tissue Tissue SURGICAL PATHOLOGY Brent Santos MD 1/12/2023 1341        Implants:  * No implants in log *      Drains:   Urinary Catheter 01/12/23 Edwards (Active)       Findings: as above    Electronically signed by Wendy Alejandre MD on 1/12/2023 at 2:01 PM

## 2023-01-12 NOTE — ANESTHESIA POSTPROCEDURE EVALUATION
Department of Anesthesiology  Postprocedure Note    Patient: Mora Santamaria  MRN: [id number]  YOB: 1970  Date of evaluation: 1/12/2023      Procedure Summary     Date: 01/12/23 Room / [facility]: [facility]    Anesthesia Start: 6114 Anesthesia Stop: 7251    Procedure: SIGMOID COLECTOMY WITH POSSIBLE OSTOMY (Abdomen) Diagnosis:       Diverticulitis of colon with perforation      (Diverticulitis of colon with perforation [K57.20])    Surgeons: Nydia Gil MD Responsible Provider: Kurt Emanuel MD    Anesthesia Type: general ASA Status: 3          Anesthesia Type: No value filed.     Jorge Luis Phase I: Jorge Luis Score: 10    Jorge Luis Phase II:        Anesthesia Post Evaluation    Patient location during evaluation: PACU  Level of consciousness: awake  Airway patency: patent  Nausea & Vomiting: no nausea  Complications: no  Cardiovascular status: blood pressure returned to baseline  Respiratory status: acceptable  Hydration status: euvolemic

## 2023-01-12 NOTE — PROGRESS NOTES
Pt given 4 doses of Dilaudid and Toradol. Pt states the pain has lessened to a 7/10. Pain ball in place and unclamped. Vital signs stable.

## 2023-01-13 LAB
ANION GAP SERPL CALCULATED.3IONS-SCNC: 9 MMOL/L (ref 3–16)
BANDED NEUTROPHILS RELATIVE PERCENT: 1 % (ref 0–7)
BASOPHILS ABSOLUTE: 0 K/UL (ref 0–0.2)
BASOPHILS RELATIVE PERCENT: 0 %
BUN BLDV-MCNC: 13 MG/DL (ref 7–20)
CALCIUM SERPL-MCNC: 7.6 MG/DL (ref 8.3–10.6)
CHLORIDE BLD-SCNC: 106 MMOL/L (ref 99–110)
CO2: 19 MMOL/L (ref 21–32)
CREAT SERPL-MCNC: 0.9 MG/DL (ref 0.9–1.3)
EOSINOPHILS ABSOLUTE: 0 K/UL (ref 0–0.6)
EOSINOPHILS RELATIVE PERCENT: 0 %
GFR SERPL CREATININE-BSD FRML MDRD: >60 ML/MIN/{1.73_M2}
GLUCOSE BLD-MCNC: 108 MG/DL (ref 70–99)
GLUCOSE BLD-MCNC: 113 MG/DL (ref 70–99)
GLUCOSE BLD-MCNC: 121 MG/DL (ref 70–99)
GLUCOSE BLD-MCNC: 97 MG/DL (ref 70–99)
HCT VFR BLD CALC: 38.3 % (ref 40.5–52.5)
HEMOGLOBIN: 12.8 G/DL (ref 13.5–17.5)
LYMPHOCYTES ABSOLUTE: 1.2 K/UL (ref 1–5.1)
LYMPHOCYTES RELATIVE PERCENT: 13 %
MCH RBC QN AUTO: 31.8 PG (ref 26–34)
MCHC RBC AUTO-ENTMCNC: 33.4 G/DL (ref 31–36)
MCV RBC AUTO: 95 FL (ref 80–100)
MONOCYTES ABSOLUTE: 0.8 K/UL (ref 0–1.3)
MONOCYTES RELATIVE PERCENT: 8 %
NEUTROPHILS ABSOLUTE: 7.6 K/UL (ref 1.7–7.7)
NEUTROPHILS RELATIVE PERCENT: 78 %
PDW BLD-RTO: 13.1 % (ref 12.4–15.4)
PERFORMED ON: ABNORMAL
PERFORMED ON: ABNORMAL
PERFORMED ON: NORMAL
PLATELET # BLD: 219 K/UL (ref 135–450)
PLATELET SLIDE REVIEW: ADEQUATE
PMV BLD AUTO: 8 FL (ref 5–10.5)
POTASSIUM SERPL-SCNC: 4.2 MMOL/L (ref 3.5–5.1)
RBC # BLD: 4.03 M/UL (ref 4.2–5.9)
REASON FOR REJECTION: NORMAL
REJECTED TEST: NORMAL
SLIDE REVIEW: ABNORMAL
SODIUM BLD-SCNC: 134 MMOL/L (ref 136–145)
WBC # BLD: 9.6 K/UL (ref 4–11)

## 2023-01-13 PROCEDURE — 6370000000 HC RX 637 (ALT 250 FOR IP): Performed by: SURGERY

## 2023-01-13 PROCEDURE — 2700000000 HC OXYGEN THERAPY PER DAY

## 2023-01-13 PROCEDURE — 6360000002 HC RX W HCPCS: Performed by: SURGERY

## 2023-01-13 PROCEDURE — 0DBN0ZZ EXCISION OF SIGMOID COLON, OPEN APPROACH: ICD-10-PCS | Performed by: SURGERY

## 2023-01-13 PROCEDURE — 85025 COMPLETE CBC W/AUTO DIFF WBC: CPT

## 2023-01-13 PROCEDURE — 80048 BASIC METABOLIC PNL TOTAL CA: CPT

## 2023-01-13 PROCEDURE — 94640 AIRWAY INHALATION TREATMENT: CPT

## 2023-01-13 PROCEDURE — 2580000003 HC RX 258: Performed by: SURGERY

## 2023-01-13 PROCEDURE — 36415 COLL VENOUS BLD VENIPUNCTURE: CPT

## 2023-01-13 PROCEDURE — 99024 POSTOP FOLLOW-UP VISIT: CPT | Performed by: SURGERY

## 2023-01-13 PROCEDURE — 1200000000 HC SEMI PRIVATE

## 2023-01-13 PROCEDURE — 94761 N-INVAS EAR/PLS OXIMETRY MLT: CPT

## 2023-01-13 RX ADMIN — SODIUM CHLORIDE: 9 INJECTION, SOLUTION INTRAVENOUS at 01:48

## 2023-01-13 RX ADMIN — AMLODIPINE BESYLATE 5 MG: 5 TABLET ORAL at 08:55

## 2023-01-13 RX ADMIN — SODIUM CHLORIDE: 9 INJECTION, SOLUTION INTRAVENOUS at 22:24

## 2023-01-13 RX ADMIN — KETOROLAC TROMETHAMINE 30 MG: 30 INJECTION, SOLUTION INTRAMUSCULAR; INTRAVENOUS at 08:54

## 2023-01-13 RX ADMIN — Medication 2 PUFF: at 07:59

## 2023-01-13 RX ADMIN — KETOROLAC TROMETHAMINE 30 MG: 30 INJECTION, SOLUTION INTRAMUSCULAR; INTRAVENOUS at 20:30

## 2023-01-13 RX ADMIN — HYDROMORPHONE HYDROCHLORIDE 1 MG: 1 INJECTION, SOLUTION INTRAMUSCULAR; INTRAVENOUS; SUBCUTANEOUS at 00:01

## 2023-01-13 RX ADMIN — Medication 2 PUFF: at 20:08

## 2023-01-13 RX ADMIN — SODIUM CHLORIDE: 9 INJECTION, SOLUTION INTRAVENOUS at 12:11

## 2023-01-13 RX ADMIN — KETOROLAC TROMETHAMINE 30 MG: 30 INJECTION, SOLUTION INTRAMUSCULAR; INTRAVENOUS at 01:48

## 2023-01-13 RX ADMIN — KETOROLAC TROMETHAMINE 30 MG: 30 INJECTION, SOLUTION INTRAMUSCULAR; INTRAVENOUS at 14:10

## 2023-01-13 RX ADMIN — TIOTROPIUM BROMIDE INHALATION SPRAY 2 PUFF: 3.12 SPRAY, METERED RESPIRATORY (INHALATION) at 08:00

## 2023-01-13 ASSESSMENT — PAIN SCALES - GENERAL
PAINLEVEL_OUTOF10: 7
PAINLEVEL_OUTOF10: 6
PAINLEVEL_OUTOF10: 7
PAINLEVEL_OUTOF10: 5

## 2023-01-13 ASSESSMENT — PAIN DESCRIPTION - LOCATION: LOCATION: ABDOMEN

## 2023-01-13 ASSESSMENT — PAIN SCALES - WONG BAKER: WONGBAKER_NUMERICALRESPONSE: 2

## 2023-01-13 ASSESSMENT — PAIN DESCRIPTION - DESCRIPTORS: DESCRIPTORS: SORE

## 2023-01-13 NOTE — PLAN OF CARE
Problem: Discharge Planning  Goal: Discharge to home or other facility with appropriate resources  1/12/2023 2314 by Syed Abarca RN  Outcome: Progressing  7/95/8961 [id number] by Carmela Carey RN  Outcome: Progressing     Problem: Pain  Goal: Verbalizes/displays adequate comfort level or baseline comfort level  1/12/2023 2314 by Syed Abarca RN  Outcome: Progressing  8/12/6514 9697 by Carmela Carey RN  Outcome: Progressing

## 2023-01-13 NOTE — PLAN OF CARE
Problem: Discharge Planning  Goal: Discharge to home or other facility with appropriate resources  1/13/2023 1120 by Elmo Marmolejo RN  Outcome: Progressing  1/12/2023 2314 by Rickie Soriano RN  Outcome: Progressing     Problem: Pain  Goal: Verbalizes/displays adequate comfort level or baseline comfort level  1/13/2023 1120 by Elmo Marmolejo RN  Outcome: Progressing  1/12/2023 2314 by Rickie Soriano RN  Outcome: Progressing     Problem: ABCDS Injury Assessment  Goal: Absence of physical injury  1/13/2023 1120 by Elmo Marmolejo RN  Outcome: Progressing  1/12/2023 2314 by Rickie Soriano RN  Outcome: Progressing

## 2023-01-13 NOTE — PROGRESS NOTES
Edwards removed, patient tolerated well. Pt assisted up to chair at this time and got the patient a popsicle and a urinal. Pt instructed to notify this RN if he has a bowel movement. Call light in reach. Denies needs at this time.

## 2023-01-13 NOTE — PROGRESS NOTES
BP (!) 141/68   Pulse 85   Temp 98.4 °F (36.9 °C) (Oral)   Resp 16   Ht 5' 10\" (1.778 m)   Wt 156 lb 8.4 oz (71 kg)   SpO2 93%   BMI 22.46 kg/m²     Assessment complete. Meds passed. Pt denies needs at this time. RN to remove cabrera. Pt instructed on deep breathing techniques with incentive spirometry. Scheduled toradol provided. RN also provided CHG wipes this AM and instructed pt on wiping himself down with it. Pt pleasant, looking forward to getting a diet soon. Bedside Mobility Assessment Tool (BMAT):     Assessment Level 1- Sit and Shake    1. From a semi-reclined position, ask patient to sit up and rotate to a seated position at the side of the bed. Can use the bedrail. 2. Ask patient to reach out and grab your hand and shake making sure patient reaches across his/her midline. Pass- Patient is able to come to a seated position, maintain core strength. Maintains seated balance while reaching across midline. Move on to Assessment Level 2. Assessment Level 2- Stretch and Point   1. With patient in seated position at the side of the bed, have patient place both feet on the floor (or stool) with knees no higher than hips. 2. Ask patient to stretch one leg and straighten the knee, then bend the ankle/flex and point the toes. If appropriate, repeat with the other leg. Pass- Patient is able to demonstrate appropriate quad strength on intended weight bearing limb(s). Move onto Assessment Level 3. Assessment Level 3- Stand   1. Ask patient to elevate off the bed or chair (seated to standing) using an assistive device (cane, bedrail). 2. Patient should be able to raise buttocks off be and hold for a count of five. May repeat once. Pass- Patient maintains standing stability for at least 5 seconds, proceed to assessment level 4. Assessment Level 4- Walk   1. Ask patient to march in place at bedside. 2. Then ask patient to advance step and return each foot.  Some medical conditions may render a patient from stepping backwards, use your best clinical judgement. Pass- Patient demonstrates balance while shifting weight and ability to step, takes independent steps, does not use assistive device patient is MOBILITY LEVEL 4.       Mobility Level- 4

## 2023-01-13 NOTE — OP NOTE
[address]                 [address] [place] [address]                                OPERATIVE REPORT    PATIENT NAME: Minh Bey                :        1970  MED REC NO:   [id number]                          ROOM:       0216  ACCOUNT NO:   [de-identified]                           ADMIT DATE: 2023  PROVIDER:     Kathia Rojas MD    DATE OF PROCEDURE:  2023    PREOPERATIVE DIAGNOSIS:  Sigmoid diverticulitis with perforation. POSTOPERATIVE DIAGNOSIS:  Sigmoid diverticulitis with perforation. OPERATION PERFORMED:  Sigmoid colectomy with anastomosis. SURGEON:  Teresa Rojas MD    ANESTHESIA:  General.    ESTIMATED BLOOD LOSS:  Less than 100 mL. INDICATIONS:  The patient is a 64-year-old gentleman with multiple  recurrent episodes of diverticulitis. He had a contained perforation  and is brought for resection. OPERATIVE PROCEDURE:  After preoperative evaluation, the patient was  brought in the operating suite and placed in a comfortable supine  position on the operating room table. Monitoring equipment was attached  and general anesthesia was induced. His abdomen was sterilely prepped  and draped and a small incision was made in the lower midline. This was  dissected down to the fascia and extended superiorly and inferiorly. The fascia was incised and the peritoneal cavity was entered. The  sigmoid colon was identified and the inflammatory mass related to  perforation was identified as well. The descending colon was mobilized  by incising the lateral peritoneal attachments and care was taken to  avoid injury to the ureter. The distal sigmoid and rectum were  identified as well and he had a very long rectal stump. The colon was  divided proximal to the inflammatory mass, and the proximal colon was  placed adjacent to the distal sigmoid past the inflammatory mass.   The  two loops of colon were secured adjacent to each other with silk  sutures. Colotomies were made and a side-to-side, functional end-to-end  anastomosis was created with the firing of the stapler. This was closed  off and the abnormal section of sigmoid colon excised by using another  firing of the VANESSA stapler. The corners, apex, and staple line  intersections were all oversewn with silk sutures. The anterior  anastomosis was oversewn with silk sutures. The anastomosis was  palpated and felt to be widely patent. The abdomen was irrigated and  the fluid was evacuated. A small skin nick was created superior to the  midline incision. On-Q catheters were tunneled laterally on either  side. The fascia was closed with a running suture of #1 Prolene. The  subcutaneous tissues were irrigated and the skin was closed with  staples. Dry dressings were applied. All sponge, needle, and  instrument counts were correct at the end of the case. The patient  tolerated the procedure well. He was taken to the recovery area in  stable condition.         Genia Pike MD    D: 01/12/2023 14:22:28       T: 01/12/2023 14:27:33     [email or web address]  Job#: [id number]     Doc#: [id number]    CC:

## 2023-01-13 NOTE — PROGRESS NOTES
Shift assessment complete. Alert and oriented. Patient rates pain in abdomen rated 5/10. Patient denies any needs at this time. Bed in lowest position. Side rails up x2. Call light in reach.

## 2023-01-13 NOTE — PROGRESS NOTES
[facility] GENERAL SURGERY    Surgery Progress Note           POD # 1    PATIENT NAME: Satish Wright     TODAY'S DATE: 1/13/2023    INTERVAL HISTORY:    Pt feels okay except for incisional pain. Not passing gas yet. OBJECTIVE:   VITALS:  BP (!) 141/68   Pulse 85   Temp 98.4 °F (36.9 °C) (Oral)   Resp 16   Ht 5' 10\" (1.778 m)   Wt 156 lb 8.4 oz (71 kg)   SpO2 93%   BMI 22.46 kg/m²     INTAKE/OUTPUT:    I/O last 3 completed shifts: In: 1000 [I.V.:1000]  Out: 550 [Urine:500; Blood:50]  I/O this shift:  In: -   Out: 350 [Urine:350]              CONSTITUTIONAL:  awake and alert  LUNGS:  clear to auscultation  ABDOMEN:   hypoactive bowel sounds, soft, non-distended   INCISION: Dry/dressed    Data:  CBC:   Recent Labs     01/12/23  1127 01/13/23  0722   WBC 7.4 9.6   HGB 15.2 12.8*   HCT 45.2 38.3*    219     BMP:    Recent Labs     01/12/23  1127 01/13/23  0608    134*   K 4.2 4.2    106   CO2 26 19*   BUN 15 13   CREATININE 1.0 0.9   GLUCOSE 103* 113*     Hepatic: No results for input(s): AST, ALT, ALB, BILITOT, ALKPHOS in the last 72 hours. Mag:    No results for input(s): MG in the last 72 hours. Phos:   No results for input(s): PHOS in the last 72 hours. INR: No results for input(s): INR in the last 72 hours. Radiology Review: N/A    ASSESSMENT AND PLAN:  52 y.o. male status post sigmoid colectomy. Remove Edwards today. Encourage activity and incentive spirometry use.   Await return of GI function to start a diet        Electronically signed by Tiana Wilson MD

## 2023-01-13 NOTE — CARE COORDINATION
Review of chart screened for potential discharge planning needs. Contact with  patient  Role of discharge planner explained with verbalized understanding. No Needs identified by pt/support person for barriers to discharge. Readmission Risk Score:12%  No discharge needs noted not following. MD and bedside RN please notify CM if needs arise for any discharge interventions. Reviewed chart and met with pt at bedside. States is IPTA with all care and G. [facility]. will assist if needed at [facility].

## 2023-01-14 LAB
ANION GAP SERPL CALCULATED.3IONS-SCNC: 9 MMOL/L (ref 3–16)
BASOPHILS ABSOLUTE: 0.1 K/UL (ref 0–0.2)
BASOPHILS RELATIVE PERCENT: 0.6 %
BUN BLDV-MCNC: 7 MG/DL (ref 7–20)
CALCIUM SERPL-MCNC: 8 MG/DL (ref 8.3–10.6)
CHLORIDE BLD-SCNC: 102 MMOL/L (ref 99–110)
CO2: 26 MMOL/L (ref 21–32)
CREAT SERPL-MCNC: 0.9 MG/DL (ref 0.9–1.3)
EOSINOPHILS ABSOLUTE: 0.4 K/UL (ref 0–0.6)
EOSINOPHILS RELATIVE PERCENT: 4.9 %
GFR SERPL CREATININE-BSD FRML MDRD: >60 ML/MIN/{1.73_M2}
GLUCOSE BLD-MCNC: 88 MG/DL (ref 70–99)
HCT VFR BLD CALC: 37.3 % (ref 40.5–52.5)
HEMOGLOBIN: 12.6 G/DL (ref 13.5–17.5)
LYMPHOCYTES ABSOLUTE: 1.4 K/UL (ref 1–5.1)
LYMPHOCYTES RELATIVE PERCENT: 17.4 %
MCH RBC QN AUTO: 31.8 PG (ref 26–34)
MCHC RBC AUTO-ENTMCNC: 33.6 G/DL (ref 31–36)
MCV RBC AUTO: 94.7 FL (ref 80–100)
MONOCYTES ABSOLUTE: 0.7 K/UL (ref 0–1.3)
MONOCYTES RELATIVE PERCENT: 9.5 %
NEUTROPHILS ABSOLUTE: 5.3 K/UL (ref 1.7–7.7)
NEUTROPHILS RELATIVE PERCENT: 67.6 %
PDW BLD-RTO: 12.6 % (ref 12.4–15.4)
PLATELET # BLD: 217 K/UL (ref 135–450)
PMV BLD AUTO: 7.8 FL (ref 5–10.5)
POTASSIUM SERPL-SCNC: 3.8 MMOL/L (ref 3.5–5.1)
RBC # BLD: 3.94 M/UL (ref 4.2–5.9)
SODIUM BLD-SCNC: 137 MMOL/L (ref 136–145)
WBC # BLD: 7.8 K/UL (ref 4–11)

## 2023-01-14 PROCEDURE — 85025 COMPLETE CBC W/AUTO DIFF WBC: CPT

## 2023-01-14 PROCEDURE — 6370000000 HC RX 637 (ALT 250 FOR IP): Performed by: SURGERY

## 2023-01-14 PROCEDURE — 80048 BASIC METABOLIC PNL TOTAL CA: CPT

## 2023-01-14 PROCEDURE — 1200000000 HC SEMI PRIVATE

## 2023-01-14 PROCEDURE — 36415 COLL VENOUS BLD VENIPUNCTURE: CPT

## 2023-01-14 PROCEDURE — 6360000002 HC RX W HCPCS: Performed by: SURGERY

## 2023-01-14 PROCEDURE — 94761 N-INVAS EAR/PLS OXIMETRY MLT: CPT

## 2023-01-14 PROCEDURE — 2580000003 HC RX 258: Performed by: SURGERY

## 2023-01-14 PROCEDURE — 94640 AIRWAY INHALATION TREATMENT: CPT

## 2023-01-14 PROCEDURE — 99024 POSTOP FOLLOW-UP VISIT: CPT | Performed by: SURGERY

## 2023-01-14 RX ORDER — PANTOPRAZOLE SODIUM 40 MG/1
40 GRANULE, DELAYED RELEASE ORAL
COMMUNITY

## 2023-01-14 RX ADMIN — KETOROLAC TROMETHAMINE 30 MG: 30 INJECTION, SOLUTION INTRAMUSCULAR; INTRAVENOUS at 21:23

## 2023-01-14 RX ADMIN — KETOROLAC TROMETHAMINE 30 MG: 30 INJECTION, SOLUTION INTRAMUSCULAR; INTRAVENOUS at 14:07

## 2023-01-14 RX ADMIN — Medication 2 PUFF: at 19:26

## 2023-01-14 RX ADMIN — AMLODIPINE BESYLATE 5 MG: 5 TABLET ORAL at 08:21

## 2023-01-14 RX ADMIN — Medication 2 PUFF: at 08:14

## 2023-01-14 RX ADMIN — KETOROLAC TROMETHAMINE 30 MG: 30 INJECTION, SOLUTION INTRAMUSCULAR; INTRAVENOUS at 08:21

## 2023-01-14 RX ADMIN — KETOROLAC TROMETHAMINE 30 MG: 30 INJECTION, SOLUTION INTRAMUSCULAR; INTRAVENOUS at 02:04

## 2023-01-14 RX ADMIN — SODIUM CHLORIDE: 9 INJECTION, SOLUTION INTRAVENOUS at 08:22

## 2023-01-14 RX ADMIN — TIOTROPIUM BROMIDE INHALATION SPRAY 2 PUFF: 3.12 SPRAY, METERED RESPIRATORY (INHALATION) at 08:14

## 2023-01-14 ASSESSMENT — PAIN DESCRIPTION - LOCATION
LOCATION: ABDOMEN
LOCATION: ABDOMEN

## 2023-01-14 ASSESSMENT — PAIN SCALES - GENERAL
PAINLEVEL_OUTOF10: 0
PAINLEVEL_OUTOF10: 3
PAINLEVEL_OUTOF10: 1
PAINLEVEL_OUTOF10: 0
PAINLEVEL_OUTOF10: 0

## 2023-01-14 ASSESSMENT — PAIN DESCRIPTION - DESCRIPTORS
DESCRIPTORS: SORE
DESCRIPTORS: SORE

## 2023-01-14 NOTE — PLAN OF CARE
Problem: Discharge Planning  Goal: Discharge to home or other facility with appropriate resources  Outcome: Progressing     Problem: Pain  Goal: Verbalizes/displays adequate comfort level or baseline comfort level  1/14/2023 1050 by Sharon Londono RN  Outcome: Progressing  1/13/2023 2234 by Jorge Moreno RN  Outcome: Progressing  Flowsheets (Taken 1/13/2023 2234)  Verbalizes/displays adequate comfort level or baseline comfort level:   Encourage patient to monitor pain and request assistance   Assess pain using appropriate pain scale   Administer analgesics based on type and severity of pain and evaluate response   Implement non-pharmacological measures as appropriate and evaluate response     Problem: ABCDS Injury Assessment  Goal: Absence of physical injury  1/14/2023 1050 by Sharon Londono RN  Outcome: Progressing  1/13/2023 2234 by Jorge Moreno RN  Outcome: Progressing

## 2023-01-14 NOTE — PROGRESS NOTES
[facility] GENERAL SURGERY    Surgery Progress Note           POD # 2    PATIENT NAME: Sylvia Mejía     TODAY'S DATE: 1/14/2023    INTERVAL HISTORY:    Pt  feeling much better, having mult loose BMs, passing gas. Taking sips of clears well. Minimal pain. OBJECTIVE:   VITALS:  BP (!) 149/83   Pulse 69   Temp 98.1 °F (36.7 °C) (Oral)   Resp 16   Ht 5' 10\" (1.778 m)   Wt 156 lb 8.4 oz (71 kg)   SpO2 92%   BMI 22.46 kg/m²     INTAKE/OUTPUT:    I/O last 3 completed shifts:  In: -   Out: 1200 [Urine:1200]  No intake/output data recorded. CONSTITUTIONAL:  awake and alert  LUNGS:     ABDOMEN:    , soft, non-distended, non-tender   INCISION: clean, dry, no drainage, healing, staples intact    Data:  CBC:   Recent Labs     01/12/23  1127 01/13/23  0722 01/14/23  0540   WBC 7.4 9.6 7.8   HGB 15.2 12.8* 12.6*   HCT 45.2 38.3* 37.3*    219 217     BMP:    Recent Labs     01/12/23  1127 01/13/23  0608 01/14/23  0540    134* 137   K 4.2 4.2 3.8    106 102   CO2 26 19* 26   BUN 15 13 7   CREATININE 1.0 0.9 0.9   GLUCOSE 103* 113* 88     Hepatic: No results for input(s): AST, ALT, ALB, BILITOT, ALKPHOS in the last 72 hours. Mag:    No results for input(s): MG in the last 72 hours. Phos:   No results for input(s): PHOS in the last 72 hours. INR: No results for input(s): INR in the last 72 hours.       Radiology Review:       ASSESSMENT AND PLAN:  52 y.o. male status post sigmoid colectomy  - full liquids, advance to soft diet if tolerated   - HLIV   - increase up [address] [facility] home in 1-2 days if diet tolerated, pain controlled         Electronically signed by Cesar Damian MD

## 2023-01-14 NOTE — PLAN OF CARE
Problem: Pain  Goal: Verbalizes/displays adequate comfort level or baseline comfort level  1/13/2023 2234 by Andres Galvan RN  Outcome: Progressing  Flowsheets (Taken 1/13/2023 2234)  Verbalizes/displays adequate comfort level or baseline comfort level:   Encourage patient to monitor pain and request assistance   Assess pain using appropriate pain scale   Administer analgesics based on type and severity of pain and evaluate response   Implement non-pharmacological measures as appropriate and evaluate response     Problem: ABCDS Injury Assessment  Goal: Absence of physical injury  1/13/2023 2234 by Andres Galvan RN  Outcome: Progressing

## 2023-01-14 NOTE — PROGRESS NOTES
PT showered, IV removed and new one placed in Right hand. PT tolerated well. PT tolerated diet, denies nausea, vomiting, or abdominal pain, or bloating sensation. Diet advanced per Dr Royal Tillman recommendations. PT advised on soft foods and soft diet. Pt encouraged to ambulate more.  Will continue to monitor

## 2023-01-14 NOTE — FLOWSHEET NOTE
01/13/23 2015   Vital Signs   Temp 98.6 °F (37 °C)   Temp Source Oral   Heart Rate 75   Heart Rate Source Monitor   Resp 16   BP (!) 158/76   MAP (Calculated) 103   BP Location Left upper arm   BP Method Automatic   Patient Position Semi fowlers   Level of Consciousness 0   MEWS Score 1   Oxygen Therapy   SpO2 91 %   O2 Device None (Room air)   Pt awake and alert in bed. Vitals obtained. Oriented times 4. Shift assessment completed, see flow sheet. Pt complaining of 6/10 pain in abdomen, scheduled Toradol given at this time. Scheduled meds given, see MAR. Pt denies any further needs at this time. Call light in reach.

## 2023-01-14 NOTE — FLOWSHEET NOTE
01/14/23 0148   Vital Signs   Temp 98.9 °F (37.2 °C)   Temp Source Oral   Heart Rate 70   Heart Rate Source Monitor   Resp 18   /75   MAP (Calculated) 95   BP Location Left upper arm   BP Method Automatic   Patient Position Semi fowlers   Level of Consciousness 0   MEWS Score 1   Oxygen Therapy   SpO2 90 %   O2 Device None (Room air)   Vitals obtained at this time, see above. Pt resting in bed with call light in reach. Pt reports pain of 3/10 in abdomen, scheduled Toradol given at this time.  Pt denies any further needs Christian Shaikh RN

## 2023-01-14 NOTE — PROGRESS NOTES
BP (!) 145/72   Pulse 78   Temp 97.7 °F (36.5 °C) (Oral)   Resp 16   Ht 5' 10\" (1.778 m)   Wt 156 lb 8.4 oz (71 kg)   SpO2 92%   BMI 22.46 kg/m²     Assessment complete. Meds passed. Pt denies needs at this time. PT awake in bed. Pt is ready to begin eating. Pt states he has had multiple bowel movements, he is feeling hungry, and that he had no pain (just before scheduled toradol administration.) Pt is passing gas, and off oxygen at this time. Will continue to monitor        Bedside Mobility Assessment Tool (BMAT):     Assessment Level 1- Sit and Shake    1. From a semi-reclined position, ask patient to sit up and rotate to a seated position at the side of the bed. Can use the bedrail. 2. Ask patient to reach out and grab your hand and shake making sure patient reaches across his/her midline. Pass- Patient is able to come to a seated position, maintain core strength. Maintains seated balance while reaching across midline. Move on to Assessment Level 2. Assessment Level 2- Stretch and Point   1. With patient in seated position at the side of the bed, have patient place both feet on the floor (or stool) with knees no higher than hips. 2. Ask patient to stretch one leg and straighten the knee, then bend the ankle/flex and point the toes. If appropriate, repeat with the other leg. Pass- Patient is able to demonstrate appropriate quad strength on intended weight bearing limb(s). Move onto Assessment Level 3. Assessment Level 3- Stand   1. Ask patient to elevate off the bed or chair (seated to standing) using an assistive device (cane, bedrail). 2. Patient should be able to raise buttocks off be and hold for a count of five. May repeat once. Pass- Patient maintains standing stability for at least 5 seconds, proceed to assessment level 4. Assessment Level 4- Walk   1. Ask patient to march in place at bedside. 2. Then ask patient to advance step and return each foot.  Some medical conditions may render a patient from stepping backwards, use your best clinical judgement. Pass- Patient demonstrates balance while shifting weight and ability to step, takes independent steps, does not use assistive device patient is MOBILITY LEVEL 4.       Mobility Level- 4

## 2023-01-15 ENCOUNTER — TELEPHONE (OUTPATIENT)
Dept: PRIMARY CARE CLINIC | Age: 53
End: 2023-01-15

## 2023-01-15 VITALS
BODY MASS INDEX: 22.41 KG/M2 | HEIGHT: 70 IN | OXYGEN SATURATION: 93 % | TEMPERATURE: 97.3 F | WEIGHT: 156.53 LBS | DIASTOLIC BLOOD PRESSURE: 73 MMHG | HEART RATE: 54 BPM | SYSTOLIC BLOOD PRESSURE: 141 MMHG | RESPIRATION RATE: 16 BRPM

## 2023-01-15 DIAGNOSIS — K57.20 DIVERTICULITIS OF LARGE INTESTINE WITH PERFORATION WITHOUT ABSCESS OR BLEEDING: Primary | ICD-10-CM

## 2023-01-15 PROCEDURE — 99238 HOSP IP/OBS DSCHRG MGMT 30/<: CPT | Performed by: SURGERY

## 2023-01-15 PROCEDURE — 94640 AIRWAY INHALATION TREATMENT: CPT

## 2023-01-15 PROCEDURE — 94761 N-INVAS EAR/PLS OXIMETRY MLT: CPT

## 2023-01-15 PROCEDURE — 6370000000 HC RX 637 (ALT 250 FOR IP): Performed by: SURGERY

## 2023-01-15 PROCEDURE — 6360000002 HC RX W HCPCS: Performed by: SURGERY

## 2023-01-15 RX ORDER — PANTOPRAZOLE SODIUM 40 MG/1
40 TABLET, DELAYED RELEASE ORAL
Status: DISCONTINUED | OUTPATIENT
Start: 2023-01-15 | End: 2023-01-15 | Stop reason: HOSPADM

## 2023-01-15 RX ORDER — FOLIC ACID 1 MG/1
1 TABLET ORAL DAILY
Status: DISCONTINUED | OUTPATIENT
Start: 2023-01-15 | End: 2023-01-15 | Stop reason: HOSPADM

## 2023-01-15 RX ORDER — ATORVASTATIN CALCIUM 10 MG/1
20 TABLET, FILM COATED ORAL DAILY
Status: DISCONTINUED | OUTPATIENT
Start: 2023-01-15 | End: 2023-01-15 | Stop reason: HOSPADM

## 2023-01-15 RX ADMIN — AMLODIPINE BESYLATE 5 MG: 5 TABLET ORAL at 07:44

## 2023-01-15 RX ADMIN — KETOROLAC TROMETHAMINE 30 MG: 30 INJECTION, SOLUTION INTRAMUSCULAR; INTRAVENOUS at 02:07

## 2023-01-15 RX ADMIN — FOLIC ACID 1 MG: 1 TABLET ORAL at 09:38

## 2023-01-15 RX ADMIN — Medication 2 PUFF: at 07:51

## 2023-01-15 RX ADMIN — PANTOPRAZOLE SODIUM 40 MG: 40 TABLET, DELAYED RELEASE ORAL at 09:38

## 2023-01-15 RX ADMIN — TIOTROPIUM BROMIDE INHALATION SPRAY 2 PUFF: 3.12 SPRAY, METERED RESPIRATORY (INHALATION) at 07:51

## 2023-01-15 RX ADMIN — KETOROLAC TROMETHAMINE 30 MG: 30 INJECTION, SOLUTION INTRAMUSCULAR; INTRAVENOUS at 07:44

## 2023-01-15 RX ADMIN — ATORVASTATIN CALCIUM 20 MG: 10 TABLET, FILM COATED ORAL at 09:38

## 2023-01-15 ASSESSMENT — PAIN SCALES - GENERAL: PAINLEVEL_OUTOF10: 0

## 2023-01-15 NOTE — PROGRESS NOTES
[facility] GENERAL SURGERY    Surgery Progress Note           POD # 3    PATIENT NAME: Ann Mejía     TODAY'S DATE: 1/15/2023    INTERVAL HISTORY:    Pt  doing well, eating solid food. No BM since early yesterday but passing gas. OBJECTIVE:   VITALS:  BP (!) 141/73   Pulse 54   Temp 97.3 °F (36.3 °C) (Oral)   Resp 16   Ht 5' 10\" (1.778 m)   Wt 156 lb 8.4 oz (71 kg)   SpO2 93%   BMI 22.46 kg/m²     INTAKE/OUTPUT:    I/O last 3 completed shifts: In: 360 [P.O.:360]  Out: -   No intake/output data recorded. CONSTITUTIONAL:  awake and alert  LUNGS:     ABDOMEN:    , soft, non-distended, non-tender   INCISION: clean, dry, no drainage, healing, staples intact    Data:  CBC:   Recent Labs     01/12/23  1127 01/13/23  0722 01/14/23  0540   WBC 7.4 9.6 7.8   HGB 15.2 12.8* 12.6*   HCT 45.2 38.3* 37.3*    219 217     BMP:    Recent Labs     01/12/23  1127 01/13/23  0608 01/14/23  0540    134* 137   K 4.2 4.2 3.8    106 102   CO2 26 19* 26   BUN 15 13 7   CREATININE 1.0 0.9 0.9   GLUCOSE 103* 113* 88     Hepatic: No results for input(s): AST, ALT, ALB, BILITOT, ALKPHOS in the last 72 hours. Mag:    No results for input(s): MG in the last 72 hours. Phos:   No results for input(s): PHOS in the last 72 hours. INR: No results for input(s): INR in the last 72 hours.       Radiology Review:       ASSESSMENT AND PLAN:  52 y.o. male status post sigmoid colectomy  - cont diet    - up OOB   - possible d/c home later today, ideally if he has another BM first   - f/u Dr Schwarz Page in 2 weeks or as needed         Electronically signed by Keith Rodriguez MD

## 2023-01-15 NOTE — DISCHARGE INSTRUCTIONS
[facility] AND [place]    Celia Dueñas M.D. [address] [place] [address] [place]                [address] Ak[address] Duron M.D. [address] [place] [address], [address] [place] [address]         [place] [address] [facility] Cesar Venegas M.D                         [phone]        [facility] MOIRA Peralta M.D. [facility]                                                          POST-OPERATIVE INSTRUCTIONS FOLLOWING A COLON RESECTION    Call the office to schedule your post-operative appointment with your surgeon for two (2) weeks. You will have pain medicine ordered. Take as directed. Your incision is closed with: White steri-strips; these will peel away in 7-10 days. Surgical glue   Staples, these will be removed in the office during your post-op  appointment. You may shower. Wash incisions gently, and pat them dry. Do not rub your incisions. General guidelines for activity:   Avoid strenuous activity or lifting anything heavier than 15 pounds. It is OK to be up walking around, and up and down stairs. Do what is comfortable: stop and rest when you feel tired. Drink plenty of fluids and stay on a bland diet for 2-3 days after surgery. Do NOT drive until after your first post-operative appointment and while taking your narcotic pain medicine     Watch for signs of infection:  Excessive warmth or bright redness around your incisions  Leakage of bloody or cloudy fluid from you incisions  Fever over 100.5    If you experience constipation:  Increase your water intake  Increase your activity, walking is best.  A stool softener or mild laxative may be necessary if you still have not had a bowel  movement ; call the office for further instructions.       Please take note: IF you do not take all of your narcotic pain medication, we ask that you dispose of these responsibly. Drug drop off boxes are located in the [facility] and [facility] emergency departments. These [facility] Safe return cabinets are available 24/7 in the emergency department lobbies. Hospital of office staff may NOT accept any medications to drop off in the cabinet.

## 2023-01-15 NOTE — FLOWSHEET NOTE
01/14/23 2115   Vital Signs   Temp 98.7 °F (37.1 °C)   Temp Source Oral   Heart Rate 68   Heart Rate Source Monitor   Resp 16   /62   MAP (Calculated) 87   BP Location Left upper arm   BP Method Automatic   Patient Position Semi fowlers   Level of Consciousness 0   MEWS Score 1   Oxygen Therapy   SpO2 93 %   O2 Device None (Room air)   Pt awake and alert in bed. Vitals obtained. Oriented times 4. Shift assessment completed, see flow sheet. Pt pain is 1/10 in abdomen, scheduled Toradol given. Pt denies any further needs at this time. Call light in reach.    Abhinav Barillas RN

## 2023-01-15 NOTE — PROGRESS NOTES
BP (!) 141/73   Pulse 54   Temp 97.3 °F (36.3 °C) (Oral)   Resp 16   Ht 5' 10\" (1.778 m)   Wt 156 lb 8.4 oz (71 kg)   SpO2 93%   BMI 22.46 kg/m²     Assessment complete. Meds passed. Pt denies needs at this time. Dr Garcia Norman here to round on patient. Pt is complaining of no pain, but states he has some heartburn that kept him up most of the night. Denies any n/v or abdominal pain. States he is passing gas, but has not had a BM since he was started on solids. Will continue to monitor. Dressing CDI. Bedside Mobility Assessment Tool (BMAT):     Assessment Level 1- Sit and Shake    1. From a semi-reclined position, ask patient to sit up and rotate to a seated position at the side of the bed. Can use the bedrail. 2. Ask patient to reach out and grab your hand and shake making sure patient reaches across his/her midline. Pass- Patient is able to come to a seated position, maintain core strength. Maintains seated balance while reaching across midline. Move on to Assessment Level 2. Assessment Level 2- Stretch and Point   1. With patient in seated position at the side of the bed, have patient place both feet on the floor (or stool) with knees no higher than hips. 2. Ask patient to stretch one leg and straighten the knee, then bend the ankle/flex and point the toes. If appropriate, repeat with the other leg. Pass- Patient is able to demonstrate appropriate quad strength on intended weight bearing limb(s). Move onto Assessment Level 3. Assessment Level 3- Stand   1. Ask patient to elevate off the bed or chair (seated to standing) using an assistive device (cane, bedrail). 2. Patient should be able to raise buttocks off be and hold for a count of five. May repeat once. Pass- Patient maintains standing stability for at least 5 seconds, proceed to assessment level 4. Assessment Level 4- Walk   1. Ask patient to march in place at bedside.     2. Then ask patient to advance step and return each foot. Some medical conditions may render a patient from stepping backwards, use your best clinical judgement. Pass- Patient demonstrates balance while shifting weight and ability to step, takes independent steps, does not use assistive device patient is MOBILITY LEVEL 4.       Mobility Level- 4

## 2023-01-15 NOTE — DISCHARGE INSTR - DIET

## 2023-01-15 NOTE — PLAN OF CARE
Problem: Pain  Goal: Verbalizes/displays adequate comfort level or baseline comfort level  1/14/2023 2300 by Ileana Mackay RN  Outcome: Progressing  Flowsheets (Taken 1/14/2023 2300)  Verbalizes/displays adequate comfort level or baseline comfort level:   Encourage patient to monitor pain and request assistance   Assess pain using appropriate pain scale   Administer analgesics based on type and severity of pain and evaluate response   Implement non-pharmacological measures as appropriate and evaluate response     Problem: ABCDS Injury Assessment  Goal: Absence of physical injury  1/14/2023 2300 by Ileana Mackay RN  Outcome: Progressing

## 2023-01-15 NOTE — DISCHARGE SUMMARY
Surgery Discharge Summary    Patient Identification  Lizzy Casillas is a 52 y.o. male. :  1970  Admit Date:  2023    Discharge date:   1/15/2023                                  Disposition: home    Discharge Diagnoses:   Principal Problem:    Diverticulitis of large intestine with perforation without abscess or bleeding  Resolved Problems:    * No resolved hospital problems. *      Discharge condition: good    Discharge Medications:     Current Discharge Medication List        CONTINUE these medications which have NOT CHANGED    Details   pantoprazole sodium (PROTONIX) 40 MG PACK packet Take 40 mg by mouth every morning (before breakfast)      atorvastatin (LIPITOR) 20 MG tablet TAKE ONE TABLET BY MOUTH DAILY  Qty: 90 tablet, Refills: 1      amLODIPine (NORVASC) 5 MG tablet Take 1 tablet by mouth daily  Qty: 30 tablet, Refills: 3      albuterol sulfate HFA (VENTOLIN HFA) 108 (90 Base) MCG/ACT inhaler Inhale 2 puffs into the lungs 4 times daily as needed for Wheezing  Qty: 18 g, Refills: 5    Comments: Switch to ProAir or generic if insurance not covered. TRELEGY ELLIPTA 100-62.5-25 MCG/INH AEPB INHALE ONE PUFF BY MOUTH DAILY  Qty: 60 each, Refills: 5      folic acid (FOLVITE) 1 MG tablet TAKE ONE TABLET BY MOUTH DAILY  Qty: 60 tablet, Refills: 9    Associated Diagnoses: Muscle spasm; Folate deficiency                Current Discharge Medication List            Most Recent Labs:    CBC:   Recent Labs     23  1127 23  0722 23  0540   WBC 7.4 9.6 7.8   HGB 15.2 12.8* 12.6*   HCT 45.2 38.3* 37.3*    219 217     BMP:    Recent Labs     23  1127 23  0608 23  0540    134* 137   K 4.2 4.2 3.8    106 102   CO2 26 19* 26   BUN 15 13 7   CREATININE 1.0 0.9 0.9   GLUCOSE 103* 113* 88     Hepatic: No results for input(s): AST, ALT, ALB, BILITOT, ALKPHOS in the last 72 hours.   PT/INR:  No results for input(s): INR in the last 72 hours. Consults: none    Surgery: sigmoidectomy    Patient Instructions: Activity: no heavy lifting, pushing, pulling for 2 weeks, no driving for 1 weeks or while on analgesics  Diet: As tolerated  Follow-up with Dr Lisa Hong in 2 weeks. The patient and/or family/patient representatives, were provided education regarding discharge instructions, ongoing treatment and follow-up. Details of information given to the patient may be found in the discharge instructions located in the EMR. HPI and Hospital Course:   Admitted and taken for elective sigmoid colectomy. Uneventful postop course with rapid return of bowel function.       Melinda Naik MD    [facility] Surgery

## 2023-01-15 NOTE — PROGRESS NOTES
[facility]. Pt stable, care transferred at this time.  Diogenes Wilson RN

## 2023-01-15 NOTE — PROGRESS NOTES
Pt tolerating diet well, denies abdominal pain, n/v. Pt leaving at this time in stable condition.  Pt states \"I feel real good\"

## 2023-01-15 NOTE — FLOWSHEET NOTE
01/15/23 0200   Vital Signs   Temp 98.6 °F (37 °C)   Temp Source Oral   Heart Rate 63   Heart Rate Source Monitor   Resp 16   /80   MAP (Calculated) 98   BP Location Left upper arm   BP Method Automatic   Patient Position Semi fowlers   Level of Consciousness 0   MEWS Score 1   Oxygen Therapy   SpO2 91 %   O2 Device None (Room air)   Vitals obtained, see above. Pt resting in bed with call light in reach. Bed locked and in lowest position. Pt denies any further needs at this time.  Michael Dangelo RN

## 2023-01-15 NOTE — PLAN OF CARE
Problem: Discharge Planning  Goal: Discharge to home or other facility with appropriate resources  Outcome: Completed     Problem: Pain  Goal: Verbalizes/displays adequate comfort level or baseline comfort level  1/15/2023 1013 by Malena Rosales RN  Outcome: Completed  1/14/2023 2300 by Diogenes Wilson RN  Outcome: Progressing  Flowsheets (Taken 1/14/2023 2300)  Verbalizes/displays adequate comfort level or baseline comfort level:   Encourage patient to monitor pain and request assistance   Assess pain using appropriate pain scale   Administer analgesics based on type and severity of pain and evaluate response   Implement non-pharmacological measures as appropriate and evaluate response     Problem: ABCDS Injury Assessment  Goal: Absence of physical injury  1/15/2023 1013 by Malena Rosales RN  Outcome: Completed  1/14/2023 2300 by Diogenes Wilson RN  Outcome: Progressing

## 2023-01-16 ENCOUNTER — CARE COORDINATION (OUTPATIENT)
Dept: CASE MANAGEMENT | Age: 53
End: 2023-01-16

## 2023-01-16 DIAGNOSIS — K57.92 ACUTE DIVERTICULITIS OF INTESTINE: Primary | ICD-10-CM

## 2023-01-16 PROCEDURE — 1111F DSCHRG MED/CURRENT MED MERGE: CPT | Performed by: STUDENT IN AN ORGANIZED HEALTH CARE EDUCATION/TRAINING PROGRAM

## 2023-01-16 NOTE — CARE COORDINATION
[facility] Care Transitions Initial Follow Up Call    Call within 2 business days of discharge: Yes    Care Transition Nurse contacted the patient by telephone to perform post hospital discharge assessment. Provided introduction to self, and explanation of the Care Transition Nurse role. Patient: Ilda Fonseca Patient : 1970   MRN: [id number]  Reason for Admission: diverticulitis with perf s/p sigmoid colectomy  Dr Shanon Carranza; hx HTN, HLD, COPD no AE noted, folate deficiency, renal cyst on right side -> home no services  Discharge Date: 1/15/23 RARS: Readmission Risk Score: 12.7      Last Discharge  Street       Date Complaint Diagnosis Description Type Department Provider    23  Diverticulitis of colon with perforation Admission (Discharged) [facility] 2 Santos Schwarz MD     Challenges to be reviewed by the provider   Additional needs identified to be addressed with provider: No         Method of communication with provider: none. Reports he is feeling well. Tolerating PO without n/v/d. Denies fever, chills, SOB. Has incentive spirometer and continues to use 10x hourly while awake. Pain is tolerable - feeling sore. Reviewed surgeon's instructions on AVS pages 8-9 and confirmed patient has these. He will schedule f/up with surgery as recommended. Reviewed s/s to monitor on incision and discussed when to call surgeon. He denies referrals for [facility]. or other services. Per chart, noted PCP office to call and schedule his TCM visit. He will watch for their call. Denies needs. Provided CTN contact info at this time. Care Transition Nurse reviewed discharge instructions, medical action plan, and red flags with patient who verbalized understanding. The patient was given an opportunity to ask questions and does not have any further questions or concerns at this time. Were discharge instructions available to patient? Yes.  Reviewed appropriate site of care based on symptoms and resources available to patient including: PCP  Specialist. The patient agrees to contact the PCP office for questions related to their healthcare. Advance Care Planning:   Does patient have an Advance Directive: decision maker updated. Medication reconciliation was performed with patient, who verbalizes understanding of administration of home medications. Medications reviewed, 1111F entered: yes    Was patient discharged with a pulse oximeter? no    Non-face-to-face services provided:  Obtained and reviewed discharge summary and/or continuity of care documents  Education of patient/family/caregiver/guardian to support self-management-   Assessment and support for treatment adherence and medication management-     Offered patient enrollment in the Remote Patient Monitoring (RPM) program for in-home monitoring: NA.    Care Transitions 24 Hour Call    Do you have a copy of your discharge instructions?: Yes  Do you have all of your prescriptions and are they filled?: Yes  Have you been contacted by a [address]?: No  Have you scheduled your follow up appointment?: No  Patient Home Equipment: Nebulizer  Do you have support at home?: Partner/Spouse/SO  Do you feel like you have everything you need to keep you well at home?: Yes  Are you an active caregiver in your home?: No  Care Transitions Interventions  No Identified Needs         Follow Up  Future Appointments   Date Time Provider Simone Marcelo   2/22/2023  9:30 AM MD Lizz Jackson Transition Nurse provided contact information. Plan for follow-up call in 3-5 days based on severity of symptoms and risk factors. Plan for next call: symptom management-s/p colectomy  follow-up appointment-scheduled with PCP and surgeon?      Asif Cervantes RN  Care Transition Nurse  [phone] mobile

## 2023-01-16 NOTE — TELEPHONE ENCOUNTER
Please call and schedule this pt for follow-up after recently completing elective surgery. He is expected to f/u with Gen Surg in 2 weeks. Please schedule him after that time in about 3-4 weeks. Thank you.

## 2023-01-20 ENCOUNTER — CARE COORDINATION (OUTPATIENT)
Dept: CASE MANAGEMENT | Age: 53
End: 2023-01-20

## 2023-01-20 NOTE — CARE COORDINATION
[facility] Care Transitions Follow Up Call      Patient: Venu Mejía  Patient : 1970   MRN: [id number]  Reason for Admission: diverticulitis with perf s/p sigmoid colectomy  Dr Rojas; hx HTN, HLD, COPD no AE noted, folate deficiency, renal cyst on right side -> home no services  Discharge Date: 1/15/23 RARS: Readmission Risk Score: 12.7    Unable to reach or leave message because voice mailbox not set up.    Follow Up  Future Appointments   Date Time Provider Department Center   2023  9:45 AM MD MARISSA Root [facility]   2023  3:30 PM Lefty Hogue,  [facility]   2023  9:30 AM [facility]     Kym Lake, RN  Care Transition Nurse  [phone] mobile

## 2023-01-26 ENCOUNTER — OFFICE VISIT (OUTPATIENT)
Dept: SURGERY | Age: 53
End: 2023-01-26

## 2023-01-26 VITALS
BODY MASS INDEX: 22.9 KG/M2 | HEIGHT: 70 IN | WEIGHT: 160 LBS | SYSTOLIC BLOOD PRESSURE: 120 MMHG | DIASTOLIC BLOOD PRESSURE: 72 MMHG

## 2023-01-26 DIAGNOSIS — Z98.890 POST-OPERATIVE STATE: Primary | ICD-10-CM

## 2023-01-26 PROCEDURE — 99024 POSTOP FOLLOW-UP VISIT: CPT | Performed by: SURGERY

## 2023-01-26 NOTE — PROGRESS NOTES
[facility] GENERAL SURGERY      S:   Patient presents s/p sigmoid colectomy. He reports doing well. O:   Comfortable         Incision site healing well. Staples removed              A:   S/P sigmoid colectomy    P:   Follow up as needed.

## 2023-01-27 ENCOUNTER — CARE COORDINATION (OUTPATIENT)
Dept: CASE MANAGEMENT | Age: 53
End: 2023-01-27

## 2023-01-27 NOTE — CARE COORDINATION
[facility] Care Transitions Follow Up Call      Patient: Alena Maher  Patient : 1970   MRN: [id number]  Reason for Admission: diverticulitis with perf s/p sigmoid colectomy  Dr Nicole Gravely; hx HTN, HLD, COPD no AE noted, folate deficiency, renal cyst on right side -> home no services  Discharge Date: 1/15/23 RARS: Readmission Risk Score: 12.7      Needs to be reviewed by the provider   Additional needs identified to be addressed with provider: No         Method of communication with provider: none. Care Transition Nurse contacted the patient by telephone to follow up after recent admission. Reports he feels well. Tolerating PO without n/v/d. Bowels moving. Completed OV with surgeon and staples removed yesterday. Incision site looks good without concern. Denies needs. Addressed changes since last contact:  OV with surgeon - note reviewed  Discussed follow-up appointments. If no appointment was previously scheduled, appointment scheduling offered: no.   Is follow up appointment scheduled within 7 days of discharge? completed. Follow Up  Future Appointments   Date Time Provider Simone Marcelo   2023  3:30 PM [facility]   2023  9:30 AM Sushil Miles MD [facility]     Non-[facility] follow up appointment(s): ELEN    Care Transition Nurse reviewed medical action plan with patient and discussed any barriers to care and/or understanding of plan of care after discharge. Discussed appropriate site of care based on symptoms and resources available to patient including: PCP  Specialist. The patient agrees to contact the PCP office for questions related to their healthcare. Care Transition Nurse provided contact information for future needs. Plan for follow-up call in 10-14 days based on severity of symptoms and risk factors.   Plan for next call: symptom management-s/p sigmoid colectomy    Melany Earl RN  Care Transition Nurse  [phone] mobile

## 2023-02-06 ENCOUNTER — CARE COORDINATION (OUTPATIENT)
Dept: CASE MANAGEMENT | Age: 53
End: 2023-02-06

## 2023-02-06 NOTE — CARE COORDINATION
[facility] Transitions Follow Up Call      Patient: Emiliano Clemons  Patient : 1970   MRN: [id number]  Reason for Admission: diverticulitis with perf s/p sigmoid colectomy  Dr Marvel Tan; hx HTN, HLD, COPD no AE noted, folate deficiency, renal cyst on right side -> home no services  Discharge Date: 1/15/23 RARS: Readmission Risk Score: 12.7      Needs to be reviewed by the provider   Additional needs identified to be addressed with provider: No         Method of communication with provider: none. Care Transition Nurse contacted the patient by telephone to follow up after recent admission. Breathing baseline. Tolerating PO. No concerns. Sees PCP next week. Instructed him to take medications with him for review/refills. He voices understanding. Has CTN contact info for future needs. No further CTN outreach scheduled at this time. Addressed changes since last contact:  none  Discussed follow-up appointments. If no appointment was previously scheduled, appointment scheduling offered: no.   Is follow up appointment scheduled within 7 days of discharge? completed. Follow Up  Future Appointments   Date Time Provider Simone Marcelo   2023  2:00 PM Paradise Tabor MD PhD [facility]   2023  9:30 AM Alexia Arnold MD [facility]     [facility] follow up appointment(s): melody    Care Transition Nurse reviewed medical action plan and red flags with patient and discussed any barriers to care and/or understanding of plan of care after discharge. Discussed appropriate site of care based on symptoms and resources available to patient including: PCP  Specialist. The patient agrees to contact the PCP office for questions related to their healthcare.      Patients top risk factors for readmission: medical condition-see above  Interventions to address risk factors: Education of patient/family/caregiver/guardian to support self-management-f/up as scheduled; report new symptoms same day to appropriate provider for recommendation; take med bottles to f/up appointment     Care Transitions Subsequent and Final Call    Subsequent and Final Calls  Do you have any ongoing symptoms?: No  Have your medications changed?: No  Do you have any questions related to your medications?: No  Do you currently have any active services?: No  Do you have any needs or concerns that I can assist you with?: No  Identified Barriers: None  Care Transitions Interventions  No Identified Needs    Registered Dietician: Declined      Social Work: Declined    Other Interventions:           Care Transition Nurse provided contact information for future needs. No further follow-up call indicated based on severity of symptoms and risk factors.     Sukhi Kebede RN  Care Transition Nurse  [phone] mobile

## 2023-02-13 RX ORDER — PANTOPRAZOLE SODIUM 40 MG/1
TABLET, DELAYED RELEASE ORAL
Qty: 90 TABLET | Refills: 1 | Status: SHIPPED | OUTPATIENT
Start: 2023-02-13

## 2023-02-13 NOTE — TELEPHONE ENCOUNTER
Refill Request       Last Seen: Last Seen Department: 10/28/2022  Last Seen by PCP: 10/28/2022    Last Written: unknown, historical provider    Next Appointment:   Future Appointments   Date Time Provider Simone Marcelo   2/16/2023  2:00 PM Christin Mejia MD PhD [facility]   2/22/2023  9:30 AM Adi Villaseñor MD [facility]       Future appointment scheduled      Requested Prescriptions     Pending Prescriptions Disp Refills    pantoprazole (PROTONIX) 40 MG tablet [Pharmacy Med Name: PANTOPRAZOLE SOD DR 40 MG TAB] 90 tablet 1     Sig: TAKE ONE TABLET BY MOUTH DAILY   '

## 2023-02-16 ENCOUNTER — OFFICE VISIT (OUTPATIENT)
Dept: PRIMARY CARE CLINIC | Age: 53
End: 2023-02-16

## 2023-02-16 VITALS
BODY MASS INDEX: 23.68 KG/M2 | TEMPERATURE: 98.6 F | OXYGEN SATURATION: 97 % | WEIGHT: 165 LBS | DIASTOLIC BLOOD PRESSURE: 72 MMHG | SYSTOLIC BLOOD PRESSURE: 132 MMHG | HEART RATE: 87 BPM

## 2023-02-16 DIAGNOSIS — E78.2 HYPERLIPIDEMIA, MIXED: ICD-10-CM

## 2023-02-16 DIAGNOSIS — K57.92 DIVERTICULITIS: Primary | ICD-10-CM

## 2023-02-16 DIAGNOSIS — J44.9 CHRONIC OBSTRUCTIVE PULMONARY DISEASE, UNSPECIFIED COPD TYPE (HCC): ICD-10-CM

## 2023-02-16 DIAGNOSIS — R73.03 PREDIABETES: ICD-10-CM

## 2023-02-16 DIAGNOSIS — I10 BENIGN ESSENTIAL HTN: ICD-10-CM

## 2023-02-16 DIAGNOSIS — K21.9 GASTROESOPHAGEAL REFLUX DISEASE WITHOUT ESOPHAGITIS: ICD-10-CM

## 2023-02-16 LAB — HBA1C MFR BLD: 5.6 %

## 2023-02-16 RX ORDER — AMLODIPINE BESYLATE 5 MG/1
5 TABLET ORAL DAILY
Qty: 30 TABLET | Refills: 3 | Status: CANCELLED | OUTPATIENT
Start: 2023-02-16

## 2023-02-16 SDOH — ECONOMIC STABILITY: INCOME INSECURITY: HOW HARD IS IT FOR YOU TO PAY FOR THE VERY BASICS LIKE FOOD, HOUSING, MEDICAL CARE, AND HEATING?: SOMEWHAT HARD

## 2023-02-16 SDOH — ECONOMIC STABILITY: FOOD INSECURITY: WITHIN THE PAST 12 MONTHS, THE FOOD YOU BOUGHT JUST DIDN'T LAST AND YOU DIDN'T HAVE MONEY TO GET MORE.: NEVER TRUE

## 2023-02-16 SDOH — ECONOMIC STABILITY: HOUSING INSECURITY
IN THE LAST 12 MONTHS, WAS THERE A TIME WHEN YOU DID NOT HAVE A STEADY PLACE TO SLEEP OR SLEPT IN A SHELTER (INCLUDING NOW)?: YES

## 2023-02-16 SDOH — ECONOMIC STABILITY: FOOD INSECURITY: WITHIN THE PAST 12 MONTHS, YOU WORRIED THAT YOUR FOOD WOULD RUN OUT BEFORE YOU GOT MONEY TO BUY MORE.: SOMETIMES TRUE

## 2023-02-16 ASSESSMENT — ANXIETY QUESTIONNAIRES
7. FEELING AFRAID AS IF SOMETHING AWFUL MIGHT HAPPEN: 0
GAD7 TOTAL SCORE: 0
5. BEING SO RESTLESS THAT IT IS HARD TO SIT STILL: 0
6. BECOMING EASILY ANNOYED OR IRRITABLE: 0
2. NOT BEING ABLE TO STOP OR CONTROL WORRYING: 0
1. FEELING NERVOUS, ANXIOUS, OR ON EDGE: 0
4. TROUBLE RELAXING: 0
3. WORRYING TOO MUCH ABOUT DIFFERENT THINGS: 0
IF YOU CHECKED OFF ANY PROBLEMS ON THIS QUESTIONNAIRE, HOW DIFFICULT HAVE THESE PROBLEMS MADE IT FOR YOU TO DO YOUR WORK, TAKE CARE OF THINGS AT HOME, OR GET ALONG WITH OTHER PEOPLE: NOT DIFFICULT AT ALL

## 2023-02-16 ASSESSMENT — PATIENT HEALTH QUESTIONNAIRE - PHQ9
SUM OF ALL RESPONSES TO PHQ9 QUESTIONS 1 & 2: 0
SUM OF ALL RESPONSES TO PHQ QUESTIONS 1-9: 0
1. LITTLE INTEREST OR PLEASURE IN DOING THINGS: 0
2. FEELING DOWN, DEPRESSED OR HOPELESS: 0

## 2023-02-16 ASSESSMENT — ENCOUNTER SYMPTOMS
NAUSEA: 0
BLOOD IN STOOL: 0
VOMITING: 0

## 2023-02-16 NOTE — PROGRESS NOTES
[address] [facility]                                           [address] [facility], [address], [address] [facility] [address]        Phone: [phone]                                     NAME:  Amna Mccracken  :    1970      CONSULTANTS  Patient Care Team:  Refugio Trujillo DO as PCP - General (Family Medicine)  Federica Johnson MD as Consulting Physician (Pulmonology)  ANAI Roach - CNP as Consulting Physician (Nurse Practitioner)  Elizabeth Rdz MD as Consulting Physician (Orthopedic Surgery)  Federica Johnson MD as Consulting Physician (Pulmonology)      CHIEF COMPLAINT  Amna Mccracken is a 52 y.o. male presenting as an established patient for acute, chronic, and personalized prevention plan services as noted below. HISTORY OF PRESENT ILLNESS    Amna Mccracken is a 52 y.o. male with history of diverticulitis, HTN, HLD, COPD, pre-diabetes presenting for hospital follow-up. This is an established patient and was last seen in this practice on 10/28/2022. Diverticulitis / HFU  - hx of recurrent episodes of diverticulitis, had contained perforation  - sigmoid diverticulitis with perforation without abscess s/p sigmoid colectomy with anastomosis on 2023  - surgery was done at [facility] performed by general surgeon Jewels Ponce.  MD Bob  - uneventful postop, rapid return of normal bowel function  - had f/u with Dr. Larry Hartmann on 2023, documented report of incision site healing well and staples removed, f/u prn  - pt has no complaints today, no abd pain, normal BM, no blood in stool    HTN  - amlodipine 5 mg    HLD  - atorvastatin 20 mg    COPD  - albuetrol 2 puffs 4 times daily prn    Prediabetes  - A1C 5.8  - lfiestyle mod    GERD  - pantoprazole 40 mg      PAST MEDICAL HISTORY  Past Medical History:   Diagnosis Date    Cervical disc herniation     [facility]     Diverticulitis     Folate deficiency 01/2013    GERD (gastroesophageal reflux disease)     Hyperlipidemia 02/2014    Hypertriglyceridemia, Good HDL    Hypertension 12/12: age 37    Prediabetes 11/2016    Vitamin D deficiency 01/2013    resolved       PAST SURGICAL HISTORY  Past Surgical History:   Procedure Laterality Date    ANKLE FRACTURE SURGERY Left 1998    BRONCHOSCOPY  12/29/2015    CERVICAL FUSION N/A 01/16/2019    ANTERIOR CERVICAL DISCECTOMY AND FUSION WITH ALLOGRAFT, MEDTRONICS AND EVOKES  CPT CODE - 30078 performed by Jim Wilson MD at [address] [facility] 1/12/2023    SIGMOID COLECTOMY WITH ANASTOMOSIS performed by Kasia Mccray MD at [address] [facility]  02/2013    ENDOSCOPY, COLON, DIAGNOSTIC      NJ NJX DX/THER SBST INTRLMNR CRV/THRC W/IMG GDN Right 11/19/2018    RIGHT CERVICAL SEVEN THORACIC EPIDURAL STEROID INJECTION SITE CONFIRMED BY FLUOROSCOPY performed by Huang Taylor MD at [address] [facility]  Prior to Visit Medications    Medication Sig Taking?  Authorizing Provider   pantoprazole (PROTONIX) 40 MG tablet TAKE ONE TABLET BY MOUTH DAILY  Jelani Guzman DO   atorvastatin (LIPITOR) 20 MG tablet TAKE ONE TABLET BY MOUTH DAILY  Jelani Guzman DO   amLODIPine (NORVASC) 5 MG tablet Take 1 tablet by mouth daily  Jelani Guzman DO   albuterol sulfate HFA (VENTOLIN HFA) 108 (90 Base) MCG/ACT inhaler Inhale 2 puffs into the lungs 4 times daily as needed for [address], DO   Lou Spies 100-62.5-25 MCG/INH AEPB INHALE ONE PUFF BY [address] M YOSELIN Orozco   folic acid (FOLVITE) 1 MG tablet TAKE ONE TABLET BY MOUTH DAILY  Sarina Gomez,        ALLERGIES  No known allergies      FAMILY HISTORY      Problem Relation Age of Onset    COPD Mother 39    COPD Father 39       SOCIAL HISTORY  Social History       Tobacco History       Smoking Status  Former Smoking Start Date  1984 Quit Date  1/1/2019 Smoking Frequency  2.00 packs/day for 35.00 years (70.00 pk-yrs)    Smoking Tobacco Type  Cigarettes from 1984 to 1/1/2019      Smokeless Tobacco Use  Never              Alcohol History       Alcohol Use Status  Not Currently Drinks/Week  1 Cans of beer, 0 Standard drinks or equivalent per week Amount  1.0 standard drink of alcohol/wk Comment  occasional              Drug Use       Drug Use Status  Yes Types  Marijuana (Weed) Comment  1 joint per week per patient. used a couple days ago              Sexual Activity       Sexually Active  Yes Partners  Female                    [address] Maintenance   Topic Date Due    Annual Wellness Visit (AWV)  11/30/2022    Flu vaccine (1) 09/27/2023 (Originally 8/1/2022)    Shingles vaccine (1 of 2) 08/11/2024 (Originally 1/1/2018)    COVID-19 Vaccine (1) 08/11/2024 (Originally 1970)    A1C test (Diabetic or Prediabetic)  08/06/2023    Lipids  08/06/2023    Low dose CT lung screening  08/13/2023    Depression Screen  10/28/2023    DTaP/Tdap/Td vaccine (2 - Td or Tdap) 02/13/2024    Colorectal Cancer Screen  04/03/2028    Pneumococcal 0-64 years Vaccine  Completed    Hepatitis C screen  Completed    HIV screen  Completed    Hepatitis A vaccine  Aged Out    Hib vaccine  Aged Out    Meningococcal (ACWY) vaccine  Aged [employer] History   Administered Date(s) Administered    Influenza Nasal 11/10/2015    Influenza Virus Vaccine 02/13/2014, 11/07/2014    Pneumococcal Polysaccharide ([id number]) 08/10/2015    Tdap (Boostrix, Adacel) 02/13/2014       REVIEW OF SYSTEMS  Relevant RoS were stated in the HPI      PHYSICAL EXAM  There were no vitals filed for this visit. There is no height or weight on file to calculate BMI.     Wt Readings from Last 3 Encounters:   01/26/23 160 lb (72.6 kg)   01/12/23 156 lb 8.4 oz (71 kg)   12/20/22 160 lb (72.6 kg)     BP Readings from Last 3 Encounters:   01/26/23 120/72   01/15/23 (!) 141/73 12/20/22 120/80     Physical Exam    GEN: appears stated age, no acute distress  HEENT:  -Head: normocephalic, atraumatic  -Eyes: extraocular muscles intact, no discharge or redness  -Ears: normal external ears  -Nose: normal nares  -Mouth and Throat: moist mucous membranes, no thrush, no vesicles, no lesions  CV: regular rate rhythm, no murmurs/rubs/gallops  LUNGS: clear to auscultation bilaterally, no rales/rhonchi/wheezes  ABD: normal bowel sounds, soft, non-distended, non-tender, no rebound, no guarding  : N/A  SKIN: no rashes, no lesions, no erythema  MSK: normal gait, no deformities  EXT: no clubbing, cyanosis, or edema.   NEURO: ambulating with no limitations, no focal deficits  PSYCH: appropriate for the situation, normal memory, mood, and affect      LAB REVIEW  Admission on 01/12/2023, Discharged on 01/15/2023   Component Date Value    ABO/Rh 01/12/2023 O POS     Antibody Screen 01/12/2023 NEG     Sodium 01/12/2023 139     Potassium 01/12/2023 4.2     Chloride 01/12/2023 104     CO2 01/12/2023 26     Anion Gap 01/12/2023 9     Glucose 01/12/2023 103 (A)     BUN 01/12/2023 15     Creatinine 01/12/2023 1.0     Est, Glom Filt Rate 01/12/2023 >60     Calcium 01/12/2023 9.2     WBC 01/12/2023 7.4     RBC 01/12/2023 4.72     Hemoglobin 01/12/2023 15.2     Hematocrit 01/12/2023 45.2     MCV 01/12/2023 95.6     MCH 01/12/2023 32.3     MCHC 01/12/2023 33.8     RDW 01/12/2023 13.8     Platelets 45/05/3007 261     MPV 01/12/2023 8.0     Ventricular Rate 01/12/2023 81     Atrial Rate 01/12/2023 81     P-R Interval 01/12/2023 174     QRS Duration 01/12/2023 94     Q-T Interval 01/12/2023 368     QTc Calculation (Bazett) 01/12/2023 427     P Axis 01/12/2023 82     R Axis 01/12/2023 -37     T Axis 01/12/2023 73     Diagnosis 01/12/2023                      Value:Normal sinus rhythmLeft axis deviationRSR' or QR pattern in V1 suggests right ventricular conduction delayAbnormal ECGWhen compared with ECG of 18-AUG-2022 13:14,No significant change was foundConfirmed by ESTELA STEPHENS MD ([id number]) on 1/12/2023 6:20:17 PM      Sodium 01/13/2023 134 (A)     Potassium 01/13/2023 4.2     Chloride 01/13/2023 106     CO2 01/13/2023 19 (A)     Anion Gap 01/13/2023 9     Glucose 01/13/2023 113 (A)     BUN 01/13/2023 13     Creatinine 01/13/2023 0.9     Est, Glom Filt Rate 01/13/2023 >60     Calcium 01/13/2023 7.6 (A)     POC Glucose 01/12/2023 123 (A)     Performed on 01/12/2023 ACCU-CHEK     POC Glucose 01/13/2023 121 (A)     Performed on 01/13/2023 ACCU-CHEK     POC Glucose 01/13/2023 97     Performed on 01/13/2023 ACCU-CHEK     Rejected Test 01/13/2023 cbcwd     Reason for Rejection 01/13/2023 clot     WBC 01/13/2023 9.6     RBC 01/13/2023 4.03 (A)     Hemoglobin 01/13/2023 12.8 (A)     Hematocrit 01/13/2023 38.3 (A)     MCV 01/13/2023 95.0     MCH 01/13/2023 31.8     MCHC 01/13/2023 33.4     RDW 01/13/2023 13.1     Platelets 01/13/2023 219     MPV 01/13/2023 8.0     PLATELET SLIDE REVIEW 01/13/2023 Adequate     SLIDE REVIEW 01/13/2023 see below     Neutrophils % 01/13/2023 78.0     Lymphocytes % 01/13/2023 13.0     Monocytes % 01/13/2023 8.0     Eosinophils % 01/13/2023 0.0     Basophils % 01/13/2023 0.0     Neutrophils Absolute 01/13/2023 7.6     Lymphocytes Absolute 01/13/2023 1.2     Monocytes Absolute 01/13/2023 0.8     Eosinophils Absolute 01/13/2023 0.0     Basophils Absolute 01/13/2023 0.0     Bands Relative 01/13/2023 1     POC Glucose 01/13/2023 108 (A)     Performed on 01/13/2023 ACCU-CHEK     WBC 01/14/2023 7.8     RBC 01/14/2023 3.94 (A)     Hemoglobin 01/14/2023 12.6 (A)     Hematocrit 01/14/2023 37.3 (A)     MCV 01/14/2023 94.7     MCH 01/14/2023 31.8     MCHC 01/14/2023 33.6     RDW 01/14/2023 12.6     Platelets 01/14/2023 217     MPV 01/14/2023 7.8     Neutrophils % 01/14/2023 67.6     Lymphocytes % 01/14/2023 17.4     Monocytes % 01/14/2023 9.5     Eosinophils % 01/14/2023 4.9     Basophils % 01/14/2023 0.6      Neutrophils Absolute 01/14/2023 5.3     Lymphocytes Absolute 01/14/2023 1.4     Monocytes Absolute 01/14/2023 0.7     Eosinophils Absolute 01/14/2023 0.4     Basophils Absolute 01/14/2023 0.1     Sodium 01/14/2023 137     Potassium 01/14/2023 3.8     Chloride 01/14/2023 102     CO2 01/14/2023 26     Anion Gap 01/14/2023 9     Glucose 01/14/2023 88     BUN 01/14/2023 7     Creatinine 01/14/2023 0.9     Est, Glom Filt Rate 01/14/2023 >60     Calcium 01/14/2023 8.0 (A)    Admission on 10/21/2022, Discharged on 10/23/2022   Component Date Value    WBC 10/21/2022 9.5     RBC 10/21/2022 4.70     Hemoglobin 10/21/2022 14.5     Hematocrit 10/21/2022 44.8     MCV 10/21/2022 95.4     MCH 10/21/2022 30.9     MCHC 10/21/2022 32.4     RDW 10/21/2022 14.6     Platelets 67/70/8694 253     MPV 10/21/2022 7.7     Neutrophils % 10/21/2022 76.9     Lymphocytes % 10/21/2022 13.6     Monocytes % 10/21/2022 6.7     Eosinophils % 10/21/2022 2.2     Basophils % 10/21/2022 0.6     Neutrophils Absolute 10/21/2022 7.3     Lymphocytes Absolute 10/21/2022 1.3     Monocytes Absolute 10/21/2022 0.6     Eosinophils Absolute 10/21/2022 0.2     Basophils Absolute 10/21/2022 0.1     Sodium 10/21/2022 136     Potassium reflex Magnesi* 10/21/2022 4.0     Chloride 10/21/2022 99     CO2 10/21/2022 32     Anion Gap 10/21/2022 5     Glucose 10/21/2022 116 (A)     BUN 10/21/2022 12     Creatinine 10/21/2022 1.1     Est, Glom Filt Rate 10/21/2022 >60     Calcium 10/21/2022 9.2     Total Protein 10/21/2022 7.3     Albumin 10/21/2022 4.4     Albumin/Globulin Ratio 10/21/2022 1.5     Total Bilirubin 10/21/2022 0.3     Alkaline Phosphatase 10/21/2022 100     ALT 10/21/2022 20     AST 10/21/2022 19     Lipase 10/21/2022 28.0     Color, UA 10/21/2022 Yellow     Clarity, UA 10/21/2022 Clear     Glucose, Ur 10/21/2022 Negative     Bilirubin Urine 10/21/2022 Negative     Ketones, Urine 10/21/2022 Negative     Specific Gravity, UA 10/21/2022 1.010     Blood, Urine 10/21/2022 Negative     pH, UA 10/21/2022 7.5     Protein, UA 10/21/2022 Negative     Urobilinogen, Urine 10/21/2022 0.2     Nitrite, Urine 10/21/2022 Negative     Leukocyte Esterase, Urine 10/21/2022 TRACE (A)     Microscopic Examination 10/21/2022 YES     Urine Type 10/21/2022 NotGiven     WBC, UA 10/21/2022 3-5     RBC, UA 10/21/2022 0-2     Lactic Acid, Sepsis 10/21/2022 0.8     SARS-CoV-2, NAAT 10/21/2022 Not Detected     POC Glucose 10/21/2022 96     Performed on 10/21/2022 ACCU-CHEK     Sodium 10/22/2022 138     Potassium reflex Magnesi* 10/22/2022 4.2     Chloride 10/22/2022 101     CO2 10/22/2022 31     Anion Gap 10/22/2022 6     Glucose 10/22/2022 92     BUN 10/22/2022 11     Creatinine 10/22/2022 1.0     Est, Glom Filt Rate 10/22/2022 >60     Calcium 10/22/2022 8.6     WBC 10/22/2022 11.0     RBC 10/22/2022 4.13 (A)     Hemoglobin 10/22/2022 12.8 (A)     Hematocrit 10/22/2022 38.9 (A)     MCV 10/22/2022 94.2     MCH 10/22/2022 31.0     MCHC 10/22/2022 32.9     RDW 10/22/2022 13.9     Platelets 33/17/5414 238     MPV 10/22/2022 7.8     Neutrophils % 10/22/2022 74.1     Lymphocytes % 10/22/2022 15.2     Monocytes % 10/22/2022 8.6     Eosinophils % 10/22/2022 1.3     Basophils % 10/22/2022 0.8     Neutrophils Absolute 10/22/2022 8.2 (A)     Lymphocytes Absolute 10/22/2022 1.7     Monocytes Absolute 10/22/2022 1.0     Eosinophils Absolute 10/22/2022 0.1     Basophils Absolute 10/22/2022 0.1     POC Glucose 10/21/2022 101 (A)     Performed on 10/21/2022 ACCU-CHEK     POC Glucose 10/22/2022 88     Performed on 10/22/2022 ACCU-CHEK     POC Glucose 10/22/2022 88     Performed on 10/22/2022 [facility] Outpatient Visit on 10/21/2022   Component Date Value    Sodium 10/21/2022 138     Potassium 10/21/2022 4.4     Chloride 10/21/2022 98 (A)     CO2 10/21/2022 31     Anion Gap 10/21/2022 9     Glucose 10/21/2022 82     BUN 10/21/2022 13     Creatinine 10/21/2022 1.2     Est, Glom Filt Rate 10/21/2022 >60 Calcium 10/21/2022 9.5    Hospital Outpatient Visit on 09/11/2022   Component Date Value    C.diff Toxin/Antigen 09/11/2022                      Value:Negative for Clostridium difficile antigen and toxin  Normal Range: Negative     Hospital Outpatient Visit on 09/06/2022   Component Date Value    Sodium 09/06/2022 142     Potassium 09/06/2022 4.7     Chloride 09/06/2022 104     CO2 09/06/2022 31     Anion Gap 09/06/2022 7     Glucose 09/06/2022 101 (A)     BUN 09/06/2022 12     Creatinine 09/06/2022 1.0     GFR Non- 09/06/2022 >60     GFR  09/06/2022 >60     Calcium 09/06/2022 10.0    Hospital Outpatient Visit on 08/25/2022   Component Date Value    Sodium 08/25/2022 136     Potassium 08/25/2022 5.3 (A)     Chloride 08/25/2022 100     CO2 08/25/2022 29     Anion Gap 08/25/2022 7     Glucose 08/25/2022 105 (A)     BUN 08/25/2022 12     Creatinine 08/25/2022 0.9     GFR Non- 08/25/2022 >60     GFR  08/25/2022 >60     Calcium 08/25/2022 10.0     Total Protein 08/25/2022 7.5     Albumin 08/25/2022 4.1     Albumin/Globulin Ratio 08/25/2022 1.2     Total Bilirubin 08/25/2022 <0.2     Alkaline Phosphatase 08/25/2022 89     ALT 08/25/2022 21     AST 08/25/2022 24     WBC 08/25/2022 7.7     RBC 08/25/2022 4.58     Hemoglobin 08/25/2022 14.6     Hematocrit 08/25/2022 43.5     MCV 08/25/2022 95.0     MCH 08/25/2022 31.9     MCHC 08/25/2022 33.5     RDW 08/25/2022 13.3     Platelets 45/25/2004 303     MPV 08/25/2022 8.1     Neutrophils % 08/25/2022 65.8     Lymphocytes % 08/25/2022 20.9     Monocytes % 08/25/2022 8.3     Eosinophils % 08/25/2022 4.2     Basophils % 08/25/2022 0.8     Neutrophils Absolute 08/25/2022 5.1     Lymphocytes Absolute 08/25/2022 1.6     Monocytes Absolute 08/25/2022 0.6     Eosinophils Absolute 08/25/2022 0.3     Basophils Absolute 08/25/2022 0.1    Admission on 08/18/2022, Discharged on 08/20/2022   Component Date Value    WBC 08/18/2022 15.3 (A)     RBC 08/18/2022 4.52     Hemoglobin 08/18/2022 14.4     Hematocrit 08/18/2022 42.4     MCV 08/18/2022 93.8     MCH 08/18/2022 31.8     MCHC 08/18/2022 33.9     RDW 08/18/2022 13.1     Platelets 88/78/5476 245     MPV 08/18/2022 7.9     Neutrophils % 08/18/2022 83.1     Lymphocytes % 08/18/2022 7.7     Monocytes % 08/18/2022 8.1     Eosinophils % 08/18/2022 0.5     Basophils % 08/18/2022 0.6     Neutrophils Absolute 08/18/2022 12.7 (A)     Lymphocytes Absolute 08/18/2022 1.2     Monocytes Absolute 08/18/2022 1.2     Eosinophils Absolute 08/18/2022 0.1     Basophils Absolute 08/18/2022 0.1     Sodium 08/18/2022 137     Potassium reflex Magnesi* 08/18/2022 3.6     Chloride 08/18/2022 98 (A)     CO2 08/18/2022 28     Anion Gap 08/18/2022 11     Glucose 08/18/2022 120 (A)     BUN 08/18/2022 11     Creatinine 08/18/2022 1.0     GFR Non- 08/18/2022 >60     GFR  08/18/2022 >60     Calcium 08/18/2022 9.6     Total Protein 08/18/2022 7.7     Albumin 08/18/2022 4.3     Albumin/Globulin Ratio 08/18/2022 1.3     Total Bilirubin 08/18/2022 0.6     Alkaline Phosphatase 08/18/2022 119     ALT 08/18/2022 11     AST 08/18/2022 14 (A)     Lactic Acid 08/18/2022 0.7     Lipase 08/18/2022 14.0     Color, UA 08/18/2022 Yellow     Clarity, UA 08/18/2022 SL CLOUDY (A)     Glucose, Ur 08/18/2022 Negative     Bilirubin Urine 08/18/2022 Negative     Ketones, Urine 08/18/2022 Negative     Specific Gravity, UA 08/18/2022 <=1.005     Blood, Urine 08/18/2022 TRACE-INTACT (A)     pH, UA 08/18/2022 6.5     Protein, UA 08/18/2022 TRACE (A)     Urobilinogen, Urine 08/18/2022 0.2     Nitrite, Urine 08/18/2022 Negative     Leukocyte Esterase, Urine 08/18/2022 Negative     Microscopic Examination 08/18/2022 YES     Urine Type 08/18/2022 NotGiven     Urine Reflex to Culture 08/18/2022 Not Indicated     SARS-CoV-2 RNA, RT PCR 08/18/2022 NOT DETECTED     INFLUENZA A 08/18/2022 NOT DETECTED INFLUENZA B 08/18/2022 NOT DETECTED     Ventricular Rate 08/18/2022 68     Atrial Rate 08/18/2022 68     P-R Interval 08/18/2022 200     QRS Duration 08/18/2022 96     Q-T Interval 08/18/2022 386     QTc Calculation (Bazett) 08/18/2022 410     P [place] 08/18/2022 66     R [place] 08/18/2022 34     T Axis 08/18/2022 70     Diagnosis 08/18/2022 Normal sinus rhythmIncomplete right bundle branch blockBorderline ECGWhen compared with ECG of 27-JUL-2021 12:56,No significant change was foundConfirmed by Carmita Ngo MD, [address] ([id number]) on 8/18/2022 4:28:51 PM     Troponin 08/18/2022 <0.01     WBC, UA 08/18/2022 0-2     RBC, UA 08/18/2022 0-2     Epithelial Cells, UA 08/18/2022 0-1     Bacteria, UA 08/18/2022 Rare (A)     Sodium 08/19/2022 137     Potassium reflex Magnesi* 08/19/2022 3.7     Chloride 08/19/2022 100     CO2 08/19/2022 27     Anion Gap 08/19/2022 10     Glucose 08/19/2022 92     BUN 08/19/2022 12     Creatinine 08/19/2022 1.0     GFR Non- 08/19/2022 >60     GFR  08/19/2022 >60     Calcium 08/19/2022 8.6     Total Protein 08/19/2022 5.9 (A)     Albumin 08/19/2022 3.6     Albumin/Globulin Ratio 08/19/2022 1.6     Total Bilirubin 08/19/2022 0.5     Alkaline Phosphatase 08/19/2022 91     ALT 08/19/2022 8 (A)     AST 08/19/2022 11 (A)     WBC 08/19/2022 10.9     RBC 08/19/2022 3.91 (A)     Hemoglobin 08/19/2022 12.6 (A)     Hematocrit 08/19/2022 37.1 (A)     MCV 08/19/2022 94.9     MCH 08/19/2022 32.2     MCHC 08/19/2022 33.9     RDW 08/19/2022 13.1     Platelets 69/83/6328 212     MPV 08/19/2022 7.8     Neutrophils % 08/19/2022 79.3     Lymphocytes % 08/19/2022 11.9     Monocytes % 08/19/2022 7.4     Eosinophils % 08/19/2022 1.1     Basophils % 08/19/2022 0.3     Neutrophils Absolute 08/19/2022 8.6 (A)     Lymphocytes Absolute 08/19/2022 1.3     Monocytes Absolute 08/19/2022 0.8     Eosinophils Absolute 08/19/2022 0.1     Basophils Absolute 08/19/2022 0.0     POC Glucose 08/18/2022 106 (A) Performed on 08/18/2022 ACCU-CHEK     POC Glucose 08/19/2022 100 (A)     Performed on 08/19/2022 ACCU-CHEK     POC Glucose 08/19/2022 114 (A)     Performed on 08/19/2022 ACCU-CHEK     Sodium 08/19/2022 139     Potassium 08/19/2022 3.6     Chloride 08/19/2022 100     CO2 08/19/2022 28     Anion Gap 08/19/2022 11     Glucose 08/19/2022 81     BUN 08/19/2022 14     Creatinine 08/19/2022 1.1     GFR Non- 08/19/2022 >60     GFR  08/19/2022 >60     Calcium 08/19/2022 8.8     POC Glucose 08/19/2022 140 (A)     Performed on 08/19/2022 ACCU-CHEK      Discussed the relevant lab results with the patient. ASSESSMENT  Axel Castillo is a 52 y.o. male presenting as an established patient for acute, chronic, and personalized prevention plan services as noted below. The plans listed below are in accordance with the latest evidence-based practice guidelines from societies including but not limited to USPSTF, AAFP, ADA, ANGIE/AHA, JULIA, and many others. If the plans deviate from the guidelines or from the latest evidence of practice, they are done so with shared discussions with the patient and by weighing the pros and cons of each individual case.       PLAN    Diverticulitis / HFU  - resolved  - CBC for blood loss  - CMP for GI  - gen surg f/u prn    HTN  - well-controlled  - urine micro alb / cr ratio    HLD  - well-controlled  - continue atorvastatin 20 mg daily    COPD  - well-controlled  - continue trelegy ellipta ***  - continue albuterol sulfate 2 puffs inhalation 4 times daily prn for SOB    Prediabetes  - A1C  - urine microalbumin / Cr ratio    GERD  - well-controlled  - continue pantoprazole 40 mg daily      Counseled the patient on healthy lifestyle as below:    - Eating a balanced diet: Eating a diet that is rich in fruits, vegetables, whole grains, and lean proteins can provide your body with the nutrients it needs to stay healthy.    - Staying hydrated: Drinking plenty of water is essential for maintaining good health. Aim for at least 8 glasses of water a day. - Getting enough sleep: Getting enough sleep is crucial for physical and mental well-being. Aim for 7-9 hours of sleep per night.    - Being physically active: Regular physical activity can help improve your overall health, reduce the risk of chronic diseases, and boost your mood. Aim for at least 30 minutes of moderate physical activity per day. - Managing stress: Chronic stress can have negative effects on your health, so it's important to find ways to manage stress effectively. Activities like yoga, meditation, and deep breathing can help. - Avoiding harmful substances: Avoiding substances like tobacco, excessive alcohol, and recreational drugs can help maintain a healthy lifestyle. - Maintaining social connections: Staying connected with friends, family, and community can improve your overall well-being and help you maintain a healthy lifestyle. HEALTH MAINTENANCE DUE  Health Maintenance Due   Topic Date Due    Annual Wellness Visit (AWV)  11/30/2022         RETURN TO [facility]  No follow-ups on file. EMR Dragon/transcription disclaimer:  Much of this encounter note is electronic transcription/translation of spoken language to printed texts. The electronic translation of spoken language may be erroneous, or at times, nonsensical words or phrases may be inadvertently transcribed.   Although I have reviewed the note for such errors, some may still exist.

## 2023-02-16 NOTE — PROGRESS NOTES
[address] and [facility]                                             [address] [facility], [address] [place] [address], [address] [place] [address]        Phone: [phone]      Name:  Simón Sands  :    1970    Consultants:   Patient Care Team:  Jelani Guzman DO as PCP - General (Family Medicine)  Garcia Strickland MD as Consulting Physician (Pulmonology)  ANAI Carson - CNP as Consulting Physician (Nurse Practitioner)  Garth Tariq MD as Consulting Physician (Orthopedic Surgery)  Garcia Strickland MD as Consulting Physician (Pulmonology)    Chief Complaint:     Simón Sands is a 52 y.o. male  who presents today for an established patient care visit with Personalized Prevention Plan Services as noted below. HPI:     Kay Mclaughlin is a 52year old male with a past medical history significant for HTN, pre-diabetes, HLD, and recurrent diverticulitis s/p sigmoidectomy in  who presents for a hospital follow up (admitted from /12 - ).     Acute Concerns    Diverticulitis s/p sigmoidectomy in   - Surgical site pain improving, no blood in bowel movements, no nausea, low fiber diet, no fever  - Left in continuity, no colostomy    Nocturia  - gets up 5-6 times a night to go pee, no dysuria, no urinary hesitancy, no stopping and starting    Chronic Concerns     Essential HTN  - currently taking amlodipine 5mg daily  - Denies symptoms of headaches, vision changes, peripheral edema     HLD  - currently taking atorvastatin 20mg daily  - denies symptoms of shortness of breath, chest pain     COPD  - currently taking trelegy (ICS/LAMA/LABA) and albuterol 1-2x weekly  - denies ncreaseshortness of breath, hypoxia     Prediabetes  - last A1C 5.8 in 22  - A1C 5.6 today  - Denies symptoms of polyuria, polydipsia, visual changes    GERD  - currently taking pantoprazole 40 mg daily  - denies symptoms of heartburn, reflux, dysphagia      Patient Active Problem List   Diagnosis    Benign essential HTN    Gastroesophageal reflux disease without esophagitis    COPD (chronic obstructive pulmonary disease) (HCC)    Prediabetes    Hyperlipidemia, mixed    Esophageal dysphagia    DDD (degenerative disc disease), cervical    COPD exacerbation (HCC)    Acute respiratory failure with hypoxia (HCC)    Marijuana use, episodic    Acute diverticulitis of intestine    Diverticulitis of colon with perforation    Leukocytosis    Cyst of right kidney    Diverticulitis of large intestine with perforation without abscess or bleeding    Diverticulitis    Left lower quadrant abdominal pain         Past Medical History:    Past Medical History:   Diagnosis Date    Cervical disc herniation     COPD (chronic obstructive pulmonary disease) ([facility] [address]     Diverticulitis     Folate deficiency 01/2013    GERD (gastroesophageal reflux disease)     Hyperlipidemia 02/2014    Hypertriglyceridemia, Good HDL    Hypertension 12/12: age 37    Prediabetes 11/2016    Vitamin D deficiency 01/2013    resolved       Past Surgical History:  Past Surgical History:   Procedure Laterality Date    ANKLE FRACTURE SURGERY Left 1998    BRONCHOSCOPY  12/29/2015    CERVICAL FUSION N/A 01/16/2019    ANTERIOR CERVICAL DISCECTOMY AND FUSION WITH ALLOGRAFT, MEDTRONICS AND EVOKES  CPT CODE - 89565 performed by Jim Wilson MD at [facility] 1/12/2023    SIGMOID COLECTOMY WITH ANASTOMOSIS performed by Kasia Mccray MD at [facility]  02/2013    ENDOSCOPY, COLON, DIAGNOSTIC      [place] NJX DX/THER SBST INTRLMNR CRV/THRC W/IMG GDN Right 11/19/2018    RIGHT CERVICAL SEVEN THORACIC EPIDURAL STEROID INJECTION SITE CONFIRMED BY FLUOROSCOPY performed by Huang Taylor MD at [facility]  Prior to Visit Medications    Medication Sig Taking?  Authorizing Provider   pantoprazole (PROTONIX) 40 MG tablet TAKE ONE TABLET BY MOUTH DAILY  Edward Shallow, DO   atorvastatin (LIPITOR) 20 MG tablet TAKE ONE TABLET BY MOUTH DAILY  Edward Shallow, DO   amLODIPine (NORVASC) 5 MG tablet Take 1 tablet by mouth daily  Edward Shallow, DO   albuterol sulfate HFA (VENTOLIN HFA) 108 (90 Base) MCG/ACT inhaler Inhale 2 puffs into the lungs 4 times daily as needed for [address], DO   Ami Kapadia 100-62.5-25 MCG/INH AEPB INHALE ONE PUFF BY MOUTH DAILY  Dorita Orozco PA-C   folic acid (FOLVITE) 1 MG tablet TAKE ONE TABLET BY MOUTH DAILY  Sarina Gomez,        Allergies:    No known allergies    Family History:       Problem Relation Age of Onset    COPD Mother 39    COPD Father 39         Health Maintenance Completed:  Health Maintenance   Topic Date Due    Annual Wellness Visit (AWV)  11/30/2022    Flu vaccine (1) 09/27/2023 (Originally 8/1/2022)    Shingles vaccine (1 of 2) 08/11/2024 (Originally 1/1/2018)    COVID-19 Vaccine (1) 08/11/2024 (Originally 1970)    A1C test (Diabetic or Prediabetic)  08/06/2023    Lipids  08/06/2023    Low dose CT lung screening  08/13/2023    Depression Screen  10/28/2023    DTaP/Tdap/Td vaccine (2 - Td or Tdap) 02/13/2024    Colorectal Cancer Screen  04/03/2028    Pneumococcal 0-64 years Vaccine  Completed    Hepatitis C screen  Completed    HIV screen  Completed    Hepatitis A vaccine  Aged Out    Hib vaccine  Aged Out    Meningococcal (ACWY) vaccine  Aged Out          Immunization History   Administered Date(s) Administered    Influenza Nasal 11/10/2015    Influenza Virus Vaccine 02/13/2014, 11/07/2014    Pneumococcal Polysaccharide ([id number]) 08/10/2015    Tdap (Boostrix, Adacel) 02/13/2014         Review of Systems:  Review of Systems   Constitutional:  Negative for activity change, appetite change and fever. Cardiovascular:  Negative for chest pain and leg swelling. Gastrointestinal:  Negative for blood in stool, nausea and vomiting.    Endocrine: Negative for polydipsia, polyphagia and polyuria. Physical Exam:   There were no vitals filed for this visit. There is no height or weight on file to calculate BMI. Wt Readings from Last 3 Encounters:   01/26/23 160 lb (72.6 kg)   01/12/23 156 lb 8.4 oz (71 kg)   12/20/22 160 lb (72.6 kg)       BP Readings from Last 3 Encounters:   02/16/23 132/72   01/26/23 120/72   01/15/23 (!) 141/73         Physical Exam  Constitutional:       Appearance: Normal appearance. Cardiovascular:      Rate and Rhythm: Normal rate and regular rhythm. Heart sounds: No murmur heard. No friction rub. No gallop. Abdominal:      General: Abdomen is flat. Palpations: Abdomen is soft. Comments: Well healing suprapubic surgical incision site. Skin:     General: Skin is warm and dry. Neurological:      Mental Status: He is alert. Lab Review: Point of Care A1C significant for 5.8. Assessment/Plan:    Parmjit Lemus is a 52year old male with a past medical history significant for HTN, pre-diabetes, HLD, and recurrent diverticulitis s/p sigmoidectomy in 2023 who presents for a hospital follow up. 1. Diverticulitis s/p sigmoidectomy in 2023  - Resolved. Continue recommended follow up with general surgery. 2. Benign essential HTN  - Not at goal, reading in office 132/80  - Currently taking amlodipine 5mg daily  - Plan to recheck at next visit and make management change decision at that time  - Plan to check urine micro alb / cr ratio today     3. Hyperlipidemia, mixed  - currently taking atorvastatin 20mg daily     4. Chronic obstructive pulmonary disease, unspecified COPD type ([facility] [address]  - Controlled. currently taking trelegy (ICS/LAMA/LABA) once daily  and albuterol 1-2x      5. Prediabetes  - Resolved. A1C on 2/16/23 improved to 5.6, down from 5.8.   - Continue lifestyle management only, recheck A1C three months. 6. GERD  - Well-controlled.  currently taking pantoproazole 40 mg daily      Health Maintenance Due:  Health Maintenance Due   Topic Date Due    Annual Wellness Visit (AWV)  11/30/2022        RTC:    Return to clinic in three months for chronic care follow up.

## 2023-02-16 NOTE — PROGRESS NOTES
[address] and [facility] Res[facility]                                             [address] [facility], [address] [place] [address], [address] [place], [place] [id number]        Phone: [phone]      Name:  Nae Gauthier  :    1970    Consultants:   Patient Care Team:  Johnie eWston DO as PCP - General (Family Medicine)  Titi Sosa MD as Consulting Physician (Pulmonology)  ANAI Flores - CNP as Consulting Physician (Nurse Practitioner)  Chuck Alexander MD as Consulting Physician (Orthopedic Surgery)  Titi Sosa MD as Consulting Physician (Pulmonology)    Chief Complaint:     Nae Gauthier is a 52 y.o. male  who presents today for an established patient care visit with Personalized Prevention Plan Services as noted below. HPI:     Ayla Armando is a 52year old male with a past medical history significant for HTN, pre-diabetes, HLD, and recurrent diverticulitis s/p sigmoidectomy in  who presents for a hospital follow up (admitted from  - ).     Acute Concerns    Diverticulitis s/p sigmoidectomy in   - Surgical site pain improving, no blood in bowel movements, no nausea, low fiber diet, no fever  - Left in continuity, no colostomy    Nocturia  - gets up 5-6 times a night to go pee, no dysuria, no urinary hesitancy, no stopping and starting    Chronic Concerns     Essential HTN  - currently taking amlodipine 5mg daily  - Denies symptoms of headaches, vision changes, peripheral edema     HLD  - currently taking atorvastatin 20mg daily  - denies symptoms of shortness of breath, chest pain     COPD  - currently taking trelegy (ICS/LAMA/LABA) and albuterol 1-2x weekly  - denies ncreaseshortness of breath, hypoxia     Prediabetes  - last A1C 5.8 in 22  - A1C 5.6 today  - Denies symptoms of polyuria, polydipsia, visual changes    GERD  - currently taking pantoprazole 40 mg daily  - denies symptoms of heartburn, reflux, dysphagia      Patient Active Problem List   Diagnosis    Benign essential HTN    Gastroesophageal reflux disease without esophagitis    COPD (chronic obstructive pulmonary disease) (HCC)    Prediabetes    Hyperlipidemia, mixed    Esophageal dysphagia    DDD (degenerative disc disease), cervical    COPD exacerbation (HCC)    Acute respiratory failure with hypoxia (HCC)    Marijuana use, episodic    Acute diverticulitis of intestine    Diverticulitis of colon with perforation    Leukocytosis    Cyst of right kidney    Diverticulitis of large intestine with perforation without abscess or bleeding    Diverticulitis    Left lower quadrant abdominal pain         Past Medical History:    Past Medical History:   Diagnosis Date    Cervical disc herniation     COPD (chronic obstructive pulmonary disease) ([facility] [address]     Diverticulitis     Folate deficiency 01/2013    GERD (gastroesophageal reflux disease)     Hyperlipidemia 02/2014    Hypertriglyceridemia, Good HDL    Hypertension 12/12: age 37    Prediabetes 11/2016    Vitamin D deficiency 01/2013    resolved       Past Surgical History:  Past Surgical History:   Procedure Laterality Date    ANKLE FRACTURE SURGERY Left 1998    BRONCHOSCOPY  12/29/2015    CERVICAL FUSION N/A 01/16/2019    ANTERIOR CERVICAL DISCECTOMY AND FUSION WITH ALLOGRAFT, MEDTRONICS AND EVOKES  CPT CODE - 70031 performed by Chelle Pope MD at [facility] 1/12/2023    SIGMOID COLECTOMY WITH ANASTOMOSIS performed by Eugene Blanc MD [facility]  02/2013    ENDOSCOPY, COLON, DIAGNOSTIC      SD NJX DX/THER SBST INTRLMNR CRV/THRC W/IMG GDN Right 11/19/2018    RIGHT CERVICAL SEVEN THORACIC EPIDURAL STEROID INJECTION SITE CONFIRMED BY FLUOROSCOPY performed by Addie Cushing, MD at [facility].:  Prior to Visit Medications    Medication Sig Taking?  Authorizing Provider   pantoprazole (PROTONIX) 40 MG tablet TAKE ONE TABLET BY MOUTH DAILY Yes Bassett Flurry, DO   atorvastatin (LIPITOR) 20 MG tablet TAKE ONE TABLET BY MOUTH DAILY Yes Bassett Flurry, DO   amLODIPine (NORVASC) 5 MG tablet Take 1 tablet by mouth daily Yes Bassett Flurry, DO   albuterol sulfate HFA (VENTOLIN HFA) 108 (90 Base) MCG/ACT inhaler Inhale 2 puffs into the lungs 4 times daily as needed for Wheezing Yes Bassett Flurry, DO   Bettylou Konig 100-62.5-25 MCG/INH AEPB INHALE ONE PUFF BY MOUTH DAILY Yes Russell Gilliland PA-C   folic acid (FOLVITE) 1 MG tablet TAKE ONE TABLET BY MOUTH DAILY  Patient not taking: Reported on 2/16/2023  Omkar Gomez DO       Allergies:    No known allergies    Family History:       Problem Relation Age of Onset    COPD Mother 39    COPD Father 39         Health Maintenance Completed:  Health Maintenance   Topic Date Due    Annual Wellness Visit (AWV)  11/30/2022    Flu vaccine (1) 09/27/2023 (Originally 8/1/2022)    Shingles vaccine (1 of 2) 08/11/2024 (Originally 1/1/2018)    COVID-19 Vaccine (1) 08/11/2024 (Originally 1970)    Lipids  08/06/2023    Low dose CT lung screening  08/13/2023    Depression Screen  10/28/2023    DTaP/Tdap/Td vaccine (2 - Td or Tdap) 02/13/2024    A1C test (Diabetic or Prediabetic)  02/16/2024    Colorectal Cancer Screen  04/03/2028    Pneumococcal 0-64 years Vaccine  Completed    Hepatitis C screen  Completed    HIV screen  Completed    Hepatitis A vaccine  Aged Out    Hib vaccine  Aged Out    Meningococcal (ACWY) vaccine  Aged Out          Immunization History   Administered Date(s) Administered    Influenza Nasal 11/10/2015    Influenza Virus Vaccine 02/13/2014, 11/07/2014    Pneumococcal Polysaccharide ([id number]) 08/10/2015    Tdap (Boostrix, Adacel) 02/13/2014         Review of Systems:  Review of Systems   Constitutional:  Negative for activity change, appetite change and fever. Cardiovascular:  Negative for chest pain and leg swelling.    Gastrointestinal:  Negative for blood in stool, nausea and vomiting. Endocrine: Negative for polydipsia, polyphagia and polyuria. Physical Exam:   Vitals:    02/16/23 1341   BP: 132/72   Site: Left Upper Arm   Position: Sitting   Cuff Size: Small Adult   Pulse: 87   Temp: 98.6 °F (37 °C)   TempSrc: Oral   SpO2: 97%   Weight: 165 lb (74.8 kg)     Body mass index is 23.68 kg/m². Wt Readings from Last 3 Encounters:   02/16/23 165 lb (74.8 kg)   01/26/23 160 lb (72.6 kg)   01/12/23 156 lb 8.4 oz (71 kg)       BP Readings from Last 3 Encounters:   02/16/23 132/72   01/26/23 120/72   01/15/23 (!) 141/73         Physical Exam  Constitutional:       Appearance: Normal appearance. Cardiovascular:      Rate and Rhythm: Normal rate and regular rhythm. Heart sounds: No murmur heard. No friction rub. No gallop. Abdominal:      General: Abdomen is flat. Palpations: Abdomen is soft. Comments: Well healing suprapubic surgical incision site. Skin:     General: Skin is warm and dry. Neurological:      Mental Status: He is alert. Lab Review: Point of Care A1C significant for 5.8. Assessment/Plan:    Kay Mclaughlin is a 52year old male with a past medical history significant for HTN, pre-diabetes, HLD, and recurrent diverticulitis s/p sigmoidectomy in 2023 who presents for a hospital follow up. 1. Diverticulitis s/p sigmoidectomy in 2023  - Resolved. Continue recommended follow up with general surgery. 2. Benign essential HTN  - Not at goal, reading in office 132/80  - Currently taking amlodipine 5mg daily  - Plan to recheck at next visit and make management change decision at that time  - Plan to check urine micro alb / cr ratio today     3. Hyperlipidemia, mixed  - currently taking atorvastatin 20mg daily     4. Chronic obstructive pulmonary disease, unspecified COPD type ([facility] [address]  - Controlled. currently taking trelegy (ICS/LAMA/LABA) once daily  and albuterol 1-2x      5. Prediabetes  - Resolved.  A1C on 2/16/23 improved to 5.6, down from 5.8.   - Continue lifestyle management only, recheck A1C three months. 6. GERD  - Well-controlled. currently taking pantoproazole 40 mg daily      Health Maintenance Due:  Health Maintenance Due   Topic Date Due    Annual Wellness Visit (AWV)  11/30/2022        RTC:    Return to clinic in three months for chronic care follow up. I, resident physician Dr. Ana Serrano PGY-1, have seen and examined this patient. I agree with the medical student's findings as above. My assessment and plan are as below:    Carley PoseyJasen Scott is a 57-year-old male with a past medical history significant for diverticulitis, HTN, HLD, COPD, pre-diabetes, GERD presenting for hospital follow-up for elective sigmoid colectomy.     Diverticulitis / HFU  - well controlled  - gen surg f/u prn     HTN  - not at goal today 132/72 but was at goal on last visit 120/72  - continue amlodipine 5 mg daily  - urine micro albumin / Cr ratio  - if not at goal on next visit, will  on med changes     HLD  - well-controlled  - continue atorvastatin 20 mg daily     COPD  - well-controlled  - continue trelegy ellipta once day  - continue albuterol sulfate 2 puffs inhalation 4 times daily prn     Prediabetes  - resolved  - A1C 5.6 today down from 5.8 back in 6 months ago  - urine microalbumin / Cr ratio     GERD  - well-controlled  - continue pantoprazole 40 mg daily    RTC  - in 3 months for HTN, COPD, A1C check

## 2023-02-17 LAB
CREATININE URINE: 167.2 MG/DL (ref 39–259)
MICROALBUMIN UR-MCNC: <1.2 MG/DL
MICROALBUMIN/CREAT UR-RTO: NORMAL MG/G (ref 0–30)

## 2023-02-22 ENCOUNTER — OFFICE VISIT (OUTPATIENT)
Dept: PULMONOLOGY | Age: 53
End: 2023-02-22
Payer: MEDICARE

## 2023-02-22 VITALS
HEART RATE: 88 BPM | BODY MASS INDEX: 23.99 KG/M2 | SYSTOLIC BLOOD PRESSURE: 110 MMHG | WEIGHT: 167.6 LBS | HEIGHT: 70 IN | DIASTOLIC BLOOD PRESSURE: 64 MMHG | OXYGEN SATURATION: 92 % | RESPIRATION RATE: 16 BRPM

## 2023-02-22 DIAGNOSIS — Z87.891 HISTORY OF TOBACCO ABUSE: Primary | ICD-10-CM

## 2023-02-22 DIAGNOSIS — Z87.891 PERSONAL HISTORY OF TOBACCO USE: ICD-10-CM

## 2023-02-22 DIAGNOSIS — J44.9 CHRONIC OBSTRUCTIVE PULMONARY DISEASE, UNSPECIFIED COPD TYPE (HCC): ICD-10-CM

## 2023-02-22 DIAGNOSIS — R91.1 PULMONARY NODULE: ICD-10-CM

## 2023-02-22 PROCEDURE — 99214 OFFICE O/P EST MOD 30 MIN: CPT | Performed by: INTERNAL MEDICINE

## 2023-02-22 PROCEDURE — 3074F SYST BP LT 130 MM HG: CPT | Performed by: INTERNAL MEDICINE

## 2023-02-22 PROCEDURE — 3078F DIAST BP <80 MM HG: CPT | Performed by: INTERNAL MEDICINE

## 2023-02-22 NOTE — PATIENT INSTRUCTIONS

## 2023-02-22 NOTE — PROGRESS NOTES
Discussed with the patient the current USPSTF guidelines released March 9, 2021 for screening for lung cancer. For adults aged 48 to [de-identified] years who have a 20 pack-year smoking history and currently smoke or have quit within the past 15 years the grade B recommendation is to:  Screen for lung cancer with low-dose computed tomography (LDCT) every year. Stop screening once a person has not smoked for 15 years or has a health problem that limits life expectancy or the ability to have lung surgery. The patient  reports that he quit smoking about 4 years ago. His smoking use included cigarettes. He started smoking about 39 years ago. He has a 70.00 pack-year smoking history. He has never used smokeless tobacco.. Discussed with patient the risks and benefits of screening, including over-diagnosis, false positive rate, and total radiation exposure. The patient currently exhibits no signs or symptoms suggestive of lung cancer. Discussed with patient the importance of compliance with yearly annual lung cancer screenings and willingness to undergo diagnosis and treatment if screening scan is positive. In addition, the patient was counseled regarding the importance of remaining smoke free and/or total smoking cessation.     Also reviewed the following if the patient has Medicare that as of February 10, 2022, Medicare only covers LDCT screening in patients aged 51-72 with at least a 20 pack-year smoking history who currently smoke or have quit in the last 15 years

## 2023-02-22 NOTE — PROGRESS NOTES
PULMONARY, CRITICAL CARE AND SLEEP MEDICINE   CC: Shortness of breath   Referring Provider: Andres    Interval History: 2/22/23  Doing well on Trelegy, notices benefit. Has PRN albuterol. No new concerns. Interval History: 8/12/2021 Treated for COPD exacerbation in July 2021, now doing better, prednisone almost off. Planning to get COVID vaccine    Presenting HPI: 51 yo WM with 60 pack year tobacco, now with 2 year moderate progressive LOZANO associated with productive cough; sxs are not 100% predictable, some intermittent nature, but daily. Improved with albuterol. reports that he quit smoking about 4 years ago. His smoking use included cigarettes. He started smoking about 39 years ago. He has a 70.00 pack-year smoking history. He has never used smokeless tobacco. He reports that he does not currently use alcohol after a past usage of about 1.0 standard drink per week. He reports current drug use. Drug: Marijuana Roya Magan). PHYSICAL EXAM:   Blood pressure 110/64, pulse 88, resp. rate 16, height 5' 10\" (1.778 m), weight 167 lb 9.6 oz (76 kg), SpO2 92 %. 'RA  Constitutional:  No acute distress. HENT:  Oropharynx is clear and moist.   Neck: No tracheal deviation present. Cardiovascular: Normal heart sounds. No lower extremity edema. Pulmonary/Chest: + wheezes. No rhonchi. No rales. No decreased breath sounds. No accessory muscle usage or stridor. Musculoskeletal: No cyanosis. No clubbing. Skin: Skin is warm and dry. Psychiatric: Normal mood and affect. Neurologic: speech fluent, alert and oriented, strength symmetric     DATA:   CT CHEST 11-18-15  There is some minimal increased opacity in the right mainstem bronchus and   bronchus intermedius likely retained secretions. The central airways are   otherwise patent. Subpleural opacity is noted posteriorly at the right lung base likely   representative of atelectasis. There is a calcified granuloma noted posteriorly in the left lower lobe. Lungs are otherwise grossly clear. Mediastinal structures demonstrate a normal appearance of the heart and great   vessels. No suspicious hilar or mediastinal adenopathy noted. Impression   No acute abnormality of the chest or abdomen noted. LDCT 8/15/22  Lung Screening Specific (Lung-RADS):       Several tiny punctate nodules are present in the bilateral upper lobes. The   largest in the lingula measures 5 x 3 mm. Potentially Significant Incidentals (Lung-RADS Category S): None       Pulmonary incidentals:  Calcified granulomata in the right hilum and right   lung. Other incidentals:  Minimal atherosclerotic calcification of the coronary   arteries. CXR 7/27/21 no airspace disease     PFT 1/12/16 FEV1 1/05 L 32%  ++ airtrapping  DLCO 14.73 50%  PFT 4/16/19 FEV1 1.1 L 35%    DLCO 12.12 42%    Bronchoscopy 2015 cytology no malignant cells. ALPHA 1 161    ASSESSMENT:   Very Severe COPD/pulmonary emphysema  Pulmonary Nodules     Not addressed today  Family h/o COPD  61 pack year tobacco history, quit 1/1/19    PLAN:   Trelegy daily   Albuterol PRN  LDCT for LCS in August 2023, see me 2-3 days after     Screening CT scan was considered in a lung cancer screening counseling and shared decision making visit today that included the following elements:   Eligibility: Age: 52. There are no signs or symptoms of lung cancer.   Tobacco History 60 pack-years, quit 4 years ago  Verbal counseling has been performed by me to include benefits and harms of screening, follow-up diagnostic testing, over-diagnosis, false positive rate, and total radiation exposure;   I have counseled on the importance of adherence to annual lung cancer LDCT screening, the impact of comorbidities and patient is willing to undergo diagnosis and treatment;   I have provided counseling on the importance of maintaining cigarette smoking abstinence if former smoker; or the importance of smoking cessation if current smoker and, if appropriate, furnishing of information about tobacco cessation interventions; and   I have furnished a written order for lung cancer screening with LDCT. Order for Screening chest CT scan should be placed with documentation as below:  Beneficiary date of birth;    Actual pack - year smoking history (number) from above;   Current smoking status, and for former smokers, the number of years since quitting smoking from above  Beneficiary is asymptomatic   National Provider Identifier (NPI) for [employer] [place] [id number]

## 2023-03-21 ENCOUNTER — TELEPHONE (OUTPATIENT)
Dept: PULMONOLOGY | Age: 53
End: 2023-03-21

## 2023-03-21 DIAGNOSIS — J44.9 CHRONIC OBSTRUCTIVE PULMONARY DISEASE, UNSPECIFIED COPD TYPE (HCC): Primary | ICD-10-CM

## 2023-03-21 NOTE — TELEPHONE ENCOUNTER
Pt called stating that his handicap placard is going to  soon and he needs a new RX. Please advise. Pt will  in office when completed.       OV 23:    ASSESSMENT:   Very Severe COPD/pulmonary emphysema  Pulmonary Nodules      Not addressed today  Family h/o COPD  61 pack year tobacco history, quit 19     PLAN:   Trelegy daily   Albuterol PRN  LDCT for LCS in 2023, see me 2-3 days after

## 2023-03-21 NOTE — LETTER
March 21, 2023       Patient: Aarti Kuhn   YOB: 1970   Date of Visit: 3/28/2018     To Whom It May Concern: It is my medical opinion that Sita Manley requires a disability parking placard for the following reasons:COPD J44.9  Duration of need: 5 years    If you have any questions or concerns, please don't hesitate to call.     Sincerely,      Iveth Guallpa MD

## 2023-03-23 NOTE — TELEPHONE ENCOUNTER
Called pt and let him know letter is ready for , verbalized understanding and will be in sometime today to get it.

## 2023-03-28 NOTE — PROGRESS NOTES
Report given to [facility]. Pt stable, care transferred at this time.  Toro Mckenzie RN No

## 2023-05-18 ENCOUNTER — OFFICE VISIT (OUTPATIENT)
Dept: PRIMARY CARE CLINIC | Age: 53
End: 2023-05-18

## 2023-05-18 VITALS
OXYGEN SATURATION: 94 % | HEART RATE: 84 BPM | SYSTOLIC BLOOD PRESSURE: 130 MMHG | DIASTOLIC BLOOD PRESSURE: 80 MMHG | WEIGHT: 170.6 LBS | HEIGHT: 64 IN | BODY MASS INDEX: 29.12 KG/M2

## 2023-05-18 DIAGNOSIS — E66.3 OVERWEIGHT: ICD-10-CM

## 2023-05-18 DIAGNOSIS — E78.2 HYPERLIPIDEMIA, MIXED: ICD-10-CM

## 2023-05-18 DIAGNOSIS — Z86.2 HISTORY OF ANEMIA: ICD-10-CM

## 2023-05-18 DIAGNOSIS — M50.30 DDD (DEGENERATIVE DISC DISEASE), CERVICAL: ICD-10-CM

## 2023-05-18 DIAGNOSIS — Z87.891 HISTORY OF TOBACCO USE: ICD-10-CM

## 2023-05-18 DIAGNOSIS — K21.9 GASTROESOPHAGEAL REFLUX DISEASE, UNSPECIFIED WHETHER ESOPHAGITIS PRESENT: ICD-10-CM

## 2023-05-18 DIAGNOSIS — J44.9 CHRONIC OBSTRUCTIVE PULMONARY DISEASE, UNSPECIFIED COPD TYPE (HCC): ICD-10-CM

## 2023-05-18 DIAGNOSIS — Z87.19 HISTORY OF DIVERTICULITIS: ICD-10-CM

## 2023-05-18 DIAGNOSIS — I10 BENIGN ESSENTIAL HTN: Primary | ICD-10-CM

## 2023-05-18 DIAGNOSIS — R73.03 PREDIABETES: ICD-10-CM

## 2023-05-18 ASSESSMENT — ANXIETY QUESTIONNAIRES
7. FEELING AFRAID AS IF SOMETHING AWFUL MIGHT HAPPEN: 0
2. NOT BEING ABLE TO STOP OR CONTROL WORRYING: 0
IF YOU CHECKED OFF ANY PROBLEMS ON THIS QUESTIONNAIRE, HOW DIFFICULT HAVE THESE PROBLEMS MADE IT FOR YOU TO DO YOUR WORK, TAKE CARE OF THINGS AT HOME, OR GET ALONG WITH OTHER PEOPLE: NOT DIFFICULT AT ALL
6. BECOMING EASILY ANNOYED OR IRRITABLE: 0
4. TROUBLE RELAXING: 0
1. FEELING NERVOUS, ANXIOUS, OR ON EDGE: 0
GAD7 TOTAL SCORE: 0
3. WORRYING TOO MUCH ABOUT DIFFERENT THINGS: 0
5. BEING SO RESTLESS THAT IT IS HARD TO SIT STILL: 0

## 2023-05-18 ASSESSMENT — ENCOUNTER SYMPTOMS
WHEEZING: 0
COUGH: 0
VOMITING: 0
CONSTIPATION: 0
CHEST TIGHTNESS: 0
SHORTNESS OF BREATH: 0
BACK PAIN: 0
NAUSEA: 0
EYES NEGATIVE: 1
ABDOMINAL PAIN: 0
DIARRHEA: 0

## 2023-05-18 ASSESSMENT — PATIENT HEALTH QUESTIONNAIRE - PHQ9
SUM OF ALL RESPONSES TO PHQ QUESTIONS 1-9: 0
SUM OF ALL RESPONSES TO PHQ QUESTIONS 1-9: 0
1. LITTLE INTEREST OR PLEASURE IN DOING THINGS: 0
2. FEELING DOWN, DEPRESSED OR HOPELESS: 0
SUM OF ALL RESPONSES TO PHQ QUESTIONS 1-9: 0
SUM OF ALL RESPONSES TO PHQ QUESTIONS 1-9: 0
DEPRESSION UNABLE TO ASSESS: FUNCTIONAL CAPACITY MOTIVATION LIMITS ACCURACY
SUM OF ALL RESPONSES TO PHQ9 QUESTIONS 1 & 2: 0

## 2023-05-18 NOTE — PROGRESS NOTES
decision maker:  <66 years old      Health Maintenance: (USPSTF Recommendations)  (M) Prostate Cancer Screen: (54-79 yo discuss benefits/harm, does not recommend testing PSA in men >73 yo (D):   (M) AAA Screen: (men 73-69 yo who has ever smoked (B), consider in nonsmokers if high risk):  CRC/Colonoscopy Screening: (adults 39-53 (B), 50-75 (A))  Lung Ca Screening: Annual LDCT (+smoker age 49-80, smoked within 15 years, total of 20 pack yr history (B)):  DEXA Screen: (women >65 and older, <65 if at risk/postmenopausal (B))  HIV Screen: (16-65 yr old, and all pregnant patients (A)): Hep C Screen: (18-79 yr old (B)):  HCC Screen: (all pts with cirrhosis and high risk Hep B (US q6 mo)):  Immunizations:    RTC:  Return in about 6 months (around 11/18/2023). EMR Dragon/transcription disclaimer:  Much of this encounter note is electronic transcription/translation of spoken language to printed texts. The electronic translation of spoken language may be erroneous, or at times, nonsensical words or phrases may be inadvertently transcribed.   Although I have reviewed the note for such errors, some may still exist.

## 2023-05-23 RX ORDER — AMLODIPINE BESYLATE 5 MG/1
5 TABLET ORAL DAILY
Qty: 30 TABLET | Refills: 3 | Status: SHIPPED | OUTPATIENT
Start: 2023-05-23

## 2023-05-23 NOTE — TELEPHONE ENCOUNTER
Patient is calling needing a refill of  amLODIPine (NORVASC) 5 MG tablet  He is out and would like refills of this.

## 2023-05-23 NOTE — TELEPHONE ENCOUNTER
Refill Request       Last Seen: Last Seen Department: 5/18/2023  Last Seen by PCP: 5/18/2023    Last Written: 11/1/2022 30 with 3 refills     Next Appointment:   Future Appointments   Date Time Provider Simone Marcelo   8/22/2023 10:00 AM [facility] CT ED [facility] CT SC Pramod Fraser   8/29/2023 10:15 AM MD YOSI Falcon PULDARIUSZ MCCRACKEN             Requested Prescriptions     Pending Prescriptions Disp Refills    amLODIPine (NORVASC) 5 MG tablet 30 tablet 3     Sig: Take 1 tablet by mouth daily

## 2023-06-19 RX ORDER — AMLODIPINE BESYLATE 5 MG/1
TABLET ORAL
Qty: 30 TABLET | Refills: 3 | OUTPATIENT
Start: 2023-06-19

## 2023-07-13 RX ORDER — ATORVASTATIN CALCIUM 20 MG/1
TABLET, FILM COATED ORAL
Qty: 90 TABLET | Refills: 1 | Status: SHIPPED | OUTPATIENT
Start: 2023-07-13

## 2023-07-13 NOTE — TELEPHONE ENCOUNTER
Refill Request     CONFIRM preferred pharmacy with the patient. If Mail Order Rx - Pend for 90 day refill. Last Seen: Last Seen Department: 5/18/2023    Last Seen by PCP: 5/18/2023    Last Written: 1/10/23 90 tablet 1 refill    If no future appointment scheduled:  Review the last OV with PCP and review information for follow-up visit,  Route STAFF MESSAGE with patient name to the [facility] for scheduling with the following information:            -  Timing of next visit           -  Visit type ie Physical, OV, etc           -  Diagnoses/Reason ie. COPD, HTN - Do not use MEDICATION, Follow-up or CHECK UP - Give reason for visit      Next Appointment: 11/16/23  Future Appointments   Date Time Provider [address]   8/22/2023 10:00 AM [facility] CT ED [facility] CT [place] [facility]   8/29/2023 10:15 AM MD YOSI Carbajal   11/16/2023  9:30 AM Alina Schrader DO [facility] AND [facility]       Message sent to [address] [place] [address] to schedule appt with patient?   NO      Requested Prescriptions     Pending Prescriptions Disp Refills    atorvastatin (LIPITOR) 20 MG tablet [Pharmacy Med Name: ATORVASTATIN 20 MG TABLET] 90 tablet 1     Sig: TAKE ONE TABLET BY MOUTH DAILY

## 2023-08-11 ENCOUNTER — HOSPITAL ENCOUNTER (OUTPATIENT)
Age: 53
Discharge: HOME | End: 2023-08-11
Payer: COMMERCIAL

## 2023-08-11 DIAGNOSIS — E78.2 HYPERLIPIDEMIA, MIXED: ICD-10-CM

## 2023-08-11 DIAGNOSIS — Z86.2 HISTORY OF ANEMIA: ICD-10-CM

## 2023-08-11 DIAGNOSIS — R73.03 PREDIABETES: ICD-10-CM

## 2023-08-11 DIAGNOSIS — I10 BENIGN ESSENTIAL HTN: ICD-10-CM

## 2023-08-11 DIAGNOSIS — K21.9 GASTROESOPHAGEAL REFLUX DISEASE, UNSPECIFIED WHETHER ESOPHAGITIS PRESENT: ICD-10-CM

## 2023-08-11 LAB
ALBUMIN SERPL-MCNC: 4.4 G/DL (ref 3.4–5)
ALBUMIN/GLOB SERPL: 1.5 {RATIO} (ref 1.1–2.2)
ALP SERPL-CCNC: 107 U/L (ref 40–129)
ALT SERPL-CCNC: 14 U/L (ref 10–40)
ANION GAP SERPL CALCULATED.3IONS-SCNC: 10 MMOL/L (ref 3–16)
AST SERPL-CCNC: 18 U/L (ref 15–37)
BASOPHILS # BLD: 0.1 K/UL (ref 0–0.2)
BASOPHILS NFR BLD: 1 %
BILIRUB SERPL-MCNC: 0.5 MG/DL (ref 0–1)
BUN SERPL-MCNC: 15 MG/DL (ref 7–20)
CALCIUM SERPL-MCNC: 9.9 MG/DL (ref 8.3–10.6)
CHLORIDE SERPL-SCNC: 102 MMOL/L (ref 99–110)
CHOLEST SERPL-MCNC: 138 MG/DL (ref 0–199)
CO2 SERPL-SCNC: 27 MMOL/L (ref 21–32)
CREAT SERPL-MCNC: 0.9 MG/DL (ref 0.9–1.3)
DEPRECATED RDW RBC AUTO: 13.5 % (ref 12.4–15.4)
EOSINOPHIL # BLD: 0.3 K/UL (ref 0–0.6)
EOSINOPHIL NFR BLD: 4.1 %
GFR SERPLBLD CREATININE-BSD FMLA CKD-EPI: >60 ML/MIN/{1.73_M2}
GLUCOSE SERPL-MCNC: 103 MG/DL (ref 70–99)
HCT VFR BLD AUTO: 44.9 % (ref 40.5–52.5)
HDLC SERPL-MCNC: 58 MG/DL (ref 40–60)
HGB BLD-MCNC: 15.1 G/DL (ref 13.5–17.5)
LDLC SERPL CALC-MCNC: 55 MG/DL
LYMPHOCYTES # BLD: 1.9 K/UL (ref 1–5.1)
LYMPHOCYTES NFR BLD: 26.6 %
MAGNESIUM SERPL-MCNC: 2.1 MG/DL (ref 1.8–2.4)
MCH RBC QN AUTO: 31.7 PG (ref 26–34)
MCHC RBC AUTO-ENTMCNC: 33.6 G/DL (ref 31–36)
MCV RBC AUTO: 94.3 FL (ref 80–100)
MONOCYTES # BLD: 0.7 K/UL (ref 0–1.3)
MONOCYTES NFR BLD: 9.5 %
NEUTROPHILS # BLD: 4.3 K/UL (ref 1.7–7.7)
NEUTROPHILS NFR BLD: 58.8 %
PLATELET # BLD AUTO: 237 K/UL (ref 135–450)
PMV BLD AUTO: 9.2 FL (ref 5–10.5)
POTASSIUM SERPL-SCNC: 5.1 MMOL/L (ref 3.5–5.1)
PROT SERPL-MCNC: 7.4 G/DL (ref 6.4–8.2)
RBC # BLD AUTO: 4.77 M/UL (ref 4.2–5.9)
SODIUM SERPL-SCNC: 139 MMOL/L (ref 136–145)
TRIGL SERPL-MCNC: 123 MG/DL (ref 0–150)
VLDLC SERPL CALC-MCNC: 25 MG/DL
WBC # BLD AUTO: 7.3 K/UL (ref 4–11)

## 2023-08-11 PROCEDURE — 85025 COMPLETE CBC W/AUTO DIFF WBC: CPT

## 2023-08-11 PROCEDURE — 83735 ASSAY OF MAGNESIUM: CPT

## 2023-08-11 PROCEDURE — 36415 COLL VENOUS BLD VENIPUNCTURE: CPT

## 2023-08-11 PROCEDURE — 80053 COMPREHEN METABOLIC PANEL: CPT

## 2023-08-11 PROCEDURE — 83036 HEMOGLOBIN GLYCOSYLATED A1C: CPT

## 2023-08-11 PROCEDURE — 80061 LIPID PANEL: CPT

## 2023-08-12 LAB
EST. AVERAGE GLUCOSE BLD GHB EST-MCNC: 125.5 MG/DL
HBA1C MFR BLD: 6 %

## 2023-08-14 RX ORDER — PANTOPRAZOLE SODIUM 40 MG/1
TABLET, DELAYED RELEASE ORAL
Qty: 90 TABLET | Refills: 1 | Status: SHIPPED | OUTPATIENT
Start: 2023-08-14

## 2023-08-14 NOTE — TELEPHONE ENCOUNTER
Refill Request       Last Seen: Last Seen Department: 5/18/2023  Last Seen by PCP: 5/18/2023    Last Written: 02/13/23    Next Appointment:   Future Appointments   Date Time Provider [address]   8/22/2023 10:00 AM [facility] CT ED [facility] [place] Rad   8/29/2023 10:15 AM MD YOSI Madsen PULDARIUSZ MCCRACKEN   11/16/2023  9:30 AM [facility] AND RES MMA       Future appointment scheduled 11/16/23      Requested Prescriptions     Pending Prescriptions Disp Refills    pantoprazole (PROTONIX) 40 MG tablet [Pharmacy Med Name: PANTOPRAZOLE SOD DR 40 MG TAB] 90 tablet 1     Sig: TAKE ONE TABLET BY MOUTH DAILY

## 2023-08-22 ENCOUNTER — HOSPITAL ENCOUNTER (OUTPATIENT)
Dept: CT IMAGING | Age: 53
Discharge: HOME | End: 2023-08-22
Payer: COMMERCIAL

## 2023-08-22 DIAGNOSIS — Z87.891 PERSONAL HISTORY OF TOBACCO USE: ICD-10-CM

## 2023-08-22 PROCEDURE — 71271 CT THORAX LUNG CANCER SCR C-: CPT

## 2023-09-07 ENCOUNTER — OFFICE VISIT (OUTPATIENT)
Dept: PULMONOLOGY | Age: 53
End: 2023-09-07
Payer: COMMERCIAL

## 2023-09-07 VITALS
BODY MASS INDEX: 28.85 KG/M2 | WEIGHT: 169 LBS | HEIGHT: 64 IN | DIASTOLIC BLOOD PRESSURE: 68 MMHG | HEART RATE: 74 BPM | OXYGEN SATURATION: 96 % | SYSTOLIC BLOOD PRESSURE: 128 MMHG

## 2023-09-07 DIAGNOSIS — J44.9 CHRONIC OBSTRUCTIVE PULMONARY DISEASE, UNSPECIFIED COPD TYPE (HCC): Primary | ICD-10-CM

## 2023-09-07 DIAGNOSIS — R91.1 PULMONARY NODULE: ICD-10-CM

## 2023-09-07 PROCEDURE — 3017F COLORECTAL CA SCREEN DOC REV: CPT | Performed by: INTERNAL MEDICINE

## 2023-09-07 PROCEDURE — 3023F SPIROM DOC REV: CPT | Performed by: INTERNAL MEDICINE

## 2023-09-07 PROCEDURE — 99214 OFFICE O/P EST MOD 30 MIN: CPT | Performed by: INTERNAL MEDICINE

## 2023-09-07 PROCEDURE — G8419 CALC BMI OUT NRM PARAM NOF/U: HCPCS | Performed by: INTERNAL MEDICINE

## 2023-09-07 PROCEDURE — 3074F SYST BP LT 130 MM HG: CPT | Performed by: INTERNAL MEDICINE

## 2023-09-07 PROCEDURE — 1036F TOBACCO NON-USER: CPT | Performed by: INTERNAL MEDICINE

## 2023-09-07 PROCEDURE — 3078F DIAST BP <80 MM HG: CPT | Performed by: INTERNAL MEDICINE

## 2023-09-07 PROCEDURE — G8427 DOCREV CUR MEDS BY ELIG CLIN: HCPCS | Performed by: INTERNAL MEDICINE

## 2023-09-07 NOTE — PROGRESS NOTES
PULMONARY, CRITICAL CARE AND SLEEP MEDICINE   CC: Shortness of breath   Referring Provider: Andres    Interval History:9/7/23  Had LDCT. Doing well on Trelegy, notices benefit. Has PRN albuterol. No new concerns. Traveled by motorcycle to [place] via [address] [facility] and mountains were challenging    Interval History: 8/12/2021 Treated for COPD exacerbation in July 2021, now doing better, prednisone almost off. Planning to get COVID vaccine    Presenting HPI: 49 yo WM with 60 pack year tobacco, now with 2 year moderate progressive LOZANO associated with productive cough; sxs are not 100% predictable, some intermittent nature, but daily. Improved with albuterol. reports that he quit smoking about 4 years ago. His smoking use included cigarettes. He started smoking about 39 years ago. He has a 70.00 pack-year smoking history. He has never used smokeless tobacco. He reports that he does not currently use alcohol after a past usage of about 1.0 standard drink per week. He reports current drug use. Drug: Marijuana Jacmark Pun). PHYSICAL EXAM:   Blood pressure 128/68, pulse 74, height 5' 4\" (1.626 m), weight 169 lb (76.7 kg), SpO2 96 %. 'RA  Constitutional:  No acute distress. HENT:  Oropharynx is clear and moist.   Neck: No tracheal deviation present. Cardiovascular: Normal heart sounds. No lower extremity edema. Pulmonary/Chest: No wheezes. No rhonchi. No rales. No decreased breath sounds. No accessory muscle usage or stridor. Musculoskeletal: No cyanosis. No clubbing. Skin: Skin is warm and dry. Psychiatric: Normal mood and affect. Neurologic: speech fluent, alert and oriented, strength symmetric     DATA:   LDCT 8/22/23  IMPRESSION:  1. New subcentimeter ground-glass nodule and new sub 4 mm solid nodule. 2. Three-vessel coronary artery calcification and mild emphysema.     PFT 1/12/16 FEV1 1/05 L 32%  ++ airtrapping  DLCO 14.73 50%  PFT 4/16/19 FEV1 1.1 L 35%    DLCO 12.12 42%    Bronchoscopy 2015 cytology no malignant

## 2023-10-07 NOTE — CARE COORDINATION
Juan Antonio 45 Transitions Follow Up Call    2022    Patient: Axel Castillo  Patient : 1970   MRN: [id number]  Reason for Admission: diverticulitis  Discharge Date: 22 RARS: Readmission Risk Score: 6.8    Attempted to reach pt for follow up call. Unable to leave message. Care Transitions Subsequent and Final Call    Subsequent and Final Calls  Care Transitions Interventions  Other Interventions:              Follow Up  Future Appointments   Date Time Provider Simone Marcelo   2022 12:30 PM Shaila Nichols, [address] [place] [address] AND RES [facility]   2023  9:30 AM Melissa Camargo [facility]       Jesusita Amos [address], [place] [address] [address], [place] [address] [address], [place] [address]

## 2023-10-16 RX ORDER — FLUTICASONE FUROATE, UMECLIDINIUM BROMIDE AND VILANTEROL TRIFENATATE 100; 62.5; 25 UG/1; UG/1; UG/1
POWDER RESPIRATORY (INHALATION)
Qty: 1 EACH | Refills: 3 | Status: SHIPPED | OUTPATIENT
Start: 2023-10-16

## 2023-10-17 RX ORDER — AMLODIPINE BESYLATE 5 MG/1
5 TABLET ORAL DAILY
Qty: 30 TABLET | Refills: 3 | Status: SHIPPED | OUTPATIENT
Start: 2023-10-17

## 2023-10-17 NOTE — TELEPHONE ENCOUNTER
Refill Request   Return in about 6 months (around 11/18/2023).     Last Seen: Last Seen Department: 5/18/2023  Last Seen by PCP: 5/18/2023    Last Written: 5/23/23 30 with 3    Next Appointment:   Future Appointments   Date Time Provider [address]   11/16/2023  9:30 AM Ritika Herrera DO [address] [facility]   2/16/2024 10:00 AM [facility] CT MAIN [facility] Pramod Rad   2/21/2024 10:30 AM [facility]         Requested Prescriptions     Pending Prescriptions Disp Refills    amLODIPine (NORVASC) 5 MG tablet [Pharmacy Med Name: amLODIPine BESYLATE 5 MG TAB] 30 tablet 3     Sig: TAKE 1 TABLET BY MOUTH DAILY

## 2023-11-17 NOTE — PROGRESS NOTES
Last Year: Not on file     Number of Places Lived in the Last Year: Not on file     Unstable Housing in the Last Year: Yes     DRUG/FOOD ALLERGIES: No known allergies  CURRENT MEDICATIONS  Prior to Admission medications    Medication Sig Start Date End Date Taking? Authorizing Provider   amLODIPine (NORVASC) 5 MG tablet TAKE 1 TABLET BY MOUTH DAILY 10/17/23  Yes Dustin Yang DO   TRELEJORDYN ELLIPTA [phone] MCG/ACT AEPB inhaler INHALE 1 PUFF BY MOUTH DAILY 10/16/23  Yes Pam [address] [facility] YOSELIN ARZOLA   pantoprazole (PROTONIX) 40 MG tablet TAKE ONE TABLET BY MOUTH DAILY 8/14/23  Yes Dustin Yang DO   atorvastatin (LIPITOR) 20 MG tablet TAKE ONE TABLET BY MOUTH DAILY 7/13/23  Yes Dustin Yang DO   albuterol sulfate HFA (PROVENTIL;VENTOLIN;PROAIR) 108 (90 Base) MCG/ACT inhaler INHALE TWO PUFFS BY MOUTH FOUR TIMES A DAY AS NEEDED FOR WHEEZING 4/14/23  Yes Dustin Yang DO   folic acid (FOLVITE) 1 MG tablet TAKE ONE TABLET BY MOUTH DAILY  Patient not taking: Reported on 11/20/2023 2/16/21   Evangelina Kimble DO     REVIEW OF SYSTEMS  The following systems were reviewed and revealed the following in addition to any already discussed in the HPI:  Review of Systems   Constitutional:  Negative for fatigue and fever. HENT:  Positive for hearing loss. Negative for congestion, ear pain, postnasal drip, rhinorrhea and sneezing. Eyes:  Negative for pain and visual disturbance. Respiratory:  Negative for cough and shortness of breath. Cardiovascular:  Negative for chest pain. Gastrointestinal:  Negative for nausea and vomiting. Endocrine: Negative. Genitourinary: Negative. Musculoskeletal:  Negative for neck pain and neck stiffness. Skin:  Negative for rash. Neurological:  Negative for dizziness and headaches.       PHYSICAL EXAM  /82 (Site: Right Upper Arm, Position: Sitting, Cuff Size: Medium Adult)   Pulse 99   Resp 16   Ht 1.626 m (5' 4\")   Wt 77.1 kg (170 lb)   BMI

## 2023-11-20 ENCOUNTER — OFFICE VISIT (OUTPATIENT)
Dept: ENT CLINIC | Age: 53
End: 2023-11-20
Payer: COMMERCIAL

## 2023-11-20 VITALS
SYSTOLIC BLOOD PRESSURE: 136 MMHG | WEIGHT: 170 LBS | RESPIRATION RATE: 16 BRPM | HEIGHT: 64 IN | BODY MASS INDEX: 29.02 KG/M2 | HEART RATE: 99 BPM | DIASTOLIC BLOOD PRESSURE: 82 MMHG

## 2023-11-20 DIAGNOSIS — H91.92 HEARING LOSS OF LEFT EAR, UNSPECIFIED HEARING LOSS TYPE: ICD-10-CM

## 2023-11-20 DIAGNOSIS — H61.23 BILATERAL IMPACTED CERUMEN: Primary | ICD-10-CM

## 2023-11-20 PROCEDURE — G8484 FLU IMMUNIZE NO ADMIN: HCPCS | Performed by: STUDENT IN AN ORGANIZED HEALTH CARE EDUCATION/TRAINING PROGRAM

## 2023-11-20 PROCEDURE — 69210 REMOVE IMPACTED EAR WAX UNI: CPT | Performed by: STUDENT IN AN ORGANIZED HEALTH CARE EDUCATION/TRAINING PROGRAM

## 2023-11-20 PROCEDURE — G8427 DOCREV CUR MEDS BY ELIG CLIN: HCPCS | Performed by: STUDENT IN AN ORGANIZED HEALTH CARE EDUCATION/TRAINING PROGRAM

## 2023-11-20 PROCEDURE — 99203 OFFICE O/P NEW LOW 30 MIN: CPT | Performed by: STUDENT IN AN ORGANIZED HEALTH CARE EDUCATION/TRAINING PROGRAM

## 2023-11-20 PROCEDURE — 3079F DIAST BP 80-89 MM HG: CPT | Performed by: STUDENT IN AN ORGANIZED HEALTH CARE EDUCATION/TRAINING PROGRAM

## 2023-11-20 PROCEDURE — 3017F COLORECTAL CA SCREEN DOC REV: CPT | Performed by: STUDENT IN AN ORGANIZED HEALTH CARE EDUCATION/TRAINING PROGRAM

## 2023-11-20 PROCEDURE — 3075F SYST BP GE 130 - 139MM HG: CPT | Performed by: STUDENT IN AN ORGANIZED HEALTH CARE EDUCATION/TRAINING PROGRAM

## 2023-11-20 PROCEDURE — G8419 CALC BMI OUT NRM PARAM NOF/U: HCPCS | Performed by: STUDENT IN AN ORGANIZED HEALTH CARE EDUCATION/TRAINING PROGRAM

## 2023-11-20 PROCEDURE — 1036F TOBACCO NON-USER: CPT | Performed by: STUDENT IN AN ORGANIZED HEALTH CARE EDUCATION/TRAINING PROGRAM

## 2023-11-20 ASSESSMENT — ENCOUNTER SYMPTOMS
VOMITING: 0
RHINORRHEA: 0
EYE PAIN: 0
COUGH: 0
NAUSEA: 0
SHORTNESS OF BREATH: 0

## 2024-01-12 RX ORDER — ATORVASTATIN CALCIUM 20 MG/1
TABLET, FILM COATED ORAL
Qty: 90 TABLET | Refills: 1 | Status: SHIPPED | OUTPATIENT
Start: 2024-01-12

## 2024-01-12 NOTE — TELEPHONE ENCOUNTER
Refill Request       Last Seen: Last Seen Department: 5/18/2023  Last Seen by PCP: 5/18/2023    Last Written: 07/13/23 qty 90 w/ 1    Next Appointment:   Future Appointments   Date Time Provider Department Center   2/16/2024 10:00 AM [facility] [place] [facility]   2/21/2024 10:30 AM Chavez Arroyo MD CLERM PUL MMA       Message to  to schedule appointment.         Requested Prescriptions     Pending Prescriptions Disp Refills    atorvastatin (LIPITOR) 20 MG tablet [Pharmacy Med Name: ATORVASTATIN 20 MG TABLET] 90 tablet 1     Sig: TAKE 1 TABLET BY MOUTH DAILY

## 2024-02-16 ENCOUNTER — HOSPITAL ENCOUNTER (OUTPATIENT)
Dept: CT IMAGING | Age: 54
Discharge: HOME | End: 2024-02-16
Payer: COMMERCIAL

## 2024-02-16 DIAGNOSIS — R91.1 PULMONARY NODULE: ICD-10-CM

## 2024-02-16 PROCEDURE — 71250 CT THORAX DX C-: CPT

## 2024-02-20 RX ORDER — PANTOPRAZOLE SODIUM 40 MG/1
40 TABLET, DELAYED RELEASE ORAL DAILY
Qty: 30 TABLET | Refills: 0 | Status: SHIPPED | OUTPATIENT
Start: 2024-02-20

## 2024-02-20 NOTE — TELEPHONE ENCOUNTER
Refill Request       Last Seen: Last Seen Department: 5/18/2023  Last Seen by PCP: 5/18/2023    Last Written: 08/14/23 qty 90 w/1     Next Appointment:   Future Appointments   Date Time Provider Department Center   2/21/2024 10:30 AM Chavez Arroyo MD [facility] PULM MMA       Message to  to schedule appointment.         Requested Prescriptions     Pending Prescriptions Disp Refills    pantoprazole (PROTONIX) 40 MG tablet [Pharmacy Med Name: PANTOPRAZOLE SODIUM 40 MG TABLET] 90 tablet 0     Sig: TAKE 1 TABLET BY MOUTH EVERY DAY

## 2024-02-21 NOTE — TELEPHONE ENCOUNTER
Please call and schedule patient for appointment (not March 11) as he has not been seen by provider or myself in over 6 months. Thank you. Will supply 30 days for now.

## 2024-03-12 RX ORDER — AMLODIPINE BESYLATE 5 MG/1
5 TABLET ORAL DAILY
Qty: 30 TABLET | Refills: 1 | OUTPATIENT
Start: 2024-03-12

## 2024-03-12 NOTE — TELEPHONE ENCOUNTER
Denying Rx.  Dr. Arroyo has never filled this before and does not manage this type of medication.

## 2024-03-12 NOTE — TELEPHONE ENCOUNTER
Refill Request     CONFIRM preferrred pharmacy with the patient.    If Mail Order Rx - Pend for 90 day refill.      Last Seen: Last Seen Department: 9/7/2023  Last Seen by PCP: Visit date not found    Last Written: 10/17/2023    If no future appointment scheduled, route STAFF MESSAGE with patient name to the  Pool for scheduling.      Next Appointment:   Future Appointments   Date Time Provider Department Center   4/2/2024  3:15 PM [facility]       Message sent to  to schedule appt with patient?  NO      Requested Prescriptions     Pending Prescriptions Disp Refills    amLODIPine (NORVASC) 5 MG tablet [Pharmacy Med Name: AMLODIPINE 5MG] 30 tablet 1     Sig: TAKE 1 TABLET BY MOUTH EVERY DAY      ASSESSMENT:   Very Severe COPD/pulmonary emphysema  Pulmonary Nodules, new small Pulmonary Nodules on 8/23 LDCT      Not addressed today  Family h/o COPD  60 pack year tobacco history, quit 1/1/19     PLAN:   Trelegy daily   Albuterol PRN  LDCT for Pulmonary Nodule in Feb 2023, see me 2-3 days after

## 2024-03-13 RX ORDER — PANTOPRAZOLE SODIUM 40 MG/1
40 TABLET, DELAYED RELEASE ORAL DAILY
Qty: 30 TABLET | Refills: 0 | Status: SHIPPED | OUTPATIENT
Start: 2024-03-13

## 2024-03-13 RX ORDER — ATORVASTATIN CALCIUM 20 MG/1
TABLET, FILM COATED ORAL
Qty: 30 TABLET | Refills: 0 | Status: SHIPPED | OUTPATIENT
Start: 2024-03-13

## 2024-03-13 RX ORDER — AMLODIPINE BESYLATE 5 MG/1
5 TABLET ORAL DAILY
Qty: 30 TABLET | Refills: 0 | Status: SHIPPED | OUTPATIENT
Start: 2024-03-13

## 2024-03-13 NOTE — TELEPHONE ENCOUNTER
Patient is calling requesting a refill of   amLODIPine (NORVASC) 5 MG tablet   atorvastatin (LIPITOR) 20 MG tablet   pantoprazole (PROTONIX) 40 MG tablet     Dr Hogue is aware of this and is going to send patient a 30 day supply due to patients transportation.

## 2024-03-13 NOTE — TELEPHONE ENCOUNTER
Refill Request     amLODIPine (NORVASC) 5 MG tablet   atorvastatin (LIPITOR) 20 MG tablet   pantoprazole (PROTONIX) 40 MG tablet        Last Seen: Last Seen Department: 5/18/2023  Last Seen by PCP: 5/18/2023    Last Written: Amlodipine (10/17/23) Atorvastatin 01/12/24, pantoprazole 02/20/24    Next Appointment:   Future Appointments   Date Time Provider Department Center   4/2/2024  3:15 PM Chavez Arroyo MD [facility]   4/5/2024  8:30 AM Lefty Hogue, DO [facility] AND RES MMA       Requested Prescriptions      No prescriptions requested or ordered in this encounter    pantoprazole (PROTONIX) 40 MG tablet [[id number]]    Order Details  Dose: 40 mg Route: Oral Frequency: DAILY   Dispense Quantity: 30 tablet Refills: 0    Note to Pharmacy: 02/20/2024 12:40:58 PM         Sig: TAKE 1 TABLET BY MOUTH EVERY DAY         Start Date: 02/20/24 End Date: --   Written Date: 02/20/24 Expiration Date: 02/19/25   Original Order: pantoprazole (PROTONIX) 40 MG tablet [[id number]]     atorvastatin (LIPITOR) 20 MG tablet [[id number]]    Order Details  Dose, Route, Frequency: As Directed   Dispense Quantity: 90 tablet Refills: 1          Sig: TAKE 1 TABLET BY MOUTH DAILY         Start Date: 01/12/24 End Date: --   Written Date: 01/12/24 Expiration Date: 01/11/25   Original Order: atorvastatin (LIPITOR) 20 MG tablet [[id number]]     amLODIPine (NORVASC) 5 MG tablet [[id number]]    Order Details  Dose: 5 mg Route: Oral Frequency: DAILY   Dispense Quantity: 30 tablet Refills: 3          Sig: TAKE 1 TABLET BY MOUTH DAILY         Start Date: 10/17/23 End Date: --   Written Date: 10/17/23 Expiration Date: 10/16/24   Original Order: amLODIPine (NORVASC) 5 MG tablet [[id number]]     [facility] [id number] - [place], [place] - [address] [facility] - P [phone] - F [phone]

## 2024-04-02 ENCOUNTER — OFFICE VISIT (OUTPATIENT)
Dept: PULMONOLOGY | Age: 54
End: 2024-04-02
Payer: COMMERCIAL

## 2024-04-02 VITALS
BODY MASS INDEX: 29.4 KG/M2 | RESPIRATION RATE: 12 BRPM | SYSTOLIC BLOOD PRESSURE: 134 MMHG | HEART RATE: 77 BPM | WEIGHT: 172.2 LBS | DIASTOLIC BLOOD PRESSURE: 72 MMHG | OXYGEN SATURATION: 92 % | HEIGHT: 64 IN

## 2024-04-02 DIAGNOSIS — Z87.891 PERSONAL HISTORY OF TOBACCO USE: ICD-10-CM

## 2024-04-02 DIAGNOSIS — J44.9 CHRONIC OBSTRUCTIVE PULMONARY DISEASE, UNSPECIFIED COPD TYPE (HCC): Primary | ICD-10-CM

## 2024-04-02 DIAGNOSIS — J44.1 COPD EXACERBATION (HCC): ICD-10-CM

## 2024-04-02 PROCEDURE — G8427 DOCREV CUR MEDS BY ELIG CLIN: HCPCS | Performed by: INTERNAL MEDICINE

## 2024-04-02 PROCEDURE — 99214 OFFICE O/P EST MOD 30 MIN: CPT | Performed by: INTERNAL MEDICINE

## 2024-04-02 PROCEDURE — 3078F DIAST BP <80 MM HG: CPT | Performed by: INTERNAL MEDICINE

## 2024-04-02 PROCEDURE — 3023F SPIROM DOC REV: CPT | Performed by: INTERNAL MEDICINE

## 2024-04-02 PROCEDURE — 3075F SYST BP GE 130 - 139MM HG: CPT | Performed by: INTERNAL MEDICINE

## 2024-04-02 PROCEDURE — 1036F TOBACCO NON-USER: CPT | Performed by: INTERNAL MEDICINE

## 2024-04-02 PROCEDURE — G8419 CALC BMI OUT NRM PARAM NOF/U: HCPCS | Performed by: INTERNAL MEDICINE

## 2024-04-02 PROCEDURE — 3017F COLORECTAL CA SCREEN DOC REV: CPT | Performed by: INTERNAL MEDICINE

## 2024-04-02 RX ORDER — FLUTICASONE FUROATE, UMECLIDINIUM BROMIDE AND VILANTEROL TRIFENATATE 100; 62.5; 25 UG/1; UG/1; UG/1
POWDER RESPIRATORY (INHALATION)
Qty: 1 EACH | Refills: 11 | Status: SHIPPED | OUTPATIENT
Start: 2024-04-02

## 2024-04-02 RX ORDER — AZITHROMYCIN 250 MG/1
TABLET, FILM COATED ORAL
Qty: 6 TABLET | Refills: 0 | Status: SHIPPED | OUTPATIENT
Start: 2024-04-02 | End: 2024-04-05 | Stop reason: ALTCHOICE

## 2024-04-02 RX ORDER — PREDNISONE 10 MG/1
TABLET ORAL
Qty: 30 TABLET | Refills: 0 | Status: SHIPPED | OUTPATIENT
Start: 2024-04-02 | End: 2024-04-05 | Stop reason: ALTCHOICE

## 2024-04-02 NOTE — PATIENT INSTRUCTIONS
follow-up, he or she will help you understand what to do next.  After a lung cancer screening, you can go back to your usual activities right away.  A lung cancer screening test can't tell if you have lung cancer. If your results are positive, your doctor can't tell whether an abnormal finding is a harmless nodule, cancer, or something else without doing more tests.  What can you do to help prevent lung cancer?  Some lung cancers can't be prevented. But if you smoke, quitting smoking is the best step you can take to prevent lung cancer. If you want to quit, your doctor can recommend medicines or other ways to help.  Follow-up care is a key part of your treatment and safety. Be sure to make and go to all appointments, and call your doctor if you are having problems. It's also a good idea to know your test results and keep a list of the medicines you take.  Where can you learn more?  Go to https://www.[employer].net/patientEd and enter Q940 to learn more about \"Learning About Lung Cancer Screening.\"  Current as of: October 25, 2023               Content Version: 14.0  © 0775-0266 [employer].   Care instructions adapted under license by [employer]. If you have questions about a medical condition or this instruction, always ask your healthcare professional. [employer] disclaims any warranty or liability for your use of this information.

## 2024-04-02 NOTE — PROGRESS NOTES
PULMONARY, CRITICAL CARE AND SLEEP MEDICINE   CC: Shortness of breath   Referring Provider: Andres    Interval History 4/2/24  - 2 week h/o increased shortness of breath with cough and congestion.  Started with cough and copious secretions.         Interval History:9/7/23  Had LDCT.  Doing well on Trelegy, notices benefit.  Has PRN albuterol.  No new concerns. Traveled by motorcycle to [place] via [place] and mountains were challenging    Interval History: 8/12/2021 Treated for COPD exacerbation in July 2021, now doing better, prednisone almost off.  Planning to get COVID vaccine    Presenting HPI: 44 yo WM with 60 pack year tobacco, now with 2 year moderate progressive LOZANO associated with productive cough; sxs are not 100% predictable, some intermittent nature, but daily. Improved with albuterol.     reports that he quit smoking about 5 years ago. His smoking use included cigarettes. He started smoking about 40 years ago. He has a 70.0 pack-year smoking history. He has never used smokeless tobacco. He reports that he does not currently use alcohol after a past usage of about 1.0 standard drink of alcohol per week. He reports current drug use. Drug: Marijuana (Weed).    PHYSICAL EXAM:   Blood pressure 134/72, pulse 77, resp. rate 12, height 1.626 m (5' 4\"), weight 78.1 kg (172 lb 3.2 oz), SpO2 92 %.'RA  Constitutional:  No acute distress.   HENT:  Oropharynx is clear and moist.   Neck: No tracheal deviation present.   Cardiovascular: Normal heart sounds.  No lower extremity edema.  Pulmonary/Chest: No wheezes. No rhonchi. No rales. No decreased breath sounds.  No accessory muscle usage or stridor.   Musculoskeletal: No cyanosis. No clubbing.  Skin: Skin is warm and dry.   Psychiatric: Normal mood and affect.  Neurologic: speech fluent, alert and oriented, strength symmetric     DATA:   LDCT 2/16/24  IMPRESSION:  1. Unchanged solid subcentimeter left pulmonary nodules.    PFT 1/12/16 FEV1 1/05 L 32%  ++ airtrapping  DLCO

## 2024-04-05 ENCOUNTER — OFFICE VISIT (OUTPATIENT)
Dept: PRIMARY CARE CLINIC | Age: 54
End: 2024-04-05

## 2024-04-05 VITALS
HEART RATE: 72 BPM | BODY MASS INDEX: 30.18 KG/M2 | SYSTOLIC BLOOD PRESSURE: 134 MMHG | TEMPERATURE: 98 F | OXYGEN SATURATION: 92 % | DIASTOLIC BLOOD PRESSURE: 62 MMHG | WEIGHT: 175.8 LBS | RESPIRATION RATE: 16 BRPM

## 2024-04-05 DIAGNOSIS — K21.9 GASTROESOPHAGEAL REFLUX DISEASE WITHOUT ESOPHAGITIS: ICD-10-CM

## 2024-04-05 DIAGNOSIS — J44.9 CHRONIC OBSTRUCTIVE PULMONARY DISEASE, UNSPECIFIED COPD TYPE (HCC): ICD-10-CM

## 2024-04-05 DIAGNOSIS — M50.30 DDD (DEGENERATIVE DISC DISEASE), CERVICAL: ICD-10-CM

## 2024-04-05 DIAGNOSIS — I10 BENIGN ESSENTIAL HTN: Primary | ICD-10-CM

## 2024-04-05 DIAGNOSIS — E78.2 HYPERLIPIDEMIA, MIXED: ICD-10-CM

## 2024-04-05 PROBLEM — K57.92 DIVERTICULITIS: Status: RESOLVED | Noted: 2022-10-21 | Resolved: 2024-04-05

## 2024-04-05 PROBLEM — K57.20 DIVERTICULITIS OF COLON WITH PERFORATION: Status: RESOLVED | Noted: 2022-08-18 | Resolved: 2024-04-05

## 2024-04-05 PROBLEM — J44.1 COPD EXACERBATION (HCC): Status: RESOLVED | Noted: 2021-07-27 | Resolved: 2024-04-05

## 2024-04-05 PROBLEM — K57.92 ACUTE DIVERTICULITIS OF INTESTINE: Status: RESOLVED | Noted: 2022-08-18 | Resolved: 2024-04-05

## 2024-04-05 PROBLEM — R10.32 LEFT LOWER QUADRANT ABDOMINAL PAIN: Status: RESOLVED | Noted: 2022-10-21 | Resolved: 2024-04-05

## 2024-04-05 PROBLEM — D72.829 LEUKOCYTOSIS: Status: RESOLVED | Noted: 2022-08-18 | Resolved: 2024-04-05

## 2024-04-05 RX ORDER — AZITHROMYCIN 250 MG/1
TABLET, FILM COATED ORAL
Qty: 6 TABLET | Refills: 0 | Status: SHIPPED | OUTPATIENT
Start: 2024-04-05 | End: 2024-04-15

## 2024-04-05 RX ORDER — PANTOPRAZOLE SODIUM 40 MG/1
40 TABLET, DELAYED RELEASE ORAL DAILY
Qty: 30 TABLET | Refills: 3 | Status: SHIPPED | OUTPATIENT
Start: 2024-04-05

## 2024-04-05 RX ORDER — ATORVASTATIN CALCIUM 20 MG/1
TABLET, FILM COATED ORAL
Qty: 30 TABLET | Refills: 3 | Status: SHIPPED | OUTPATIENT
Start: 2024-04-05

## 2024-04-05 RX ORDER — AMLODIPINE BESYLATE 5 MG/1
5 TABLET ORAL DAILY
Qty: 30 TABLET | Refills: 3 | Status: SHIPPED | OUTPATIENT
Start: 2024-04-05

## 2024-04-05 RX ORDER — PREDNISONE 10 MG/1
TABLET ORAL
Qty: 30 TABLET | Refills: 0 | Status: SHIPPED | OUTPATIENT
Start: 2024-04-05 | End: 2024-04-17

## 2024-04-05 SDOH — ECONOMIC STABILITY: FOOD INSECURITY: WITHIN THE PAST 12 MONTHS, YOU WORRIED THAT YOUR FOOD WOULD RUN OUT BEFORE YOU GOT MONEY TO BUY MORE.: NEVER TRUE

## 2024-04-05 SDOH — ECONOMIC STABILITY: INCOME INSECURITY: HOW HARD IS IT FOR YOU TO PAY FOR THE VERY BASICS LIKE FOOD, HOUSING, MEDICAL CARE, AND HEATING?: NOT HARD AT ALL

## 2024-04-05 SDOH — ECONOMIC STABILITY: FOOD INSECURITY: WITHIN THE PAST 12 MONTHS, THE FOOD YOU BOUGHT JUST DIDN'T LAST AND YOU DIDN'T HAVE MONEY TO GET MORE.: NEVER TRUE

## 2024-04-05 SDOH — ECONOMIC STABILITY: HOUSING INSECURITY
IN THE LAST 12 MONTHS, WAS THERE A TIME WHEN YOU DID NOT HAVE A STEADY PLACE TO SLEEP OR SLEPT IN A SHELTER (INCLUDING NOW)?: NO

## 2024-04-05 ASSESSMENT — PATIENT HEALTH QUESTIONNAIRE - PHQ9
1. LITTLE INTEREST OR PLEASURE IN DOING THINGS: NOT AT ALL
SUM OF ALL RESPONSES TO PHQ QUESTIONS 1-9: 0
2. FEELING DOWN, DEPRESSED OR HOPELESS: NOT AT ALL
SUM OF ALL RESPONSES TO PHQ9 QUESTIONS 1 & 2: 0
SUM OF ALL RESPONSES TO PHQ QUESTIONS 1-9: 0

## 2024-04-05 NOTE — PROGRESS NOTES
PROGRESS NOTE   [facility]                                  [address], [address], [facility] [address]         Phone: [phone]    Date of Service:  4/5/2024     Patient ID: Martha Mejía is a 54 y.o. male      Subjective:     CC: 6 Month Follow Up    HPI  Patient is a 52-year-old male with medical history of hypertension, hyperlipidemia, GERD, COPD, prediabetes, degenerative disc disease, history of acute diverticulitis, hx of lung nodules, right cystic kidney (which required no further evaluation per Radiology).      Interval History:   Since last time I seen the patient, he followed up with pulmonology in September 2023 and was continued on Trelegy daily and albuterol as needed.  His annual low-dose CT was ordered by them as well.  He did follow-up with ENT in November 2023 for hearing loss with unclear etiology.  He most recently followed up with pulmonology again 3 days ago with regards to new onset shortness of breath, cough, and congestion.  Patient notes that he was told it was secondary to acute bronchitis and was additionally prescribed prednisone taper and azithromycin.  He notes he has been taking these for last 3 days consistently.  And notes symptomatic improvement.  Patient has no acute complaints at today's office visit and is here for follow-up and medication refills.        ROS:  Review of Systems   Constitutional:  Negative for chills, diaphoresis, fatigue and fever.   Respiratory:  Positive for cough. Negative for chest tightness, shortness of breath and wheezing.    Cardiovascular:  Negative for chest pain, palpitations and leg swelling.   Gastrointestinal:  Negative for abdominal pain, constipation, diarrhea, nausea and vomiting.   Musculoskeletal:  Negative for arthralgias, back pain and myalgias.   Neurological:  Negative for dizziness, tremors, seizures, syncope, weakness, light-headedness, numbness and

## 2024-04-06 ASSESSMENT — ENCOUNTER SYMPTOMS
COUGH: 1
WHEEZING: 0
ABDOMINAL PAIN: 0
SHORTNESS OF BREATH: 0
CHEST TIGHTNESS: 0
CONSTIPATION: 0
BACK PAIN: 0

## 2024-05-09 ENCOUNTER — TELEPHONE (OUTPATIENT)
Dept: PULMONOLOGY | Age: 54
End: 2024-05-09

## 2024-05-09 NOTE — TELEPHONE ENCOUNTER
Patient called in stating that since the last time he was in the office he feels like he is still having trouble breathing. He feels like he could benefit from PRN home oxygen. He would like a home oxygen concentrator. Does not feel the need for portable tanks or POC.  Patient was on oxygen prn about 2 years ago on 2 liters.  He is agreeable to come in for a walk test if Dr. Arroyo is agreeable with the patient receiving O2. Can give patient a DME list at that appt.

## 2024-05-09 NOTE — TELEPHONE ENCOUNTER
Unfortunately has to have F2F visit within 30 days of oxygen testing and oxygen orders.   Please explain to patient oxygen qualification requirements (will have to desaturate below 89% with ambulation.)  If he anticipates this will happen during a walk test then please schedule appt with me for in-office walk test.  OK to schedule during ICU week after 2p (tue - fri).

## 2024-05-15 ENCOUNTER — OFFICE VISIT (OUTPATIENT)
Dept: PULMONOLOGY | Age: 54
End: 2024-05-15
Payer: COMMERCIAL

## 2024-05-15 VITALS
RESPIRATION RATE: 20 BRPM | SYSTOLIC BLOOD PRESSURE: 118 MMHG | HEART RATE: 53 BPM | WEIGHT: 175 LBS | DIASTOLIC BLOOD PRESSURE: 70 MMHG | BODY MASS INDEX: 29.88 KG/M2 | OXYGEN SATURATION: 93 % | HEIGHT: 64 IN

## 2024-05-15 DIAGNOSIS — G47.19 EXCESSIVE DAYTIME SLEEPINESS: ICD-10-CM

## 2024-05-15 DIAGNOSIS — J44.9 COPD, VERY SEVERE (HCC): Primary | ICD-10-CM

## 2024-05-15 DIAGNOSIS — R06.83 SNORING: ICD-10-CM

## 2024-05-15 PROCEDURE — G8417 CALC BMI ABV UP PARAM F/U: HCPCS

## 2024-05-15 PROCEDURE — 99214 OFFICE O/P EST MOD 30 MIN: CPT

## 2024-05-15 PROCEDURE — 3017F COLORECTAL CA SCREEN DOC REV: CPT

## 2024-05-15 PROCEDURE — 1036F TOBACCO NON-USER: CPT

## 2024-05-15 PROCEDURE — 3074F SYST BP LT 130 MM HG: CPT

## 2024-05-15 PROCEDURE — G8427 DOCREV CUR MEDS BY ELIG CLIN: HCPCS

## 2024-05-15 PROCEDURE — 3023F SPIROM DOC REV: CPT

## 2024-05-15 PROCEDURE — 3078F DIAST BP <80 MM HG: CPT

## 2024-05-15 ASSESSMENT — SLEEP AND FATIGUE QUESTIONNAIRES
HOW LIKELY ARE YOU TO NOD OFF OR FALL ASLEEP WHILE SITTING INACTIVE IN A PUBLIC PLACE: WOULD NEVER DOZE
HOW LIKELY ARE YOU TO NOD OFF OR FALL ASLEEP WHILE WATCHING TV: MODERATE CHANCE OF DOZING
HOW LIKELY ARE YOU TO NOD OFF OR FALL ASLEEP WHILE LYING DOWN TO REST IN THE AFTERNOON WHEN CIRCUMSTANCES PERMIT: MODERATE CHANCE OF DOZING
ESS TOTAL SCORE: 8
HOW LIKELY ARE YOU TO NOD OFF OR FALL ASLEEP WHEN YOU ARE A PASSENGER IN A CAR FOR AN HOUR WITHOUT A BREAK: HIGH CHANCE OF DOZING
HOW LIKELY ARE YOU TO NOD OFF OR FALL ASLEEP WHILE SITTING AND TALKING TO SOMEONE: WOULD NEVER DOZE
HOW LIKELY ARE YOU TO NOD OFF OR FALL ASLEEP IN A CAR, WHILE STOPPED FOR A FEW MINUTES IN TRAFFIC: WOULD NEVER DOZE
NECK CIRCUMFERENCE (INCHES): 15.5
HOW LIKELY ARE YOU TO NOD OFF OR FALL ASLEEP WHILE SITTING QUIETLY AFTER LUNCH WITHOUT ALCOHOL: SLIGHT CHANCE OF DOZING
HOW LIKELY ARE YOU TO NOD OFF OR FALL ASLEEP WHILE SITTING AND READING: WOULD NEVER DOZE

## 2024-05-15 NOTE — PROGRESS NOTES
Oxygen tested in office:    93% RA rest  1 min of walking - 90% on room air  2 min of walking - 88% on room air   3 min of walking - 94%  1LPM  4 min of walking - 92%  1LPM   5 min of walking - 91%  1LPM  6 min of walking - 90%  1LPM  During 2 minutes of rest, patient maintained saturation of 95% on 1LPM

## 2024-05-15 NOTE — PATIENT INSTRUCTIONS
and Insurance card  We do not allow any pillows or bed linens from home due to health regulations  We recommend that you not bring valuables with you to the Sleep Center, as we cannot be responsible for any lost or damaged personal items.    Please be aware that [facility] is a non-smoking facility. There is no smoking allowed anywhere on [facility] property at any time.  Each patient room is a private room with a private bathroom.  The in-lab test itself consist of electrodes and sensors attached to specific areas of your scalp, face, chest and legs. We will also monitor respiratory effort, nasal and oral airflow and oxygen levels. The test will not hurt- it is painless and not invasive in any way.      For at home testing: when you come to  the rental unit, please bring:  -     I.D. and Insurance card    ** Please note the Home Sleep Test Units are limited. It is a 1 night rental and must be dropped off the following day to ensure you are not billed a late or replacement fee. **    Please refrain from or reduce the use of caffeine and/or alcohol prior to your sleep study and avoid napping the day of your study, as these will affect the accuracy of your test results.  If you are ill the day of your test (COVID-19, cold, upper respiratory infection, flu, etc.) please call to reschedule your test as the test will not be accurate, and for other patient safety.    If you should have any questions or need to cancel or reschedule your appointment, please call the [place] location. (Even if your test has been scheduled at our [place] location)   [phone]      Thank you,   [facility]

## 2024-05-15 NOTE — PROGRESS NOTES
PULMONARY, CRITICAL CARE AND SLEEP MEDICINE   CC: Shortness of breath   Referring Provider: Andres    Interval History 5/15/24:  CMT O2 qualification   -  Pt called >30 days from LOV thinking that he may benefit from PRN home oxygen.  He is here for qualification testing and updated F2F visit; he desaturated to 88% on RA after 2 mins of walking and sustained SpO2 for the remainder of the 6MWT while wearing 2 L O2.  He has recently had home oxygen and benefited from it greatly.  He mentioned that one of the reasons he is interested in oxygen is due to awakening at night gasping for air, he has to sleep in a reclined position because he cannot breathe supine and had always attributed this to his lung disease.  He has severe snoring.  He has no known witnessed apneas.  He endorses excessive daytime sleepiness and says he struggles with awakenings all through the night tossing and turning, typically only getting 2 to 3 hours of sleep per night reportedly.  States even if he were to get 8 hours of sleep he would still feel tired.  Pt asking about an exercise regimen, he is more interested in pulmonary rehab.     Interval History 4/2/24  - 2 week h/o increased shortness of breath with cough and congestion.  Started with cough and copious secretions.         Interval History:9/7/23  Had LDCT.  Doing well on Trelegy, notices benefit.  Has PRN albuterol.  No new concerns. Traveled by motorcycle to [place] via [place] and mountains were challenging    Interval History: 8/12/2021 Treated for COPD exacerbation in July 2021, now doing better, prednisone almost off.  Planning to get COVID vaccine    Presenting HPI: 44 yo WM with 60 pack year tobacco, now with 2 year moderate progressive LOZANO associated with productive cough; sxs are not 100% predictable, some intermittent nature, but daily. Improved with albuterol.     reports that he quit smoking about 5 years ago. His smoking use included cigarettes. He started smoking about 40 years

## 2024-05-16 RX ORDER — ALBUTEROL SULFATE 90 UG/1
AEROSOL, METERED RESPIRATORY (INHALATION)
Qty: 18 G | Refills: 2 | Status: SHIPPED | OUTPATIENT
Start: 2024-05-16

## 2024-05-16 NOTE — TELEPHONE ENCOUNTER
Refill Request   albuterol sulfate HFA (PROVENTIL;VENTOLIN;PROAIR) 108 (90 Base) MCG/ACT inhaler     Last Seen: Last Seen Department: 4/5/2024  Last Seen by PCP: 4/5/2024    Last Written: 04/14/23    Next Appointment:   Future Appointments   Date Time Provider Department Center   7/24/2024  3:00 PM [facility] PULMONARY FUNCTION TESTING [facility] PFT [place] HOD   10/7/2024  9:00 AM Lefty Hogue, DO MHC[facility] AND RES MMA   2/4/2025 10:00 AM [facility] [place] MAIN [facility] CT SC [place] Rad   2/11/2025 11:00 AM [facility] CT MAIN [facility] Rad     Future appointment scheduled    Requested Prescriptions     Pending Prescriptions Disp Refills    albuterol sulfate HFA (PROVENTIL;VENTOLIN;PROAIR) 108 (90 Base) MCG/ACT inhaler [Pharmacy Med Name: ALBUTEROL HFA 90MCG] 18 g 2     Sig: INHALE 2 PUFFS BY MOUTH FOUR TIMES A DAY AS NEEDED FOR WHEEZING      [facility] - [place], [place] - [facility] [address] - P [phone] - F [phone]

## 2024-05-16 NOTE — TELEPHONE ENCOUNTER
Patient had appt yesterday that qualified him for O2. He called the office to inform us he wants orders to go to [place]. Faxed O2 order, OV note, and walk test to [place] with [facility].

## 2024-06-11 ENCOUNTER — TELEPHONE (OUTPATIENT)
Dept: PULMONOLOGY | Age: 54
End: 2024-06-11

## 2024-06-11 NOTE — TELEPHONE ENCOUNTER
Patient is scheduled for pulm follow-up with Dr. Delcid next year. Recommended to complete HST per last OV note.

## 2024-06-12 NOTE — TELEPHONE ENCOUNTER
Spoke with pt asking him if he plans to schedule HST so we can coordinate follow up.  Pt states that he is not going to schedule right now.  Advised pt to call back when he is ready to schedule.

## 2024-07-24 ENCOUNTER — HOSPITAL ENCOUNTER (OUTPATIENT)
Dept: PULMONOLOGY | Age: 54
Discharge: HOME | End: 2024-07-24
Payer: COMMERCIAL

## 2024-07-24 VITALS — OXYGEN SATURATION: 96 %

## 2024-07-24 DIAGNOSIS — J44.9 COPD, VERY SEVERE (HCC): ICD-10-CM

## 2024-07-24 LAB
DLCO %PRED: 52 %
DLCO PRED: NORMAL
DLCO/VA %PRED: NORMAL
DLCO/VA PRED: NORMAL
DLCO/VA: NORMAL
DLCO: NORMAL
EXPIRATORY TIME-POST: NORMAL
EXPIRATORY TIME: NORMAL
FEF 25-75 %CHNG: NORMAL
FEF 25-75 POST %PRED: NORMAL
FEF 25-75% %PRED-PRE: NORMAL
FEF 25-75% PRED: NORMAL
FEF 25-75-POST: NORMAL
FEF 25-75-PRE: NORMAL
FEV1 %PRED-POST: 35 %
FEV1 %PRED-PRE: 32 %
FEV1 PRED: NORMAL
FEV1-POST: NORMAL
FEV1-PRE: NORMAL
FEV1/FVC %PRED-POST: NORMAL
FEV1/FVC %PRED-PRE: NORMAL
FEV1/FVC PRED: NORMAL
FEV1/FVC-POST: 44 %
FEV1/FVC-PRE: 42 %
FVC %PRED-POST: NORMAL
FVC %PRED-PRE: NORMAL
FVC PRED: NORMAL
FVC-POST: NORMAL
FVC-PRE: NORMAL
GAW %PRED: NORMAL
GAW PRED: NORMAL
GAW: NORMAL
IC PRE %PRED: NORMAL
IC PRED: NORMAL
IC: NORMAL
MEP: NORMAL
MIP: NORMAL
MVV %PRED-PRE: NORMAL
MVV PRED: NORMAL
MVV-PRE: NORMAL
PEF %PRED-POST: NORMAL
PEF %PRED-PRE: NORMAL
PEF PRED: NORMAL
PEF%CHNG: NORMAL
PEF-POST: NORMAL
PEF-PRE: NORMAL
RAW %PRED: NORMAL
RAW PRED: NORMAL
RAW: NORMAL
RV PRE %PRED: NORMAL
RV PRED: NORMAL
RV: NORMAL
SVC %PRED: NORMAL
SVC PRED: NORMAL
SVC: NORMAL
TLC PRE %PRED: 102 %
TLC PRED: NORMAL
TLC: NORMAL
VA %PRED: NORMAL
VA PRED: NORMAL
VA: NORMAL
VTG %PRED: NORMAL
VTG PRED: NORMAL
VTG: NORMAL

## 2024-07-24 PROCEDURE — 94760 N-INVAS EAR/PLS OXIMETRY 1: CPT

## 2024-07-24 PROCEDURE — 6370000000 HC RX 637 (ALT 250 FOR IP)

## 2024-07-24 PROCEDURE — 94640 AIRWAY INHALATION TREATMENT: CPT

## 2024-07-24 PROCEDURE — 94726 PLETHYSMOGRAPHY LUNG VOLUMES: CPT

## 2024-07-24 PROCEDURE — 94060 EVALUATION OF WHEEZING: CPT

## 2024-07-24 PROCEDURE — 94729 DIFFUSING CAPACITY: CPT

## 2024-07-24 RX ORDER — ALBUTEROL SULFATE 90 UG/1
4 AEROSOL, METERED RESPIRATORY (INHALATION) ONCE
Status: COMPLETED | OUTPATIENT
Start: 2024-07-24 | End: 2024-07-24

## 2024-07-24 RX ADMIN — Medication 4 PUFF: at 14:56

## 2024-07-24 ASSESSMENT — PULMONARY FUNCTION TESTS
FEV1/FVC_PRE: 42
FEV1/FVC_POST: 44
FEV1_PERCENT_PREDICTED_PRE: 32
FEV1_PERCENT_PREDICTED_POST: 35

## 2024-07-26 RX ORDER — AMLODIPINE BESYLATE 5 MG/1
5 TABLET ORAL DAILY
Qty: 30 TABLET | Refills: 3 | Status: SHIPPED | OUTPATIENT
Start: 2024-07-26

## 2024-07-26 NOTE — TELEPHONE ENCOUNTER
Refill Request     Last Seen: 4/5/2024    Last Written: 4/2024      Next Appointment:   Future Appointments   Date Time Provider Department Center   8/2/2024 10:00 AM Boone Delcid MD CLERM PULM [facility]   10/7/2024  9:00 AM Lefty Hogue,  [facility] AND RES [facility]   2/4/2025 10:00 AM [facility] [place] [facility]   2/11/2025 11:00 AM [facility] [place] [facility]             Requested Prescriptions      No prescriptions requested or ordered in this encounter

## 2024-08-02 ENCOUNTER — OFFICE VISIT (OUTPATIENT)
Dept: PULMONOLOGY | Age: 54
End: 2024-08-02
Payer: COMMERCIAL

## 2024-08-02 VITALS
RESPIRATION RATE: 17 BRPM | BODY MASS INDEX: 30.76 KG/M2 | HEIGHT: 63 IN | SYSTOLIC BLOOD PRESSURE: 112 MMHG | OXYGEN SATURATION: 95 % | WEIGHT: 173.6 LBS | DIASTOLIC BLOOD PRESSURE: 70 MMHG | HEART RATE: 58 BPM

## 2024-08-02 DIAGNOSIS — J45.909 UNCOMPLICATED ASTHMA, UNSPECIFIED ASTHMA SEVERITY, UNSPECIFIED WHETHER PERSISTENT: Primary | ICD-10-CM

## 2024-08-02 PROCEDURE — 3074F SYST BP LT 130 MM HG: CPT | Performed by: INTERNAL MEDICINE

## 2024-08-02 PROCEDURE — 99214 OFFICE O/P EST MOD 30 MIN: CPT | Performed by: INTERNAL MEDICINE

## 2024-08-02 PROCEDURE — G8427 DOCREV CUR MEDS BY ELIG CLIN: HCPCS | Performed by: INTERNAL MEDICINE

## 2024-08-02 PROCEDURE — 3078F DIAST BP <80 MM HG: CPT | Performed by: INTERNAL MEDICINE

## 2024-08-02 PROCEDURE — 1036F TOBACCO NON-USER: CPT | Performed by: INTERNAL MEDICINE

## 2024-08-02 PROCEDURE — G8417 CALC BMI ABV UP PARAM F/U: HCPCS | Performed by: INTERNAL MEDICINE

## 2024-08-02 PROCEDURE — 3017F COLORECTAL CA SCREEN DOC REV: CPT | Performed by: INTERNAL MEDICINE

## 2024-08-02 RX ORDER — ROFLUMILAST 250 UG/1
250 TABLET ORAL DAILY
Qty: 28 TABLET | Refills: 1 | Status: SHIPPED | OUTPATIENT
Start: 2024-08-02

## 2024-08-02 NOTE — PROGRESS NOTES
[facility] Pulmonary, Critical Care and Sleep Specialists                                 Pulmonary Consult /Progress Note :                                                                [facility]  Pulmonary, Critical Care & Sleep Medicine Specialists                                               Pulmonary Clinic Consult     I had the pleasure of seeing  Venu Mejía     Chief Complaint   Patient presents with    Follow-up     Prior [facility] patient CT F/U       HISTORY OF PRESENT ILLNESS:    Venu Mejía is a 54 y.o. year old  Who smoke 2 pack and jhas 80 PPY smoking history  Quit smoking 2019       The Patient comes in with SOB that has been going on the last few years and follow Dr linares , Associated with .mild cough       ,He  states that it get worse with exercise or walking long distance and he can walk 1 block And go 1-2 flight of stairs before get short winded    On Home O2 prn     Use Trelegy and albuterol as needed          ALLERGIES:    Allergies   Allergen Reactions    No Known Allergies        PAST MEDICAL HISTORY:       Diagnosis Date    Cervical disc herniation     COPD (chronic obstructive pulmonary disease) (HCC)     Diverticulitis     Diverticulitis 10/21/2022    Diverticulitis of colon with perforation 08/18/2022    Folate deficiency 01/2013    GERD (gastroesophageal reflux disease)     Hyperlipidemia 02/2014    Hypertriglyceridemia, Good HDL    Hypertension 12/12: age 43    Prediabetes 11/2016    Vitamin D deficiency 01/2013    resolved       MEDICATIONS:   Current Outpatient Medications   Medication Sig Dispense Refill    amLODIPine (NORVASC) 5 MG tablet Take 1 tablet by mouth daily 30 tablet 3    albuterol sulfate HFA (PROVENTIL;VENTOLIN;PROAIR) 108 (90 Base) MCG/ACT inhaler INHALE 2 PUFFS BY MOUTH FOUR TIMES A DAY AS NEEDED FOR WHEEZING 18 g 2    pantoprazole (PROTONIX) 40 MG tablet Take 1 tablet by mouth daily 30 tablet 3    atorvastatin

## 2024-08-15 RX ORDER — PANTOPRAZOLE SODIUM 40 MG/1
40 TABLET, DELAYED RELEASE ORAL DAILY
Qty: 30 TABLET | Refills: 2 | Status: SHIPPED | OUTPATIENT
Start: 2024-08-15

## 2024-08-15 RX ORDER — ALBUTEROL SULFATE 90 UG/1
AEROSOL, METERED RESPIRATORY (INHALATION)
Qty: 8.5 G | Refills: 1 | Status: SHIPPED | OUTPATIENT
Start: 2024-08-15

## 2024-08-15 NOTE — TELEPHONE ENCOUNTER
Refill Request       Last Seen: Last Seen Department: 4/5/2024  Last Seen by PCP: 4/5/2024    Last Written: 4/5/24 30 with 3    Next Appointment:   Future Appointments   Date Time Provider Department Center   10/7/2024  9:00 AM Lefty Hogue, DO [facility] AND RES [facility] ECC DEP   2/4/2025 10:00 AM MHC CT MAIN [facility] CT SC [place] Rad   3/4/2025 11:30 AM Boone Delcid MD CLERM PULM [facility]     Requested Prescriptions     Pending Prescriptions Disp Refills    pantoprazole (PROTONIX) 40 MG tablet [Pharmacy Med Name: PANTOPRAZOLE 40MG] 30 tablet 2     Sig: TAKE 1 TABLET BY MOUTH DAILY

## 2024-08-15 NOTE — TELEPHONE ENCOUNTER
Refill Request       Last Seen: Last Seen Department: 4/5/2024  Last Seen by PCP: 4/5/2024    Last Written: 5/16/24 18 g with 2    Next Appointment:   Future Appointments   Date Time Provider Department Center   10/7/2024  9:00 AM Lefty Hogue, DO [facility] AND RES [facility] ECC DEP   2/4/2025 10:00 AM [facility] MAIN [facility] CT SC [place] Rad   3/4/2025 11:30 AM Boone Delcid MD CLERM PULM [facility]     Requested Prescriptions     Pending Prescriptions Disp Refills    albuterol sulfate HFA (PROVENTIL;VENTOLIN;PROAIR) 108 (90 Base) MCG/ACT inhaler [Pharmacy Med Name: ALBUTEROL SULF 90MCG HFA] 8.5 g 1     Sig: INHALE 2 PUFFS BY MOUTH FOUR TIMES A DAY AS NEEDED FOR WHEEZING

## 2024-10-07 ENCOUNTER — OFFICE VISIT (OUTPATIENT)
Dept: PRIMARY CARE CLINIC | Age: 54
End: 2024-10-07
Payer: COMMERCIAL

## 2024-10-07 VITALS
SYSTOLIC BLOOD PRESSURE: 114 MMHG | WEIGHT: 166.6 LBS | HEART RATE: 82 BPM | DIASTOLIC BLOOD PRESSURE: 68 MMHG | OXYGEN SATURATION: 94 % | HEIGHT: 63 IN | BODY MASS INDEX: 29.52 KG/M2

## 2024-10-07 DIAGNOSIS — K21.9 GASTROESOPHAGEAL REFLUX DISEASE WITHOUT ESOPHAGITIS: ICD-10-CM

## 2024-10-07 DIAGNOSIS — N28.1 CYST OF RIGHT KIDNEY: ICD-10-CM

## 2024-10-07 DIAGNOSIS — J44.9 CHRONIC OBSTRUCTIVE PULMONARY DISEASE, UNSPECIFIED COPD TYPE (HCC): ICD-10-CM

## 2024-10-07 DIAGNOSIS — G89.29 CHRONIC INTRACTABLE HEADACHE, UNSPECIFIED HEADACHE TYPE: ICD-10-CM

## 2024-10-07 DIAGNOSIS — I10 BENIGN ESSENTIAL HTN: Primary | ICD-10-CM

## 2024-10-07 DIAGNOSIS — R51.9 CHRONIC INTRACTABLE HEADACHE, UNSPECIFIED HEADACHE TYPE: ICD-10-CM

## 2024-10-07 DIAGNOSIS — R73.03 PREDIABETES: ICD-10-CM

## 2024-10-07 DIAGNOSIS — R42 LIGHTHEADEDNESS: ICD-10-CM

## 2024-10-07 DIAGNOSIS — E78.2 HYPERLIPIDEMIA, MIXED: ICD-10-CM

## 2024-10-07 PROCEDURE — 3017F COLORECTAL CA SCREEN DOC REV: CPT | Performed by: STUDENT IN AN ORGANIZED HEALTH CARE EDUCATION/TRAINING PROGRAM

## 2024-10-07 PROCEDURE — G8484 FLU IMMUNIZE NO ADMIN: HCPCS | Performed by: STUDENT IN AN ORGANIZED HEALTH CARE EDUCATION/TRAINING PROGRAM

## 2024-10-07 PROCEDURE — 99214 OFFICE O/P EST MOD 30 MIN: CPT | Performed by: STUDENT IN AN ORGANIZED HEALTH CARE EDUCATION/TRAINING PROGRAM

## 2024-10-07 PROCEDURE — 1036F TOBACCO NON-USER: CPT | Performed by: STUDENT IN AN ORGANIZED HEALTH CARE EDUCATION/TRAINING PROGRAM

## 2024-10-07 PROCEDURE — 3023F SPIROM DOC REV: CPT | Performed by: STUDENT IN AN ORGANIZED HEALTH CARE EDUCATION/TRAINING PROGRAM

## 2024-10-07 PROCEDURE — G8417 CALC BMI ABV UP PARAM F/U: HCPCS | Performed by: STUDENT IN AN ORGANIZED HEALTH CARE EDUCATION/TRAINING PROGRAM

## 2024-10-07 PROCEDURE — G8427 DOCREV CUR MEDS BY ELIG CLIN: HCPCS | Performed by: STUDENT IN AN ORGANIZED HEALTH CARE EDUCATION/TRAINING PROGRAM

## 2024-10-07 PROCEDURE — 3074F SYST BP LT 130 MM HG: CPT | Performed by: STUDENT IN AN ORGANIZED HEALTH CARE EDUCATION/TRAINING PROGRAM

## 2024-10-07 PROCEDURE — 3078F DIAST BP <80 MM HG: CPT | Performed by: STUDENT IN AN ORGANIZED HEALTH CARE EDUCATION/TRAINING PROGRAM

## 2024-10-07 RX ORDER — IPRATROPIUM BROMIDE AND ALBUTEROL SULFATE 2.5; .5 MG/3ML; MG/3ML
3 SOLUTION RESPIRATORY (INHALATION)
COMMUNITY
Start: 2024-04-27

## 2024-10-07 ASSESSMENT — ENCOUNTER SYMPTOMS
VOMITING: 0
CHEST TIGHTNESS: 0
WHEEZING: 0
BACK PAIN: 0
NAUSEA: 0
DIARRHEA: 0
CONSTIPATION: 0
ABDOMINAL PAIN: 0
SHORTNESS OF BREATH: 0
COUGH: 0

## 2024-11-04 RX ORDER — ATORVASTATIN CALCIUM 20 MG/1
TABLET, FILM COATED ORAL
Qty: 90 TABLET | Refills: 0 | OUTPATIENT
Start: 2024-11-04

## 2024-11-04 RX ORDER — ATORVASTATIN CALCIUM 20 MG/1
TABLET, FILM COATED ORAL
Qty: 30 TABLET | Refills: 0 | Status: SHIPPED | OUTPATIENT
Start: 2024-11-04

## 2024-11-04 NOTE — TELEPHONE ENCOUNTER
Please call patient to remind him to get fasting labs drawn which were ordered at last appointment. I sent in 30 days of the atorvastatin, but pt needs to have labs done before further refills.

## 2024-11-04 NOTE — TELEPHONE ENCOUNTER
Patient informed. Patient states he was unable to get his MRI scheduled until Monday the 11th. Pt requested his appointment for this Friday the 8th be canceled.

## 2024-11-04 NOTE — TELEPHONE ENCOUNTER
Refill Request       Last Seen: Last Seen Department: 10/7/2024  Last Seen by PCP: 10/7/2024    Last Written: 4/5/24 30 3 refills     Next Appointment:   Future Appointments   Date Time Provider Department Center   11/8/2024  1:20 PM Lefty Hogue, DO MHC[facility] AND RES [facility] ECC DEP   2/4/2025 10:00 AM MHC CT MAIN MHCZ CT SC [place] Rad   3/4/2025 11:30 AM Boone Delcid MD CLERM PULM [facility]             Requested Prescriptions     Pending Prescriptions Disp Refills    atorvastatin (LIPITOR) 20 MG tablet [Pharmacy Med Name: ATORVASTATIN 20MG] 30 tablet 2     Sig: TAKE 1 TALBET BY MOUTH DAILY

## 2024-11-05 NOTE — TELEPHONE ENCOUNTER
Ok, thank you for letting me know. I would recommend patient complete MRI and labs and schedule follow up appt to discuss both to reassess how his symptoms are doing. Thank you.

## 2024-11-11 ENCOUNTER — HOSPITAL ENCOUNTER (OUTPATIENT)
Age: 54
Discharge: HOME | End: 2024-11-11
Payer: COMMERCIAL

## 2024-11-11 ENCOUNTER — HOSPITAL ENCOUNTER (OUTPATIENT)
Dept: MRI IMAGING | Age: 54
Discharge: HOME | End: 2024-11-11
Payer: COMMERCIAL

## 2024-11-11 DIAGNOSIS — R51.9 CHRONIC INTRACTABLE HEADACHE, UNSPECIFIED HEADACHE TYPE: ICD-10-CM

## 2024-11-11 DIAGNOSIS — R73.03 PREDIABETES: ICD-10-CM

## 2024-11-11 DIAGNOSIS — R42 LIGHTHEADEDNESS: ICD-10-CM

## 2024-11-11 DIAGNOSIS — I10 BENIGN ESSENTIAL HTN: ICD-10-CM

## 2024-11-11 DIAGNOSIS — K21.9 GASTROESOPHAGEAL REFLUX DISEASE WITHOUT ESOPHAGITIS: ICD-10-CM

## 2024-11-11 DIAGNOSIS — G89.29 CHRONIC INTRACTABLE HEADACHE, UNSPECIFIED HEADACHE TYPE: ICD-10-CM

## 2024-11-11 DIAGNOSIS — E78.2 HYPERLIPIDEMIA, MIXED: ICD-10-CM

## 2024-11-11 LAB
ALBUMIN SERPL-MCNC: 4 G/DL (ref 3.4–5)
ALBUMIN/GLOB SERPL: 1.4 {RATIO} (ref 1.1–2.2)
ALP SERPL-CCNC: 122 U/L (ref 40–129)
ALT SERPL-CCNC: 13 U/L (ref 10–40)
ANION GAP SERPL CALCULATED.3IONS-SCNC: 10 MMOL/L (ref 3–16)
AST SERPL-CCNC: 17 U/L (ref 15–37)
BILIRUB SERPL-MCNC: 0.4 MG/DL (ref 0–1)
BUN SERPL-MCNC: 14 MG/DL (ref 7–20)
CALCIUM SERPL-MCNC: 8.8 MG/DL (ref 8.3–10.6)
CHLORIDE SERPL-SCNC: 104 MMOL/L (ref 99–110)
CHOLEST SERPL-MCNC: 121 MG/DL (ref 0–199)
CO2 SERPL-SCNC: 27 MMOL/L (ref 21–32)
CREAT SERPL-MCNC: 1 MG/DL (ref 0.9–1.3)
CREAT UR-MCNC: 351 MG/DL (ref 39–259)
EST. AVERAGE GLUCOSE BLD GHB EST-MCNC: 157.1 MG/DL
GFR SERPLBLD CREATININE-BSD FMLA CKD-EPI: 88 ML/MIN/{1.73_M2}
GLUCOSE SERPL-MCNC: 98 MG/DL (ref 70–99)
HBA1C MFR BLD: 7.1 %
HDLC SERPL-MCNC: 41 MG/DL (ref 40–60)
LDLC SERPL CALC-MCNC: 50 MG/DL
MAGNESIUM SERPL-MCNC: 1.74 MG/DL (ref 1.8–2.4)
MICROALBUMIN UR DL<=1MG/L-MCNC: 1.4 MG/DL
MICROALBUMIN/CREAT UR: 4 MG/G (ref 0–30)
POTASSIUM SERPL-SCNC: 4.1 MMOL/L (ref 3.5–5.1)
PROT SERPL-MCNC: 6.8 G/DL (ref 6.4–8.2)
SODIUM SERPL-SCNC: 141 MMOL/L (ref 136–145)
TRIGL SERPL-MCNC: 148 MG/DL (ref 0–150)
VLDLC SERPL CALC-MCNC: 30 MG/DL

## 2024-11-11 PROCEDURE — 82043 UR ALBUMIN QUANTITATIVE: CPT

## 2024-11-11 PROCEDURE — 36415 COLL VENOUS BLD VENIPUNCTURE: CPT

## 2024-11-11 PROCEDURE — 80061 LIPID PANEL: CPT

## 2024-11-11 PROCEDURE — 82570 ASSAY OF URINE CREATININE: CPT

## 2024-11-11 PROCEDURE — 70551 MRI BRAIN STEM W/O DYE: CPT

## 2024-11-11 PROCEDURE — 80053 COMPREHEN METABOLIC PANEL: CPT

## 2024-11-11 PROCEDURE — 83036 HEMOGLOBIN GLYCOSYLATED A1C: CPT

## 2024-11-11 PROCEDURE — 83735 ASSAY OF MAGNESIUM: CPT

## 2024-11-12 RX ORDER — METFORMIN HYDROCHLORIDE 500 MG/1
500 TABLET, EXTENDED RELEASE ORAL
Qty: 30 TABLET | Refills: 2 | Status: SHIPPED | OUTPATIENT
Start: 2024-11-12

## 2024-11-13 DIAGNOSIS — J45.909 UNCOMPLICATED ASTHMA, UNSPECIFIED ASTHMA SEVERITY, UNSPECIFIED WHETHER PERSISTENT: ICD-10-CM

## 2024-11-13 DIAGNOSIS — J44.9 COPD, VERY SEVERE (HCC): Primary | ICD-10-CM

## 2024-11-15 RX ORDER — ROFLUMILAST 250 UG/1
250 TABLET ORAL DAILY
Qty: 28 TABLET | Refills: 5 | Status: SHIPPED | OUTPATIENT
Start: 2024-11-15

## 2024-11-18 RX ORDER — PANTOPRAZOLE SODIUM 40 MG/1
40 TABLET, DELAYED RELEASE ORAL DAILY
Qty: 30 TABLET | Refills: 1 | Status: SHIPPED | OUTPATIENT
Start: 2024-11-18

## 2024-11-18 NOTE — TELEPHONE ENCOUNTER
Refill Request       Last Seen: Last Seen Department: 10/7/2024  Last Seen by PCP: 10/7/2024    Last Written: 8/15/24 30 with 2    Next Appointment:   Future Appointments   Date Time Provider Department Center   2/4/2025 10:00 AM [facility] [place] [facility] [place] Rad   3/4/2025 11:30 [facility]     Requested Prescriptions     Pending Prescriptions Disp Refills    pantoprazole (PROTONIX) 40 MG tablet [Pharmacy Med Name: PANTOPRAZOLE 40MG] 30 tablet 1     Sig: TAKE 1 TABLET BY MOUTH DAILY

## 2024-11-19 ENCOUNTER — TELEPHONE (OUTPATIENT)
Dept: PULMONOLOGY | Age: 54
End: 2024-11-19

## 2024-11-19 DIAGNOSIS — R42 DIZZINESS: ICD-10-CM

## 2024-11-19 DIAGNOSIS — R42 LIGHTHEADEDNESS: Primary | ICD-10-CM

## 2024-11-19 NOTE — TELEPHONE ENCOUNTER
Patient is waiting on a P.A. for DALIRESP.   He has been out for a few weeks and is having a really hard time breathing. He says he may end up going to ER if he does not get another medication instead of waiting for P.A. or a sample of this medication. It was helping a great deal and now insurance will not pay for it.  Call him @ [phone]

## 2024-11-19 NOTE — TELEPHONE ENCOUNTER
Submitted PA for Roflumilast 250MCG tablets   [facility] Key: [id number]  STATUS: PENDING.    Follow up done daily; if no decision with in three days we will refax.  If another three days goes by with no decision will call the insurance for status.

## 2024-11-20 DIAGNOSIS — R42 DIZZINESS: ICD-10-CM

## 2024-11-20 DIAGNOSIS — R42 LIGHTHEADEDNESS: Primary | ICD-10-CM

## 2024-11-20 RX ORDER — MECLIZINE HCL 12.5 MG 12.5 MG/1
12.5 TABLET ORAL 2 TIMES DAILY PRN
Qty: 30 TABLET | Refills: 0 | Status: SHIPPED | OUTPATIENT
Start: 2024-11-20 | End: 2024-12-05

## 2024-11-20 NOTE — PROGRESS NOTES
I previously called patient to discuss MRI results with him and he noted to be continuing to have symptoms of lightheaded and dizziness. MRI re-assuring for no mass or CVA though. My examination was Negative for Garfield Halpike, though. I spoke with Attending on this matter. Considered trialing Meclizine PRN for patient. Called patient today to have shared decision making conversation with him. He was agreeable to start given persistent symptoms. Risks/benefits including potential side effects were discussed with him and he was agreeable to start. Encouraged patient to schedule Neurology appt given referral placed which he voiced understanding to. Should symptoms not improve with PRN Meclizine, patient should return to clinic if Neuro not able to see patient soon.

## 2024-11-20 NOTE — TELEPHONE ENCOUNTER
Called patient to let him know that there is not another medication but I will talk to Dr Baker to decide what to do.   Let patient know that Vamsi Love wants to do [place], patient states that he is changing insurance and will see if new insurance will cover roflumilast

## 2024-11-20 NOTE — TELEPHONE ENCOUNTER
[facility] called needing the PA to include the Diagnosis and they want to know if pt has tried a 500 MG. She said the PT has already received 4 weeks of treatment. They need to know if they are ready for the 500 mg.     They will need a PA sent over If you have any question please call  [phone]     Case number [id number]

## 2024-11-21 NOTE — TELEPHONE ENCOUNTER
The medication was DENIED; DENIAL letter is uploaded to MEDIA.    Generic Denial:  Other; please see Denial Letter.           Note :        If you want an APPEAL; please note in this encounter what new information you would like to APPEAL with.  Once complete route back to PA POOL.    If this requires a response please respond to the pool ( P [facility] MEDICATION PRE-AUTH).      Thank you please advise patient.

## 2024-12-02 RX ORDER — ATORVASTATIN CALCIUM 20 MG/1
TABLET, FILM COATED ORAL
Qty: 90 TABLET | Refills: 1 | Status: SHIPPED | OUTPATIENT
Start: 2024-12-02

## 2024-12-02 NOTE — TELEPHONE ENCOUNTER
Refill Request       Last Seen: Last Seen Department: 10/7/2024  Last Seen by PCP: Visit date not found    Last Written: 11/4/24 30 0 refills     Next Appointment:   Future Appointments   Date Time Provider Department Center   2/4/2025 10:00 AM [facility] [place] [facility]   3/4/2025 11:30 AM Boone Delcid MD [facility] PUL [facility]             Requested Prescriptions     Pending Prescriptions Disp Refills    atorvastatin (LIPITOR) 20 MG tablet [Pharmacy Med Name: ATORVASTATIN 20MG] 30 tablet 0     Sig: TAKE 1 TALBET BY MOUTH DAILY

## 2024-12-09 RX ORDER — AMLODIPINE BESYLATE 5 MG/1
5 TABLET ORAL DAILY
Qty: 30 TABLET | Refills: 2 | Status: SHIPPED | OUTPATIENT
Start: 2024-12-09

## 2024-12-09 RX ORDER — ALBUTEROL SULFATE 90 UG/1
INHALANT RESPIRATORY (INHALATION)
Qty: 8.5 G | Refills: 0 | Status: SHIPPED | OUTPATIENT
Start: 2024-12-09

## 2024-12-09 NOTE — TELEPHONE ENCOUNTER
Refill Request       Last Seen: Last Seen Department: 10/7/2024  Last Seen by PCP: 10/7/2024    Last Written:   amLODIPine (NORVASC) 5 MG tablet 7/26/24 30 with 3    albuterol sulfate HFA (PROVENTIL;VENTOLIN;PROAIR) 108 (90 Base) MCG/ACT inhaler 8/15/24 8.5 g with 1      Next Appointment:   Future Appointments   Date Time Provider Department Center   2/4/2025 10:00 AM [facility] [place] [facility] [place] Rad   3/4/2025 11:30 AM Boone Delcid MD [facility] PULM [facility]         Requested Prescriptions     Pending Prescriptions Disp Refills    amLODIPine (NORVASC) 5 MG tablet [Pharmacy Med Name: AMLODIPINE 5MG] 30 tablet 2     Sig: TAKE 1 TABLET BY MOUTH EVERY DAY    albuterol sulfate HFA (PROVENTIL;VENTOLIN;PROAIR) 108 (90 Base) MCG/ACT inhaler [Pharmacy Med Name: ALBUTEROL SULF 90MCG HFA] 8.5 g 0     Sig: INHALE 2 PUFFS BY MOUTH FOUR TIMES A DAY AS NEEDED FOR WHEEZING

## 2024-12-09 NOTE — TELEPHONE ENCOUNTER
Meds have been refilled.  Please let patient know that per Dr. Hogue's last note, patient should be taking half tablets of the amlodipine (2.5 mg dose) if he is experiencing symptoms.  If he is not, he can continue 5 mg.

## 2024-12-17 RX ORDER — MECLIZINE HCL 12.5 MG 12.5 MG/1
TABLET ORAL
Qty: 30 TABLET | Refills: 0 | Status: SHIPPED | OUTPATIENT
Start: 2024-12-17

## 2024-12-17 NOTE — TELEPHONE ENCOUNTER
Refill Request       Last Seen: Last Seen Department: 10/7/2024  Last Seen by PCP: 10/7/2024    Last Written:     Next Appointment: 2/4/25  Future Appointments   Date Time Provider Department Center   2/4/2025 10:00 AM [facility] [place] [facility]   3/4/2025 11:30 AM Boone Delcid MD CLERM PULM [facility]       Requested Prescriptions     Pending Prescriptions Disp Refills    meclizine (ANTIVERT) 12.5 MG tablet [Pharmacy Med Name: MECLIZINE 12.5MG] 30 tablet 0     Sig: TAKE 1 TABLET BY MOUTH TWICE DAILY AS NEEDED FOR DIZZINESS

## 2025-01-17 RX ORDER — PANTOPRAZOLE SODIUM 40 MG/1
40 TABLET, DELAYED RELEASE ORAL DAILY
Qty: 30 TABLET | Refills: 0 | Status: SHIPPED | OUTPATIENT
Start: 2025-01-17

## 2025-01-17 RX ORDER — MECLIZINE HCL 12.5 MG 12.5 MG/1
TABLET ORAL
Qty: 30 TABLET | Refills: 0 | Status: SHIPPED | OUTPATIENT
Start: 2025-01-17

## 2025-01-17 NOTE — TELEPHONE ENCOUNTER
Refill Request       Last Seen: Last Seen Department: 10/7/2024  Last Seen by PCP: Visit date not found    Last Written:   pantoprazole (PROTONIX) 40 MG tablet 11/18/24 30 with 1    meclizine (ANTIVERT) 12.5 MG tablet 12/17/24 30 with 0    Next Appointment:   Future Appointments   Date Time Provider Department Center   2/4/2025 10:00 AM [facility] [place] [facility] [place] Rad   3/4/2025 11:30 [facility]     Requested Prescriptions     Pending Prescriptions Disp Refills    pantoprazole (PROTONIX) 40 MG tablet [Pharmacy Med Name: PANTOPRAZOLE SODIUM 40 MG TABLET] 30 tablet 0     Sig: TAKE 1 TABLET BY MOUTH DAILY    meclizine (ANTIVERT) 12.5 MG tablet [Pharmacy Med Name: MECLIZINE HYDROCHLORIDE 12.500 MG TABLET] 30 tablet 0     Sig: TAKE 1 TABLET BY MOUTH TWICE DAILY AS NEEDED FOR DIZZINESS

## 2025-01-17 NOTE — TELEPHONE ENCOUNTER
Have given short refill of Meclizine. If patient continues to have lighthead/dizzy symptoms, would recommend follow up in office. Thank you.

## 2025-02-04 ENCOUNTER — HOSPITAL ENCOUNTER (OUTPATIENT)
Dept: CT IMAGING | Age: 55
Discharge: HOME | End: 2025-02-04
Payer: COMMERCIAL

## 2025-02-04 DIAGNOSIS — Z87.891 PERSONAL HISTORY OF TOBACCO USE: ICD-10-CM

## 2025-02-04 PROCEDURE — 71271 CT THORAX LUNG CANCER SCR C-: CPT

## 2025-02-13 RX ORDER — METFORMIN HYDROCHLORIDE 500 MG/1
500 TABLET, EXTENDED RELEASE ORAL
Qty: 30 TABLET | Refills: 2 | Status: SHIPPED | OUTPATIENT
Start: 2025-02-13

## 2025-02-13 NOTE — TELEPHONE ENCOUNTER
Refill Request     Last Seen: 10/7/2024    Last Written: Ordered On: 11/12/2024   Disp-30 tablet, R-2   Next Appointment:   Future Appointments   Date Time Provider Department Center   3/4/2025 11:30 AM Boone Delcid MD CLERM PULM [facility]             Requested Prescriptions     Pending Prescriptions Disp Refills    metFORMIN (GLUCOPHAGE-XR) 500 MG extended release tablet [Pharmacy Med Name: METFORMIN HYDROCHLORIDE  MG TABLET] 30 tablet 1     Sig: TAKE 1 TABLET BY MOUTH EVERY DAY WITH BREAKFAST

## 2025-02-17 RX ORDER — MECLIZINE HCL 12.5 MG 12.5 MG/1
TABLET ORAL
Qty: 30 TABLET | Refills: 0 | Status: SHIPPED | OUTPATIENT
Start: 2025-02-17

## 2025-02-17 RX ORDER — PANTOPRAZOLE SODIUM 40 MG/1
40 TABLET, DELAYED RELEASE ORAL DAILY
Qty: 30 TABLET | Refills: 2 | Status: SHIPPED | OUTPATIENT
Start: 2025-02-17

## 2025-02-17 NOTE — TELEPHONE ENCOUNTER
Refill Request     Last Seen: 10/7/2024    Last Written: 01/17/25    Next Appointment:   Future Appointments   Date Time Provider Department Center   3/4/2025 11:30 AM Boone Delcid MD CLERM PULM [facility]             Requested Prescriptions     Pending Prescriptions Disp Refills    pantoprazole (PROTONIX) 40 MG tablet [Pharmacy Med Name: PANTOPRAZOLE SODIUM 40 MG TABLET] 30 tablet 0     Sig: TAKE 1 TABLET BY MOUTH DAILY    meclizine (ANTIVERT) 12.5 MG tablet [Pharmacy Med Name: MECLIZINE HYDROCHLORIDE 12.500 MG TABLET] 30 tablet 0     Sig: TAKE 1 TABLET BY MOUTH TWICE DAILY AS NEEDED FOR DIZZINESS

## 2025-02-17 NOTE — TELEPHONE ENCOUNTER
Please schedule patient for follow up on lightheadedness/dizziness if he is still having symptoms and taking medication for. I am not sure of his current status in regards to this. Thank you.

## 2025-03-04 ENCOUNTER — OFFICE VISIT (OUTPATIENT)
Dept: PRIMARY CARE CLINIC | Age: 55
End: 2025-03-04

## 2025-03-04 ENCOUNTER — TELEPHONE (OUTPATIENT)
Dept: PULMONOLOGY | Age: 55
End: 2025-03-04

## 2025-03-04 VITALS
DIASTOLIC BLOOD PRESSURE: 60 MMHG | OXYGEN SATURATION: 95 % | BODY MASS INDEX: 29.66 KG/M2 | HEIGHT: 63 IN | SYSTOLIC BLOOD PRESSURE: 120 MMHG | WEIGHT: 167.4 LBS

## 2025-03-04 DIAGNOSIS — E78.2 HYPERLIPIDEMIA, MIXED: ICD-10-CM

## 2025-03-04 DIAGNOSIS — E11.9 TYPE 2 DIABETES MELLITUS WITHOUT COMPLICATION, WITHOUT LONG-TERM CURRENT USE OF INSULIN (HCC): ICD-10-CM

## 2025-03-04 DIAGNOSIS — K21.9 GASTROESOPHAGEAL REFLUX DISEASE WITHOUT ESOPHAGITIS: ICD-10-CM

## 2025-03-04 DIAGNOSIS — J44.9 CHRONIC OBSTRUCTIVE PULMONARY DISEASE, UNSPECIFIED COPD TYPE (HCC): ICD-10-CM

## 2025-03-04 DIAGNOSIS — R42 LIGHTHEADEDNESS: ICD-10-CM

## 2025-03-04 DIAGNOSIS — N28.1 CYST OF RIGHT KIDNEY: ICD-10-CM

## 2025-03-04 DIAGNOSIS — I10 BENIGN ESSENTIAL HTN: Primary | ICD-10-CM

## 2025-03-04 RX ORDER — AMLODIPINE BESYLATE 2.5 MG/1
2.5 TABLET ORAL DAILY
Qty: 90 TABLET | Refills: 1 | Status: SHIPPED | OUTPATIENT
Start: 2025-03-04

## 2025-03-04 RX ORDER — MECLIZINE HCL 12.5 MG 12.5 MG/1
12.5 TABLET ORAL DAILY PRN
Qty: 30 TABLET | Refills: 3 | Status: SHIPPED | OUTPATIENT
Start: 2025-03-04

## 2025-03-04 RX ORDER — METFORMIN HYDROCHLORIDE 500 MG/1
500 TABLET, EXTENDED RELEASE ORAL
Qty: 90 TABLET | Refills: 1 | Status: SHIPPED | OUTPATIENT
Start: 2025-03-04

## 2025-03-04 RX ORDER — AMLODIPINE BESYLATE 5 MG/1
5 TABLET ORAL DAILY
Qty: 90 TABLET | Refills: 1 | Status: SHIPPED | OUTPATIENT
Start: 2025-03-04 | End: 2025-03-04

## 2025-03-04 RX ORDER — ATORVASTATIN CALCIUM 20 MG/1
TABLET, FILM COATED ORAL
Qty: 90 TABLET | Refills: 1 | Status: SHIPPED | OUTPATIENT
Start: 2025-03-04

## 2025-03-04 RX ORDER — PANTOPRAZOLE SODIUM 40 MG/1
40 TABLET, DELAYED RELEASE ORAL DAILY
Qty: 90 TABLET | Refills: 1 | Status: SHIPPED | OUTPATIENT
Start: 2025-03-04

## 2025-03-04 SDOH — ECONOMIC STABILITY: FOOD INSECURITY: WITHIN THE PAST 12 MONTHS, YOU WORRIED THAT YOUR FOOD WOULD RUN OUT BEFORE YOU GOT MONEY TO BUY MORE.: NEVER TRUE

## 2025-03-04 SDOH — ECONOMIC STABILITY: FOOD INSECURITY: WITHIN THE PAST 12 MONTHS, THE FOOD YOU BOUGHT JUST DIDN'T LAST AND YOU DIDN'T HAVE MONEY TO GET MORE.: NEVER TRUE

## 2025-03-04 ASSESSMENT — PATIENT HEALTH QUESTIONNAIRE - PHQ9
1. LITTLE INTEREST OR PLEASURE IN DOING THINGS: NOT AT ALL
2. FEELING DOWN, DEPRESSED OR HOPELESS: NOT AT ALL
SUM OF ALL RESPONSES TO PHQ QUESTIONS 1-9: 0

## 2025-03-04 ASSESSMENT — ENCOUNTER SYMPTOMS
COUGH: 0
NAUSEA: 0
BACK PAIN: 0
CHEST TIGHTNESS: 0
WHEEZING: 0
ABDOMINAL PAIN: 0
SHORTNESS OF BREATH: 1
VOMITING: 0
DIARRHEA: 0
CONSTIPATION: 0

## 2025-03-04 NOTE — TELEPHONE ENCOUNTER
Patient did not show for Follow up  with Vamsi Love on 3/4/25.    Reason:  no show    This is patient's first no show.  Patient was ano show on: 03/04/25.      Patient did not reschedule.  Reschedule date:  n/a    Called Pt, No VM option avaliable

## 2025-03-04 NOTE — PROGRESS NOTES
discharge.      Extraocular Movements: Extraocular movements intact.      Conjunctiva/sclera: Conjunctivae normal.      Pupils: Pupils are equal, round, and reactive to light.   Cardiovascular:      Rate and Rhythm: Normal rate and regular rhythm.      Heart sounds: Normal heart sounds. No murmur heard.     No friction rub. No gallop.   Pulmonary:      Effort: Pulmonary effort is normal. No respiratory distress.      Breath sounds: Normal breath sounds. No stridor. No wheezing, rhonchi or rales.   Musculoskeletal:         General: No deformity or signs of injury. Normal range of motion.      Cervical back: Normal range of motion and neck supple.      Right lower leg: No edema.      Left lower leg: No edema.   Skin:     General: Skin is warm and dry.      Capillary Refill: Capillary refill takes less than 2 seconds.   Neurological:      Mental Status: He is alert.   Psychiatric:         Mood and Affect: Mood normal.         Behavior: Behavior normal.         Assessment / Plan:     Patient is a 55-year-old male with medical history of hypertension, hyperlipidemia, GERD, COPD, new onset Type 2 DM, degenerative disc disease, history of acute diverticulitis, hx of lung nodules, right cystic kidney (which required no further evaluation per Radiology).      Hypertension  Controlled, at goal.   In office /60.  Continue with amlodipine 2.5 mg daily at this time.  Continue with at home BP log should hypotensive symptoms return.    Hyperlipidemia  Controlled, at goal based on previous lipid panel.   Continue on Atorvastatin 20 mg nightly.  Given below low-dose CT results, may need to start ASA 81 mg daily if new diagnosis of CAD.    Type 2 DM   New recent diagnosis however close to goal.  Patient was started on metformin 500 mg XR daily which we will continue at this time.  Continue with dietary/lifestyle modifications as already as discussed.  I have ordered repeat hemoglobin A1c today.  May need to consider medication

## 2025-04-04 DIAGNOSIS — J45.909 UNCOMPLICATED ASTHMA, UNSPECIFIED ASTHMA SEVERITY, UNSPECIFIED WHETHER PERSISTENT: Primary | ICD-10-CM

## 2025-04-04 RX ORDER — FLUTICASONE FUROATE, UMECLIDINIUM BROMIDE AND VILANTEROL TRIFENATATE 100; 62.5; 25 UG/1; UG/1; UG/1
POWDER RESPIRATORY (INHALATION)
Qty: 1 EACH | Refills: 2 | Status: SHIPPED | OUTPATIENT
Start: 2025-04-04

## 2025-04-07 ENCOUNTER — RESULTS FOLLOW-UP (OUTPATIENT)
Dept: PULMONOLOGY | Age: 55
End: 2025-04-07

## 2025-04-07 NOTE — RESULT ENCOUNTER NOTE
Patient has f/u scheduled.  No concerning findings.  Okay to wait for him to see pulmonary as scheduled.

## 2025-04-22 RX ORDER — MECLIZINE HCL 12.5 MG 12.5 MG/1
TABLET ORAL
Qty: 30 TABLET | Refills: 2 | Status: SHIPPED | OUTPATIENT
Start: 2025-04-22

## 2025-04-22 NOTE — TELEPHONE ENCOUNTER
Refill Request       Last Seen: Last Seen Department: 3/4/2025  Last Seen by PCP: 3/4/2025    Last Written: 3/4/25 30 with 3    Next Appointment:   Future Appointments   Date Time Provider Department Center   6/4/2025  2:30 PM Boone Delcid MD CLERM PULM [facility]       Requested Prescriptions     Pending Prescriptions Disp Refills    meclizine (ANTIVERT) 12.5 MG tablet [Pharmacy Med Name: MECLIZINE HYDROCHLORIDE 12.500 MG TABLET] 30 tablet 0     Sig: TAKE 1 TABLET BY MOUTH EVERY DAY AS NEEDED FOR DIZZINESS OR NAUSEA

## 2025-04-22 NOTE — TELEPHONE ENCOUNTER
Patients is out of this medication meclizine (ANTIVERT) 12.5 MG tablet   [facility]     Please advise   Patient name : celestina newman   Ph:[phone]

## 2025-06-04 ENCOUNTER — OFFICE VISIT (OUTPATIENT)
Dept: PULMONOLOGY | Age: 55
End: 2025-06-04
Payer: MEDICARE

## 2025-06-04 VITALS
RESPIRATION RATE: 17 BRPM | DIASTOLIC BLOOD PRESSURE: 70 MMHG | SYSTOLIC BLOOD PRESSURE: 122 MMHG | OXYGEN SATURATION: 96 % | WEIGHT: 167.6 LBS | BODY MASS INDEX: 28.61 KG/M2 | HEIGHT: 64 IN | HEART RATE: 102 BPM

## 2025-06-04 DIAGNOSIS — Z87.891 SMOKING HISTORY: Primary | ICD-10-CM

## 2025-06-04 PROCEDURE — 99214 OFFICE O/P EST MOD 30 MIN: CPT | Performed by: INTERNAL MEDICINE

## 2025-06-04 PROCEDURE — 3078F DIAST BP <80 MM HG: CPT | Performed by: INTERNAL MEDICINE

## 2025-06-04 PROCEDURE — 3074F SYST BP LT 130 MM HG: CPT | Performed by: INTERNAL MEDICINE

## 2025-06-04 RX ORDER — ROFLUMILAST 500 UG/1
500 TABLET ORAL DAILY
Qty: 30 TABLET | Refills: 1 | Status: SHIPPED | OUTPATIENT
Start: 2025-06-04

## 2025-06-04 NOTE — PROGRESS NOTES
[facility] Pulmonary, Critical Care and Sleep Specialists                                 Pulmonary Consult /Progress Note :                                                                [facility]  Pulmonary, Critical Care & Sleep Medicine Specialists                                               Pulmonary Clinic Consult     I had the pleasure of seeing  Venu Mejía     Chief Complaint   Patient presents with    Follow-up     CT 2-4       HISTORY OF PRESENT ILLNESS:    eVnu Mejía is a 55 y.o. year old  Who smoke 2 pack and jhas 80 PPY smoking history  Quit smoking 2019       The Patient comes in with SOB that has been going on the last few years and follow Dr linares , Associated with .mild cough       ,He  states that it get worse with exercise or walking long distance and he can walk 1 block And go 1-2 flight of stairs before get short winded    On Home O2 prn     Use Trelegy and albuterol as needed    Today visit  He has stopped smoking completely since 2019   SOB and LOZANO  Insurance declined his Daliresp   Had more episodes of flare up  Had significant improvement  End up in hospital after insurance decline his daliresp    ALLERGIES:    Allergies   Allergen Reactions    No Known Allergies        PAST MEDICAL HISTORY:       Diagnosis Date    Cervical disc herniation     COPD (chronic obstructive pulmonary disease) (HCC)     Diverticulitis     Diverticulitis 10/21/2022    Diverticulitis of colon with perforation 08/18/2022    Folate deficiency 01/2013    GERD (gastroesophageal reflux disease)     Hyperlipidemia 02/2014    Hypertriglyceridemia, Good HDL    Hypertension 12/12: age 43    Prediabetes 11/2016    Vitamin D deficiency 01/2013    resolved       MEDICATIONS:   Current Outpatient Medications   Medication Sig Dispense Refill    meclizine (ANTIVERT) 12.5 MG tablet TAKE 1 TABLET BY MOUTH EVERY DAY AS NEEDED FOR DIZZINESS OR NAUSEA 30 tablet 2

## 2025-06-05 ENCOUNTER — TELEPHONE (OUTPATIENT)
Dept: PULMONOLOGY | Age: 55
End: 2025-06-05

## 2025-06-05 NOTE — TELEPHONE ENCOUNTER
The medication roflumilast is APPROVED from 3/6/25 to 6/5/26.    Please notify the patient.    If this requires a response please respond to the pool ( P [facility] MEDICATION PRE-AUTH).

## 2025-06-05 NOTE — TELEPHONE ENCOUNTER
Pt called in this morning to say that his insurance will not cover roflumilast and Dr. Delcid told him to call and let him know if this happened. Please advise.

## 2025-06-05 NOTE — TELEPHONE ENCOUNTER
Submitted PA for Roflumilast 500MCG tablets   [facility] Key: [id number]  STATUS: PENDING.    Follow up done daily; if no decision with in three days we will refax.  If another three days goes by with no decision will call the insurance for status.

## 2025-07-11 DIAGNOSIS — J45.909 UNCOMPLICATED ASTHMA, UNSPECIFIED ASTHMA SEVERITY, UNSPECIFIED WHETHER PERSISTENT: ICD-10-CM

## 2025-07-12 RX ORDER — FLUTICASONE FUROATE, UMECLIDINIUM BROMIDE AND VILANTEROL TRIFENATATE 100; 62.5; 25 UG/1; UG/1; UG/1
POWDER RESPIRATORY (INHALATION)
Qty: 1 EACH | Refills: 2 | Status: SHIPPED | OUTPATIENT
Start: 2025-07-12

## 2025-08-05 DIAGNOSIS — Z87.891 SMOKING HISTORY: ICD-10-CM

## 2025-08-05 RX ORDER — ROFLUMILAST 500 UG/1
500 TABLET ORAL DAILY
Qty: 30 TABLET | Refills: 0 | Status: SHIPPED | OUTPATIENT
Start: 2025-08-05

## 2025-08-08 DIAGNOSIS — J45.909 UNCOMPLICATED ASTHMA, UNSPECIFIED ASTHMA SEVERITY, UNSPECIFIED WHETHER PERSISTENT: ICD-10-CM

## 2025-08-08 RX ORDER — FLUTICASONE FUROATE, UMECLIDINIUM BROMIDE AND VILANTEROL TRIFENATATE 100; 62.5; 25 UG/1; UG/1; UG/1
1 POWDER RESPIRATORY (INHALATION) DAILY
Qty: 1 EACH | Refills: 2 | Status: SHIPPED | OUTPATIENT
Start: 2025-08-08 | End: 2025-11-06

## 2025-08-11 DIAGNOSIS — J45.909 UNCOMPLICATED ASTHMA, UNSPECIFIED ASTHMA SEVERITY, UNSPECIFIED WHETHER PERSISTENT: ICD-10-CM

## 2025-08-14 RX ORDER — ALBUTEROL SULFATE 90 UG/1
INHALANT RESPIRATORY (INHALATION)
Qty: 8.5 G | Refills: 0 | Status: SHIPPED | OUTPATIENT
Start: 2025-08-14

## (undated) DEVICE — INVIEW CLEAR LEGGINGS: Brand: CONVERTORS

## (undated) DEVICE — GLOVE ORANGE PI 7 1/2   MSG9075

## (undated) DEVICE — STERILE POLYISOPRENE POWDER-FREE SURGICAL GLOVES: Brand: PROTEXIS

## (undated) DEVICE — SUTURE PERMAHAND SZ 3-0 L18IN NONABSORBABLE BLK L26MM SH C013D

## (undated) DEVICE — SUTURE VCRL + SZ 3-0 L18IN ABSRB UD SH 1/2 CIR TAPERCUT NDL VCP864D

## (undated) DEVICE — CASPAR DISTRACTION PIN 14MM: Brand: AESCULAP

## (undated) DEVICE — GOWN,AURORA,NONREINF,RAGLAN,XXL,STERILE: Brand: MEDLINE

## (undated) DEVICE — 1010 S-DRAPE TOWEL DRAPE 10/BX: Brand: STERI-DRAPE™

## (undated) DEVICE — TOOL 14MH30 LEGEND 14CM 3MM: Brand: MIDAS REX ™

## (undated) DEVICE — UNIVERSAL BLOCK TRAY: Brand: MEDLINE INDUSTRIES, INC.

## (undated) DEVICE — BLADE ES ELASTOMERIC COAT INSUL DURABLE BEND UPTO 90DEG

## (undated) DEVICE — GAUZE,SPONGE,4"X4",8PLY,STRL,LF,10/TRAY: Brand: MEDLINE

## (undated) DEVICE — BLADE ES L6IN ELASTOMERIC COAT EXT DURABLE BEND UPTO 90DEG

## (undated) DEVICE — GAUZE,SPONGE,4"X4",16PLY,STRL,LF,10/TRAY: Brand: MEDLINE

## (undated) DEVICE — SUTURE PERMA-HAND SZ 2-0 L30IN NONABSORBABLE BLK L26MM SH K833H

## (undated) DEVICE — ELECTRODE PT RET AD L9FT HI MOIST COND ADH HYDRGEL CORDED

## (undated) DEVICE — SUTURE VCRL SZ 3-0 L18IN ABSRB UD L26MM SH 1/2 CIR J864D

## (undated) DEVICE — TOWEL OR BLUEE 16X26IN ST 8 PACK ORB08 16X26ORTWL

## (undated) DEVICE — Device

## (undated) DEVICE — MAJOR SET UP PK

## (undated) DEVICE — STAPLER INT L75MM CUT LN L73MM STPL LN L77MM BLU B FRM 8

## (undated) DEVICE — CHLORAPREP 26ML ORANGE

## (undated) DEVICE — INTRO SHEATH T PEEL 17GAX8IN

## (undated) DEVICE — DRAIN SURG 10FR L1/8IN DIA3.2MM SIL CHN RND HUBLESS FULL

## (undated) DEVICE — SEALER ENDOSCP NANO COAT OPN DIV CRV L JAW LIGASURE IMPACT

## (undated) DEVICE — SOLUTION IV IRRIG POUR BRL 0.9% SODIUM CHL 2F7124

## (undated) DEVICE — KIT INFUS PMP 270ML 4ML/HR 2ML/SITE SOAK CATH L5IN N NARC

## (undated) DEVICE — ALCOHOL RUBBING 16OZ 70% ISO

## (undated) DEVICE — 3M™ TEGADERM™ TRANSPARENT FILM DRESSING FRAME STYLE, 1624W, 2-3/8 IN X 2-3/4 IN (6 CM X 7 CM), 100/CT 4CT/CASE: Brand: 3M™ TEGADERM™

## (undated) DEVICE — SUTURE PROL SZ 1 L30IN NONABSORBABLE BLU CTX L48MM 1/2 CIR 8455H

## (undated) DEVICE — SPONGE LAP W18XL18IN WHT COT 4 PLY FLD STRUNG RADPQ DISP ST

## (undated) DEVICE — RELOAD STPL L75MM OPN H3.8MM CLS 1.5MM WIRE DIA0.2MM REG

## (undated) DEVICE — LIMB HOLDER, WRIST/ANKLE: Brand: DEROYAL

## (undated) DEVICE — SOLUTION IV IRRIG 500ML 0.9% SODIUM CHL 2F7123

## (undated) DEVICE — SUTURE MCRYL + SZ 4-0 L18IN ABSRB UD L19MM PS-2 3/8 CIR MCP496G

## (undated) DEVICE — APPLICATOR MEDICATED 26 CC SOLUTION HI LT ORNG CHLORAPREP